# Patient Record
Sex: FEMALE | Race: BLACK OR AFRICAN AMERICAN | Employment: OTHER | URBAN - METROPOLITAN AREA
[De-identification: names, ages, dates, MRNs, and addresses within clinical notes are randomized per-mention and may not be internally consistent; named-entity substitution may affect disease eponyms.]

---

## 2017-01-13 DIAGNOSIS — R55 SYNCOPE AND COLLAPSE: ICD-10-CM

## 2017-01-13 DIAGNOSIS — I10 ESSENTIAL HYPERTENSION: ICD-10-CM

## 2017-01-14 RX ORDER — LOSARTAN POTASSIUM AND HYDROCHLOROTHIAZIDE 12.5; 5 MG/1; MG/1
1 TABLET ORAL DAILY
Qty: 90 TAB | Refills: 3 | Status: SHIPPED | OUTPATIENT
Start: 2017-01-14 | End: 2018-01-30 | Stop reason: SDUPTHER

## 2017-01-14 RX ORDER — MECLIZINE HCL 12.5 MG 12.5 MG/1
12.5 TABLET ORAL
Qty: 30 TAB | Refills: 3 | Status: SHIPPED | OUTPATIENT
Start: 2017-01-14 | End: 2018-05-24 | Stop reason: SDUPTHER

## 2017-01-23 ENCOUNTER — HOSPITAL ENCOUNTER (OUTPATIENT)
Dept: GENERAL RADIOLOGY | Age: 82
Discharge: HOME | End: 2017-01-23
Attending: INTERNAL MEDICINE
Payer: MEDICARE

## 2017-01-23 DIAGNOSIS — D50.8 IRON DEFICIENCY ANEMIA SECONDARY TO INADEQUATE DIETARY IRON INTAKE: ICD-10-CM

## 2017-01-23 PROCEDURE — 77075 RADEX OSSEOUS SURVEY COMPL: CPT

## 2017-02-14 ENCOUNTER — OFFICE VISIT (OUTPATIENT)
Dept: INTERNAL MEDICINE CLINIC | Age: 82
End: 2017-02-14

## 2017-02-14 VITALS
BODY MASS INDEX: 33.09 KG/M2 | SYSTOLIC BLOOD PRESSURE: 131 MMHG | TEMPERATURE: 98.8 F | DIASTOLIC BLOOD PRESSURE: 74 MMHG | WEIGHT: 179.8 LBS | HEART RATE: 67 BPM | OXYGEN SATURATION: 95 % | HEIGHT: 62 IN

## 2017-02-14 DIAGNOSIS — I10 ESSENTIAL HYPERTENSION: ICD-10-CM

## 2017-02-14 DIAGNOSIS — M17.0 PRIMARY OSTEOARTHRITIS OF BOTH KNEES: ICD-10-CM

## 2017-02-14 DIAGNOSIS — F41.9 ANXIETY TENSION STATE: ICD-10-CM

## 2017-02-14 DIAGNOSIS — M47.896 OTHER OSTEOARTHRITIS OF SPINE, LUMBAR REGION: ICD-10-CM

## 2017-02-14 DIAGNOSIS — D64.9 ANEMIA, UNSPECIFIED TYPE: Primary | ICD-10-CM

## 2017-02-14 DIAGNOSIS — E66.09 NON MORBID OBESITY DUE TO EXCESS CALORIES: ICD-10-CM

## 2017-02-14 DIAGNOSIS — N81.4 UTERINE PROLAPSE: ICD-10-CM

## 2017-02-14 PROBLEM — M47.816 OSTEOARTHRITIS OF LUMBAR SPINE: Status: ACTIVE | Noted: 2017-02-14

## 2017-02-14 NOTE — MR AVS SNAPSHOT
Visit Information Date & Time Provider Department Dept. Phone Encounter #  
 2/14/2017  2:30 PM Camilla Schulz 80 Sports [facility] and [place] [address] [id number] Follow-up Instructions Return in about 3 months (around 5/14/2017). Your Appointments 3/6/2017  9:30 AM  
Any with Mady Gallego MD  
[address] [place] [address] and Primary Care [facility]) Appt Note: 3 mo f/up [address] 1 [facility] [place]  
  
   
 [address] [id number]  
  
    
 11/29/2017 11:40 AM  
ESTABLISHED PATIENT with Samuel Carrillo MD  
[facility] ([facility]) Appt Note: 1 yr f/u  
 [address]  [address] [place] [address]  
[address] [address] Upcoming Health Maintenance Date Due  
 GLAUCOMA SCREENING Q2Y 4/19/1997 OSTEOPOROSIS SCREENING (DEXA) 4/19/1997 Pneumococcal 65+ High/Highest Risk (2 of 2 - PPSV23) 1/3/2017 ZOSTER VACCINE AGE 60> 3/8/2017* DTaP/Tdap/Td series (1 - Tdap) 3/8/2017* MEDICARE YEARLY EXAM 11/4/2017 *Topic was postponed. The date shown is not the original due date. Allergies as of 2/14/2017  Review Complete On: 2/14/2017 By: Mady Gallego MD  
 No Known Allergies Current Immunizations  Never Reviewed No immunizations on file. Not reviewed this visit You Were Diagnosed With   
  
 Codes Comments Anemia, unspecified type    -  Primary ICD-10-CM: D64.9 ICD-9-CM: 285.9 Anxiety tension state     ICD-10-CM: F41.9 ICD-9-CM: 300.00 Essential hypertension     ICD-10-CM: I10 
ICD-9-CM: 401.9 Primary osteoarthritis of both knees     ICD-10-CM: M17.0 ICD-9-CM: 715.16 Non morbid obesity due to excess calories     ICD-10-CM: E66.09 
ICD-9-CM: 278.00 Other osteoarthritis of spine, lumbar region     ICD-10-CM: M47.896 Uterine prolapse     ICD-10-CM: N81.4 ICD-9-CM: 618.1 Vitals BP Pulse Temp Height(growth percentile) Weight(growth percentile) SpO2  
 131/74 (BP 1 Location: Left arm, BP Patient Position: Sitting) 67 98.8 °F (37.1 °C) (Oral) 5' 2\" (1.575 m) 179 lb 12.8 oz (81.6 kg) 95% BMI OB Status Smoking Status 32.89 kg/m2 Hysterectomy Never Smoker BMI and BSA Data Body Mass Index Body Surface Area  
 32.89 kg/m 2 1.89 m 2 Preferred Pharmacy Pharmacy Name Phone [id number] [address] [phone] Your Updated Medication List  
  
   
This list is accurate as of: 2/14/17  3:57 PM.  Always use your most recent med list.  
  
  
  
  
 citalopram 20 mg tablet Commonly known as:  CELEXA  
1 tablet every morning  
  
 fluticasone 50 mcg/actuation nasal spray Commonly known as:  Caffie Euler 2 Sprays by Both Nostrils route daily. LORazepam 0.5 mg tablet Commonly known as:  ATIVAN Take 1 Tab by mouth two (2) times daily as needed for Anxiety. Max Daily Amount: 1 mg.  
  
 losartan-hydroCHLOROthiazide 50-12.5 mg per tablet Commonly known as:  HYZAAR Take 1 Tab by mouth daily. meclizine 25 mg Cap Take 12.5 mg by mouth every six (6) hours as needed (for vertigo). omeprazole 40 mg capsule Commonly known as:  PRILOSEC Take 1 Cap by mouth daily. traMADol 50 mg tablet Commonly known as:  ULTRAM  
Take 50 mg by mouth every six (6) hours as needed for Pain. We Performed the Following METABOLIC PANEL, BASIC [98972 CPT(R)] Follow-up Instructions Return in about 3 months (around 5/14/2017). Introducing [facility]! Silvia Clifford introduces [employer] patient portal. Now you can access parts of your medical record, email your doctor's office, and request medication refills online. 1. In your internet browser, go to [email or web address] 2. Click on the First Time User? Click Here link in the Sign In box. You will see the New Member Sign Up page. 3. Enter your Gifi Access Code exactly as it appears below. You will not need to use this code after youve completed the sign-up process. If you do not sign up before the expiration date, you must request a new code. · Gifi Access Code: [email or web address] Expires: 4/11/2017  2:46 PM 
 
4. Enter the last four digits of your Social Security Number (xxxx) and Date of Birth (mm/dd/yyyy) as indicated and click Submit. You will be taken to the next sign-up page. 5. Create a Gifi ID. This will be your Gifi login ID and cannot be changed, so think of one that is secure and easy to remember. 6. Create a Gifi password. You can change your password at any time. 7. Enter your Password Reset Question and Answer. This can be used at a later time if you forget your password. 8. Enter your e-mail address. You will receive e-mail notification when new information is available in [address] 9. Click Sign Up. You can now view and download portions of your medical record. 10. Click the Download Summary menu link to download a portable copy of your medical information. If you have questions, please visit the Frequently Asked Questions section of the Gifi website. Remember, Gifi is NOT to be used for urgent needs. For medical emergencies, dial 911. Now available from your iPhone and Android! Please provide this summary of care documentation to your next provider. Your primary care clinician is listed as Amarilis Eisenberg. If you have any questions after today's visit, please call [phone].

## 2017-02-14 NOTE — PROGRESS NOTES
[address] [facility] and Primary Care  [place] [address]  [address] [place] [id number]  Phone:  [phone]  Fax: [phone]       Chief Complaint   Patient presents with    Hypertension     follow up    . SUBJECTIVE:    Mayur Gama is a 80 y.o. female comes in for return visit stating that she has done fairly well. She sees multiple physicians regarding different medical problems. She is concerned about her psychiatric medicine because it is not doing what it formerly did before in terms of allowing some degree of solace. She was seeing Dr. Doug Vogt who actually placed her on her current medication. She is wondering if she needs to see him again. I also sent her to the hematologist for her anemia because of positive end spike on her serum protein electrophoresis. He told her that he would see her back in six months. She has severe osteoarthritis involving both knees. Fortunately it does not limit her ability to function. She remains on her antihypertensive medication as prescribed. She has had no cardiovascular events. Current Outpatient Prescriptions   Medication Sig Dispense Refill    losartan-hydroCHLOROthiazide (HYZAAR) 50-12.5 mg per tablet Take 1 Tab by mouth daily. 90 Tab 3    citalopram (CELEXA) 20 mg tablet 1 tablet every morning 30 Tab 11    LORazepam (ATIVAN) 0.5 mg tablet Take 1 Tab by mouth two (2) times daily as needed for Anxiety. Max Daily Amount: 1 mg. 50 Tab 5    omeprazole (PRILOSEC) 40 mg capsule Take 1 Cap by mouth daily. 30 Cap 11    traMADol (ULTRAM) 50 mg tablet Take 50 mg by mouth every six (6) hours as needed for Pain.  fluticasone (FLONASE) 50 mcg/actuation nasal spray 2 Sprays by Both Nostrils route daily. 1 Bottle 1    meclizine 25 mg Cap Take 12.5 mg by mouth every six (6) hours as needed (for vertigo).        Past Medical History   Diagnosis Date    Bradycardia     Essential hypertension     GERD (gastroesophageal reflux disease)     Psychiatric disorder      Anxiety     Past Surgical History   Procedure Laterality Date    Hx partial hysterectomy      Hx bunionectomy      Hx gyn       Status post PAULO    Hx orthopaedic       Status post bunionectomy     No Known Allergies      REVIEW OF SYSTEMS:  General: negative for - chills or fever  ENT: negative for - headaches, nasal congestion or tinnitus  Respiratory: negative for - cough, hemoptysis, shortness of breath or wheezing  Cardiovascular : negative for - chest pain, edema, palpitations or shortness of breath  Gastrointestinal: negative for - abdominal pain, blood in stools, heartburn or nausea/vomiting  Genito-Urinary: no dysuria, trouble voiding, or hematuria  Musculoskeletal: negative for - gait disturbance, joint pain, joint stiffness or joint swelling  Neurological: no TIA or stroke symptoms  Hematologic: no bruises, no bleeding, no swollen glands  Integument: no lumps, mole changes, nail changes or rash  Endocrine: no malaise/lethargy or unexpected weight changes      Social History     Social History    Marital status:      Spouse name: N/A    Number of children: N/A    Years of education: N/A     Social History Main Topics    Smoking status: Never Smoker    Smokeless tobacco: Never Used    Alcohol use No    Drug use: No    Sexual activity: Not Currently     Other Topics Concern    None     Social History Narrative     Family History   Problem Relation Age of Onset    Hypertension Mother        OBJECTIVE:    Visit Vitals    /74 (BP 1 Location: Left arm, BP Patient Position: Sitting)    Pulse 67    Temp 98.8 °F (37.1 °C) (Oral)    Ht 5' 2\" (1.575 m)    Wt 179 lb 12.8 oz (81.6 kg)    SpO2 95%    BMI 32.89 kg/m2     CONSTITUTIONAL: well , well nourished, appears age appropriate  EYES: perrla, eom intact  ENMT:moist mucous membranes, pharynx clear  NECK: supple.  Thyroid normal  RESPIRATORY: Chest: clear to ascultation and percussion CARDIOVASCULAR: Heart: regular rate and rhythm  GASTROINTESTINAL: Abdomen: soft, bowel sounds active  HEMATOLOGIC: no pathological lymph nodes palpated  MUSCULOSKELETAL: Extremities: no edema, pulse 1+, severe degenerative changes noted both knees  INTEGUMENT: No unusual rashes or suspicious skin lesions noted. Nails appear normal.  NEUROLOGIC: non-focal exam   MENTAL STATUS: alert and oriented, appropriate affect      ASSESSMENT:  1. Anemia, unspecified type    2. Anxiety tension state    3. Essential hypertension    4. Primary osteoarthritis of both knees    5. Non morbid obesity due to excess calories    6. Other osteoarthritis of spine, lumbar region    7. Uterine prolapse        PLAN:    1. Her anemia is under the auspices of the hematologist.  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 2. She has a tremendous amount of anxiety. She wishes to see a psychiatrist again, specifically Dr. Sarai Dobbs. I will attempt to facilitate this. 3. Her blood pressure is excellent today. No adjustments are made. 4. She has a rather severe osteoarthritis of both knees. They are reasonably stable at this point. 5. She is concerned about her weight thinking that she has lost a significant amount of weight. In reality she has lost about four pounds. I do not think this is pathological weight loss at this point. 6. She complains about pain in her right low back area radiating to her buttock. This is related to osteoarthritis of her back. Supportive measures only. 7. She complains about a cough but I do not find any pathology. 8. She also apparently has a uterine prolapse. She will have to follow-up with gynecology if she is concerned about this. Follow-up Disposition:  Return in about 3 months (around 5/14/2017).       Isa Horton MD

## 2017-02-14 NOTE — PROGRESS NOTES
Chief Complaint   Patient presents with    Hypertension     follow up    1. Have you been to the ER, urgent care clinic since your last visit? Hospitalized since your last visit? No    2. Have you seen or consulted any other health care providers outside of the [facility] since your last visit? Include any pap smears or colon screening.  No

## 2017-02-15 LAB
BUN SERPL-MCNC: 14 MG/DL (ref 8–27)
BUN/CREAT SERPL: 16 (ref 11–26)
CALCIUM SERPL-MCNC: 9.5 MG/DL (ref 8.7–10.3)
CHLORIDE SERPL-SCNC: 100 MMOL/L (ref 96–106)
CO2 SERPL-SCNC: 25 MMOL/L (ref 18–29)
CREAT SERPL-MCNC: 0.87 MG/DL (ref 0.57–1)
GLUCOSE SERPL-MCNC: 102 MG/DL (ref 65–99)
POTASSIUM SERPL-SCNC: 3.7 MMOL/L (ref 3.5–5.2)
SODIUM SERPL-SCNC: 139 MMOL/L (ref 134–144)

## 2017-04-17 ENCOUNTER — HOSPITAL ENCOUNTER (OUTPATIENT)
Dept: MAMMOGRAPHY | Age: 82
Discharge: HOME | End: 2017-04-17
Attending: INTERNAL MEDICINE
Payer: MEDICARE

## 2017-04-17 DIAGNOSIS — Z12.31 VISIT FOR SCREENING MAMMOGRAM: ICD-10-CM

## 2017-04-17 PROCEDURE — 77067 SCR MAMMO BI INCL CAD: CPT

## 2017-05-05 ENCOUNTER — OFFICE VISIT (OUTPATIENT)
Dept: INTERNAL MEDICINE CLINIC | Age: 82
End: 2017-05-05

## 2017-05-05 VITALS
TEMPERATURE: 97.5 F | DIASTOLIC BLOOD PRESSURE: 73 MMHG | WEIGHT: 180.8 LBS | HEIGHT: 62 IN | BODY MASS INDEX: 33.27 KG/M2 | SYSTOLIC BLOOD PRESSURE: 120 MMHG | HEART RATE: 56 BPM | RESPIRATION RATE: 18 BRPM | OXYGEN SATURATION: 97 %

## 2017-05-05 DIAGNOSIS — F41.9 ANXIETY TENSION STATE: ICD-10-CM

## 2017-05-05 DIAGNOSIS — K21.00 REFLUX ESOPHAGITIS: Primary | ICD-10-CM

## 2017-05-05 DIAGNOSIS — I10 ESSENTIAL HYPERTENSION: ICD-10-CM

## 2017-05-05 DIAGNOSIS — G44.219 EPISODIC TENSION-TYPE HEADACHE, NOT INTRACTABLE: ICD-10-CM

## 2017-05-05 DIAGNOSIS — D64.9 ANEMIA, UNSPECIFIED TYPE: ICD-10-CM

## 2017-05-05 RX ORDER — PANTOPRAZOLE SODIUM 40 MG/1
40 TABLET, DELAYED RELEASE ORAL DAILY
Qty: 30 TAB | Refills: 11 | Status: SHIPPED | OUTPATIENT
Start: 2017-05-05 | End: 2018-03-14 | Stop reason: ALTCHOICE

## 2017-05-05 NOTE — PROGRESS NOTES
Chief Complaint   Patient presents with    Abdominal Pain   1. Have you been to the ER, urgent care clinic since your last visit? Hospitalized since your last visit? No    2. Have you seen or consulted any other health care providers outside of the [address] [place] [address] since your last visit? Include any pap smears or colon screening.  No

## 2017-05-05 NOTE — MR AVS SNAPSHOT
Visit Information Date & Time Provider Department Dept. Phone Encounter #  
 5/5/2017 11:30 AM Marianne Johns MD [facility] Medicine and Primary Care [phone] [id number] Your Appointments 5/18/2017  9:15 AM  
Any with Marianne Johns MD  
[address] [facility] and Primary Care [address] [facility]) Appt Note: 3 month f/u  
 [address] 1 [facility]  
  
   
 [address] [id number]  
  
    
 11/29/2017 11:40 AM  
ESTABLISHED PATIENT with Emeterio Butt MD  
[facility] ([facility]) Appt Note: 1 yr f/u  
 [address]  [address] [place] [address]  
[address] [address] Upcoming Health Maintenance Date Due DTaP/Tdap/Td series (1 - Tdap) 4/19/1953 ZOSTER VACCINE AGE 60> 4/19/1992 GLAUCOMA SCREENING Q2Y 4/19/1997 OSTEOPOROSIS SCREENING (DEXA) 4/19/1997 Pneumococcal 65+ High/Highest Risk (2 of 2 - PPSV23) 1/3/2017 INFLUENZA AGE 9 TO ADULT 8/1/2017 MEDICARE YEARLY EXAM 11/4/2017 Allergies as of 5/5/2017  Review Complete On: 2/14/2017 By: Marianne Johns MD  
 No Known Allergies Current Immunizations  Never Reviewed No immunizations on file. Not reviewed this visit You Were Diagnosed With   
  
 Codes Comments Reflux esophagitis    -  Primary ICD-10-CM: K21.0 ICD-9-CM: 530.11 Essential hypertension     ICD-10-CM: I10 
ICD-9-CM: 401.9 Anxiety tension state     ICD-10-CM: F41.9 ICD-9-CM: 300.00 Episodic tension-type headache, not intractable     ICD-10-CM: G32.321 ICD-9-CM: 339.11 Anemia, unspecified type     ICD-10-CM: D64.9 ICD-9-CM: 821. 9 Vitals BP Pulse Temp Resp Height(growth percentile) Weight(growth percentile) 120/73 (BP 1 Location: Left arm, BP Patient Position: Sitting) (!) 56 97.5 °F (36.4 °C) (Oral) 18 5' 2\" (1.575 m) 180 lb 12.8 oz (82 kg) SpO2 BMI OB Status Smoking Status 97% 33.07 kg/m2 Hysterectomy Never Smoker Vitals History BMI and BSA Data Body Mass Index Body Surface Area 33.07 kg/m 2 1.89 m 2 [id number] [place] [address] [address] [phone] Your Updated Medication List  
  
   
This list is accurate as of: 5/5/17  1:46 PM.  Always use your most recent med list.  
  
  
  
  
 citalopram 20 mg tablet Commonly known as:  CELEXA  
1 tablet every morning  
  
 fluticasone 50 mcg/actuation nasal spray Commonly known as:  [place]ick [place] 2 Sprays by Both Nostrils route daily. LORazepam 0.5 mg tablet Commonly known as:  ATIVAN Take 1 Tab by mouth two (2) times daily as needed for Anxiety. Max Daily Amount: 1 mg.  
  
 losartan-hydroCHLOROthiazide 50-12.5 mg per tablet Commonly known as:  HYZAAR Take 1 Tab by mouth daily. meclizine 25 mg Cap Take 12.5 mg by mouth every six (6) hours as needed (for vertigo). pantoprazole 40 mg tablet Commonly known as:  PROTONIX Take 1 Tab by mouth daily. traMADol 50 mg tablet Commonly known as:  ULTRAM  
Take 50 mg by mouth every six (6) hours as needed for Pain. Prescriptions Sent to Pharmacy Refills  
 pantoprazole (PROTONIX) 40 mg tablet 11 Sig: Take 1 Tab by mouth daily. Class: Normal  
 [id number] [place] [address] [address] Ph #: [phone] Route: Oral  
  
We Performed the Following CBC WITH AUTOMATED DIFF [12986 CPT(R)] METABOLIC PANEL, BASIC [26845 CPT(R)] Introducing [facility]! Hazel Jacobson introduces [employer] patient portal. Now you can access parts of your medical record, email your doctor's office, and request medication refills online. 1. In your internet browser, go to [email or web address] 2. Click on the First Time User? Click Here link in the Sign In box. You will see the New Member Sign Up page. 3. Enter your [email or web address] Access Code exactly as it appears below. You will not need to use this code after youve completed the sign-up process. If you do not sign up before the expiration date, you must request a new code. · [email or web address] Access Code: [email or web address] Expires: 7/16/2017  9:20 AM 
 
4. Enter the last four digits of your Social Security Number (xxxx) and Date of Birth (mm/dd/yyyy) as indicated and click Submit. You will be taken to the next sign-up page. 5. Create a [email or web address] ID. This will be your [email or web address] login ID and cannot be changed, so think of one that is secure and easy to remember. 6. Create a [email or web address] password. You can change your password at any time. 7. Enter your Password Reset Question and Answer. This can be used at a later time if you forget your password. 8. Enter your e-mail address. You will receive e-mail notification when new information is available in [address] 9. Click Sign Up. You can now view and download portions of your medical record. 10. Click the Download Summary menu link to download a portable copy of your medical information. If you have questions, please visit the Frequently Asked Questions section of the [email or web address] website. Remember, [email or web address] is NOT to be used for urgent needs. For medical emergencies, dial 911. Now available from your iPhone and Android! Please provide this summary of care documentation to your next provider. Your primary care clinician is listed as Amarilis Eisenberg. If you have any questions after today's visit, please call [phone].

## 2017-05-06 LAB
BASOPHILS # BLD AUTO: 0 X10E3/UL (ref 0–0.2)
BASOPHILS NFR BLD AUTO: 0 %
BUN SERPL-MCNC: 16 MG/DL (ref 8–27)
BUN/CREAT SERPL: 19 (ref 12–28)
CALCIUM SERPL-MCNC: 9.4 MG/DL (ref 8.7–10.3)
CHLORIDE SERPL-SCNC: 102 MMOL/L (ref 96–106)
CO2 SERPL-SCNC: 24 MMOL/L (ref 18–29)
CREAT SERPL-MCNC: 0.84 MG/DL (ref 0.57–1)
EOSINOPHIL # BLD AUTO: 0.1 X10E3/UL (ref 0–0.4)
EOSINOPHIL NFR BLD AUTO: 1 %
ERYTHROCYTE [DISTWIDTH] IN BLOOD BY AUTOMATED COUNT: 20.6 % (ref 12.3–15.4)
GLUCOSE SERPL-MCNC: 89 MG/DL (ref 65–99)
HCT VFR BLD AUTO: 32.8 % (ref 34–46.6)
HGB BLD-MCNC: 10.8 G/DL (ref 11.1–15.9)
IMM GRANULOCYTES # BLD: 0 X10E3/UL (ref 0–0.1)
IMM GRANULOCYTES NFR BLD: 0 %
LYMPHOCYTES # BLD AUTO: 2.7 X10E3/UL (ref 0.7–3.1)
LYMPHOCYTES NFR BLD AUTO: 39 %
MCH RBC QN AUTO: 31.3 PG (ref 26.6–33)
MCHC RBC AUTO-ENTMCNC: 32.9 G/DL (ref 31.5–35.7)
MCV RBC AUTO: 95 FL (ref 79–97)
MONOCYTES # BLD AUTO: 0.4 X10E3/UL (ref 0.1–0.9)
MONOCYTES NFR BLD AUTO: 6 %
NEUTROPHILS # BLD AUTO: 3.7 X10E3/UL (ref 1.4–7)
NEUTROPHILS NFR BLD AUTO: 54 %
PLATELET # BLD AUTO: 194 X10E3/UL (ref 150–379)
POTASSIUM SERPL-SCNC: 4.3 MMOL/L (ref 3.5–5.2)
RBC # BLD AUTO: 3.45 X10E6/UL (ref 3.77–5.28)
SODIUM SERPL-SCNC: 140 MMOL/L (ref 134–144)
WBC # BLD AUTO: 6.9 X10E3/UL (ref 3.4–10.8)

## 2017-05-06 NOTE — PROGRESS NOTES
[address] [facility] and Primary Care  [place] [address]  [address] [place] [address]  Phone:  [phone]  Fax: [phone]       Chief Complaint   Patient presents with    Abdominal Pain   . SUBJECTIVE:    Laurie Perez is a 80 y.o. female comes in for return visit. She continues to complain about the antics of her . She additionally complains of dyspeptic symptoms accompanied by reflux symptoms. She has a bitter taste in her mouth periodically. She continues to be quite anxious. She has episodic headaches which she has had previously which have not changed in quality or character. She has seen neurologists on multiple occasions for this. Finally I sent her a neurologist because of an M spike. Current Outpatient Prescriptions   Medication Sig Dispense Refill    pantoprazole (PROTONIX) 40 mg tablet Take 1 Tab by mouth daily. 30 Tab 11    losartan-hydroCHLOROthiazide (HYZAAR) 50-12.5 mg per tablet Take 1 Tab by mouth daily. 90 Tab 3    citalopram (CELEXA) 20 mg tablet 1 tablet every morning 30 Tab 11    LORazepam (ATIVAN) 0.5 mg tablet Take 1 Tab by mouth two (2) times daily as needed for Anxiety. Max Daily Amount: 1 mg. 50 Tab 5    traMADol (ULTRAM) 50 mg tablet Take 50 mg by mouth every six (6) hours as needed for Pain.  fluticasone (FLONASE) 50 mcg/actuation nasal spray 2 Sprays by Both Nostrils route daily. 1 Bottle 1    meclizine 25 mg Cap Take 12.5 mg by mouth every six (6) hours as needed (for vertigo).        Past Medical History:   Diagnosis Date    Bradycardia     Essential hypertension     GERD (gastroesophageal reflux disease)     Psychiatric disorder     Anxiety     Past Surgical History:   Procedure Laterality Date    HX BUNIONECTOMY      HX GYN      Status post PAULO    HX ORTHOPAEDIC      Status post bunionectomy    HX PARTIAL HYSTERECTOMY       No Known Allergies      REVIEW OF SYSTEMS:  General: negative for - chills or fever  ENT: negative for - headaches, nasal congestion or tinnitus  Respiratory: negative for - cough, hemoptysis, shortness of breath or wheezing  Cardiovascular : negative for - chest pain, edema, palpitations or shortness of breath  Gastrointestinal: negative for - abdominal pain, blood in stools, heartburn or nausea/vomiting  Genito-Urinary: no dysuria, trouble voiding, or hematuria  Musculoskeletal: negative for - gait disturbance, joint pain, joint stiffness or joint swelling  Neurological: no TIA or stroke symptoms  Hematologic: no bruises, no bleeding, no swollen glands  Integument: no lumps, mole changes, nail changes or rash  Endocrine: no malaise/lethargy or unexpected weight changes      Social History     Social History    Marital status:      Spouse name: N/A    Number of children: N/A    Years of education: N/A     Social History Main Topics    Smoking status: Never Smoker    Smokeless tobacco: Never Used    Alcohol use No    Drug use: No    Sexual activity: Not Currently     Other Topics Concern    None     Social History Narrative     Family History   Problem Relation Age of Onset    Hypertension Mother        OBJECTIVE:    Visit Vitals    /73 (BP 1 Location: Left arm, BP Patient Position: Sitting)    Pulse (!) 56    Temp 97.5 °F (36.4 °C) (Oral)    Resp 18    Ht 5' 2\" (1.575 m)    Wt 180 lb 12.8 oz (82 kg)    SpO2 97%    BMI 33.07 kg/m2     CONSTITUTIONAL: well , well nourished, appears age appropriate  EYES: perrla, eom intact  ENMT:moist mucous membranes, pharynx clear  NECK: supple. Thyroid normal  RESPIRATORY: Chest: clear to ascultation and percussion   CARDIOVASCULAR: Heart: regular rate and rhythm  GASTROINTESTINAL: Abdomen: soft, bowel sounds active  HEMATOLOGIC: no pathological lymph nodes palpated  MUSCULOSKELETAL: Extremities: no edema, pulse 1+   INTEGUMENT: No unusual rashes or suspicious skin lesions noted.  Nails appear normal.  NEUROLOGIC: non-focal exam   MENTAL STATUS: alert and oriented, appropriate affect      ASSESSMENT:  1. Reflux esophagitis    2. Essential hypertension    3. Anxiety tension state    4. Episodic tension-type headache, not intractable    5. Anemia, unspecified type        PLAN:    1. She appears to have reflux esophagitis. She will be started on Protonix because the Omeprazole did not work. Hopefully this will help. She was told to take this on a daily basis. 2. Blood pressure is excellent today. No adjustments are made. 3. She is chronically anxious. She will take her minor tranquilizer as needed. She however remains quite functional.    4. She has chronic muscle tension headaches. Nothing more need be done. 5. As far as her anemia is concerned, repeat CBC will be done. 6. She remains quite physically active which is great. .  Orders Placed This Encounter    CBC WITH AUTOMATED DIFF    METABOLIC PANEL, BASIC    pantoprazole (PROTONIX) 40 mg tablet         Follow-up Disposition:  Return in about 3 months (around 8/5/2017).       Chris Cline MD

## 2017-05-30 ENCOUNTER — OFFICE VISIT (OUTPATIENT)
Dept: INTERNAL MEDICINE CLINIC | Age: 82
End: 2017-05-30

## 2017-05-30 VITALS
HEIGHT: 62 IN | RESPIRATION RATE: 18 BRPM | DIASTOLIC BLOOD PRESSURE: 68 MMHG | TEMPERATURE: 98 F | BODY MASS INDEX: 33.31 KG/M2 | SYSTOLIC BLOOD PRESSURE: 135 MMHG | HEART RATE: 85 BPM | WEIGHT: 181 LBS

## 2017-05-30 DIAGNOSIS — T14.8XXA HEMATOMA: Primary | ICD-10-CM

## 2017-05-30 DIAGNOSIS — D64.9 ANEMIA, UNSPECIFIED TYPE: ICD-10-CM

## 2017-05-30 NOTE — MR AVS SNAPSHOT
Visit Information Date & Time Provider Department Dept. Phone Encounter #  
 5/30/2017 11:45 AM Camilla Willoughby 80 Sports Medicine and [place] 34 [id number] Your Appointments 8/31/2017 11:45 AM  
Any with Bennett Christensen MD  
[address] [place] [address] and Primary Care [address] [facility]) Appt Note: 3 month f/u  
 [address] [facility]  
  
   
 [address] [id number]  
  
    
 11/29/2017 11:40 AM  
ESTABLISHED PATIENT with Celestia Meigs, MD  
[facility] ([facility]) Appt Note: 1 yr f/u  
 [address] [facility] [address] [place] [address]  
[address] [facility] [facility] [address] [address] Upcoming Health Maintenance Date Due DTaP/Tdap/Td series (1 - Tdap) 4/19/1953 ZOSTER VACCINE AGE 60> 4/19/1992 GLAUCOMA SCREENING Q2Y 4/19/1997 OSTEOPOROSIS SCREENING (DEXA) 4/19/1997 Pneumococcal 65+ High/Highest Risk (2 of 2 - PPSV23) 1/3/2017 INFLUENZA AGE 9 TO ADULT 8/1/2017 MEDICARE YEARLY EXAM 11/4/2017 Allergies as of 5/30/2017  Review Complete On: 5/30/2017 By: Alber Arguello LPN No Known Allergies Current Immunizations  Never Reviewed No immunizations on file. Not reviewed this visit Vitals BP Pulse Temp Resp Height(growth percentile) Weight(growth percentile) 135/68 85 98 °F (36.7 °C) 18 5' 2\" (1.575 m) 181 lb (82.1 kg) BMI OB Status Smoking Status 33.11 kg/m2 Hysterectomy Never Smoker BMI and BSA Data Body Mass Index Body Surface Area  
 33.11 kg/m 2 1.9 m 2 Preferred Pharmacy Pharmacy Name Phone [phone] [facility], [place] [address] Britton Torres [phone] Your Updated Medication List  
  
   
This list is accurate as of: 5/30/17  1:57 PM.  Always use your most recent med list.  
  
  
  
  
 citalopram 20 mg tablet Commonly known as:  CELEXA  
1 tablet every morning  
  
 fluticasone 50 mcg/actuation nasal spray Commonly known as:  Grossman Elizabeth 2 Sprays by Both Nostrils route daily. LORazepam 0.5 mg tablet Commonly known as:  ATIVAN Take 1 Tab by mouth two (2) times daily as needed for Anxiety. Max Daily Amount: 1 mg.  
  
 losartan-hydroCHLOROthiazide 50-12.5 mg per tablet Commonly known as:  HYZAAR Take 1 Tab by mouth daily. meclizine 25 mg Cap Take 12.5 mg by mouth every six (6) hours as needed (for vertigo). pantoprazole 40 mg tablet Commonly known as:  PROTONIX Take 1 Tab by mouth daily. traMADol 50 mg tablet Commonly known as:  ULTRAM  
Take 50 mg by mouth every six (6) hours as needed for Pain. Introducing [facility] & HEALTH SERVICES! [facility] introduces GLO patient portal. Now you can access parts of your medical record, email your doctor's office, and request medication refills online. 1. In your internet browser, go to [email or web address] 2. Click on the First Time User? Click Here link in the Sign In box. You will see the New Member Sign Up page. 3. Enter your GLO Access Code exactly as it appears below. You will not need to use this code after youve completed the sign-up process. If you do not sign up before the expiration date, you must request a new code. · GLO Access Code: [email or web address] Expires: 7/16/2017  9:20 AM 
 
4. Enter the last four digits of your Social Security Number (xxxx) and Date of Birth (mm/dd/yyyy) as indicated and click Submit. You will be taken to the next sign-up page. 5. Create a NWIXt ID. This will be your GLO login ID and cannot be changed, so think of one that is secure and easy to remember. 6. Create a GLO password. You can change your password at any time. 7. Enter your Password Reset Question and Answer. This can be used at a later time if you forget your password. 8. Enter your e-mail address. You will receive e-mail notification when new information is available in [address] 9. Click Sign Up. You can now view and download portions of your medical record. 10. Click the Download Summary menu link to download a portable copy of your medical information. If you have questions, please visit the Frequently Asked Questions section of the [employer] website. Remember, [employer] is NOT to be used for urgent needs. For medical emergencies, dial 911. Now available from your iPhone and Android! Please provide this summary of care documentation to your next provider. Your primary care clinician is listed as Amarilis Eisenberg. If you have any questions after today's visit, please call [phone].

## 2017-05-30 NOTE — PROGRESS NOTES
Chief Complaint   Patient presents with    Mass     on the top of right foot, since this morning, no injury

## 2017-05-31 NOTE — PROGRESS NOTES
Chief Complaint   Patient presents with    Mass     on the top of right foot, since this morning, no injury   . SUBECTIVE:    Florencio Frankel is a 80 y.o. female comes in for return visit concerned about a nodule on the ventral aspect of her right foot. She states that it was never painful and does not remember bumping the area. She also has a mild anemia with a positive M spike. She is seeing the hematologist regarding this. Current Outpatient Prescriptions   Medication Sig Dispense Refill    pantoprazole (PROTONIX) 40 mg tablet Take 1 Tab by mouth daily. 30 Tab 11    losartan-hydroCHLOROthiazide (HYZAAR) 50-12.5 mg per tablet Take 1 Tab by mouth daily. 90 Tab 3    citalopram (CELEXA) 20 mg tablet 1 tablet every morning 30 Tab 11    LORazepam (ATIVAN) 0.5 mg tablet Take 1 Tab by mouth two (2) times daily as needed for Anxiety. Max Daily Amount: 1 mg. 50 Tab 5    traMADol (ULTRAM) 50 mg tablet Take 50 mg by mouth every six (6) hours as needed for Pain.  fluticasone (FLONASE) 50 mcg/actuation nasal spray 2 Sprays by Both Nostrils route daily. 1 Bottle 1    meclizine 25 mg Cap Take 12.5 mg by mouth every six (6) hours as needed (for vertigo).        Past Medical History:   Diagnosis Date    Bradycardia     Essential hypertension     GERD (gastroesophageal reflux disease)     Psychiatric disorder     Anxiety     Past Surgical History:   Procedure Laterality Date    HX BUNIONECTOMY      HX GYN      Status post PAULO    HX ORTHOPAEDIC      Status post bunionectomy    HX PARTIAL HYSTERECTOMY       No Known Allergies    REVIEW OF SYSTEMS:  Review of Systems - Negative except   ENT ROS: negative for - headaches, hearing change, nasal congestion, oral lesions, tinnitus, visual changes or   Respiratory ROS: no cough, shortness of breath, or wheezing  Cardiovascular ROS: no chest pain or dyspnea on exertion  Gastrointestinal ROS: no abdominal pain, change in bowel habits, or black or blood  Genito-Urinary ROS: no dysuria, trouble voiding, or hematuria  Musculoskeletal ROS: negative  Neurological ROS: no TIA or stroke symptoms      Social History     Social History    Marital status:      Spouse name: N/A    Number of children: N/A    Years of education: N/A     Social History Main Topics    Smoking status: Never Smoker    Smokeless tobacco: Never Used    Alcohol use No    Drug use: No    Sexual activity: Not Currently     Other Topics Concern    None     Social History Narrative   r  Family History   Problem Relation Age of Onset    Hypertension Mother        OBJECTIVE:  Visit Vitals    /68    Pulse 85    Temp 98 °F (36.7 °C)    Resp 18    Ht 5' 2\" (1.575 m)    Wt 181 lb (82.1 kg)    BMI 33.11 kg/m2     ENT: perrla,  eom intact  NECK: supple. Thyroid normal, no JVD  CHEST: clear to ascultation and percussion   HEART: regular rate and rhythm  ABD: soft, bowel sounds active,   EXTREMITIES: no edema, pulse 1+  INTEGUMENT: clear      ASSESSMENT:  1. Hematoma    2. Anemia, unspecified type        PLAN:    1. The lesion on her foot is suggestive of a hematoma. This is not a new one but it has been present for about a week. She has no pain in that area. I explained to her that this should resolve in the next two to three weeks. I do not think this is a ganglion cyst in view of the discoloration that is present. 2. She asks me about following up with the hematologist for her anemia which I strongly suggest she do this. She also can discontinue the iron which was inappropriately started by her previous physician. Follow-up Disposition:  Return in about 3 months (around 8/30/2017).       Rodger Kam MD

## 2017-07-14 RX ORDER — HYDROXYZINE 25 MG/1
25 TABLET, FILM COATED ORAL
Qty: 60 TAB | Refills: 0 | Status: SHIPPED | OUTPATIENT
Start: 2017-07-14 | End: 2017-07-24

## 2017-08-31 ENCOUNTER — OFFICE VISIT (OUTPATIENT)
Dept: INTERNAL MEDICINE CLINIC | Age: 82
End: 2017-08-31

## 2017-08-31 VITALS
SYSTOLIC BLOOD PRESSURE: 137 MMHG | TEMPERATURE: 97.9 F | BODY MASS INDEX: 33.42 KG/M2 | HEART RATE: 56 BPM | RESPIRATION RATE: 16 BRPM | WEIGHT: 181.6 LBS | DIASTOLIC BLOOD PRESSURE: 76 MMHG | OXYGEN SATURATION: 94 % | HEIGHT: 62 IN

## 2017-08-31 DIAGNOSIS — G44.219 EPISODIC TENSION-TYPE HEADACHE, NOT INTRACTABLE: Primary | ICD-10-CM

## 2017-08-31 DIAGNOSIS — I10 ESSENTIAL HYPERTENSION: ICD-10-CM

## 2017-08-31 DIAGNOSIS — D64.9 ANEMIA, UNSPECIFIED TYPE: ICD-10-CM

## 2017-08-31 DIAGNOSIS — F41.9 ANXIETY TENSION STATE: ICD-10-CM

## 2017-08-31 RX ORDER — CITALOPRAM 20 MG/1
20 TABLET, FILM COATED ORAL DAILY
COMMUNITY
End: 2017-10-20 | Stop reason: CLARIF

## 2017-08-31 RX ORDER — ESOMEPRAZOLE MAGNESIUM 40 MG/1
20 CAPSULE, DELAYED RELEASE ORAL DAILY
COMMUNITY
End: 2018-03-14 | Stop reason: ALTCHOICE

## 2017-08-31 NOTE — MR AVS SNAPSHOT
Visit Information Date & Time Provider Department Dept. Phone Encounter #  
 8/31/2017 11:45 AM [facility] Phillips [facility] [id number] Your Appointments 11/29/2017 11:40 AM  
ESTABLISHED PATIENT with Argelia Saleh MD  
[facility] ([facility]) Appt Note: 1 yr f/u  
 [address] [facility] [address] [facility] [address]  
[address] [facility] [address]  
  
    
 12/7/2017 10:45 AM  
Any with Elvis Deshpande MD  
[address] [place] [address] and Primary Care [facility]) Appt Note: 3 month f/u  
 [address] [facility] 1 [facility]  
  
   
 [address] [facility] [address] Upcoming Health Maintenance Date Due DTaP/Tdap/Td series (1 - Tdap) 4/19/1953 ZOSTER VACCINE AGE 60> 2/19/1992 GLAUCOMA SCREENING Q2Y 4/19/1997 OSTEOPOROSIS SCREENING (DEXA) 4/19/1997 Pneumococcal 65+ High/Highest Risk (2 of 2 - PPSV23) 1/3/2017 INFLUENZA AGE 9 TO ADULT 8/1/2017 MEDICARE YEARLY EXAM 11/4/2017 Allergies as of 8/31/2017  Review Complete On: 8/31/2017 By: Leanna Ferrera No Known Allergies Current Immunizations  Never Reviewed No immunizations on file. Not reviewed this visit You Were Diagnosed With   
  
 Codes Comments Episodic tension-type headache, not intractable    -  Primary ICD-10-CM: A67.769 ICD-9-CM: 339.11 Anxiety tension state     ICD-10-CM: F41.9 ICD-9-CM: 300.00 Anemia, unspecified type     ICD-10-CM: D64.9 ICD-9-CM: 285.9 Essential hypertension     ICD-10-CM: I10 
ICD-9-CM: 401.9 Vitals BP Pulse Temp Resp Height(growth percentile) Weight(growth percentile)  
 137/76 (BP 1 Location: Left arm, BP Patient Position: Sitting) (!) 56 97.9 °F (36.6 °C) (Oral) 16 5' 2\" (1.575 m) 181 lb 9.6 oz (82.4 kg) SpO2 BMI OB Status Smoking Status 94% 33.22 kg/m2 Hysterectomy Never Smoker Vitals History BMI and BSA Data Body Mass Index Body Surface Area  
 33.22 kg/m 2 1.9 m 2 Preferred Pharmacy Pharmacy Name Phone [phone] [place] [address] Dwayne Antoine [phone] Your Updated Medication List  
  
   
This list is accurate as of: 8/31/17  1:54 PM.  Always use your most recent med list.  
  
  
  
  
 CENTRUM SILVER PO Take 1 Tab by mouth daily. citalopram 20 mg tablet Commonly known as:  Alexandria Sink Take 20 mg by mouth daily. esomeprazole 40 mg capsule Commonly known as:  Rick Moulds Take 20 mg by mouth daily. Take 1 capsule by mouth once daily 1 to 2 hours before dinner  
  
 fluticasone 50 mcg/actuation nasal spray Commonly known as:  [employer] 2 Sprays by Both Nostrils route daily. LORazepam 0.5 mg tablet Commonly known as:  ATIVAN  
TAKE ONE TABLET BY MOUTH TWICE A DAY AS NEEDED FOR ANXIETY. MAX DAILY DOSE 1MG  
  
 losartan-hydroCHLOROthiazide 50-12.5 mg per tablet Commonly known as:  HYZAAR Take 1 Tab by mouth daily. meclizine 25 mg Cap Take 12.5 mg by mouth every six (6) hours as needed (for vertigo). pantoprazole 40 mg tablet Commonly known as:  PROTONIX Take 1 Tab by mouth daily. traMADol 50 mg tablet Commonly known as:  ULTRAM  
Take 50 mg by mouth every six (6) hours as needed for Pain. Introducing \[facility]\"" & HEALTH SERVICES! Marixa Lundberg introduces TopLog patient portal. Now you can access parts of your medical record, email your doctor's office, and request medication refills online. 1. In your internet browser, go to [email or web address] 2. Click on the First Time User? Click Here link in the Sign In box. You will see the New Member Sign Up page. 3. Enter your TopLog Access Code exactly as it appears below. You will not need to use this code after youve completed the sign-up process.  If you do not sign up before the expiration date, you must request a new code. · Edvivo Access Code: [email or web address] Expires: 11/29/2017  1:54 PM 
 
4. Enter the last four digits of your Social Security Number (xxxx) and Date of Birth (mm/dd/yyyy) as indicated and click Submit. You will be taken to the next sign-up page. 5. Create a Edvivo ID. This will be your Edvivo login ID and cannot be changed, so think of one that is secure and easy to remember. 6. Create a Edvivo password. You can change your password at any time. 7. Enter your Password Reset Question and Answer. This can be used at a later time if you forget your password. 8. Enter your e-mail address. You will receive e-mail notification when new information is available in [address] 9. Click Sign Up. You can now view and download portions of your medical record. 10. Click the Download Summary menu link to download a portable copy of your medical information. If you have questions, please visit the Frequently Asked Questions section of the Ed[employer]ivo website. Remember, Edvivo is NOT to be used for urgent needs. For medical emergencies, dial 911. Now available from your iPhone and Android! Please provide this summary of care documentation to your next provider. Your primary care clinician is listed as Amarilis Eisenberg. If you have any questions after today's visit, please call [phone].

## 2017-08-31 NOTE — PROGRESS NOTES
Chief Complaint   Patient presents with    Anemia     3 month follow up      1. Have you been to the ER, urgent care clinic since your last visit? Hospitalized since your last visit? No    2. Have you seen or consulted any other health care providers outside of the [address] [place] [address] since your last visit? Include any pap smears or colon screening.  No

## 2017-08-31 NOTE — PROGRESS NOTES
Chief Complaint   Patient presents with    Anemia     3 month follow up    . SUBECTIVE:    Sandy Wang is a 80 y.o. female Comes in for return visit stating that she has done fairly well. Her biggest complaint is of headaches, frontal and vertex in locations. She has had chronic headaches. There is nothing new and different about these. She has occasional nausea. She had a GI workup by Dr. Zabrina Aguiar and is essentially negative. She is quite stressed because of her . She needs help in the home. He is quite disabled and it is difficult for her to care for him. Current Outpatient Prescriptions   Medication Sig Dispense Refill    citalopram (CELEXA) 20 mg tablet Take 20 mg by mouth daily.  esomeprazole (NEXIUM) 40 mg capsule Take 20 mg by mouth daily. Take 1 capsule by mouth once daily 1 to 2 hours before dinner      FOLIC ACID/MULTIVIT-MIN/LUTEIN (CENTRUM SILVER PO) Take 1 Tab by mouth daily.  LORazepam (ATIVAN) 0.5 mg tablet TAKE ONE TABLET BY MOUTH TWICE A DAY AS NEEDED FOR ANXIETY. MAX DAILY DOSE 1MG 50 Tab 5    pantoprazole (PROTONIX) 40 mg tablet Take 1 Tab by mouth daily. 30 Tab 11    losartan-hydroCHLOROthiazide (HYZAAR) 50-12.5 mg per tablet Take 1 Tab by mouth daily. 90 Tab 3    fluticasone (FLONASE) 50 mcg/actuation nasal spray 2 Sprays by Both Nostrils route daily. 1 Bottle 1    meclizine 25 mg Cap Take 12.5 mg by mouth every six (6) hours as needed (for vertigo).  traMADol (ULTRAM) 50 mg tablet Take 50 mg by mouth every six (6) hours as needed for Pain.        Past Medical History:   Diagnosis Date    Bradycardia     Essential hypertension     GERD (gastroesophageal reflux disease)     Psychiatric disorder     Anxiety     Past Surgical History:   Procedure Laterality Date    HX BUNIONECTOMY      HX GYN      Status post PAULO    HX ORTHOPAEDIC      Status post bunionectomy    HX PARTIAL HYSTERECTOMY       No Known Allergies    REVIEW OF SYSTEMS:  Review of Systems - Negative except   ENT ROS: negative for - headaches, hearing change, nasal congestion, oral lesions, tinnitus, visual changes or   Respiratory ROS: no cough, shortness of breath, or wheezing  Cardiovascular ROS: no chest pain or dyspnea on exertion  Gastrointestinal ROS: no abdominal pain, change in bowel habits, or black or blood  Genito-Urinary ROS: no dysuria, trouble voiding, or hematuria  Musculoskeletal ROS: negative  Neurological ROS: no TIA or stroke symptoms      Social History     Social History    Marital status:      Spouse name: N/A    Number of children: N/A    Years of education: N/A     Social History Main Topics    Smoking status: Never Smoker    Smokeless tobacco: Never Used    Alcohol use No    Drug use: No    Sexual activity: Not Currently     Other Topics Concern    None     Social History Narrative   r  Family History   Problem Relation Age of Onset    Hypertension Mother        OBJECTIVE:  Visit Vitals    /76 (BP 1 Location: Left arm, BP Patient Position: Sitting)    Pulse (!) 56    Temp 97.9 °F (36.6 °C) (Oral)    Resp 16    Ht 5' 2\" (1.575 m)    Wt 181 lb 9.6 oz (82.4 kg)    SpO2 94%    BMI 33.22 kg/m2     ENT: perrla,  eom intact  NECK: supple. Thyroid normal, no JVD  CHEST: clear to ascultation and percussion   HEART: regular rate and rhythm  ABD: soft, bowel sounds active,   EXTREMITIES: no edema, pulse 1+  INTEGUMENT: clear      ASSESSMENT:  1. Episodic tension-type headache, not intractable    2. Anxiety tension state    3. Anemia, unspecified type    4. Essential hypertension        PLAN:    1. The patient has muscle tension headaches. The quality and character are unchanged from her baseline headaches. Continue symptomatic treatment. She has a tremendous amount of anxiety and will take her anxiolytic as needed.    2. She has an anemia that is accurately followed by hematology for the possibility of plasma cell dyscrasia. 3. Blood pressure is excellent. No adjustments are made. .  Orders Placed This Encounter    citalopram (CELEXA) 20 mg tablet    esomeprazole (NEXIUM) 40 mg capsule    FOLIC ACID/MULTIVIT-MIN/LUTEIN (CENTRUM SILVER PO)       Follow-up Disposition:  Return in about 4 months (around 12/31/2017).       Niya Yeboah MD

## 2017-10-20 RX ORDER — CITALOPRAM 20 MG/1
TABLET, FILM COATED ORAL
Qty: 30 TAB | Refills: 11 | Status: SHIPPED | OUTPATIENT
Start: 2017-10-20 | End: 2018-08-18 | Stop reason: SDUPTHER

## 2017-11-29 ENCOUNTER — OFFICE VISIT (OUTPATIENT)
Dept: CARDIOLOGY CLINIC | Age: 82
End: 2017-11-29

## 2017-11-29 VITALS
WEIGHT: 182.2 LBS | BODY MASS INDEX: 33.53 KG/M2 | HEIGHT: 62 IN | HEART RATE: 72 BPM | DIASTOLIC BLOOD PRESSURE: 80 MMHG | SYSTOLIC BLOOD PRESSURE: 120 MMHG

## 2017-11-29 DIAGNOSIS — I10 ESSENTIAL HYPERTENSION: ICD-10-CM

## 2017-11-29 DIAGNOSIS — I49.5 SSS (SICK SINUS SYNDROME) (HCC): Primary | ICD-10-CM

## 2017-11-29 DIAGNOSIS — M17.0 PRIMARY OSTEOARTHRITIS OF BOTH KNEES: ICD-10-CM

## 2017-11-29 DIAGNOSIS — E66.09 NON MORBID OBESITY DUE TO EXCESS CALORIES: ICD-10-CM

## 2017-11-29 RX ORDER — ASPIRIN 81 MG/1
TABLET ORAL DAILY
COMMUNITY
End: 2017-11-29 | Stop reason: ALTCHOICE

## 2017-11-29 NOTE — MR AVS SNAPSHOT
Visit Information Date & Time Provider Department Dept. Phone Encounter #  
 11/29/2017 11:40 AM Nishant Wesley MD [facility] Zayda Macdonald [phone] [id number] Your Appointments 11/29/2017 11:40 AM  
ESTABLISHED PATIENT with Nishant Wesley MD  
[facility] ([facility]) Appt Note: 1 yr f/u  
 [address] [facility] [address] [place] [address]  
[address] [place] [address]  
  
    
 12/7/2017 10:45 AM  
Any with Marissa Campo MD  
[address] [place] [address] and [facility] Appt Note: 3 month f/u  
 [address] 1 [facility]  
  
   
 [address] [id number] Upcoming Health Maintenance Date Due DTaP/Tdap/Td series (1 - Tdap) 4/19/1953 ZOSTER VACCINE AGE 60> 2/19/1992 GLAUCOMA SCREENING Q2Y 4/19/1997 OSTEOPOROSIS SCREENING (DEXA) 4/19/1997 Pneumococcal 65+ Low/Medium Risk (1 of 2 - PCV13) 4/19/1997 Influenza Age 5 to Adult 8/1/2017 MEDICARE YEARLY EXAM 11/4/2017 Allergies as of 11/29/2017  Review Complete On: 11/29/2017 By: Omayra Johnson LPN No Known Allergies Current Immunizations  Never Reviewed No immunizations on file. Not reviewed this visit Vitals BP Pulse Height(growth percentile) Weight(growth percentile) BMI OB Status 120/80 72 5' 2\" (1.575 m) 182 lb 3.2 oz (82.6 kg) 33.32 kg/m2 Hysterectomy Smoking Status Never Smoker Vitals History BMI and BSA Data Body Mass Index Body Surface Area  
 33.32 kg/m 2 1.9 m 2 Your Updated Medication List  
  
   
This list is accurate as of: 11/29/17  9:53 AM.  Always use your most recent med list.  
  
  
  
  
 aspirin delayed-release 81 mg tablet Take  by mouth daily. CENTRUM SILVER PO Take 1 Tab by mouth daily. citalopram 20 mg tablet Commonly known as:  Sabi Eye take 1 tablet by mouth once daily  
  
 esomeprazole 40 mg capsule Commonly known as:  Haley Sarna Take 20 mg by mouth daily. Take 1 capsule by mouth once daily 1 to 2 hours before dinner  
  
 fluticasone 50 mcg/actuation nasal spray Commonly known as:  Euna Devin 2 Sprays by Both Nostrils route daily. LORazepam 0.5 mg tablet Commonly known as:  ATIVAN  
TAKE ONE TABLET BY MOUTH TWICE A DAY AS NEEDED FOR ANXIETY. MAX DAILY DOSE 1MG  
  
 losartan-hydroCHLOROthiazide 50-12.5 mg per tablet Commonly known as:  HYZAAR Take 1 Tab by mouth daily. meclizine 25 mg Cap Take 12.5 mg by mouth every six (6) hours as needed (for vertigo). pantoprazole 40 mg tablet Commonly known as:  PROTONIX Take 1 Tab by mouth daily. traMADol 50 mg tablet Commonly known as:  ULTRAM  
Take 50 mg by mouth every six (6) hours as needed for Pain. Introducing [facility] & HEALTH SERVICES! Domingo Lancaster introduces Oodle patient portal. Now you can access parts of your medical record, email your doctor's office, and request medication refills online. 1. In your internet browser, go to [email or web address] 2. Click on the First Time User? Click Here link in the Sign In box. You will see the New Member Sign Up page. 3. Enter your Oodle Access Code exactly as it appears below. You will not need to use this code after youve completed the sign-up process. If you do not sign up before the expiration date, you must request a new code. · Oodle Access Code: [email or web address] Expires: 11/29/2017 12:54 PM 
 
4. Enter the last four digits of your Social Security Number (xxxx) and Date of Birth (mm/dd/yyyy) as indicated and click Submit. You will be taken to the next sign-up page. 5. Create a EntropySoftt ID. This will be your EntropySoftt login ID and cannot be changed, so think of one that is secure and easy to remember. 6. Create a EntropySoftt password. You can change your password at any time. 7. Enter your Password Reset Question and Answer. This can be used at a later time if you forget your password. 8. Enter your e-mail address. You will receive e-mail notification when new information is available in [address] 9. Click Sign Up. You can now view and download portions of your medical record. 10. Click the Download Summary menu link to download a portable copy of your medical information. If you have questions, please visit the Frequently Asked Questions section of the NetLex website. Remember, NetLex is NOT to be used for urgent needs. For medical emergencies, dial 911. Now available from your iPhone and Android! Please provide this summary of care documentation to your next provider. Your primary care clinician is listed as Amarilis Eisenberg. If you have any questions after today's visit, please call [phone].

## 2017-11-29 NOTE — PROGRESS NOTES
HISTORY OF PRESENT ILLNESS  Analilia Flaherty is a 80 y.o. female. She is seen in followup for sick sinus syndrome and hypertension. She continues to have a great deal of stress caring for her  who is over [de-identified] years of age. She is unsteady and uses a cane. Her  has lost a lot of weight. She does complain of some problems with reflux and anxiety. She also has anemia and has an appointment to see Dr. Saundra Leach in February in this regard. Her anemia workup has been unrevealing thus far. HPI  Patient Active Problem List   Diagnosis Code    Syncope and collapse R55    Obesity E66.9    Bradycardia R00.1    HTN (hypertension) I10    Sinus congestion R09.81    SSS (sick sinus syndrome) (HCC) I49.5    Vasomotor rhinitis J30.0    Non morbid obesity due to excess calories E66.09    Anemia D64.9    Primary osteoarthritis of both knees M17.0    Benign recurrent vertigo H81.10    Anxiety tension state F41.9    Dyspepsia R10.13    Uterine prolapse N81.4    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     Current Outpatient Prescriptions   Medication Sig Dispense Refill    citalopram (CELEXA) 20 mg tablet take 1 tablet by mouth once daily 30 Tab 11    esomeprazole (NEXIUM) 40 mg capsule Take 20 mg by mouth daily. Take 1 capsule by mouth once daily 1 to 2 hours before dinner      LORazepam (ATIVAN) 0.5 mg tablet TAKE ONE TABLET BY MOUTH TWICE A DAY AS NEEDED FOR ANXIETY. MAX DAILY DOSE 1MG 50 Tab 5    losartan-hydroCHLOROthiazide (HYZAAR) 50-12.5 mg per tablet Take 1 Tab by mouth daily. 90 Tab 3    meclizine 25 mg Cap Take 12.5 mg by mouth every six (6) hours as needed (for vertigo).  FOLIC ACID/MULTIVIT-MIN/LUTEIN (CENTRUM SILVER PO) Take 1 Tab by mouth daily.  pantoprazole (PROTONIX) 40 mg tablet Take 1 Tab by mouth daily. 30 Tab 11    traMADol (ULTRAM) 50 mg tablet Take 50 mg by mouth every six (6) hours as needed for Pain.       fluticasone (FLONASE) 50 mcg/actuation nasal spray 2 Sprays by Both Nostrils route daily. 1 Bottle 1     Past Medical History:   Diagnosis Date    Bradycardia     Essential hypertension     GERD (gastroesophageal reflux disease)     Psychiatric disorder     Anxiety     Past Surgical History:   Procedure Laterality Date    HX BUNIONECTOMY      HX GYN      Status post PAULO    HX ORTHOPAEDIC      Status post bunionectomy    HX PARTIAL HYSTERECTOMY         Review of Systems   Skin: Positive for itching. Psychiatric/Behavioral: The patient is nervous/anxious. All other systems reviewed and are negative. Visit Vitals    /80    Pulse 72    Ht 5' 2\" (1.575 m)    Wt 182 lb 3.2 oz (82.6 kg)    BMI 33.32 kg/m2       Physical Exam   Constitutional: She is oriented to person, place, and time. She appears well-nourished. HENT:   Head: Atraumatic. Eyes: Conjunctivae are normal.   Neck: Neck supple. Cardiovascular: Normal rate, regular rhythm and normal heart sounds. Exam reveals no gallop and no friction rub. No murmur heard. Pulmonary/Chest: Breath sounds normal. She has no wheezes. Abdominal: Bowel sounds are normal.   Musculoskeletal: She exhibits no edema. Neurological: She is oriented to person, place, and time. Skin: Skin is dry. Nursing note and vitals reviewed. ASSESSMENT and PLAN  Her blood pressure is well controlled today and her heart rate is 72 beats per minute. She previously had a low heart rate and syncope on carvedilol. I have encouraged her to keep her appointments with her other physicians in the future. I will see her back in six months.

## 2017-12-26 ENCOUNTER — OFFICE VISIT (OUTPATIENT)
Dept: INTERNAL MEDICINE CLINIC | Age: 82
End: 2017-12-26

## 2017-12-26 VITALS
DIASTOLIC BLOOD PRESSURE: 71 MMHG | HEIGHT: 62 IN | WEIGHT: 180.2 LBS | OXYGEN SATURATION: 96 % | RESPIRATION RATE: 16 BRPM | HEART RATE: 62 BPM | SYSTOLIC BLOOD PRESSURE: 109 MMHG | BODY MASS INDEX: 33.16 KG/M2 | TEMPERATURE: 97.5 F

## 2017-12-26 DIAGNOSIS — I10 ESSENTIAL HYPERTENSION: ICD-10-CM

## 2017-12-26 DIAGNOSIS — Z00.00 WELLNESS EXAMINATION: ICD-10-CM

## 2017-12-26 DIAGNOSIS — Z13.39 SCREENING FOR ALCOHOLISM: ICD-10-CM

## 2017-12-26 DIAGNOSIS — K21.00 REFLUX ESOPHAGITIS: Primary | ICD-10-CM

## 2017-12-26 DIAGNOSIS — G44.229 CHRONIC TENSION-TYPE HEADACHE, NOT INTRACTABLE: ICD-10-CM

## 2017-12-26 DIAGNOSIS — Z13.31 SCREENING FOR DEPRESSION: ICD-10-CM

## 2017-12-26 DIAGNOSIS — E66.09 NON MORBID OBESITY DUE TO EXCESS CALORIES: ICD-10-CM

## 2017-12-26 RX ORDER — DEXTROMETHORPHAN HYDROBROMIDE, GUAIFENESIN 5; 100 MG/5ML; MG/5ML
650 LIQUID ORAL EVERY 8 HOURS
COMMUNITY
End: 2018-03-14 | Stop reason: ALTCHOICE

## 2017-12-26 RX ORDER — SUCRALFATE 1 G/1
1 TABLET ORAL
Qty: 42 TAB | Refills: 0 | Status: SHIPPED | OUTPATIENT
Start: 2017-12-26 | End: 2018-03-14 | Stop reason: ALTCHOICE

## 2017-12-26 NOTE — PATIENT INSTRUCTIONS

## 2017-12-26 NOTE — MR AVS SNAPSHOT
Visit Information Date & Time Provider Department Dept. Phone Encounter #  
 12/26/2017 12:00 PM Ricky Mcdaniel MD [facility] [phone] [id number] Your Appointments 5/30/2018 11:40 AM  
ESTABLISHED PATIENT with Parul Stacy MD  
[facility] ([facility] SCHEDULING) Appt Note: 6 MO F/U  
 [address] [facility] [address] [place] [address] [address] [place], [place] [address] [facility] 7 [phone] Upcoming Health Maintenance Date Due  
 GLAUCOMA SCREENING Q2Y 4/19/1997 OSTEOPOROSIS SCREENING (DEXA) 4/19/1997 MEDICARE YEARLY EXAM 11/4/2017 Pneumococcal 65+ Low/Medium Risk (2 of 2 - PPSV23) 10/10/2018 DTaP/Tdap/Td series (2 - Td) 12/26/2027 Allergies as of 12/26/2017  Review Complete On: 12/26/2017 By: Emily Valenzuela LPN No Known Allergies Current Immunizations  Never Reviewed No immunizations on file. Not reviewed this visit You Were Diagnosed With   
  
 Codes Comments Reflux esophagitis    -  Primary ICD-10-CM: K21.0 ICD-9-CM: 530.11 Chronic tension-type headache, not intractable     ICD-10-CM: U91.195 ICD-9-CM: 339.12 Essential hypertension     ICD-10-CM: I10 
ICD-9-CM: 401.9 Non morbid obesity due to excess calories     ICD-10-CM: E66.09 
ICD-9-CM: 278.00 Vitals BP Pulse Temp Resp Height(growth percentile) Weight(growth percentile) 109/71 62 97.5 °F (36.4 °C) (Oral) 16 5' 2\" (1.575 m) 180 lb 3.2 oz (81.7 kg) SpO2 BMI OB Status Smoking Status 96% 32.96 kg/m2 Hysterectomy Never Smoker Vitals History BMI and BSA Data Body Mass Index Body Surface Area  
 32.96 kg/m 2 1.89 m 2 Preferred Pharmacy Pharmacy Name Phone [id number] [address] [phone] Your Updated Medication List  
  
   
 This list is accurate as of: 12/26/17  1:22 PM.  Always use your most recent med list.  
  
  
  
  
 CENTRUM SILVER PO Take 1 Tab by mouth daily. citalopram 20 mg tablet Commonly known as:  CELEXA  
take 1 tablet by mouth once daily  
  
 esomeprazole 40 mg capsule Commonly known as:  Arleene Markell Take 20 mg by mouth daily. Take 1 capsule by mouth once daily 1 to 2 hours before dinner  
  
 fluticasone 50 mcg/actuation nasal spray Commonly known as:  Montine Manderson 2 Sprays by Both Nostrils route daily. LORazepam 0.5 mg tablet Commonly known as:  ATIVAN  
TAKE ONE TABLET BY MOUTH TWICE A DAY AS NEEDED FOR ANXIETY. MAX DAILY DOSE 1MG  
  
 losartan-hydroCHLOROthiazide 50-12.5 mg per tablet Commonly known as:  HYZAAR Take 1 Tab by mouth daily. meclizine 25 mg Cap Take 12.5 mg by mouth every six (6) hours as needed (for vertigo). pantoprazole 40 mg tablet Commonly known as:  PROTONIX Take 1 Tab by mouth daily. sucralfate 1 gram tablet Commonly known as:  Wilhelminia Amor Take 1 Tab by mouth Before breakfast, lunch, and dinner. traMADol 50 mg tablet Commonly known as:  ULTRAM  
Take 50 mg by mouth every six (6) hours as needed for Pain. TYLENOL ARTHRITIS PAIN 650 mg Michelle Glass Generic drug:  acetaminophen Take 650 mg by mouth every eight (8) hours. Prescriptions Sent to Pharmacy Refills  
 sucralfate (CARAFATE) 1 gram tablet 0 Sig: Take 1 Tab by mouth Before breakfast, lunch, and dinner. Class: Normal  
 [facility]acy: [id number] [address] [place] [address] #: [phone] Route: Oral  
  
Introducing [facility]! [facility] introduces [employer] patient portal. Now you can access parts of your medical record, email your doctor's office, and request medication refills online. 1. In your internet browser, go to [email or web address] 2. Click on the First Time User? Click Here link in the Sign In box. You will see the New Member Sign Up page. 3. Enter your [employer] Access Code exactly as it appears below. You will not need to use this code after youve completed the sign-up process. If you do not sign up before the expiration date, you must request a new code. · [employer] Access Code: [email or web address] Expires: 3/26/2018  1:22 PM 
 
4. Enter the last four digits of your Social Security Number (xxxx) and Date of Birth (mm/dd/yyyy) as indicated and click Submit. You will be taken to the next sign-up page. 5. Create a [employer] ID. This will be your MicroV[employer] login ID and cannot be changed, so think of one that is secure and easy to remember. 6. Create a [employer] password. You can change your password at any time. 7. Enter your Password Reset Question and Answer. This can be used at a later time if you forget your password. 8. Enter your e-mail address. You will receive e-mail notification when new information is available in [address] 9. Click Sign Up. You can now view and download portions of your medical record. 10. Click the Download Summary menu link to download a portable copy of your medical information. If you have questions, please visit the Frequently Asked Questions section of the [employer] website. Remember, [employer] is NOT to be used for urgent needs. For medical emergencies, dial 911. Now available from your iPhone and Android! Please provide this summary of care documentation to your next provider. Your primary care clinician is listed as Amarilis Eisenberg. If you have any questions after today's visit, please call [phone].

## 2017-12-26 NOTE — PROGRESS NOTES
1. Have you been to the ER, urgent care clinic since your last visit? Hospitalized since your last visit? No    2. Have you seen or consulted any other health care providers outside of the [address] [place] [address] since your last visit? Include any pap smears or colon screening.  No     Complaints of really bad headaches

## 2018-01-30 ENCOUNTER — OFFICE VISIT (OUTPATIENT)
Dept: INTERNAL MEDICINE CLINIC | Age: 83
End: 2018-01-30

## 2018-01-30 VITALS
RESPIRATION RATE: 16 BRPM | HEART RATE: 56 BPM | WEIGHT: 181.5 LBS | DIASTOLIC BLOOD PRESSURE: 79 MMHG | TEMPERATURE: 97.6 F | SYSTOLIC BLOOD PRESSURE: 125 MMHG | HEIGHT: 62 IN | OXYGEN SATURATION: 97 % | BODY MASS INDEX: 33.4 KG/M2

## 2018-01-30 DIAGNOSIS — M19.041 PRIMARY OSTEOARTHRITIS OF BOTH HANDS: ICD-10-CM

## 2018-01-30 DIAGNOSIS — L85.3 XEROSIS OF SKIN: Primary | ICD-10-CM

## 2018-01-30 DIAGNOSIS — I10 ESSENTIAL HYPERTENSION: ICD-10-CM

## 2018-01-30 DIAGNOSIS — I49.5 SSS (SICK SINUS SYNDROME) (HCC): ICD-10-CM

## 2018-01-30 DIAGNOSIS — D64.9 ANEMIA, UNSPECIFIED TYPE: ICD-10-CM

## 2018-01-30 DIAGNOSIS — M19.042 PRIMARY OSTEOARTHRITIS OF BOTH HANDS: ICD-10-CM

## 2018-01-30 DIAGNOSIS — E66.09 NON MORBID OBESITY DUE TO EXCESS CALORIES: ICD-10-CM

## 2018-01-30 RX ORDER — LOSARTAN POTASSIUM AND HYDROCHLOROTHIAZIDE 12.5; 5 MG/1; MG/1
1 TABLET ORAL DAILY
Qty: 90 TAB | Refills: 3 | Status: SHIPPED | OUTPATIENT
Start: 2018-01-30 | End: 2018-08-18 | Stop reason: SDUPTHER

## 2018-01-30 NOTE — MR AVS SNAPSHOT
[address] [place] [address]. [address] [id number] 
[phone] Patient: Ahmet Brasher MRN: [id number] TOÑO:[birth year] Visit Information Date & Time Provider Department Dept. Phone Encounter #  
 1/30/2018 12:00 PM Chico Arcos MD [facility] [phone] [id number] Your Appointments 5/30/2018 11:40 AM  
ESTABLISHED PATIENT with Janki Patiño MD  
[facility] ([facility] SCHEDUL[facility]) Appt Note: 6 MO F/U  
 [address] [place] [address]  
[address] [phone] Upcoming Health Maintenance Date Due Pneumococcal 65+ Low/Medium Risk (2 of 2 - PPSV23) 10/10/2018 MEDICARE YEARLY EXAM 12/27/2018 GLAUCOMA SCREENING Q2Y 1/30/2020 DTaP/Tdap/Td series (2 - Td) 12/26/2027 Allergies as of 1/30/2018  Review Complete On: 1/30/2018 By: Nikita Becerril No Known Allergies Current Immunizations  Never Reviewed No immunizations on file. Not reviewed this visit You Were Diagnosed With   
  
 Codes Comments Essential hypertension     ICD-10-CM: I10 
ICD-9-CM: 401.9 Vitals BP Pulse Temp Resp Height(growth percentile) Weight(growth percentile) 125/79 (BP 1 Location: Left arm, BP Patient Position: Sitting) (!) 56 97.6 °F (36.4 °C) (Oral) 16 5' 2\" (1.575 m) 181 lb 8 oz (82.3 kg) SpO2 BMI OB Status Smoking Status 97% 33.2 kg/m2 Hysterectomy Never Smoker Vitals History BMI and BSA Data Body Mass Index Body Surface Area  
 33.2 kg/m 2 1.9 m 2 Preferred Pharmacy Pharmacy Name Phone [id number]- [place], [place] - [address] [phone] Your Updated Medication List  
  
   
This list is accurate as of: 1/30/18  1:59 PM.  Always use your most recent med list.  
  
  
  
  
 CENTRUM SILVER PO  
 Take 1 Tab by mouth daily. citalopram 20 mg tablet Commonly known as:  CELEXA  
take 1 tablet by mouth once daily  
  
 esomeprazole 40 mg capsule Commonly known as:  Dimitri Rash Take 20 mg by mouth daily. Take 1 capsule by mouth once daily 1 to 2 hours before dinner  
  
 fluticasone 50 mcg/actuation nasal spray Commonly known as:  Ejrri Jamar 2 Sprays by Both Nostrils route daily. LORazepam 0.5 mg tablet Commonly known as:  ATIVAN  
TAKE ONE TABLET BY MOUTH TWICE A DAY AS NEEDED FOR ANXIETY. MAX DAILY DOSE 1MG  
  
 losartan-hydroCHLOROthiazide 50-12.5 mg per tablet Commonly known as:  HYZAAR Take 1 Tab by mouth daily. meclizine 25 mg Cap Take 12.5 mg by mouth every six (6) hours as needed (for vertigo). pantoprazole 40 mg tablet Commonly known as:  PROTONIX Take 1 Tab by mouth daily. sucralfate 1 gram tablet Commonly known as:  Es Shutters Take 1 Tab by mouth Before breakfast, lunch, and dinner. traMADol 50 mg tablet Commonly known as:  ULTRAM  
Take 50 mg by mouth every six (6) hours as needed for Pain. TYLENOL ARTHRITIS PAIN 650 mg [facility] [employer] Generic drug:  acetaminophen Take 650 mg by mouth every eight (8) hours. Introducing [facility]! [facility] introduces [employer] patient portal. Now you can access parts of your medical record, email your doctor's office, and request medication refills online. 1. In your internet browser, go to [email or web address] 2. Click on the First Time User? Click Here link in the Sign In box. You will see the New Member Sign Up page. 3. Enter your [employer] Access Code exactly as it appears below. You will not need to use this code after youve completed the sign-up process. If you do not sign up before the expiration date, you must request a new code. · [employer] Access Code: [email or web address] Expires: 3/26/2018  1:22 PM 
 
 4. Enter the last four digits of your Social Security Number (xxxx) and Date of Birth (mm/dd/yyyy) as indicated and click Submit. You will be taken to the next sign-up page. 5. Create a [employer] ID. This will be your [employer] login ID and cannot be changed, so think of one that is secure and easy to remember. 6. Create a [employer] password. You can change your password at any time. 7. Enter your Password Reset Question and Answer. This can be used at a later time if you forget your password. 8. Enter your e-mail address. You will receive e-mail notification when new information is available in [address] 9. Click Sign Up. You can now view and download portions of your medical record. 10. Click the Download Summary menu link to download a portable copy of your medical information. If you have questions, please visit the Frequently Asked Questions section of the [employer] website. Remember, [employer] is NOT to be used for urgent needs. For medical emergencies, dial 911. Now available from your iPhone and Android! Please provide this summary of care documentation to your next provider. Your primary care clinician is listed as Amarilis Eisenberg. If you have any questions after today's visit, please call [phone].

## 2018-01-30 NOTE — PROGRESS NOTES
[facility]  [place] [address]  [address]  [place] [address]  Phone:  [phone]  Fax: [phone]       Chief Complaint   Patient presents with    Head Pain    Ankle swelling    Other     Patient complains of \"bump\" on lower lip that \"comes and goes\". .      SUBJECTIVE:    Brandan Moctezuma is a 80 y.o. female   Comes in quite anxious as is always the case. She is concerned about her skin that is quite dry and ashy. She has significant pain in her hands aggravated with movement although when she puts her hand in warm water, it feels much better. She has been taking her antihypertensive medication as prescribed. There has been no meaningful weight loss, but fortunately, she has not gained any. 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 MedDATA/gwo            Current Outpatient Prescriptions   Medication Sig Dispense Refill    losartan-hydroCHLOROthiazide (HYZAAR) 50-12.5 mg per tablet Take 1 Tab by mouth daily. 90 Tab 3    acetaminophen (TYLENOL ARTHRITIS PAIN) 650 mg TbER Take 650 mg by mouth every eight (8) hours.  sucralfate (CARAFATE) 1 gram tablet Take 1 Tab by mouth Before breakfast, lunch, and dinner. 42 Tab 0    citalopram (CELEXA) 20 mg tablet take 1 tablet by mouth once daily 30 Tab 11    esomeprazole (NEXIUM) 40 mg capsule Take 20 mg by mouth daily. Take 1 capsule by mouth once daily 1 to 2 hours before dinner      FOLIC ACID/MULTIVIT-MIN/LUTEIN (CENTRUM SILVER PO) Take 1 Tab by mouth daily.  LORazepam (ATIVAN) 0.5 mg tablet TAKE ONE TABLET BY MOUTH TWICE A DAY AS NEEDED FOR ANXIETY. MAX DAILY DOSE 1MG 50 Tab 5    pantoprazole (PROTONIX) 40 mg tablet Take 1 Tab by mouth daily. 30 Tab 11    traMADol (ULTRAM) 50 mg tablet Take 50 mg by mouth every six (6) hours as needed for Pain.  fluticasone (FLONASE) 50 mcg/actuation nasal spray 2 Sprays by Both Nostrils route daily.  1 Bottle 1    meclizine 25 mg Cap Take 12.5 mg by mouth every six (6) hours as needed (for vertigo).        Past Medical History:   Diagnosis Date    Bradycardia     Essential hypertension     GERD (gastroesophageal reflux disease)     Psychiatric disorder     Anxiety     Past Surgical History:   Procedure Laterality Date    HX BUNIONECTOMY      HX GYN      Status post PAULO    HX ORTHOPAEDIC      Status post bunionectomy    HX PARTIAL HYSTERECTOMY       No Known Allergies      REVIEW OF SYSTEMS:  General: negative for - chills or fever  ENT: negative for - headaches, nasal congestion or tinnitus  Respiratory: negative for - cough, hemoptysis, shortness of breath or wheezing  Cardiovascular : negative for - chest pain, edema, palpitations or shortness of breath  Gastrointestinal: negative for - abdominal pain, blood in stools, heartburn or nausea/vomiting  Genito-Urinary: no dysuria, trouble voiding, or hematuria  Musculoskeletal: negative for - gait disturbance, joint pain, joint stiffness or joint swelling  Neurological: no TIA or stroke symptoms  Hematologic: no bruises, no bleeding, no swollen glands  Integument: no lumps, mole changes, nail changes or rash  Endocrine: no malaise/lethargy or unexpected weight changes      Social History     Social History    Marital status:      Spouse name: N/A    Number of children: N/A    Years of education: N/A     Social History Main Topics    Smoking status: Never Smoker    Smokeless tobacco: Never Used    Alcohol use No    Drug use: No    Sexual activity: Not Currently     Other Topics Concern    None     Social History Narrative     Family History   Problem Relation Age of Onset    Hypertension Mother        OBJECTIVE:    Visit Vitals    /79 (BP 1 Location: Left arm, BP Patient Position: Sitting)    Pulse (!) 56    Temp 97.6 °F (36.4 °C) (Oral)    Resp 16    Ht 5' 2\" (1.575 m)    Wt 181 lb 8 oz (82.3 kg)    SpO2 97%    BMI 33.2 kg/m2     CONSTITUTIONAL: well , well nourished, appears age appropriate  EYES: perrla, eom intact  ENMT:moist mucous membranes, pharynx clear  NECK: supple. Thyroid normal  RESPIRATORY: Chest: clear to ascultation and percussion   CARDIOVASCULAR: Heart: regular rate and rhythm  GASTROINTESTINAL: Abdomen: soft, bowel sounds active  HEMATOLOGIC: no pathological lymph nodes palpated  MUSCULOSKELETAL: Extremities: no edema, pulse 1+   INTEGUMENT: No unusual rashes or suspicious skin lesions noted. Nails appear normal.  NEUROLOGIC: non-focal exam   MENTAL STATUS: alert and oriented, appropriate affect      ASSESSMENT:  1. Xerosis of skin    2. Essential hypertension    3. Primary osteoarthritis of both hands    4. Non morbid obesity due to excess calories    5. Anemia, unspecified type    6. [facility]      Diagnoses and all orders for this visit:    1. Xerosis of skin    2. Essential hypertension  -     losartan-hydroCHLOROthiazide (HYZAAR) 50-12.5 mg per tablet; Take 1 Tab by mouth daily. 3. Primary osteoarthritis of both hands    4. Non morbid obesity due to excess calories    5. Anemia, unspecified type    6. SSS (sick sinus syndrome) ([facility].)  Assessment & Plan: This condition is managed by Specialist.  Key CAD CHF Meds             losartan-hydroCHLOROthiazide (HYZAAR) 50-12.5 mg per tablet Take 1 Tab by mouth daily. Key Antihyperlipidemia Meds     The patient is on no antihyperlipidemia meds. Lab Results   Component Value Date/Time    Sodium 140 05/05/2017 01:38 PM    Potassium 4.3 05/05/2017 01:38 PM    Cholesterol, total 180 11/03/2016 04:41 PM    HDL Cholesterol 67 11/03/2016 04:41 PM    LDL, calculated 104 11/03/2016 04:41 PM    Triglyceride 43 11/03/2016 04:41 PM             PLAN:    1. The patient has xerosis of her skin and needs to apply Vaseline at least twice or three times a day. 2. Blood pressure excellent, no adjustments made. 3. She has osteoarthritis involving hands.  Theoretically, Prednisone 5 mg would be great for her. At this point, she remains quite comfortable and there is no reason to intervene. 4. I encouraged her to lose weight by eating meals, minimizing snacks. She needs also to reduce her consumption of processed carbohydrates. 5. Her anemia has remained reasonably stable. She will continue follow up with hematology. Alma/leoncio               Follow-up Disposition:  Return in about 4 months (around 5/30/2018).       Sonja Booker MD

## 2018-01-31 NOTE — ASSESSMENT & PLAN NOTE
This condition is managed by Specialist.  Key CAD CHF Meds             losartan-hydroCHLOROthiazide (HYZAAR) 50-12.5 mg per tablet Take 1 Tab by mouth daily. Key Antihyperlipidemia Meds     The patient is on no antihyperlipidemia meds.         Lab Results   Component Value Date/Time    Sodium 140 05/05/2017 01:38 PM    Potassium 4.3 05/05/2017 01:38 PM    Cholesterol, total 180 11/03/2016 04:41 PM    HDL Cholesterol 67 11/03/2016 04:41 PM    LDL, calculated 104 11/03/2016 04:41 PM    Triglyceride 43 11/03/2016 04:41 PM

## 2018-02-09 DIAGNOSIS — F41.9 ANXIETY: Primary | ICD-10-CM

## 2018-02-10 RX ORDER — LORAZEPAM 0.5 MG/1
TABLET ORAL
Qty: 50 TAB | Refills: 5 | Status: SHIPPED | OUTPATIENT
Start: 2018-02-10 | End: 2018-08-18 | Stop reason: SDUPTHER

## 2018-02-12 ENCOUNTER — HOSPITAL ENCOUNTER (OUTPATIENT)
Dept: GENERAL RADIOLOGY | Age: 83
Discharge: HOME | End: 2018-02-12
Attending: INTERNAL MEDICINE
Payer: MEDICARE

## 2018-02-12 DIAGNOSIS — D50.8 IRON DEFICIENCY ANEMIA SECONDARY TO INADEQUATE DIETARY IRON INTAKE: ICD-10-CM

## 2018-02-12 DIAGNOSIS — R68.89 MECHANICAL AND MOTOR PROBLEMS WITH INTERNAL ORGANS: ICD-10-CM

## 2018-02-12 PROCEDURE — 77075 RADEX OSSEOUS SURVEY COMPL: CPT

## 2018-03-14 ENCOUNTER — OFFICE VISIT (OUTPATIENT)
Dept: OBGYN CLINIC | Age: 83
End: 2018-03-14

## 2018-03-14 VITALS
HEIGHT: 62 IN | WEIGHT: 179 LBS | RESPIRATION RATE: 16 BRPM | BODY MASS INDEX: 32.94 KG/M2 | SYSTOLIC BLOOD PRESSURE: 128 MMHG | DIASTOLIC BLOOD PRESSURE: 78 MMHG | TEMPERATURE: 97.2 F | HEART RATE: 70 BPM

## 2018-03-14 DIAGNOSIS — N81.10 CYSTOCELE WITHOUT UTERINE PROLAPSE: Primary | ICD-10-CM

## 2018-03-14 RX ORDER — HYDROCORTISONE 1 %
CREAM (GRAM) TOPICAL 2 TIMES DAILY
Qty: 30 G | Refills: 1 | Status: SHIPPED | OUTPATIENT
Start: 2018-03-14 | End: 2018-12-13

## 2018-03-14 NOTE — PROGRESS NOTES
SUBJECTIVE: Milvia Espinal is a 80 y.o. female  s/p hysterectomy who presents with complaints of \"something dropping out. \" over the last 2-3 weeks. She describes a hysterectomy for \"unknown reasons\" in . She describes a \"bump' that is pink coming from her vagina. She also describes irritation in the vaginal folds that sometimes causes a \"boil\" in the skin folds sometimes. \"  She desires evaluation today. No LMP recorded. Patient has had a hysterectomy. .    ROS: A comprehensive review of systems was negative except for that written in the HPI. OBJECTIVE:     Visit Vitals    /78 (BP 1 Location: Right arm, BP Patient Position: Sitting)    Pulse 70    Temp 97.2 °F (36.2 °C) (Oral)    Resp 16    Ht 5' 2\" (1.575 m)    Wt 179 lb (81.2 kg)    BMI 32.74 kg/m2         General:  alert, cooperative, no distress, appears stated age   Skin:  Normal.   Abdomen: soft, non-tender. Bowel sounds normal. No masses,  no organomegaly   Back:  Costovertebral angle tenderness absent   Genitourinary: External genitalia: atrophic, some resolving redness noted on the labia majora  Urinary system: urethral meatus gaping  Vaginal: atrophic, cystocele noted with valsalva. Cervix: absent  Adnexa: normal bimanual exam  Uterus: absent   Extremities:  extremities normal, atraumatic, no cyanosis or edema   Neurologic:  negative   Psychiatric:  non focal       ASSESSMENT:  Cystocele with prolapse    Plan:  Fitting with #5 pessary ring with platform. Order placed with distributor. RTO 2 weeks for placement. RX. For 1% hydrocortisone cream- apply lightly to groin creases bid. Pt. Voices understanding of treatment plan. Follow-up Disposition:  Return in about 2 weeks (around 3/28/2018) for pessary fitting.

## 2018-03-14 NOTE — PATIENT INSTRUCTIONS
Pessary: Care Instructions  Your Care Instructions    A pessary is a device that fits into your vagina and supports the pelvic organs. It may be used if a pelvic organ sags or moves out of its normal position (prolapse). For some women, wearing a pessary means that they may not have to have surgery to fix a prolapse. A pessary also may help a woman who has trouble controlling her urine (incontinence). Or a pessary may be used during a pregnancy to hold the uterus in place. There are many sizes and types of pessaries. Which type you use depends on the problem you have. Your doctor will make sure the pessary is just right for you. You may need to try different kinds of pessaries to find the best fit. The pessary should hold the pelvic organ in place without causing pain or pressure. You may be able to have sex with your pessary in place. It depends on the type of pessary and your comfort level. Follow-up care is a key part of your treatment and safety. Be sure to make and go to all appointments, and call your doctor if you are having problems. It's also a good idea to know your test results and keep a list of the medicines you take. How can you care for yourself at home? · If you get a vaginal discharge while you have a pessary, talk to your doctor about ways to reduce the discharge and smell. A pessary, in some cases, rubs the vagina and may cause irritation and discharge. If your vagina feels sore, talk to your doctor about a cream or gel to protect the vagina. · Follow your doctor's advice on how long you can wear your pessary before it needs to be cleaned. You may be able to remove and clean it yourself. Or your doctor may want to do this during an office visit. · If you clean your pessary, wash it with mild soap and water. Follow your doctor's advice on inserting the pessary. · Do not douche or use a vaginal wash unless your doctor tells you to do so. · Do not smoke.  Smoking can cause a cough, which makes a prolapse worse. If you need help quitting, talk to your doctor about stop-smoking programs and medicines. These can increase your chances of quitting for good. To help support your pelvic organs  · Avoid activities that put pressure on your pelvic muscles, such as heavy lifting. · Do pelvic floor (Kegel) exercises, which tighten and strengthen pelvic muscles. To do Kegel exercises:  ¨ Squeeze the same muscles you would use to stop your urine. Your belly and thighs should not move. ¨ Hold the squeeze for 3 seconds, and then relax for 3 seconds. ¨ Start with 3 seconds. Then add 1 second each week until you are able to squeeze for 10 seconds. ¨ Repeat the exercise 10 to 15 times a session. Do three or more sessions each day. · To ease pressure on your vagina, lie down and put a pillow under your knees. You also can lie on your side and bring your knees up to your chest.  When should you call for help? Call your doctor now or seek immediate medical care if:  ? · You have vaginal discharge that has increased in amount or smells bad.   ? · You have new or worse belly or pelvic pain. ? Watch closely for changes in your health, and be sure to contact your doctor if:  ? · You have problems with the pessary, such as vaginal pain, vaginal bleeding, or problems with urination or bowel movements. Where can you learn more? Go to http://ryan-leonardo.info/. Enter G520 in the search box to learn more about \"Pessary: Care Instructions. \"  Current as of: October 13, 2016  Content Version: 11.4  © 3499-2716 CrowdScannerr. Care instructions adapted under license by GOQii (which disclaims liability or warranty for this information). If you have questions about a medical condition or this instruction, always ask your healthcare professional. [address] any warranty or liability for your use of this information.

## 2018-03-14 NOTE — PROGRESS NOTES
Chief Complaint   Patient presents with    Well Woman     Pt states she noticed a \"pink\" something coming out, noticed it about 3 weeks ago. Pt also states she noticed some \"black area down there. \"

## 2018-03-14 NOTE — MR AVS SNAPSHOT
[address] [facility] [address] 
[phone] Patient: Marjorie Zaragoza MRN:  [id number] Visit Information Date & Time Provider Department Dept. Phone Encounter #  
 3/14/2018  3:00 PM Yair Wise NP [facility] 2220 [facility] AT [address] [facility] [id number] Follow-up Instructions Return in about 2 weeks (around 3/28/2018) for pessary fitting. Your Appointments 5/1/2018 11:45 AM  
Any with Bette Burciaga MD  
[address] [place] [address] and Primary Care 365[address] [facility]) Appt Note: 4 Month Follow Up  
 [phone]  
  
   
 [address] [phone]  
  
    
 5/30/2018 11:40 AM  
ESTABLISHED PATIENT with Santiago Dumont MD  
[facility] ([facility]) Appt Note: 6 MO F/U  
 [address] [facility] [address] [facility] 7 [address] Allergies as of 3/14/2018  Review Complete On: 3/14/2018 By: Yair Wise NP No Known Allergies Current Immunizations  Never Reviewed No immunizations on file. Not reviewed this visit Vitals BP Pulse Temp Resp Height(growth percentile) Weight(growth percentile) 128/78 (BP 1 Location: Right arm, BP Patient Position: Sitting) 70 97.2 °F (36.2 °C) (Oral) 16 5' 2\" (1.575 m) 179 lb (81.2 kg) BMI OB Status Smoking Status 32.74 kg/m2 Hysterectomy Never Smoker Vitals History BMI and BSA Data Body Mass Index Body Surface Area 32.74 kg/m 2 1.88 m 2 Preferred Pharmacy Pharmacy Name Phone [id number]- [place], [place] - [address] [phone] Your Updated Medication List  
  
   
This list is accurate as of 3/14/18  3:10 PM.  Always use your most recent med list.  
  
  
  
  
 citalopram 20 mg tablet Commonly known as:  CELEXA  
take 1 tablet by mouth once daily GAVISCON PO Take  by mouth. hydrocortisone 1 % topical cream  
Commonly known as:  CORTAID Apply  to affected area two (2) times a day. use thin layer LORazepam 0.5 mg tablet Commonly known as:  ATIVAN  
TAKE ONE TABLET BY MOUTH TWICE A DAY AS NEEDED FOR ANXIETY. MAX DAILY DOSE 1MG  
  
 losartan-hydroCHLOROthiazide 50-12.5 mg per tablet Commonly known as:  HYZAAR Take 1 Tab by mouth daily. meclizine 25 mg Cap Take 12.5 mg by mouth every six (6) hours as needed (for vertigo). Prescriptions Printed Refills  
 hydrocortisone (CORTAID) 1 % topical cream 1 Sig: Apply  to affected area two (2) times a day. use thin layer Class: Print Route: Topical  
  
Follow-up Instructions Return in about 2 weeks (around 3/28/2018) for pessary fitting. Patient Instructions Pessary: Care Instructions Your Care Instructions A pessary is a device that fits into your vagina and supports the pelvic organs. It may be used if a pelvic organ sags or moves out of its normal position (prolapse). For some women, wearing a pessary means that they may not have to have surgery to fix a prolapse. A pessary also may help a woman who has trouble controlling her urine (incontinence). Or a pessary may be used during a pregnancy to hold the uterus in place. There are many sizes and types of pessaries. Which type you use depends on the problem you have. Your doctor will make sure the pessary is just right for you. You may need to try different kinds of pessaries to find the best fit. The pessary should hold the pelvic organ in place without causing pain or pressure. You may be able to have sex with your pessary in place. It depends on the type of pessary and your comfort level. Follow-up care is a key part of your treatment and safety.  Be sure to make and go to all appointments, and call your doctor if you are having problems. It's also a good idea to know your test results and keep a list of the medicines you take. How can you care for yourself at home? · If you get a vaginal discharge while you have a pessary, talk to your doctor about ways to reduce the discharge and smell. A pessary, in some cases, rubs the vagina and may cause irritation and discharge. If your vagina feels sore, talk to your doctor about a cream or gel to protect the vagina. · Follow your doctor's advice on how long you can wear your pessary before it needs to be cleaned. You may be able to remove and clean it yourself. Or your doctor may want to do this during an office visit. · If you clean your pessary, wash it with mild soap and water. Follow your doctor's advice on inserting the pessary. · Do not douche or use a vaginal wash unless your doctor tells you to do so. · Do not smoke. Smoking can cause a cough, which makes a prolapse worse. If you need help quitting, talk to your doctor about stop-smoking programs and medicines. These can increase your chances of quitting for good. To help support your pelvic organs · Avoid activities that put pressure on your pelvic muscles, such as heavy lifting. · Do pelvic floor (Kegel) exercises, which tighten and strengthen pelvic muscles. To do Kegel exercises: 
¨ Squeeze the same muscles you would use to stop your urine. Your belly and thighs should not move. ¨ Hold the squeeze for 3 seconds, and then relax for 3 seconds. ¨ Start with 3 seconds. Then add 1 second each week until you are able to squeeze for 10 seconds. ¨ Repeat the exercise 10 to 15 times a session. Do three or more sessions each day. · To ease pressure on your vagina, lie down and put a pillow under your knees. You also can lie on your side and bring your knees up to your chest. 
When should you call for help? Call your doctor now or seek immediate medical care if: ? · You have vaginal discharge that has increased in amount or smells bad.  
? · You have new or worse belly or pelvic pain. ? Watch closely for changes in your health, and be sure to contact your doctor if: 
? · You have problems with the pessary, such as vaginal pain, vaginal bleeding, or problems with urination or bowel movements. Where can you learn more? Go to http://ryan-leonardo.info/. Enter X414 in the search box to learn more about \"Pessary: Care Instructions. \" Current as of: October 13, 2016 Content Version: 11.4 © 2769-2443 SmartLink Radio Networks. Care instructions adapted under license by Access Point (which disclaims liability or warranty for this information). If you have questions about a medical condition or this instruction, always ask your healthcare professional. [address] any warranty or liability for your use of this information. Introducing [facility]! [facility] introduces Fugoo patient portal. Now you can access parts of your medical record, email your doctor's office, and request medication refills online. 1. In your internet browser, go to [email or web address] 2. Click on the First Time User? Click Here link in the Sign In box. You will see the New Member Sign Up page. 3. Enter your Fugoo Access Code exactly as it appears below. You will not need to use this code after youve completed the sign-up process. If you do not sign up before the expiration date, you must request a new code. · Fugoo Access Code: [email or web address] Expires: 3/26/2018  2:22 PM 
 
4. Enter the last four digits of your Social Security Number (xxxx) and Date of Birth (mm/dd/yyyy) as indicated and click Submit. You will be taken to the next sign-up page. 5. Create a Fugoo ID. This will be your Fugoo login ID and cannot be changed, so think of one that is secure and easy to remember. 6. Create a Grandis password. You can change your password at any time. 7. Enter your Password Reset Question and Answer. This can be used at a later time if you forget your password. 8. Enter your e-mail address. You will receive e-mail notification when new information is available in [address] 9. Click Sign Up. You can now view and download portions of your medical record. 10. Click the Download Summary menu link to download a portable copy of your medical information. If you have questions, please visit the Frequently Asked Questions section of the Grandis website. Remember, Grandis is NOT to be used for urgent needs. For medical emergencies, dial 911. Now available from your iPhone and Android! Please provide this summary of care documentation to your next provider. Your primary care clinician is listed as Amarilis Eisenberg. If you have any questions after today's visit, please call [phone].

## 2018-03-20 ENCOUNTER — TELEPHONE (OUTPATIENT)
Dept: CARDIOLOGY CLINIC | Age: 83
End: 2018-03-20

## 2018-03-20 NOTE — TELEPHONE ENCOUNTER
Pt calling because she is really fatigued and she wants to know if it is because of her heart. Please give her a call back @ [phone]. Thanks!   Deandra Polk

## 2018-03-20 NOTE — TELEPHONE ENCOUNTER
Called patient. Left voicemail stating I was returning call. I stated on message it may be a good idea to move up her May appointment with Dr. Karlene Lewis to an earlier date. If she is having new symptoms, it can be better addressed in the office by Dr. Karlene Lewis.

## 2018-03-28 ENCOUNTER — OFFICE VISIT (OUTPATIENT)
Dept: OBGYN CLINIC | Age: 83
End: 2018-03-28

## 2018-03-28 VITALS
BODY MASS INDEX: 32.97 KG/M2 | HEIGHT: 62 IN | DIASTOLIC BLOOD PRESSURE: 71 MMHG | TEMPERATURE: 96.2 F | WEIGHT: 179.2 LBS | HEART RATE: 62 BPM | OXYGEN SATURATION: 97 % | SYSTOLIC BLOOD PRESSURE: 137 MMHG | RESPIRATION RATE: 20 BRPM

## 2018-03-28 DIAGNOSIS — N81.10 FEMALE CYSTOCELE: Primary | ICD-10-CM

## 2018-03-28 NOTE — MR AVS SNAPSHOT
Jasmyn Smith 
 
 
 [facility] [address] 
[phone] Patient: Ya Gant MRN:  [id number] Visit Information Date & Time Provider Department Dept. Phone Encounter #  
 3/28/2018 11:00 AM Lincoln Miller 2[address] [facility] [id number] Follow-up Instructions Return in about 3 months (around 6/28/2018) for pessary washing. Your Appointments 3/28/2018 11:00 AM  
ESTABLISHED PATIENT with JIMMY Miller [facility] ([address]) Appt Note: pessary fitting. Pessary in office 3/22/18 [facility] [address] [address]  
[phone]  
  
   
 [facility] [address] 5/1/2018 11:45 AM  
Any with Tyesha Gaviria MD  
[address] [place] [address] and Primary Care [address]) Appt Note: 4 Month Follow Up  
 [address] [facility]  
  
   
 [address] [phone]  
  
    
 5/30/2018 11:40 AM  
ESTABLISHED PATIENT with Davy Loredo MD  
[facility] ([facility]) Appt Note: 6 MO F/U  
 [address] [place] [address] [address] [facility] [address] Allergies as of 3/28/2018  Review Complete On: 3/28/2018 By: Lynsey Beltran NP No Known Allergies Current Immunizations  Never Reviewed No immunizations on file. Not reviewed this visit Vitals BP Pulse Temp Resp Height(growth percentile) Weight(growth percentile)  
 137/71 (BP 1 Location: Right arm, BP Patient Position: Sitting) 62 96.2 °F (35.7 °C) (Oral) 20 5' 2\" (1.575 m) 179 lb 3.2 oz (81.3 kg) SpO2 BMI OB Status Smoking Status 97% 32.78 kg/m2 Hysterectomy Never Smoker Vitals History BMI and BSA Data Body Mass Index Body Surface Area 32.78 kg/m 2 1.89 m 2 Preferred Pharmacy Pharmacy Name Phone [phone] [address] Valerie Valle [phone] Your Updated Medication List  
  
   
This list is accurate as of 3/28/18 10:39 AM.  Always use your most recent med list.  
  
  
  
  
 citalopram 20 mg tablet Commonly known as:  CELEXA  
take 1 tablet by mouth once daily GAVISCON PO Take  by mouth. hydrocortisone 1 % topical cream  
Commonly known as:  CORTAID Apply  to affected area two (2) times a day. use thin layer LORazepam 0.5 mg tablet Commonly known as:  ATIVAN  
TAKE ONE TABLET BY MOUTH TWICE A DAY AS NEEDED FOR ANXIETY. MAX DAILY DOSE 1MG  
  
 losartan-hydroCHLOROthiazide 50-12.5 mg per tablet Commonly known as:  HYZAAR Take 1 Tab by mouth daily. meclizine 25 mg Cap Take 12.5 mg by mouth every six (6) hours as needed (for vertigo). Follow-up Instructions Return in about 3 months (around 6/28/2018) for pessary washing. Patient Instructions Pessary: Care Instructions Your Care Instructions A pessary is a device that fits into your vagina and supports the pelvic organs. It may be used if a pelvic organ sags or moves out of its normal position (prolapse). For some women, wearing a pessary means that they may not have to have surgery to fix a prolapse. A pessary also may help a woman who has trouble controlling her urine (incontinence). Or a pessary may be used during a pregnancy to hold the uterus in place. There are many sizes and types of pessaries. Which type you use depends on the problem you have. Your doctor will make sure the pessary is just right for you. You may need to try different kinds of pessaries to find the best fit. The pessary should hold the pelvic organ in place without causing pain or pressure. You may be able to have sex with your pessary in place. It depends on the type of pessary and your comfort level. Follow-up care is a key part of your treatment and safety. Be sure to make and go to all appointments, and call your doctor if you are having problems. It's also a good idea to know your test results and keep a list of the medicines you take. How can you care for yourself at home? · If you get a vaginal discharge while you have a pessary, talk to your doctor about ways to reduce the discharge and smell. A pessary, in some cases, rubs the vagina and may cause irritation and discharge. If your vagina feels sore, talk to your doctor about a cream or gel to protect the vagina. · Follow your doctor's advice on how long you can wear your pessary before it needs to be cleaned. You may be able to remove and clean it yourself. Or your doctor may want to do this during an office visit. · If you clean your pessary, wash it with mild soap and water. Follow your doctor's advice on inserting the pessary. · Do not douche or use a vaginal wash unless your doctor tells you to do so. · Do not smoke. Smoking can cause a cough, which makes a prolapse worse. If you need help quitting, talk to your doctor about stop-smoking programs and medicines. These can increase your chances of quitting for good. To help support your pelvic organs · Avoid activities that put pressure on your pelvic muscles, such as heavy lifting. · Do pelvic floor (Kegel) exercises, which tighten and strengthen pelvic muscles. To do Kegel exercises: 
¨ Squeeze the same muscles you would use to stop your urine. Your belly and thighs should not move. ¨ Hold the squeeze for 3 seconds, and then relax for 3 seconds. ¨ Start with 3 seconds. Then add 1 second each week until you are able to squeeze for 10 seconds. ¨ Repeat the exercise 10 to 15 times a session. Do three or more sessions each day.  
· To ease pressure on your vagina, lie down and put a pillow under your knees. You also can lie on your side and bring your knees up to your chest. 
When should you call for help? Call your doctor now or seek immediate medical care if: 
? · You have vaginal discharge that has increased in amount or smells bad.  
? · You have new or worse belly or pelvic pain. ? Watch closely for changes in your health, and be sure to contact your doctor if: 
? · You have problems with the pessary, such as vaginal pain, vaginal bleeding, or problems with urination or bowel movements. Where can you learn more? Go to http://ryan-leonardo.info/. Enter R023 in the search box to learn more about \"Pessary: Care Instructions. \" Current as of: October 13, 2016 Content Version: 11.4 © 6292-6551 FriendsClear. Care instructions adapted under license by LeadCloud (which disclaims liability or warranty for this information). If you have questions about a medical condition or this instruction, always ask your healthcare professional. [place] [address] any warranty or liability for your use of this information. Introducing [facility]! Sahde Thomas introduces Newsela patient portal. Now you can access parts of your medical record, email your doctor's office, and request medication refills online. 1. In your internet browser, go to [email or web address] 2. Click on the First Time User? Click Here link in the Sign In box. You will see the New Member Sign Up page. 3. Enter your Newsela Access Code exactly as it appears below. You will not need to use this code after youve completed the sign-up process. If you do not sign up before the expiration date, you must request a new code. · Newsela Access Code: [email or web address] Expires: 6/26/2018 10:39 AM 
 
4. Enter the last four digits of your Social Security Number (xxxx) and Date of Birth (mm/dd/yyyy) as indicated and click Submit.  You will be taken to the next sign-up page. 5. Create a Millennium Entertainment ID. This will be your Millennium Entertainment login ID and cannot be changed, so think of one that is secure and easy to remember. 6. Create a Millennium Entertainment password. You can change your password at any time. 7. Enter your Password Reset Question and Answer. This can be used at a later time if you forget your password. 8. Enter your e-mail address. You will receive e-mail notification when new information is available in [address] 9. Click Sign Up. You can now view and download portions of your medical record. 10. Click the Download Summary menu link to download a portable copy of your medical information. If you have questions, please visit the Frequently Asked Questions section of the Millennium Entertainment website. Remember, [employer] Entertainment is NOT to be used for urgent needs. For medical emergencies, dial 911. Now available from your iPhone and Android! Please provide this summary of care documentation to your next provider. Your primary care clinician is listed as Amarilis Eisenberg. If you have any questions after today's visit, please call [phone].

## 2018-03-28 NOTE — PATIENT INSTRUCTIONS
Pessary: Care Instructions  Your Care Instructions    A pessary is a device that fits into your vagina and supports the pelvic organs. It may be used if a pelvic organ sags or moves out of its normal position (prolapse). For some women, wearing a pessary means that they may not have to have surgery to fix a prolapse. A pessary also may help a woman who has trouble controlling her urine (incontinence). Or a pessary may be used during a pregnancy to hold the uterus in place. There are many sizes and types of pessaries. Which type you use depends on the problem you have. Your doctor will make sure the pessary is just right for you. You may need to try different kinds of pessaries to find the best fit. The pessary should hold the pelvic organ in place without causing pain or pressure. You may be able to have sex with your pessary in place. It depends on the type of pessary and your comfort level. Follow-up care is a key part of your treatment and safety. Be sure to make and go to all appointments, and call your doctor if you are having problems. It's also a good idea to know your test results and keep a list of the medicines you take. How can you care for yourself at home? · If you get a vaginal discharge while you have a pessary, talk to your doctor about ways to reduce the discharge and smell. A pessary, in some cases, rubs the vagina and may cause irritation and discharge. If your vagina feels sore, talk to your doctor about a cream or gel to protect the vagina. · Follow your doctor's advice on how long you can wear your pessary before it needs to be cleaned. You may be able to remove and clean it yourself. Or your doctor may want to do this during an office visit. · If you clean your pessary, wash it with mild soap and water. Follow your doctor's advice on inserting the pessary. · Do not douche or use a vaginal wash unless your doctor tells you to do so. · Do not smoke.  Smoking can cause a cough, which makes a prolapse worse. If you need help quitting, talk to your doctor about stop-smoking programs and medicines. These can increase your chances of quitting for good. To help support your pelvic organs  · Avoid activities that put pressure on your pelvic muscles, such as heavy lifting. · Do pelvic floor (Kegel) exercises, which tighten and strengthen pelvic muscles. To do Kegel exercises:  ¨ Squeeze the same muscles you would use to stop your urine. Your belly and thighs should not move. ¨ Hold the squeeze for 3 seconds, and then relax for 3 seconds. ¨ Start with 3 seconds. Then add 1 second each week until you are able to squeeze for 10 seconds. ¨ Repeat the exercise 10 to 15 times a session. Do three or more sessions each day. · To ease pressure on your vagina, lie down and put a pillow under your knees. You also can lie on your side and bring your knees up to your chest.  When should you call for help? Call your doctor now or seek immediate medical care if:  ? · You have vaginal discharge that has increased in amount or smells bad.   ? · You have new or worse belly or pelvic pain. ? Watch closely for changes in your health, and be sure to contact your doctor if:  ? · You have problems with the pessary, such as vaginal pain, vaginal bleeding, or problems with urination or bowel movements. Where can you learn more? Go to http://ryan-leonardo.info/. Enter I080 in the search box to learn more about \"Pessary: Care Instructions. \"  Current as of: October 13, 2016  Content Version: 11.4  © 8314-6092 [email or web address]. Care instructions adapted under license by [employer] (which disclaims liability or warranty for this information). If you have questions about a medical condition or this instruction, always ask your healthcare professional. [address] any warranty or liability for your use of this information.

## 2018-03-28 NOTE — PROGRESS NOTES
SUBJECTIVE: Eva Maldonado is a 80 y.o. female G9 who presents in follow up for pessary from prolapse. Pt. Was fitted at her last visit with a #5 ring with platform and comes today to receive the device. No LMP recorded. Patient has had a hysterectomy. .    ROS: A comprehensive review of systems was negative except for that written in the HPI. OBJECTIVE:     Visit Vitals    /71 (BP 1 Location: Right arm, BP Patient Position: Sitting)    Pulse 62    Temp 96.2 °F (35.7 °C) (Oral)    Resp 20    Ht 5' 2\" (1.575 m)    Wt 179 lb 3.2 oz (81.3 kg)    SpO2 97%    BMI 32.78 kg/m2         General:  alert, cooperative, no distress, appears stated age   Skin:  Normal.   Abdomen: soft, non-tender. Bowel sounds normal. No masses,  no organomegaly   Back:  Costovertebral angle tenderness absent   Genitourinary: External genitalia: Atrohic appearance  #5 RIng with platform pessary is placed. Pt. Attempted valsalva and ambulated in room and device was re-check with good placement. Pt. Tolerated well. Extremities:  extremities normal, atraumatic, no cyanosis or edema   Neurologic:  negative   Psychiatric:  non focal       ASSESSMENT:  Cystocele with prolapse    Plan:  Insertion complete. RTO in 3 months for washing and cleaning. TrimoSan cream given with kit and instructions reviewed. Pt. Voices understanding of treatment plan. Follow-up Disposition:  Return in about 3 months (around 6/28/2018) for pessary washing.

## 2018-04-12 ENCOUNTER — OFFICE VISIT (OUTPATIENT)
Dept: INTERNAL MEDICINE CLINIC | Age: 83
End: 2018-04-12

## 2018-04-12 VITALS
HEIGHT: 62 IN | WEIGHT: 177.3 LBS | HEART RATE: 60 BPM | BODY MASS INDEX: 32.63 KG/M2 | RESPIRATION RATE: 14 BRPM | DIASTOLIC BLOOD PRESSURE: 70 MMHG | SYSTOLIC BLOOD PRESSURE: 112 MMHG | TEMPERATURE: 97.9 F | OXYGEN SATURATION: 94 %

## 2018-04-12 DIAGNOSIS — J45.30 MILD PERSISTENT REACTIVE AIRWAY DISEASE WITHOUT COMPLICATION: ICD-10-CM

## 2018-04-12 DIAGNOSIS — I10 ESSENTIAL HYPERTENSION: ICD-10-CM

## 2018-04-12 DIAGNOSIS — N81.4 UTERINE PROLAPSE: ICD-10-CM

## 2018-04-12 DIAGNOSIS — F41.9 ANXIETY TENSION STATE: ICD-10-CM

## 2018-04-12 DIAGNOSIS — J06.9 UPPER RESPIRATORY TRACT INFECTION, UNSPECIFIED TYPE: Primary | ICD-10-CM

## 2018-04-12 DIAGNOSIS — K59.00 CONSTIPATION, UNSPECIFIED CONSTIPATION TYPE: ICD-10-CM

## 2018-04-12 RX ORDER — PREDNISONE 20 MG/1
20 TABLET ORAL
Qty: 21 TAB | Refills: 0 | Status: SHIPPED | OUTPATIENT
Start: 2018-04-12 | End: 2019-02-14 | Stop reason: ALTCHOICE

## 2018-04-12 NOTE — MR AVS SNAPSHOT
[address] [facility] 
 
 
 [address] [address] 
[phone] Patient: Nina Arceo MRN: [id number] QEA:5[birth year]98[birth year] Visit Information Date & Time Provider Department Dept. Phone Encounter #  
 4/12/2018 10:30 AM Carloz Jeff MD [facility] Medicine and [address] [id number] Your Appointments 5/1/2018 11:45 AM  
Any with Carloz Jeff MD  
[address] [place] [address] and Primary Care [address]) Appt Note: 4 Month Follow Up  
 [address] [facility]  
  
   
 [address] [address]  
  
    
 5/30/2018 11:40 AM  
ESTABLISHED PATIENT with Thelma Walsh MD  
[facility] ([facility]) Appt Note: 6 MO F/U  
 [address] [facility] [address] [place]  
[address] [facility] [address]  
  
    
 6/28/2018 11:00 AM  
ESTABLISHED PATIENT with Zoya Arnett NP  
[address]Second Floor [place] AT [facility] ([address]) Appt Note: Pessary washing [place] [address] [facility] [address] [phone]  
  
   
 [place] [address] [place] Upcoming Health Maintenance Date Due Pneumococcal 65+ Low/Medium Risk (2 of 2 - PPSV23) 10/10/2018 MEDICARE YEARLY EXAM 12/27/2018 GLAUCOMA SCREENING Q2Y 1/30/2020 DTaP/Tdap/Td series (2 - Td) 12/26/2027 Allergies as of 4/12/2018  Review Complete On: 4/12/2018 By: Tim Byrd No Known Allergies Current Immunizations  Never Reviewed No immunizations on file. Not reviewed this visit You Were Diagnosed With   
  
 Codes Comments Upper respiratory tract infection, unspecified type    -  Primary ICD-10-CM: J06.9 ICD-9-CM: 465.9 Mild persistent reactive airway disease without complication     VJH-45-LOREE: J45.30 ICD-9-CM: 493.90   
 Constipation, unspecified constipation type     ICD-10-CM: K59.00 ICD-9-CM: 564.00 Essential hypertension     ICD-10-CM: I10 
ICD-9-CM: 401.9 Vaginal prolapse without uterine prolapse     ICD-10-CM: N81.10 ICD-9-CM: 618.00 Anxiety tension state     ICD-10-CM: F41.9 ICD-9-CM: 300.00 Vitals BP Pulse Temp Resp Height(growth percentile) Weight(growth percentile) 112/70 60 97.9 °F (36.6 °C) (Oral) 14 5' 2\" (1.575 m) 177 lb 4.8 oz (80.4 kg) SpO2 BMI OB Status Smoking Status 94% 32.43 kg/m2 Hysterectomy Never Smoker Vitals History BMI and BSA Data Body Mass Index Body Surface Area  
 32.43 kg/m 2 1.88 m 2 Preferred Pharmacy Pharmacy Name Phone [id number] [phone] Your Updated Medication List  
  
   
This list is accurate as of 4/12/18 11:40 AM.  Always use your most recent med list.  
  
  
  
  
 citalopram 20 mg tablet Commonly known as:  CELEXA  
take 1 tablet by mouth once daily  
  
 docusate sodium 50 mg capsule Commonly known as:  Genice Flax Take 1 tablet twice a day GAVISCON PO Take  by mouth. hydrocortisone 1 % topical cream  
Commonly known as:  CORTAID Apply  to affected area two (2) times a day. use thin layer LORazepam 0.5 mg tablet Commonly known as:  ATIVAN  
TAKE ONE TABLET BY MOUTH TWICE A DAY AS NEEDED FOR ANXIETY. MAX DAILY DOSE 1MG  
  
 losartan-hydroCHLOROthiazide 50-12.5 mg per tablet Commonly known as:  HYZAAR Take 1 Tab by mouth daily. meclizine 25 mg Cap Take 12.5 mg by mouth every six (6) hours as needed (for vertigo). predniSONE 20 mg tablet Commonly known as:  Amanda Luigier Take 1 Tab by mouth three (3) times daily (after meals). Prescriptions Sent to Pharmacy Refills  
 predniSONE (DELTASONE) 20 mg tablet 0 Sig: Take 1 Tab by mouth three (3) times daily (after meals).   
 Class: Normal  
 [facility] [address] [place], [place] [address] [place] [address] #: [phone] Route: Oral  
  
To-Do List   
 04/19/2018 12:15 PM  
  Appointment with [facility] JOHNSON 1 at [address] [place] [address] ([phone]) Shower or bathe using soap and water. Do not use deodorant, powder, perfumes, or lotion the day of your exam.  If your prior mammograms were not performed at [facility] 6 please bring films with you or forward prior images 2 days before your procedure. Check in at registration 15min before your appointment time unless you were instructed to do otherwise. A script is not necessary, but if you have one, please bring it on the day of the mammogram or have it faxed to the department. [facility] [phone] [facility]  [phone] [address]  [phone] [facility] [phone] [address] [phone] Introducing [facility] & HEALTH SERVICES! [facility] introduces Passado patient portal. Now you can access parts of your medical record, email your doctor's office, and request medication refills online. 1. In your internet browser, go to [email or web address] 2. Click on the First Time User? Click Here link in the Sign In box. You will see the New Member Sign Up page. 3. Enter your Passado Access Code exactly as it appears below. You will not need to use this code after youve completed the sign-up process. If you do not sign up before the expiration date, you must request a new code. · Passado Access Code: [email or web address] Expires: 6/26/2018 10:39 AM 
 
4. Enter the last four digits of your Social Security Number (xxxx) and Date of Birth (mm/dd/yyyy) as indicated and click Submit. You will be taken to the next sign-up page. 5. Create a Passado ID. This will be your MyChart login ID and cannot be changed, so think of one that is secure and easy to remember. 6. Create a Passado password. You can change your password at any time. 7. Enter your Password Reset Question and Answer. This can be used at a later time if you forget your password. 8. Enter your e-mail address. You will receive e-mail notification when new information is available in [address] 9. Click Sign Up. You can now view and download portions of your medical record. 10. Click the Download Summary menu link to download a portable copy of your medical information. If you have questions, please visit the Frequently Asked Questions section of the Cleverlize website. Remember, Cleverlize is NOT to be used for urgent needs. For medical emergencies, dial 911. Now available from your iPhone and Android! Please provide this summary of care documentation to your next provider. Your primary care clinician is listed as Amarilis Eisenberg. If you have any questions after today's visit, please call [phone].

## 2018-04-12 NOTE — PROGRESS NOTES
[address] [facility] and Primary Care  [place] [address]  [address] [place] [address]  Phone:  [phone]  Fax: [phone]       Chief Complaint   Patient presents with    Cold Symptoms     Patient complains of a cold (cough and sore throat) x 3 days. .      SUBJECTIVE:    Thuan Montanez is a 80 y.o. female comes in complaining of upper respiratory symptoms for the last 3-4 days. She has had a persistent cough with audible wheezing. Her sputum is clear although she had a slight blood tinging on one or two occasions. She denies any shortness of breath. She has also had problems with constipation. She is taking a stool softener. Most recently she saw a gynecologist for a vaginal/uterine prolapse. She was given a pessary but it fell out so she is going to just leave well enough alone at this point. She has a past history of anemia and primary hypertension. Current Outpatient Prescriptions   Medication Sig Dispense Refill    predniSONE (DELTASONE) 20 mg tablet Take 1 Tab by mouth three (3) times daily (after meals). 21 Tab 0    docusate sodium (COLACE) 50 mg capsule Take 1 tablet twice a day 60 Cap 11    MAG/ALUMINUM/SOD BICARB/ALGINC (GAVISCON PO) Take  by mouth.  hydrocortisone (CORTAID) 1 % topical cream Apply  to affected area two (2) times a day. use thin layer 30 g 1    LORazepam (ATIVAN) 0.5 mg tablet TAKE ONE TABLET BY MOUTH TWICE A DAY AS NEEDED FOR ANXIETY. MAX DAILY DOSE 1MG 50 Tab 5    citalopram (CELEXA) 20 mg tablet take 1 tablet by mouth once daily 30 Tab 11    meclizine 25 mg Cap Take 12.5 mg by mouth every six (6) hours as needed (for vertigo).  losartan-hydroCHLOROthiazide (HYZAAR) 50-12.5 mg per tablet Take 1 Tab by mouth daily.  80 Tab 3     Past Medical History:   Diagnosis Date    Bradycardia     Essential hypertension     GERD (gastroesophageal reflux disease)     Psychiatric disorder     Anxiety     Past Surgical History:   Procedure Laterality Date    HX BUNIONECTOMY      HX HYSTERECTOMY  1971    partial    HX ORTHOPAEDIC      Status post bunionectomy    HX PARTIAL HYSTERECTOMY       No Known Allergies      REVIEW OF SYSTEMS:  General: negative for - chills or fever  ENT: negative for - headaches, nasal congestion or tinnitus  Respiratory: negative for - cough, hemoptysis, shortness of breath or wheezing  Cardiovascular : negative for - chest pain, edema, palpitations or shortness of breath  Gastrointestinal: negative for - abdominal pain, blood in stools, heartburn or nausea/vomiting  Genito-Urinary: no dysuria, trouble voiding, or hematuria  Musculoskeletal: negative for - gait disturbance, joint pain, joint stiffness or joint swelling  Neurological: no TIA or stroke symptoms  Hematologic: no bruises, no bleeding, no swollen glands  Integument: no lumps, mole changes, nail changes or rash  Endocrine: no malaise/lethargy or unexpected weight changes      Social History     Social History    Marital status:      Spouse name: N/A    Number of children: N/A    Years of education: N/A     Social History Main Topics    Smoking status: Never Smoker    Smokeless tobacco: Never Used    Alcohol use No    Drug use: No    Sexual activity: Not Currently     Other Topics Concern    None     Social History Narrative     Family History   Problem Relation Age of Onset    Hypertension Mother        OBJECTIVE:    Visit Vitals    /70    Pulse 60    Temp 97.9 °F (36.6 °C) (Oral)    Resp 14    Ht 5' 2\" (1.575 m)    Wt 177 lb 4.8 oz (80.4 kg)    SpO2 94%    BMI 32.43 kg/m2     CONSTITUTIONAL: well , well nourished, appears age appropriate  EYES: perrla, eom intact  ENMT:moist mucous membranes, pharynx clear  NECK: supple.  Thyroid normal  RESPIRATORY: Chest: clear to ascultation and percussion   CARDIOVASCULAR: Heart: regular rate and rhythm  GASTROINTESTINAL: Abdomen: soft, bowel sounds active  HEMATOLOGIC: no pathological lymph nodes palpated  MUSCULOSKELETAL: Extremities: no edema, pulse 1+   INTEGUMENT: No unusual rashes or suspicious skin lesions noted. Nails appear normal.  NEUROLOGIC: non-focal exam   MENTAL STATUS: alert and oriented, appropriate affect      ASSESSMENT:  1. Upper respiratory tract infection, unspecified type    2. Mild persistent reactive airway disease without complication    3. Constipation, unspecified constipation type    4. Essential hypertension    5. Uterine prolapse    6. Anxiety tension state        PLAN:    1. She has an upper respiratory infection complicated by reactive airway disease. She blood tinging of the mucus is probably of not significance other than bronchial irritation. If it gets worse or she starts having true hemoptysis she will obviously return as soon as possible. In the intervening time I will give her prednisone 20 mg tid pc for the next seven days. 2. For constipation she will continue Miralax. 3. Blood pressure is excellent today, no adjustments are made. 4. Her anxiety level is quite stable also. She uses her anxiolytic as needed. 5. As far as her vaginal prolapse is concerned she is not going to use the pessary any longer. I explained to her that this is a benign condition that is more of a nescience than anything else. .  Orders Placed This Encounter    predniSONE (DELTASONE) 20 mg tablet         Follow-up Disposition:  Return in about 3 months (around 7/12/2018).       Izabela Hatfield MD

## 2018-04-12 NOTE — PROGRESS NOTES
Chief Complaint   Patient presents with    Cold Symptoms     Patient complains of a cold (cough and sore throat) x 3 days. 1. Have you been to the ER, urgent care clinic since your last visit? Hospitalized since your last visit? No    2. Have you seen or consulted any other health care providers outside of the [address] [place] [address] since your last visit? Include any pap smears or colon screening.  No

## 2018-04-15 PROBLEM — N81.4 VAGINAL AND CERVICAL PROLAPSE: Status: RESOLVED | Noted: 2018-04-15 | Resolved: 2018-04-15

## 2018-04-15 PROBLEM — N81.4 VAGINAL AND CERVICAL PROLAPSE: Status: ACTIVE | Noted: 2018-04-15

## 2018-04-23 ENCOUNTER — HOSPITAL ENCOUNTER (OUTPATIENT)
Dept: MAMMOGRAPHY | Age: 83
Discharge: HOME | End: 2018-04-23
Attending: INTERNAL MEDICINE
Payer: MEDICARE

## 2018-04-23 DIAGNOSIS — Z12.31 VISIT FOR SCREENING MAMMOGRAM: ICD-10-CM

## 2018-04-23 PROCEDURE — 77067 SCR MAMMO BI INCL CAD: CPT

## 2018-05-15 ENCOUNTER — OFFICE VISIT (OUTPATIENT)
Dept: CARDIOLOGY CLINIC | Age: 83
End: 2018-05-15

## 2018-05-15 VITALS
BODY MASS INDEX: 32.57 KG/M2 | HEIGHT: 62 IN | SYSTOLIC BLOOD PRESSURE: 110 MMHG | DIASTOLIC BLOOD PRESSURE: 70 MMHG | HEART RATE: 81 BPM | WEIGHT: 177 LBS

## 2018-05-15 DIAGNOSIS — I49.5 SSS (SICK SINUS SYNDROME) (HCC): ICD-10-CM

## 2018-05-15 DIAGNOSIS — R45.89 DEPRESSED AFFECT: ICD-10-CM

## 2018-05-15 DIAGNOSIS — I10 ESSENTIAL HYPERTENSION: Primary | ICD-10-CM

## 2018-05-15 DIAGNOSIS — E66.09 NON MORBID OBESITY DUE TO EXCESS CALORIES: ICD-10-CM

## 2018-05-15 DIAGNOSIS — R55 SYNCOPE AND COLLAPSE: ICD-10-CM

## 2018-05-15 DIAGNOSIS — F41.9 ANXIETY TENSION STATE: ICD-10-CM

## 2018-05-15 NOTE — MR AVS SNAPSHOT
[address] [facility] [address] [place] 
[phone] Patient: Michelle Nelson MRN:  [id number] Visit Information Date & Time Provider Department Dept. Phone Encounter #  
 5/15/2018 11:20 AM Clarissa Marrero MD [facility] [phone] [id number] Your Appointments 5/15/2018 11:20 AM  
ESTABLISHED PATIENT with Clarissa Marrero MD  
[facility] ([facility] SCHEDULING) Appt Note: 6 MO F/U; Pt r/s from 05/30/18 6 MO F/U [address] [place] [address]  
[address] [address]  
  
    
 6/28/2018 11:00 AM  
ESTABLISHED PATIENT with Keith Luke NP  
[address],Second Floor [place] AT [facility] ([facility]) Appt Note: Pessary washing [place] [address] [address]  
[phone]  
  
   
 [place] [address] [place] [address] Upcoming Health Maintenance Date Due Influenza Age 5 to Adult 8/1/2018 Pneumococcal 65+ Low/Medium Risk (2 of 2 - PPSV23) 10/10/2018 MEDICARE YEARLY EXAM 12/27/2018 GLAUCOMA SCREENING Q2Y 1/30/2020 DTaP/Tdap/Td series (2 - Td) 12/26/2027 Allergies as of 5/15/2018  Review Complete On: 5/15/2018 By: Larisa Rand RN No Known Allergies Current Immunizations  Never Reviewed No immunizations on file. Not reviewed this visit Vitals BP Pulse Height(growth percentile) Weight(growth percentile) BMI OB Status 110/70 (BP 1 Location: Right arm, BP Patient Position: Sitting) 81 5' 2\" (1.575 m) 177 lb (80.3 kg) 32.37 kg/m2 Hysterectomy Smoking Status Never Smoker Vitals History BMI and BSA Data Body Mass Index Body Surface Area  
 32.37 kg/m 2 1.87 m 2 Preferred Pharmacy Pharmacy Name Phone [id number] [facility], [place] [address] Anthony Jose [phone] Your Updated Medication List  
  
   
This list is accurate as of 5/15/18 11:02 AM.  Always use your most recent med list.  
  
  
  
  
 citalopram 20 mg tablet Commonly known as:  CELEXA  
take 1 tablet by mouth once daily  
  
 docusate sodium 50 mg capsule Commonly known as:  Grey Guppy Take 1 tablet twice a day GAVISCON PO Take  by mouth. hydrocortisone 1 % topical cream  
Commonly known as:  CORTAID Apply  to affected area two (2) times a day. use thin layer LORazepam 0.5 mg tablet Commonly known as:  ATIVAN  
TAKE ONE TABLET BY MOUTH TWICE A DAY AS NEEDED FOR ANXIETY. MAX DAILY DOSE 1MG  
  
 losartan-hydroCHLOROthiazide 50-12.5 mg per tablet Commonly known as:  HYZAAR Take 1 Tab by mouth daily. meclizine 25 mg Cap Take 12.5 mg by mouth every six (6) hours as needed (for vertigo). predniSONE 20 mg tablet Commonly known as:  Edrie Power Take 1 Tab by mouth three (3) times daily (after meals). Introducing [facility] & HEALTH SERVICES! Arnold Rosarioick introduces Blink for iPhone and Android patient portal. Now you can access parts of your medical record, email your doctor's office, and request medication refills online. 1. In your internet browser, go to [email or web address] 2. Click on the First Time User? Click Here link in the Sign In box. You will see the New Member Sign Up page. 3. Enter your Blink for iPhone and Android Access Code exactly as it appears below. You will not need to use this code after youve completed the sign-up process. If you do not sign up before the expiration date, you must request a new code. · Blink for iPhone and Android Access Code: [email or web address] Expires: 6/26/2018 10:39 AM 
 
4. Enter the last four digits of your Social Security Number (xxxx) and Date of Birth (mm/dd/yyyy) as indicated and click Submit. You will be taken to the next sign-up page. 5. Create a Colabo ID. This will be your Colabo login ID and cannot be changed, so think of one that is secure and easy to remember. 6. Create a Colabo password. You can change your password at any time. 7. Enter your Password Reset Question and Answer. This can be used at a later time if you forget your password. 8. Enter your e-mail address. You will receive e-mail notification when new information is available in [address] 9. Click Sign Up. You can now view and download portions of your medical record. 10. Click the Download Summary menu link to download a portable copy of your medical information. If you have questions, please visit the Frequently Asked Questions section of the Colabo website. Remember, Colabo is NOT to be used for urgent needs. For medical emergencies, dial 911. Now available from your iPhone and Android! Please provide this summary of care documentation to your next provider. Your primary care clinician is listed as Amarilis Eisenberg. If you have any questions after today's visit, please call [phone].

## 2018-05-15 NOTE — PROGRESS NOTES
HISTORY OF PRESENT ILLNESS  Diogo James is a 80 y.o. female. She is seen in follow up for hypertension. She still cares for her 80-year old  and she admits to being stressed and depressed. She has a history of sick sinus syndrome on a beta-blocker but currently this is not an issue. She actually had syncope in the past. She is losing some weight but states she has a good appetite. 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  LORazepam (ATIVAN) 0.5 mg tablet TAKE ONE TABLET BY MOUTH TWICE A DAY AS NEEDED FOR ANXIETY. MAX DAILY DOSE 1MG 50 Tab 5    losartan-hydroCHLOROthiazide (HYZAAR) 50-12.5 mg per tablet Take 1 Tab by mouth daily. 90 Tab 3    citalopram (CELEXA) 20 mg tablet take 1 tablet by mouth once daily 30 Tab 11    predniSONE (DELTASONE) 20 mg tablet Take 1 Tab by mouth three (3) times daily (after meals). 21 Tab 0    hydrocortisone (CORTAID) 1 % topical cream Apply  to affected area two (2) times a day. use thin layer 30 g 1    meclizine 25 mg Cap Take 12.5 mg by mouth every six (6) hours as needed (for vertigo).        Past Medical History:   Diagnosis Date    Bradycardia     Essential hypertension     GERD (gastroesophageal reflux disease)     Psychiatric disorder     Anxiety     Past Surgical History:   Procedure Laterality Date    HX BUNIONECTOMY      HX HYSTERECTOMY  1971    partial    HX ORTHOPAEDIC      Status post bunionectomy    HX PARTIAL HYSTERECTOMY         Review of Systems   Constitutional: Positive for malaise/fatigue and weight loss. Neurological: Positive for weakness. Psychiatric/Behavioral: Positive for depression. All other systems reviewed and are negative. Visit Vitals    /70 (BP 1 Location: Right arm, BP Patient Position: Sitting)    Pulse 81    Ht 5' 2\" (1.575 m)    Wt 177 lb (80.3 kg)    BMI 32.37 kg/m2       Physical Exam   Constitutional: She appears well-nourished. HENT:   Head: Atraumatic. Eyes: Conjunctivae are normal.   Neck: Neck supple. Cardiovascular: Normal rate, regular rhythm and normal heart sounds. Exam reveals no gallop and no friction rub. No murmur heard. Pulmonary/Chest: Breath sounds normal. She has no wheezes. Abdominal: Bowel sounds are normal.   Musculoskeletal: She exhibits no edema. Neurological: No cranial nerve deficit. Coordination normal.   Skin: Skin is dry. Psychiatric: Her behavior is normal.   Nursing note and vitals reviewed. ASSESSMENT and PLAN  Her blood pressure is well controlled. Her son  recently in his 63's possibly from Alzheimer's disease. I suspect she does suffer from depression given her lifestyle and the fact that she has no help. She will be seeing her primary care physician in the near future and I asked her to bring up these issues with him. I will see her back in 6 months.

## 2018-05-28 ENCOUNTER — HOSPITAL ENCOUNTER (EMERGENCY)
Age: 83
Discharge: HOME | End: 2018-05-28
Attending: EMERGENCY MEDICINE | Admitting: EMERGENCY MEDICINE
Payer: MEDICARE

## 2018-05-28 VITALS
RESPIRATION RATE: 20 BRPM | DIASTOLIC BLOOD PRESSURE: 67 MMHG | HEART RATE: 65 BPM | HEIGHT: 62 IN | WEIGHT: 177 LBS | SYSTOLIC BLOOD PRESSURE: 140 MMHG | OXYGEN SATURATION: 100 % | BODY MASS INDEX: 32.57 KG/M2 | TEMPERATURE: 98.2 F

## 2018-05-28 DIAGNOSIS — R51.9 CHRONIC NONINTRACTABLE HEADACHE, UNSPECIFIED HEADACHE TYPE: ICD-10-CM

## 2018-05-28 DIAGNOSIS — G89.29 CHRONIC NONINTRACTABLE HEADACHE, UNSPECIFIED HEADACHE TYPE: ICD-10-CM

## 2018-05-28 DIAGNOSIS — M70.22 OLECRANON BURSITIS OF LEFT ELBOW: Primary | ICD-10-CM

## 2018-05-28 PROCEDURE — 99282 EMERGENCY DEPT VISIT SF MDM: CPT

## 2018-05-28 RX ORDER — IBUPROFEN 800 MG/1
800 TABLET ORAL
Qty: 30 TAB | Refills: 0 | Status: SHIPPED | OUTPATIENT
Start: 2018-05-28 | End: 2018-12-13

## 2018-05-28 NOTE — ED NOTES
Assumed care of patient from triage. Patient alert and oriented x4. Patient reports multiple complains that she states she has seen Dr. Mima Uribe for. Patient reports left elbow pain since this morning. Denies injury. Patient also reports urinary frequency since starting blood pressure medication a year ago. Patient concerns for painful area to upper and lower lip for an unknown amount of time. Patient also reports bilateral knee pain. Patient with ace wraps that have slipped down to knee. Patient also reports intermittent headaches but denies any head pain currently. Patient denies any other complaints at this time. Emergency Department Nursing Plan of Care       The Nursing Plan of Care is developed from the Nursing assessment and Emergency Department Attending provider initial evaluation. The plan of care may be reviewed in the ED Provider note.     The Plan of Care was developed with the following considerations:   Patient / Family readiness to learn indicated by:verbalized understanding  Persons(s) to be included in education: patient  Barriers to Learning/Limitations:No    Signed     Eric Clemente RN    5/28/2018   11:28 AM

## 2018-05-28 NOTE — ED NOTES
Discharge instructions and 1 prescriptions reviewed with patient. Patient verbalizes understanding and denies any questions. Patient alert and oriented and ambulatory out of ED in no apparent distress. Patient declined wheelchair.

## 2018-05-28 NOTE — ED PROVIDER NOTES
EMERGENCY DEPARTMENT HISTORY AND PHYSICAL EXAM      Date: 5/28/2018  Patient Name: Romulo Huynh    History of Presenting Illness     Chief Complaint   Patient presents with    Arm Pain     left arm pain starting this morning upon waking up. Denies injury. Denies chest pain or SOB.  Urinary Frequency       History Provided By: Patient    HPI: Romulo Huynh, 80 y.o. female with PMHx significant for HTN, GERD, and anxiety, presents ambulatory to the ED with cc of left elbow pain since this morning. She states her pain is exacerbated by movement of her arm. Pt denies recent trauma, injury, or excessive exertion to which her symptoms might be attributed. Pt also reports HA x ~3-4 months, for which she denies taking any medications. She states she has not been evaluated for HA since onset. Pt specifically denies fever, chills, and N/V/D. There are no other complaints, changes, or physical findings at this time. PCP: Arabella Grijalva MD    Current Outpatient Prescriptions   Medication Sig Dispense Refill    ibuprofen (MOTRIN) 800 mg tablet Take 1 Tab by mouth every eight (8) hours as needed for Pain. 30 Tab 0    meclizine (ANTIVERT) 12.5 mg tablet 1 tablet 3 times daily as needed for dizziness 30 Tab 3    predniSONE (DELTASONE) 20 mg tablet Take 1 Tab by mouth three (3) times daily (after meals). 21 Tab 0    docusate sodium (COLACE) 50 mg capsule Take 1 tablet twice a day 60 Cap 11    MAG/ALUMINUM/SOD BICARB/ALGINC (GAVISCON PO) Take  by mouth.  hydrocortisone (CORTAID) 1 % topical cream Apply  to affected area two (2) times a day. use thin layer 30 g 1    LORazepam (ATIVAN) 0.5 mg tablet TAKE ONE TABLET BY MOUTH TWICE A DAY AS NEEDED FOR ANXIETY. MAX DAILY DOSE 1MG 50 Tab 5    losartan-hydroCHLOROthiazide (HYZAAR) 50-12.5 mg per tablet Take 1 Tab by mouth daily.  90 Tab 3    citalopram (CELEXA) 20 mg tablet take 1 tablet by mouth once daily 30 Tab 11       Past History     Past Medical History:  Past Medical History:   Diagnosis Date    Bradycardia     Essential hypertension     GERD (gastroesophageal reflux disease)     Psychiatric disorder     Anxiety       Past Surgical History:  Past Surgical History:   Procedure Laterality Date    HX BUNIONECTOMY      HX HYSTERECTOMY  1971    partial    HX ORTHOPAEDIC      Status post bunionectomy    HX PARTIAL HYSTERECTOMY         Family History:  Family History   Problem Relation Age of Onset    Hypertension Mother        Social History:  Social History   Substance Use Topics    Smoking status: Never Smoker    Smokeless tobacco: Never Used    Alcohol use No       Allergies:  No Known Allergies      Review of Systems   Review of Systems   Constitutional: Negative for chills and fever. HENT: Negative for congestion, rhinorrhea, sneezing and sore throat. Eyes: Negative for redness and visual disturbance. Respiratory: Negative for shortness of breath. Cardiovascular: Negative for leg swelling. Gastrointestinal: Negative for abdominal pain, nausea and vomiting. Genitourinary: Negative for difficulty urinating and frequency. Musculoskeletal: Positive for arthralgias (left elbow). Negative for back pain, myalgias and neck stiffness. Skin: Negative for rash. Neurological: Positive for headaches. Negative for dizziness, syncope and weakness. Hematological: Negative for adenopathy. All other systems reviewed and are negative. Physical Exam   Physical Exam   Constitutional: She is oriented to person, place, and time. She appears well-developed and well-nourished. HENT:   Head: Normocephalic and atraumatic. Mouth/Throat: Oropharynx is clear and moist.   Eyes: Conjunctivae and EOM are normal.   Neck: Normal range of motion and full passive range of motion without pain. Neck supple. Cardiovascular: Normal rate, regular rhythm, S1 normal, S2 normal, normal heart sounds, intact distal pulses and normal pulses.     No murmur heard.  Pulmonary/Chest: Effort normal and breath sounds normal. No respiratory distress. She has no wheezes. Abdominal: Soft. Normal appearance and bowel sounds are normal. She exhibits no distension. There is no tenderness. There is no rebound. Musculoskeletal: Normal range of motion. Tenderness over left olecranon   Neurological: She is alert and oriented to person, place, and time. She has normal strength. Skin: Skin is warm, dry and intact. No rash noted. Psychiatric: She has a normal mood and affect. Her speech is normal and behavior is normal. Judgment and thought content normal.   Nursing note and vitals reviewed. Medical Decision Making   I am the first provider for this patient. I reviewed the vital signs, available nursing notes, past medical history, past surgical history, family history and social history. Vital Signs-Reviewed the patient's vital signs. Patient Vitals for the past 12 hrs:   Temp Pulse Resp BP SpO2   05/28/18 1105 98.2 °F (36.8 °C) 65 20 140/67 100 %       Pulse Oximetry Analysis - 100% on RA    Records Reviewed: Nursing Notes and Old Medical Records    Provider Notes (Medical Decision Making):     DDx: olecranon bursitis, tension HA, migraine HA    ED Course:   Initial assessment performed. The patients presenting problems have been discussed, and they are in agreement with the care plan formulated and outlined with them. I have encouraged them to ask questions as they arise throughout their visit. Disposition:  DISCHARGE NOTE:  12:28 PM  The patient is ready for discharge. The patients signs, symptoms, diagnosis, and instructions for discharge have been discussed and the pt has conveyed their understanding. The patient is to follow up as recommended or return to the ER should their symptoms worsen. Plan has been discussed and patient has conveyed their agreement. PLAN:  1.    Current Discharge Medication List      START taking these medications    Details ibuprofen (MOTRIN) 800 mg tablet Take 1 Tab by mouth every eight (8) hours as needed for Pain. Qty: 30 Tab, Refills: 0           2. Follow-up Information     Follow up With Details Comments [id number] MD Sofia Schedule an appointment as soon as possible for a visit  [address] [phone]      One of the dentists from the list provided Schedule an appointment as soon as possible for a visit      [facility] EMERGENCY DEPT  As needed, If symptoms worsen [facility]  [phone]        Return to ED if worse     Diagnosis     Clinical Impression:   1. Olecranon bursitis of left elbow    2. Chronic nonintractable headache, unspecified headache type        Attestations: This note is prepared by Sebastian Holloway, acting as Scribe for Angelito Cordoba MD.    Angelito Cordoba MD: The scribe's documentation has been prepared under my direction and personally reviewed by me in its entirety. I confirm that the note above accurately reflects all work, treatment, procedures, and medical decision making performed by me.

## 2018-05-28 NOTE — DISCHARGE INSTRUCTIONS
Bursitis of the Elbow: Care Instructions  Your Care Instructions  Bursitis is pain and swelling of the bursae. These are sacs of fluid that help your joints move smoothly. Olecranon bursitis is a type of bursitis that affects the back of the elbow. This is sometimes called Stanford elbow because the bump that develops looks like the cartoon character Stanford's elbow. Injury, overuse, or prolonged pressure on your elbow can cause this form of bursitis. Sometimes it happens when people have arthritis. It also can occur for unknown reasons. Treatment may include draining fluid from the bursa with a needle. If your doctor thought there was infection, he or she may have prescribed antibiotics. You also may get shots of medicine into the bursa to help the swelling go down. Your elbow should get better in a few days or weeks. Follow-up care is a key part of your treatment and safety. Be sure to make and go to all appointments, and call your doctor if you are having problems. It's also a good idea to know your test results and keep a list of the medicines you take. How can you care for yourself at home? · Take pain medicines exactly as directed. ¨ If the doctor gave you a prescription medicine for pain, take it as prescribed. ¨ If you are not taking a prescription pain medicine, ask your doctor if you can take an over-the-counter medicine. ¨ Do not take two or more pain medicines at the same time unless the doctor told you to. Many pain medicines have acetaminophen, which is Tylenol. Too much acetaminophen (Tylenol) can be harmful. · If your doctor prescribed antibiotics, take them as directed. Do not stop taking them just because you feel better. You need to take the full course of antibiotics. · If your doctor gave you a sling, an elastic bandage, or a compression sleeve, wear it exactly as instructed. · Put ice or a cold pack on your elbow for 10 to 20 minutes at a time.  Try to do this every 1 to 2 hours for the next 3 days (when you are awake) or until the swelling goes down. Put a thin cloth between the ice and your skin. · After 3 days, you can try heat, or alternate heat and ice. · Rest your elbow. Try to stop or reduce any activity that causes pain. · Wear elbow pads during physical activity to prevent injury. · Do not lean your elbows on tables or armrests. When should you call for help? Call your doctor now or seek immediate medical care if:  ? · You have new or worse symptoms of infection, such as:  ¨ Increased pain, swelling, warmth, or redness. ¨ Red streaks leading from the area. ¨ Pus draining from the area. ¨ A fever. ? Watch closely for changes in your health, and be sure to contact your doctor if:  ? · You do not get better as expected. Where can you learn more? Go to http://ryanConferenceEdgeleonardo.info/. Enter  in the search box to learn more about \"Bursitis of the Elbow: Care Instructions. \"  Current as of: March 21, 2017  Content Version: 11.4  © 6075-5245 [employer]. Care instructions adapted under license by [employer] (which disclaims liability or warranty for this information). If you have questions about a medical condition or this instruction, always ask your healthcare professional. [place] [address] any warranty or liability for your use of this information. Bursitis: Care Instructions  Your Care Instructions    A bursa is a small sac of fluid that helps the tissues around a joint slide over one another easily. Injury or overuse of a joint can cause pain, redness, and inflammation in the bursa (bursitis). Bursitis usually gets better if you avoid the activity that caused it. You can help prevent bursitis from coming back by doing stretching and strengthening exercises. You may also need to change the way you do some activities. Follow-up care is a key part of your treatment and safety.  Be sure to make and go to all appointments, and call your doctor if you are having problems. It's also a good idea to know your test results and keep a list of the medicines you take. How can you care for yourself at home? · Put ice or a cold pack on the area for 10 to 20 minutes at a time. Try to do this every 1 to 2 hours for the next 3 days (when you are awake) or until the swelling goes down. Put a thin cloth between the ice and your skin. · After the 3 days of using ice, you may use heat on the area. You can use a hot water bottle; a warm, moist towel; or a heating pad set on low. You can also try alternating heat and ice. · Rest the area where you have pain. Stop any activities that cause pain. Switch to activities that do not stress the area. · Take pain medicines exactly as directed. ¨ If the doctor gave you a prescription medicine for pain, take it as prescribed. ¨ If you are not taking a prescription pain medicine, ask your doctor if you can take an over-the-counter medicine. ¨ Do not take two or more pain medicines at the same time unless the doctor told you to. Many pain medicines have acetaminophen, which is Tylenol. Too much acetaminophen (Tylenol) can be harmful. · To prevent stiffness, gently move the joint as much as you can without pain every day. As the pain gets better, keep doing range-of-motion exercises. Ask your doctor for exercises that will make the muscles around the joint stronger. Do these as directed. · You can slowly return to the activity that caused the pain, but do it with less effort until you can do it without pain or swelling. Be sure to warm up before and stretch after you do the activity. When should you call for help? Call your doctor now or seek immediate medical care if:  ? · You have new or worse symptoms of infection, such as:  ¨ Increased pain, swelling, warmth, or redness. ¨ Red streaks leading from the area. ¨ Pus draining from the area. ¨ A fever. ? Watch closely for changes in your health, and be sure to contact your doctor if:  ? · You do not get better as expected. Where can you learn more? Go to http://ryan-leonardo.info/. Enter D952 in the search box to learn more about \"Bursitis: Care Instructions. \"  Current as of: March 21, 2017  Content Version: 11.4  © 3163-2847 [employer]. Care instructions adapted under license by [employer] (which disclaims liability or warranty for this information). If you have questions about a medical condition or this instruction, always ask your healthcare professional. [place] [address] any warranty or liability for your use of this information.

## 2018-06-16 NOTE — PROGRESS NOTES
1. Sling to left arm - wear for 48 hours, then only at night for 6 weeks.  2. Keflex 500 mg TID for 5 days at discharge   3. No lifting left elbow above shoulder height  4. No lifting over 5 pounds  5. No driving for 1 week and for 4 weeks if patient uses left arm to make turns  8. Patient may shower in 24 hours, do not let beam of shower hit site directly and no scrubbing in area  6. Follow up in device clinic in 1 week and with 4 weeks with Dr. Gates       [address] [place] [address] and Primary Care  [facility]  [address] [place] [address]  Phone:  [phone]  Fax: [phone]       Chief Complaint   Patient presents with   [facility] Wellness Visit   . SUBJECTIVE:    Dede Klinefelter is a 80 y.o. female comes in for return visit complaining of reflux symptoms. She has seen an ENT physician who gave her esomeprazole for reflux esophagitis  In spite of that, she continues with the burning sensation and bitter taste in her mouth. She continues to have chronic recurring headaches which are unchanged in quality or character. She has a past history of primary hypertension and chronic anxiety. Additionally, there has been no meaningful weight loss. Current Outpatient Prescriptions   Medication Sig Dispense Refill    acetaminophen (TYLENOL ARTHRITIS PAIN) 650 mg TbER Take 650 mg by mouth every eight (8) hours.  sucralfate (CARAFATE) 1 gram tablet Take 1 Tab by mouth Before breakfast, lunch, and dinner. 42 Tab 0    citalopram (CELEXA) 20 mg tablet take 1 tablet by mouth once daily 30 Tab 11    esomeprazole (NEXIUM) 40 mg capsule Take 20 mg by mouth daily. Take 1 capsule by mouth once daily 1 to 2 hours before dinner      FOLIC ACID/MULTIVIT-MIN/LUTEIN (CENTRUM SILVER PO) Take 1 Tab by mouth daily.  LORazepam (ATIVAN) 0.5 mg tablet TAKE ONE TABLET BY MOUTH TWICE A DAY AS NEEDED FOR ANXIETY. MAX DAILY DOSE 1MG 50 Tab 5    pantoprazole (PROTONIX) 40 mg tablet Take 1 Tab by mouth daily. 30 Tab 11    losartan-hydroCHLOROthiazide (HYZAAR) 50-12.5 mg per tablet Take 1 Tab by mouth daily. 90 Tab 3    traMADol (ULTRAM) 50 mg tablet Take 50 mg by mouth every six (6) hours as needed for Pain.  fluticasone (FLONASE) 50 mcg/actuation nasal spray 2 Sprays by Both Nostrils route daily. 1 Bottle 1    meclizine 25 mg Cap Take 12.5 mg by mouth every six (6) hours as needed (for vertigo).        Past Medical History:   Diagnosis Date  Bradycardia     Essential hypertension     GERD (gastroesophageal reflux disease)     Psychiatric disorder     Anxiety     Past Surgical History:   Procedure Laterality Date    HX BUNIONECTOMY      HX GYN      Status post PAULO    HX ORTHOPAEDIC      Status post bunionectomy    HX PARTIAL HYSTERECTOMY       No Known Allergies      REVIEW OF SYSTEMS:  General: negative for - chills or fever  ENT: negative for - headaches, nasal congestion or tinnitus  Respiratory: negative for - cough, hemoptysis, shortness of breath or wheezing  Cardiovascular : negative for - chest pain, edema, palpitations or shortness of breath  Gastrointestinal: negative for - abdominal pain, blood in stools, heartburn or nausea/vomiting  Genito-Urinary: no dysuria, trouble voiding, or hematuria  Musculoskeletal: negative for - gait disturbance, joint pain, joint stiffness or joint swelling  Neurological: no TIA or stroke symptoms  Hematologic: no bruises, no bleeding, no swollen glands  Integument: no lumps, mole changes, nail changes or rash  Endocrine: no malaise/lethargy or unexpected weight changes      Social History     Social History    Marital status:      Spouse name: N/A    Number of children: N/A    Years of education: N/A     Social History Main Topics    Smoking status: Never Smoker    Smokeless tobacco: Never Used    Alcohol use No    Drug use: No    Sexual activity: Not Currently     Other Topics Concern    None     Social History Narrative     Family History   Problem Relation Age of Onset    Hypertension Mother        OBJECTIVE:    Visit Vitals    /71    Pulse 62    Temp 97.5 °F (36.4 °C) (Oral)    Resp 16    Ht 5' 2\" (1.575 m)    Wt 180 lb 3.2 oz (81.7 kg)    SpO2 96%    BMI 32.96 kg/m2     CONSTITUTIONAL: well , well nourished, appears age appropriate  EYES: perrla, eom intact  ENMT:moist mucous membranes, pharynx clear  NECK: supple.  Thyroid normal  RESPIRATORY: Chest: clear to ascultation and percussion   CARDIOVASCULAR: Heart: regular rate and rhythm  GASTROINTESTINAL: Abdomen: soft, bowel sounds active  HEMATOLOGIC: no pathological lymph nodes palpated  MUSCULOSKELETAL: Extremities: no edema, pulse 1+   INTEGUMENT: No unusual rashes or suspicious skin lesions noted. Nails appear normal.  NEUROLOGIC: non-focal exam   MENTAL STATUS: alert and oriented, appropriate affect      ASSESSMENT:  1. Reflux esophagitis    2. Chronic tension-type headache, not intractable    3. Essential hypertension    4. Non morbid obesity due to excess calories    5. Screening for alcoholism    6. Screening for depression    7. Wellness examination        PLAN:    1. The patient has reflux esophagitis. I will add Carafate to her regimen and she will continue the omeprazole. She will take two weeks of this and hopefully this will alleviate the reflux. If so, I will put her on it for at least next 3-4 months. 2. She has chronic muscle tension headaches. Symptomatic treatment only. She will follow-up with neurology if this gets worse. 3. Blood pressure is excellent today, no adjustments are made. Specifically, her blood pressure today was 130/75.    4. I encouraged the patient to lose weight by reducing carbohydrate intake which means minimize the consumption of processed carbohydrates and eating meals. She is also to minimizing snacking. 5. I encouraged the patient to remain as physically active as possible. .  Orders Placed This Encounter    Depression Screen Annual    acetaminophen (TYLENOL ARTHRITIS PAIN) 650 mg TbER    sucralfate (CARAFATE) 1 gram tablet         Follow-up Disposition:  Return in about 4 weeks (around 1/23/2018).       Lyudmila Carcamo MD      This is a Subsequent Medicare Annual Wellness Exam (AWV) (Performed 12 months after IPPE or effective date of Medicare Part B enrollment)    I have reviewed the patient's medical history in detail and updated the computerized patient record. History     Past Medical History:   Diagnosis Date    Bradycardia     Essential hypertension     GERD (gastroesophageal reflux disease)     Psychiatric disorder     Anxiety      Past Surgical History:   Procedure Laterality Date    HX BUNIONECTOMY      HX GYN      Status post PAULO    HX ORTHOPAEDIC      Status post bunionectomy    HX PARTIAL HYSTERECTOMY       Current Outpatient Prescriptions   Medication Sig Dispense Refill    acetaminophen (TYLENOL ARTHRITIS PAIN) 650 mg TbER Take 650 mg by mouth every eight (8) hours.  sucralfate (CARAFATE) 1 gram tablet Take 1 Tab by mouth Before breakfast, lunch, and dinner. 42 Tab 0    citalopram (CELEXA) 20 mg tablet take 1 tablet by mouth once daily 30 Tab 11    esomeprazole (NEXIUM) 40 mg capsule Take 20 mg by mouth daily. Take 1 capsule by mouth once daily 1 to 2 hours before dinner      FOLIC ACID/MULTIVIT-MIN/LUTEIN (CENTRUM SILVER PO) Take 1 Tab by mouth daily.  LORazepam (ATIVAN) 0.5 mg tablet TAKE ONE TABLET BY MOUTH TWICE A DAY AS NEEDED FOR ANXIETY. MAX DAILY DOSE 1MG 50 Tab 5    pantoprazole (PROTONIX) 40 mg tablet Take 1 Tab by mouth daily. 30 Tab 11    losartan-hydroCHLOROthiazide (HYZAAR) 50-12.5 mg per tablet Take 1 Tab by mouth daily. 90 Tab 3    traMADol (ULTRAM) 50 mg tablet Take 50 mg by mouth every six (6) hours as needed for Pain.  fluticasone (FLONASE) 50 mcg/actuation nasal spray 2 Sprays by Both Nostrils route daily. 1 Bottle 1    meclizine 25 mg Cap Take 12.5 mg by mouth every six (6) hours as needed (for vertigo).        No Known Allergies  Family History   Problem Relation Age of Onset    Hypertension Mother      Social History   Substance Use Topics    Smoking status: Never Smoker    Smokeless tobacco: Never Used    Alcohol use No     Patient Active Problem List   Diagnosis Code    Syncope and collapse R55    Obesity E66.9    Bradycardia R00.1    HTN (hypertension) I10    Sinus congestion R09.81    SSS (sick sinus syndrome) ([facility]) I49.5    Vasomotor rhinitis J30.0    Non morbid obesity due to excess calories E66.09    Anemia D64.9    Primary osteoarthritis of both knees M17.0    Benign recurrent vertigo H81.10    Anxiety tension state F41.9    Dyspepsia R10.13    Uterine prolapse N81.4    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 Functional Ability and Level of Safety:   Hearing Loss  Hearing is good. Activities of Daily Living  The home contains: no safety equipment. Patient does total self care    Fall Risk  Fall Risk Assessment, last 12 mths 12/26/2017   Able to walk? Yes   Fall in past 12 months? No       Abuse Screen  Patient is not abused    Cognitive Screening   Evaluation of Cognitive Function:  Has your family/caregiver stated any concerns about your memory: no  Normal    Patient Care Team   Patient Care Team:  Tasha Zepeda MD as PCP - General (Internal Medicine)  Hitesh Jerry MD (Cardiology)    Assessment/Plan   Education and counseling provided:  Are appropriate based on today's review and evaluation    Diagnoses and all orders for this visit:    1. Reflux esophagitis    2. Chronic tension-type headache, not intractable    3. Essential hypertension    4. Non morbid obesity due to excess calories    5. Screening for alcoholism  -     Annual  Alcohol Screen 15 min ()    6. Screening for depression  -     Depression Screen Annual    7. Wellness examination    Other orders  -     sucralfate (CARAFATE) 1 gram tablet; Take 1 Tab by mouth Before breakfast, lunch, and dinner.         Health Maintenance Due   Topic Date Due    GLAUCOMA SCREENING Q2Y  04/19/1997    OSTEOPOROSIS SCREENING (DEXA)  04/19/1997    MEDICARE YEARLY EXAM  11/04/2017

## 2018-08-29 ENCOUNTER — ANESTHESIA EVENT (OUTPATIENT)
Dept: ENDOSCOPY | Age: 83
End: 2018-08-29
Payer: MEDICARE

## 2018-08-30 ENCOUNTER — ANESTHESIA (OUTPATIENT)
Dept: ENDOSCOPY | Age: 83
End: 2018-08-30
Payer: MEDICARE

## 2018-08-30 ENCOUNTER — HOSPITAL ENCOUNTER (OUTPATIENT)
Age: 83
Setting detail: OUTPATIENT SURGERY
Discharge: HOME | End: 2018-08-30
Attending: SPECIALIST | Admitting: SPECIALIST
Payer: MEDICARE

## 2018-08-30 VITALS
OXYGEN SATURATION: 99 % | TEMPERATURE: 97.8 F | RESPIRATION RATE: 20 BRPM | DIASTOLIC BLOOD PRESSURE: 60 MMHG | HEART RATE: 61 BPM | SYSTOLIC BLOOD PRESSURE: 140 MMHG

## 2018-08-30 PROCEDURE — 88305 TISSUE EXAM BY PATHOLOGIST: CPT | Performed by: SPECIALIST

## 2018-08-30 PROCEDURE — 74011250636 HC RX REV CODE- 250/636

## 2018-08-30 PROCEDURE — 77030009426 HC FCPS BIOP ENDOSC BSC -B: Performed by: SPECIALIST

## 2018-08-30 PROCEDURE — 76060000031 HC ANESTHESIA FIRST 0.5 HR: Performed by: SPECIALIST

## 2018-08-30 PROCEDURE — 76040000019: Performed by: SPECIALIST

## 2018-08-30 RX ORDER — SODIUM CHLORIDE 9 MG/ML
INJECTION, SOLUTION INTRAVENOUS
Status: DISCONTINUED | OUTPATIENT
Start: 2018-08-30 | End: 2018-08-30 | Stop reason: HOSPADM

## 2018-08-30 RX ORDER — PROPOFOL 10 MG/ML
INJECTION, EMULSION INTRAVENOUS AS NEEDED
Status: DISCONTINUED | OUTPATIENT
Start: 2018-08-30 | End: 2018-08-30 | Stop reason: HOSPADM

## 2018-08-30 RX ORDER — LIDOCAINE HYDROCHLORIDE 20 MG/ML
INJECTION, SOLUTION EPIDURAL; INFILTRATION; INTRACAUDAL; PERINEURAL AS NEEDED
Status: DISCONTINUED | OUTPATIENT
Start: 2018-08-30 | End: 2018-08-30 | Stop reason: HOSPADM

## 2018-08-30 RX ADMIN — LIDOCAINE HYDROCHLORIDE 40 MG: 20 INJECTION, SOLUTION EPIDURAL; INFILTRATION; INTRACAUDAL; PERINEURAL at 17:12

## 2018-08-30 RX ADMIN — PROPOFOL 50 MG: 10 INJECTION, EMULSION INTRAVENOUS at 17:08

## 2018-08-30 RX ADMIN — PROPOFOL 50 MG: 10 INJECTION, EMULSION INTRAVENOUS at 17:12

## 2018-08-30 RX ADMIN — SODIUM CHLORIDE: 9 INJECTION, SOLUTION INTRAVENOUS at 17:00

## 2018-08-30 NOTE — ANESTHESIA PREPROCEDURE EVALUATION
Anesthetic History No history of anesthetic complications Review of Systems / Medical History Patient summary reviewed, nursing notes reviewed and pertinent labs reviewed Pulmonary Within defined limits Neuro/Psych Psychiatric history Cardiovascular Hypertension: well controlled Dysrhythmias Comments: Hx bradycardia GI/Hepatic/Renal 
  
GERD: well controlled Endo/Other Obesity and anemia Other Findings Physical Exam 
 
Airway Mallampati: II 
TM Distance: > 6 cm Neck ROM: normal range of motion Mouth opening: Normal 
 
 Cardiovascular Regular rate and rhythm,  S1 and S2 normal,  no murmur, click, rub, or gallop Dental 
 
Dentition: Full lower dentures and Full upper dentures Pulmonary Breath sounds clear to auscultation Abdominal 
GI exam deferred Other Findings Anesthetic Plan ASA: 3 Anesthesia type: MAC Induction: Intravenous Anesthetic plan and risks discussed with: Patient

## 2018-08-30 NOTE — PROGRESS NOTES

## 2018-08-30 NOTE — H&P
Pre-endoscopy H and P     The patient was seen and examined in the endoscopy suite. The airway was assessed and docuemented. The problem list and medications were reviewed. 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 Social History Main Topics    Smoking status: Never Smoker    Smokeless tobacco: Never Used    Alcohol use No    Drug use: No    Sexual activity: Not Currently     Other Topics Concern    Not on file     Social History Narrative     Past Medical History:   Diagnosis Date    Bradycardia     Essential hypertension     GERD (gastroesophageal reflux disease)     Psychiatric disorder     Anxiety         Prior to Admission Medications   Prescriptions Last Dose Informant Patient Reported? Taking?    LORazepam (ATIVAN) 0.5 mg tablet 8/29/2018 at Unknown time  No Yes   Sig: TAKE 1 TABLET BY MOUTH TWICE A DAY AS NEEDED FOR ANXIETY   MAG/ALUMINUM/SOD BICARB/ALGINC (GAVISCON PO) Not Taking at Unknown time  Yes No   Sig: Take  by mouth.   citalopram (CELEXA) 20 mg tablet 8/29/2018 at Unknown time  No Yes   Sig: TAKE 1 TABLET BY MOUTH EVERY DAY   docusate sodium (COLACE) 50 mg capsule 8/23/2018 at Unknown time  No Yes   Sig: Take 1 tablet twice a day   hydrocortisone (CORTAID) 1 % topical cream   No No   Sig: Apply  to affected area two (2) times a day. use thin layer   ibuprofen (MOTRIN) 800 mg tablet 2018 at Unknown time  No Yes   Sig: Take 1 Tab by mouth every eight (8) hours as needed for Pain.   losartan-hydroCHLOROthiazide (HYZAAR) 50-12.5 mg per tablet 2018 at Unknown time  No Yes   Sig: TAKE 1 TABLET BY MOUTH EVERY DAY   meclizine (ANTIVERT) 12.5 mg tablet 2018 at Unknown time  No Yes   Si tablet 3 times daily as needed for dizziness   predniSONE (DELTASONE) 20 mg tablet Not Taking at Unknown time  No No   Sig: Take 1 Tab by mouth three (3) times daily (after meals). Facility-Administered Medications: None       Chief complaint, history of present illness, and review of systems and Past medical History are positive for: epigastric pain, GERD and anxiety    The heart, lungs and mental status were satisfactory for the administration of sedation and for the procedure. I discussed with the patient the objectives, risks, consequences and alternatives to the procedure.      Plan: Endoscopic procedure with sedation     Jose Martin Hou MD   2018  5:08 PM

## 2018-08-30 NOTE — IP AVS SNAPSHOT
[address] 
[phone] Patient: Edmundo Found MRN: [id number] PGB:[id number] About your hospitalization You were admitted on:  August 30, 2018 You last received care in the:  [facility] ENDOSCOPY You were discharged on:  August 30, 2018 Why you were hospitalized Your primary diagnosis was:  Not on File Follow-up Information None Discharge Orders None A check pam indicates which time of day the medication should be taken. My Medications CONTINUE taking these medications Instructions Each Dose to Equal  
 Morning Noon Evening Bedtime  
 citalopram 20 mg tablet Commonly known as:  Viona Creamer Your last dose was: Your next dose is: TAKE 1 TABLET BY MOUTH EVERY DAY  
     
   
   
   
  
 docusate sodium 50 mg capsule Commonly known as:  Ninoska Uribe Your last dose was: Your next dose is: Take 1 tablet twice a day GAVISCON PO Your last dose was: Your next dose is: Take  by mouth. hydrocortisone 1 % topical cream  
Commonly known as:  CORTAID Your last dose was: Your next dose is:    
   
   
 Apply  to affected area two (2) times a day. use thin layer  
     
   
   
   
  
 ibuprofen 800 mg tablet Commonly known as:  MOTRIN Your last dose was: Your next dose is: Take 1 Tab by mouth every eight (8) hours as needed for Pain. 800 mg LORazepam 0.5 mg tablet Commonly known as:  ATIVAN Your last dose was: Your next dose is: TAKE 1 TABLET BY MOUTH TWICE A DAY AS NEEDED FOR ANXIETY  
     
   
   
   
  
 losartan-hydroCHLOROthiazide 50-12.5 mg per tablet Commonly known as:  HYZAAR Your last dose was: Your next dose is: TAKE 1 TABLET BY MOUTH EVERY DAY  
     
   
   
   
  
 meclizine 12.5 mg tablet Commonly known as:  ANTIVERT Your last dose was: Your next dose is:    
   
   
 1 tablet 3 times daily as needed for dizziness  
     
   
   
   
  
 predniSONE 20 mg tablet Commonly known as:  Marcelene Im Your last dose was: Your next dose is: Take 1 Tab by mouth three (3) times daily (after meals). 20 mg Discharge Instructions Rosalva Ulloa [id number] 4/19/1932 EGD DISCHARGE INSTRUCTIONS Discomfort: 
Sore throat- throat lozenges or warm salt water gargle 
redness at IV site- apply warm compress to area; if redness or soreness persist- contact your physician Gaseous discomfort- walking, belching will help relieve any discomfort You may not operate a vehicle for 12 hours You may not engage in an occupation involving machinery or appliances for rest of today. You may not drink alcoholic beverages for at least 12 hours Avoid making any critical decisions for at least 24 hour DIET You may resume your regular diet  however -  remember your colon is empty and a heavy meal will produce gas. Avoid these foods:  vegetables, fried / greasy foods, carbonated drinks MEDICATIONS Regarding Aspirin or Nonsteroidal medications specifically, please see below. ACTIVITY You may resume your normal daily activities. Spend the remainder of the day resting -  avoid any strenuous activity. CALL M.D. ANY SIGN OF Increasing pain, nausea, vomiting Abdominal distension (swelling) New increased bleeding (oral or rectal) Fever (chills) Pain in chest area Bloody discharge from nose or mouth Shortness of breath You may not  take any Advil, Aspirin, Ibuprofen, Motrin, Aleve, or Goodys for 10 days, ONLY  Tylenol as needed for pain. Follow-up Instructions: 
 Call Dr. Justina Marinelli Results of procedure / biopsy in 10 days Telephone #  [phone] DISCHARGE SUMMARY from Nurse The following personal items collected during your admission are returned to you:  
Dental Appliance: Dental Appliances: Uppers, Lowers Vision: Visual Aid: None Hearing Aid:   
Jewelry:   
Clothing:   
Other Valuables:   
Valuables sent to safe:   
 
 
 
 
  
 
 
ACO Transitions of Care Introducing [employer] [address] offers a voluntary care coordination program to provide high quality service and care to [facility] fee-for-service beneficiaries. Matt Corbin was designed to help you enhance your health and well-being through the following services: ? Transitions of Care  support for individuals who are transitioning from one care setting to another (example: Hospital to home). ? Chronic and Complex Care Coordination  support for individuals and caregivers of those with serious or chronic illnesses or with more than one chronic (ongoing) condition and those who take a number of different medications. If you meet specific medical criteria, a [address] [place] [address] Rd may call you directly to coordinate your care with your primary care physician and your other care providers. For questions about the [facility] programs, please, contact your physicians office. For general questions or additional information about Accountable Care Organizations: 
Please visit www.medicare.gov/acos. html or call 1-800-MEDICARE (4-371.432.3570) TTY users should call 9-169.978.6470. Introducing [facility]! [facility] introduces eROI patient portal. Now you can access parts of your medical record, email your doctor's office, and request medication refills online. 1. In your internet browser, go to [email or web address] Ext[employer]art 2. Click on the First Time User? Click Here link in the Sign In box. You will see the New Member Sign Up page. 3. Enter your Neuro Hero Access Code exactly as it appears below. You will not need to use this code after youve completed the sign-up process. If you do not sign up before the expiration date, you must request a new code. · Neuro Hero Access Code: [email or web address] Expires: 9/27/2018  2:54 PM 
 
4. Enter the last four digits of your Social Security Number (xxxx) and Date of Birth (mm/dd/yyyy) as indicated and click Submit. You will be taken to the next sign-up page. 5. Create a HMT Technologyt ID. This will be your Neuro Hero login ID and cannot be changed, so think of one that is secure and easy to remember. 6. Create a Neuro Hero password. You can change your password at any time. 7. Enter your Password Reset Question and Answer. This can be used at a later time if you forget your password. 8. Enter your e-mail address. You will receive e-mail notification when new information is available in [address]. 9. Click Sign Up. You can now view and download portions of your medical record. 10. Click the Download Summary menu link to download a portable copy of your medical information. If you have questions, please visit the Frequently Asked Questions section of the Neuro Hero website. Remember, Neuro Hero is NOT to be used for urgent needs. For medical emergencies, dial 911. Now available from your iPhone and Android! Introducing Elie Styles As a Jeff Gong patient, I wanted to make you aware of our electronic visit tool called Elie Shankarpepealysa. [facility] 24/7 allows you to connect within minutes with a medical provider 24 hours a day, seven days a week via a mobile device or tablet or logging into a secure website from your computer. You can access Elie Styles from anywhere in the [place].  
 
A virtual visit might be right for you when you have a simple condition and feel like you just dont want to get out of bed, or cant get away from work for an appointment, when your regular [employer] provider is not available (evenings, weekends or holidays), or when youre out of town and need minor care. Electronic visits cost only $49 and if the [employer] 24/7 provider determines a prescription is needed to treat your condition, one can be electronically transmitted to a nearby pharmacy*. Please take a moment to enroll today if you have not already done so. The enrollment process is free and takes just a few minutes. To enroll, please download the [employer] 24/7 grey to your tablet or phone, or visit [email or web address][email or web address] org to enroll on your computer. And, as an [address] [place] [address] patient with a [employer] account, the results of your visits will be scanned into your electronic medical record and your primary care provider will be able to view the scanned results. We urge you to continue to see your regular [employer] provider for your ongoing medical care. And while your primary care provider may not be the one available when you seek a [employer] virtual visit, the peace of mind you get from getting a real diagnosis real time can be priceless. For more information on SpecifiedBy, view our Frequently Asked Questions (FAQs) at [email or web address]."[email or web address][email or web address] org. Sincerely, 
 
Denny Zendejas MD 
Chief Medical Officer [employer] *:  certain medications cannot be prescribed via SpecifiedBy Providers Seen During Your Hospitalization Provider Specialty Primary office phone Brittany Hartman MD Gastroenterology [phone] Your Primary Care Physician (PCP) Primary Care Physician Office Phone Office Fax [facility] [phone] You are allergic to the following No active allergies Recent Documentation OB Status Smoking Status Hysterectomy Never Smoker Emergency Contacts Name Discharge Info Relation Home Work Mobile U Mely [address] CAREGIVER [3] Child [2] [phone] Patient Belongings The following personal items are in your possession at time of discharge: 
  Dental Appliances: Uppers, Lowers  Visual Aid: None Please provide this summary of care documentation to your next provider. Signatures-by signing, you are acknowledging that this After Visit Summary has been reviewed with you and you have received a copy. Patient Signature:  ____________________________________________________________ Date:  ____________________________________________________________  
  
[facility] Provider Signature:  ____________________________________________________________ Date:  ____________________________________________________________

## 2018-08-30 NOTE — PROCEDURES
[address] [facility] [address], [address] [facility]                 NAME:  Edmundo Vaca   :   1932   MRN:   [id number]     Date/Time:  2018 5:20 PM    Esophagogastroduodenoscopy (EGD) Procedure Note    :  Anival Mills MD    Referring Provider:  Shaji Jo MD    Anethesia/Sedation:  MAC anesthesia Propofol    Preoperative diagnosis:  EPIGASTRIC PAIN, GERD  Postoperative diagnosis: 1. Gastritis  2. Hiatal Hernia    Procedure Details     After infom consent was obtained for the procedure, with all risks and benefits of procedure explained the patient was taken to the endoscopy suite and placed in the left lateral decubitus position. Following sequential administration of sedation as per above, the [id number] gastroscope was inserted into the mouth and advanced under direct vision to second portion of the duodenum. A careful inspection was made as the gastroscope was withdrawn, including a retroflexed view of the proximal stomach; findings and interventions are described below. Findings:  Esophagus:Small hiatal hernia with Z line at 36 cm. Stomach:Patchy antral erythema, biopsies done  Duodenum/jejunum:normal      Therapies:  none    Specimens: antral biopsies           EBL: None    Complications:   None; patient tolerated the procedure well. Impression:    See Postoperative diagnosis above    Recommendations:  -Acid suppression with a proton pump inhibitor. , -Await pathology.     Discharge disposition:  Home in the company of  when able to ambulate    Anival Mills MD

## 2018-08-30 NOTE — DISCHARGE INSTRUCTIONS
Shima Agarwal  [id number]  4/19/1932    EGD DISCHARGE INSTRUCTIONS  Discomfort:  Sore throat- throat lozenges or warm salt water gargle  redness at IV site- apply warm compress to area; if redness or soreness persist- contact your physician  Gaseous discomfort- walking, belching will help relieve any discomfort  You may not operate a vehicle for 12 hours  You may not engage in an occupation involving machinery or appliances for rest of today. You may not drink alcoholic beverages for at least 12 hours  Avoid making any critical decisions for at least 24 hour  DIET  You may resume your regular diet - however -  remember your colon is empty and a heavy meal will produce gas. Avoid these foods:  vegetables, fried / greasy foods, carbonated drinks  MEDICATIONS   Regarding Aspirin or Nonsteroidal medications specifically, please see below. ACTIVITY  You may resume your normal daily activities. Spend the remainder of the day resting -  avoid any strenuous activity. CALL M.D. ANY SIGN OF   Increasing pain, nausea, vomiting  Abdominal distension (swelling)  New increased bleeding (oral or rectal)  Fever (chills)  Pain in chest area  Bloody discharge from nose or mouth  Shortness of breath    You may not  take any Advil, Aspirin, Ibuprofen, Motrin, Aleve, or Goodys for 10 days, ONLY  Tylenol as needed for pain.     Follow-up Instructions:   Call Dr. Nirmal Mike  Results of procedure / biopsy in 10 days  Telephone #  [phone]        DISCHARGE SUMMARY from Nurse    The following personal items collected during your admission are returned to you:   Dental Appliance: Dental Appliances: Uppers, Lowers  Vision: Visual Aid: None  Hearing Aid:    Jewelry:    Clothing:    Other Valuables:    Valuables sent to safe:

## 2018-08-30 NOTE — ANESTHESIA POSTPROCEDURE EVALUATION
Post-Anesthesia Evaluation and Assessment Patient: Heron Reddy MRN: [id number]  SSN: [id number] YOB: 1932  Age: 80 y.o. Sex: female Cardiovascular Function/Vital Signs Visit Vitals  /66  Pulse 69  Temp 36.5 °C (97.7 °F)  Resp 21  SpO2 99% Patient is status post MAC anesthesia for Procedure(s): ESOPHAGOGASTRODUODENOSCOPY (EGD) ESOPHAGOGASTRODUODENAL (EGD) BIOPSY. Nausea/Vomiting: None Postoperative hydration reviewed and adequate. Pain: 
Pain Scale 1: Numeric (0 - 10) (08/30/18 1601) Managed Neurological Status: At baseline Mental Status and Level of Consciousness: Arousable Pulmonary Status:  
O2 Device: CO2 nasal cannula (08/30/18 2185) Adequate oxygenation and airway patent Complications related to anesthesia: None Post-anesthesia assessment completed. No concerns Signed By: Modesto Hirsch MD   
 August 30, 2018

## 2018-09-06 ENCOUNTER — CLINICAL SUPPORT (OUTPATIENT)
Dept: INTERNAL MEDICINE CLINIC | Age: 83
End: 2018-09-06

## 2018-09-06 ENCOUNTER — OFFICE VISIT (OUTPATIENT)
Dept: INTERNAL MEDICINE CLINIC | Age: 83
End: 2018-09-06

## 2018-09-06 VITALS
TEMPERATURE: 97.9 F | RESPIRATION RATE: 14 BRPM | HEART RATE: 49 BPM | WEIGHT: 172.5 LBS | OXYGEN SATURATION: 97 % | HEIGHT: 62 IN | DIASTOLIC BLOOD PRESSURE: 68 MMHG | BODY MASS INDEX: 31.74 KG/M2 | SYSTOLIC BLOOD PRESSURE: 114 MMHG

## 2018-09-06 DIAGNOSIS — G44.219 EPISODIC TENSION-TYPE HEADACHE, NOT INTRACTABLE: Primary | ICD-10-CM

## 2018-09-06 DIAGNOSIS — E66.09 CLASS 1 OBESITY DUE TO EXCESS CALORIES WITHOUT SERIOUS COMORBIDITY WITH BODY MASS INDEX (BMI) OF 31.0 TO 31.9 IN ADULT: ICD-10-CM

## 2018-09-06 DIAGNOSIS — N39.0 URINARY TRACT INFECTION WITHOUT HEMATURIA, SITE UNSPECIFIED: ICD-10-CM

## 2018-09-06 DIAGNOSIS — L50.9 URTICARIA: ICD-10-CM

## 2018-09-06 DIAGNOSIS — R53.83 FATIGUE, UNSPECIFIED TYPE: ICD-10-CM

## 2018-09-06 DIAGNOSIS — D47.2 SMOLDERING MYELOMA: ICD-10-CM

## 2018-09-06 NOTE — PROGRESS NOTES
Chief Complaint Patient presents with  Foreign Body in [address] [place] [address] 1. Have you been to the ER, urgent care clinic since your last visit? Hospitalized since your last visit? No 
 
2. Have you seen or consulted any other health care providers outside of the [address] [place] [address] since your last visit? Include any pap smears or colon screening.  No

## 2018-09-06 NOTE — PROGRESS NOTES
[address] [place] [address] and Primary Care 
[place] [address] 
[address] [place] [address] [address] Phone:  [phone]  Fax: [phone] Chief Complaint Patient presents with  Foreign Body in [address] [place] [address] Ryan Morley SUBJECTIVE: 
  Stephanie Pinzon is a 80 y.o. female Comes in for a return visit quite anxious. She complains of fatigue. She takes care of her  at home and this is quite a bit for her. She has been having increasing headaches frontal in location without other associated symptoms. She does have a history of recurrent headaches. She has had some vague pain in the left ear occurring most recently. She complains of generalized itching, oftentimes worse at night. The patient follows up with Dr. Louisa Sorenson for her smoldering myeloma as well as a cardiologist for her cardiac problems which are currently inactive. Current Outpatient Prescriptions Medication Sig Dispense Refill  LORazepam (ATIVAN) 0.5 mg tablet TAKE 1 TABLET BY MOUTH TWICE A DAY AS NEEDED FOR ANXIETY 50 Tab 1  
 losartan-hydroCHLOROthiazide (HYZAAR) 50-12.5 mg per tablet TAKE 1 TABLET BY MOUTH EVERY DAY 90 Tab 3  
 citalopram (CELEXA) 20 mg tablet TAKE 1 TABLET BY MOUTH EVERY DAY 90 Tab 3  ibuprofen (MOTRIN) 800 mg tablet Take 1 Tab by mouth every eight (8) hours as needed for Pain. 30 Tab 0  
 meclizine (ANTIVERT) 12.5 mg tablet 1 tablet 3 times daily as needed for dizziness 30 Tab 3  predniSONE (DELTASONE) 20 mg tablet Take 1 Tab by mouth three (3) times daily (after meals). 21 Tab 0  
 docusate sodium (COLACE) 50 mg capsule Take 1 tablet twice a day 60 Cap 11  
 MAG/ALUMINUM/SOD BICARB/ALGINC (GAVISCON PO) Take  by mouth.  hydrocortisone (CORTAID) 1 % topical cream Apply  to affected area two (2) times a day. use thin layer 30 g 1 Past Medical History:  
Diagnosis Date  Bradycardia  Essential hypertension  GERD (gastroesophageal reflux disease)  Psychiatric disorder Anxiety Past Surgical History:  
Procedure Laterality Date  HX BUNIONECTOMY  HX HYSTERECTOMY  1971  
 partial  
 HX ORTHOPAEDIC Status post bunionectomy  HX PARTIAL HYSTERECTOMY No Known Allergies REVIEW OF SYSTEMS: 
General: negative for - chills or fever ENT: negative for - headaches, nasal congestion or tinnitus Respiratory: negative for - cough, hemoptysis, shortness of breath or wheezing Cardiovascular : negative for - chest pain, edema, palpitations or shortness of breath Gastrointestinal: negative for - abdominal pain, blood in stools, heartburn or nausea/vomiting Genito-Urinary: no dysuria, trouble voiding, or hematuria Musculoskeletal: negative for - gait disturbance, joint pain, joint stiffness or joint swelling Neurological: no TIA or stroke symptoms Hematologic: no bruises, no bleeding, no swollen glands Integument: no lumps, mole changes, nail changes or rash Endocrine: no malaise/lethargy or unexpected weight changes Social History Social History  Marital status:  Spouse name: N/A  
 Number of children: N/A  
 Years of education: N/A Social History Main Topics  Smoking status: Never Smoker  Smokeless tobacco: Never Used  Alcohol use No  
 Drug use: No  
 Sexual activity: Not Currently Other Topics Concern  None Social History Narrative Family History Problem Relation Age of Onset  Hypertension Mother OBJECTIVE: 
 
Visit Vitals  /68  Pulse (!) 49  Temp 97.9 °F (36.6 °C) (Oral)  Resp 14  
 Ht 5' 2\" (1.575 m)  Wt 172 lb 8 oz (78.2 kg)  SpO2 97%  BMI 31.55 kg/m2 CONSTITUTIONAL: well , well nourished, appears age appropriate EYES: perrla, eom intact ENMT:moist mucous membranes, pharynx clear NECK: supple. Thyroid normal 
RESPIRATORY: Chest: clear to ascultation and percussion CARDIOVASCULAR: Heart: regular rate and rhythm GASTROINTESTINAL: Abdomen: soft, bowel sounds active HEMATOLOGIC: no pathological lymph nodes palpated MUSCULOSKELETAL: Extremities: no edema, pulse 1+ INTEGUMENT: No unusual rashes or suspicious skin lesions noted. Nails appear normal. 
NEUROLOGIC: non-focal exam  
MENTAL STATUS: alert and oriented, appropriate affect ASSESSMENT: 
1. Episodic tension-type headache, not intractable 2. Fatigue, unspecified type 3. Urticaria 4. Class 1 obesity due to excess calories without serious comorbidity with body mass index (BMI) of 31.0 to 31.9 in adult 5. Smoldering myeloma ([facility] [address] 6. Urinary tract infection without hematuria, site unspecified PLAN: 
 
1. The patient's fatigue is related to deconditioning and the excessive care required for taking care of her . There is nothing I can do for that. 2. Her headaches are muscle contraction, symptomatic treatment for now. 3. I do not find any pathology in her left ear today. 4. The itching is related to urticaria, symptomatic treatment with Claritin 10 mg daily. Other antihistamines would cause sedation. 5. She will follow up with her hematologist for her smoldering myeloma. 6. She will continue antihypertensive medication as prescribed. Follow-up Disposition: 
Return in about 3 months (around 12/6/2018).  
 
 
Pat Mascorro MD

## 2018-09-08 LAB
APPEARANCE UR: CLEAR
BILIRUB UR QL STRIP: NEGATIVE
COLOR UR: YELLOW
GLUCOSE UR QL: NEGATIVE
HGB UR QL STRIP: NEGATIVE
KETONES UR QL STRIP: NEGATIVE
LEUKOCYTE ESTERASE UR QL STRIP: NEGATIVE
MICRO URNS: NORMAL
NITRITE UR QL STRIP: NEGATIVE
PH UR STRIP: 6.5 [PH] (ref 5–7.5)
PROT UR QL STRIP: NEGATIVE
SP GR UR: 1.02 (ref 1–1.03)
UROBILINOGEN UR STRIP-MCNC: 0.2 MG/DL (ref 0.2–1)

## 2018-10-25 RX ORDER — MECLIZINE HCL 12.5 MG 12.5 MG/1
TABLET ORAL
Qty: 30 TAB | Refills: 3 | Status: SHIPPED | OUTPATIENT
Start: 2018-10-25 | End: 2019-11-04 | Stop reason: SDUPTHER

## 2018-11-14 ENCOUNTER — OFFICE VISIT (OUTPATIENT)
Dept: CARDIOLOGY CLINIC | Age: 83
End: 2018-11-14

## 2018-11-14 VITALS
RESPIRATION RATE: 16 BRPM | BODY MASS INDEX: 31.65 KG/M2 | WEIGHT: 172 LBS | HEART RATE: 64 BPM | DIASTOLIC BLOOD PRESSURE: 70 MMHG | SYSTOLIC BLOOD PRESSURE: 110 MMHG | OXYGEN SATURATION: 97 % | HEIGHT: 62 IN

## 2018-11-14 DIAGNOSIS — E66.09 NON MORBID OBESITY DUE TO EXCESS CALORIES: ICD-10-CM

## 2018-11-14 DIAGNOSIS — I10 ESSENTIAL HYPERTENSION: Primary | ICD-10-CM

## 2018-11-14 DIAGNOSIS — I49.5 SSS (SICK SINUS SYNDROME) (HCC): ICD-10-CM

## 2018-11-14 DIAGNOSIS — R55 SYNCOPE AND COLLAPSE: ICD-10-CM

## 2018-11-14 DIAGNOSIS — M17.0 PRIMARY OSTEOARTHRITIS OF BOTH KNEES: ICD-10-CM

## 2018-11-14 NOTE — PROGRESS NOTES
HISTORY OF PRESENT ILLNESS  Radha Ingram is a 80 y.o. female. She is still caring for her 80-year old  without help. She is having occasional spells of dizziness which may sound like vertigo. Her blood pressure is somewhat low, however, and has been there on medication. She was dizzy today. Her weight is down five pounds, but she states she has a good appetite. She uses a cane. HPI    Review of Systems   Constitutional: Positive for weight loss. Neurological: Positive for dizziness. All other systems reviewed and are negative. Physical Exam   Constitutional: She is oriented to person, place, and time. She appears well-nourished. HENT:   Head: Atraumatic. Eyes: Conjunctivae are normal.   Neck: Neck supple. Cardiovascular: Normal rate, regular rhythm and normal heart sounds. Exam reveals no gallop and no friction rub. No murmur heard. Pulmonary/Chest: Breath sounds normal. She has no wheezes. Abdominal: Bowel sounds are normal.   Musculoskeletal: She exhibits no edema. Neurological: She is oriented to person, place, and time. Skin: Skin is dry. Psychiatric: Her behavior is normal.   Nursing note and vitals reviewed. ASSESSMENT and PLAN  She is still managing despite her arthritis and her difficulties with her . I am concerned about her dizziness and her relatively low blood pressure as well as her weight loss. For now, I will have her stop Losartan with HCTZ and see her back in one month. She may wish to establish care with another primary care physician.

## 2018-11-30 DIAGNOSIS — F41.9 ANXIETY: ICD-10-CM

## 2018-11-30 RX ORDER — LORAZEPAM 0.5 MG/1
TABLET ORAL
Qty: 50 TAB | Refills: 1 | Status: SHIPPED | OUTPATIENT
Start: 2018-11-30 | End: 2019-02-22 | Stop reason: SDUPTHER

## 2018-12-03 ENCOUNTER — TELEPHONE (OUTPATIENT)
Dept: INTERNAL MEDICINE CLINIC | Age: 83
End: 2018-12-03

## 2018-12-11 ENCOUNTER — TELEPHONE (OUTPATIENT)
Dept: CARDIOLOGY CLINIC | Age: 83
End: 2018-12-11

## 2018-12-11 NOTE — TELEPHONE ENCOUNTER
Patient stated she was taken off her BP medication but she is unable to make it to her appt today's so she needs to know what to do   Phone: [phone]
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
## 2018-12-13 ENCOUNTER — OFFICE VISIT (OUTPATIENT)
Dept: CARDIOLOGY CLINIC | Age: 83
End: 2018-12-13

## 2018-12-13 VITALS
SYSTOLIC BLOOD PRESSURE: 144 MMHG | OXYGEN SATURATION: 98 % | DIASTOLIC BLOOD PRESSURE: 78 MMHG | RESPIRATION RATE: 16 BRPM | HEART RATE: 60 BPM | WEIGHT: 175.4 LBS | HEIGHT: 62 IN | BODY MASS INDEX: 32.28 KG/M2

## 2018-12-13 DIAGNOSIS — F41.9 ANXIETY TENSION STATE: ICD-10-CM

## 2018-12-13 DIAGNOSIS — I10 ESSENTIAL HYPERTENSION: Primary | ICD-10-CM

## 2018-12-13 DIAGNOSIS — M17.0 PRIMARY OSTEOARTHRITIS OF BOTH KNEES: ICD-10-CM

## 2018-12-13 DIAGNOSIS — R00.1 BRADYCARDIA: ICD-10-CM

## 2018-12-13 DIAGNOSIS — I49.5 SSS (SICK SINUS SYNDROME) (HCC): ICD-10-CM

## 2018-12-13 DIAGNOSIS — E66.09 NON MORBID OBESITY DUE TO EXCESS CALORIES: ICD-10-CM

## 2018-12-13 RX ORDER — DEXLANSOPRAZOLE 60 MG/1
CAPSULE, DELAYED RELEASE ORAL
Refills: 4 | Status: ON HOLD | COMMUNITY
Start: 2018-11-27 | End: 2021-12-07

## 2018-12-13 NOTE — PROGRESS NOTES
Chief Complaint   Patient presents with    Hypertension     was taken off BP medication. Denies chest pain/shortness of breath. complaints of dizziness (vertigo) and swelling.

## 2018-12-13 NOTE — PROGRESS NOTES
HISTORY OF PRESENT ILLNESS  Jose Levy is a 80 y.o. female. She has hypertension and sick sinus syndrome. She continues to care for her , who has dementia. She gave birth to nine children, but only has two still living. When she was here before, her blood pressure was only 375 systolic and she complained of dizziness. I stopped her Losartan and Hydrochlorothiazide, but her blood pressure has been as high as 170 at home and is 144 in the office today. Her weight is up three pounds. She complains of some swelling in her leg.           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    Non morbid obesity due to excess calories E66.09    Anemia D64.9    Primary osteoarthritis of both knees M17.0    Benign recurrent vertigo H81.10    Anxiety tension state F41.9    Dyspepsia R10.13    Uterine prolapse N81.4    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    Primary osteoarthritis of both hands M19.041, M19.042    Smoldering myeloma ([facility]) C90.00     Current Outpatient Medications   Medication Sig Dispense Refill    DEXILANT 60 mg CpDB capsule (delayed release) TAKE ONE CAPSULE BY MOUTH EVERY DAY 30 TO 60 MINS PRIOR TO MEAL  4    LORazepam (ATIVAN) 0.5 mg tablet TAKE 1 TABLET BY MOUTH TWICE A DAY AS NEEDED FOR ANXIETY 50 Tab 1    meclizine (ANTIVERT) 12.5 mg tablet 1 tablet 3 times daily as needed for dizziness 30 Tab 3    citalopram (CELEXA) 20 mg tablet TAKE 1 TABLET BY MOUTH EVERY DAY 90 Tab 3    docusate sodium (COLACE) 50 mg capsule Take 1 tablet twice a day 60 Cap 11    MAG/ALUMINUM/SOD BICARB/ALGINC (GAVISCON PO) Take  by mouth daily as needed.  losartan-hydroCHLOROthiazide (HYZAAR) 50-12.5 mg per tablet TAKE 1 TABLET BY MOUTH EVERY DAY 90 Tab 3    predniSONE (DELTASONE) 20 mg tablet Take 1 Tab by mouth three (3) times daily (after meals). 21 Tab 0     Past Medical History:   Diagnosis Date    Bradycardia     Essential hypertension     GERD (gastroesophageal reflux disease)     Psychiatric disorder     Anxiety     Past Surgical History:   Procedure Laterality Date    HX BUNIONECTOMY      HX HYSTERECTOMY  1971    partial    HX ORTHOPAEDIC      Status post bunionectomy    HX PARTIAL HYSTERECTOMY         HPI    Review of Systems   Cardiovascular: Positive for leg swelling. All other systems reviewed and are negative. Physical Exam   Constitutional: She is oriented to person, place, and time. She appears well-nourished. HENT:   Head: Atraumatic. Eyes: Conjunctivae are normal.   Neck: Neck supple. Cardiovascular: Normal rate, regular rhythm and normal heart sounds. Exam reveals no gallop and no friction rub. No murmur heard. Pulmonary/Chest: Breath sounds normal. She has no wheezes. Abdominal: Bowel sounds are normal.   Musculoskeletal: She exhibits no edema. Neurological: She is oriented to person, place, and time. Skin: Skin is dry. Psychiatric: Her behavior is normal.   Nursing note and vitals reviewed. ASSESSMENT and PLAN  Her blood pressure is higher and it sounds like she has episodes of dizziness or vertigo for which she takes Meclizine on a p.r.n. basis. I will therefore have her go back on her Losartan with the Hydrochlorothiazide and we will see her in six months' time.

## 2018-12-19 ENCOUNTER — TELEPHONE (OUTPATIENT)
Dept: CARDIOLOGY CLINIC | Age: 83
End: 2018-12-19

## 2018-12-19 NOTE — TELEPHONE ENCOUNTER
Two patient indentifiers verified. Returned patient call about her high blood pressure. Pt stated that she checks her BP everyday and it has been high at 170's/70's since she was seen in the office 12/13/18. Pt was started back on losartan-hydochlorothiazide at that appointment. Pt stated that she has been taking it. Pt denies any swelling and stated that her swelling has gone away since started back on that medications. Informed patient to make sure she is not eating salty foods because that could increase BP and she stated she doesn't eat salty foods. Pt did say she has a slight headache off and on. Pt was informed Dr. Leroy Victor would get this message and we will call back once we hear from him. Pt verbalized understanding and denies any further questions at this time.

## 2018-12-19 NOTE — TELEPHONE ENCOUNTER
Pt calling because she said she was told by dr. Danika Bolden to call if her bp was high. It's 173/74. The pt stated she wasn't feeling any symptoms. She can be reached at .     [employer]

## 2018-12-20 ENCOUNTER — HOSPITAL ENCOUNTER (EMERGENCY)
Age: 83
Discharge: HOME | End: 2018-12-20
Attending: EMERGENCY MEDICINE
Payer: MEDICARE

## 2018-12-20 ENCOUNTER — APPOINTMENT (OUTPATIENT)
Dept: CT IMAGING | Age: 83
End: 2018-12-20
Attending: EMERGENCY MEDICINE
Payer: MEDICARE

## 2018-12-20 VITALS
RESPIRATION RATE: 16 BRPM | WEIGHT: 167 LBS | HEIGHT: 62 IN | OXYGEN SATURATION: 95 % | BODY MASS INDEX: 30.73 KG/M2 | HEART RATE: 94 BPM | DIASTOLIC BLOOD PRESSURE: 96 MMHG | SYSTOLIC BLOOD PRESSURE: 139 MMHG | TEMPERATURE: 98.3 F

## 2018-12-20 DIAGNOSIS — G45.9 TIA (TRANSIENT ISCHEMIC ATTACK): Primary | ICD-10-CM

## 2018-12-20 LAB
ANION GAP SERPL CALC-SCNC: 9 MMOL/L (ref 5–15)
BASOPHILS # BLD: 0.1 K/UL (ref 0–0.1)
BASOPHILS NFR BLD: 1 % (ref 0–1)
BUN SERPL-MCNC: 26 MG/DL (ref 6–20)
BUN/CREAT SERPL: 21 (ref 12–20)
CALCIUM SERPL-MCNC: 9.7 MG/DL (ref 8.5–10.1)
CHLORIDE SERPL-SCNC: 102 MMOL/L (ref 97–108)
CO2 SERPL-SCNC: 26 MMOL/L (ref 21–32)
COMMENT, HOLDF: NORMAL
CREAT SERPL-MCNC: 1.21 MG/DL (ref 0.55–1.02)
DIFFERENTIAL METHOD BLD: ABNORMAL
EOSINOPHIL # BLD: 0.2 K/UL (ref 0–0.4)
EOSINOPHIL NFR BLD: 2 % (ref 0–7)
ERYTHROCYTE [DISTWIDTH] IN BLOOD BY AUTOMATED COUNT: 20.8 % (ref 11.5–14.5)
GLUCOSE BLD STRIP.AUTO-MCNC: 123 MG/DL (ref 65–100)
GLUCOSE SERPL-MCNC: 107 MG/DL (ref 65–100)
HCT VFR BLD AUTO: 32.6 % (ref 35–47)
HGB BLD-MCNC: 11.1 G/DL (ref 11.5–16)
IMM GRANULOCYTES # BLD: 0.1 K/UL (ref 0–0.04)
IMM GRANULOCYTES NFR BLD AUTO: 1 % (ref 0–0.5)
INR PPP: 1 (ref 0.9–1.1)
LYMPHOCYTES # BLD: 2.6 K/UL (ref 0.8–3.5)
LYMPHOCYTES NFR BLD: 32 % (ref 12–49)
MCH RBC QN AUTO: 31.9 PG (ref 26–34)
MCHC RBC AUTO-ENTMCNC: 34 G/DL (ref 30–36.5)
MCV RBC AUTO: 93.7 FL (ref 80–99)
MONOCYTES # BLD: 0.6 K/UL (ref 0–1)
MONOCYTES NFR BLD: 7 % (ref 5–13)
NEUTS SEG # BLD: 4.6 K/UL (ref 1.8–8)
NEUTS SEG NFR BLD: 57 % (ref 32–75)
NRBC # BLD: 0.09 K/UL (ref 0–0.01)
NRBC BLD-RTO: 1.1 PER 100 WBC
PLATELET # BLD AUTO: 187 K/UL (ref 150–400)
PMV BLD AUTO: 10.9 FL (ref 8.9–12.9)
POTASSIUM SERPL-SCNC: 3.7 MMOL/L (ref 3.5–5.1)
PROTHROMBIN TIME: 9.9 SEC (ref 9–11.1)
RBC # BLD AUTO: 3.48 M/UL (ref 3.8–5.2)
RBC MORPH BLD: ABNORMAL
RBC MORPH BLD: ABNORMAL
SAMPLES BEING HELD,HOLD: NORMAL
SERVICE CMNT-IMP: ABNORMAL
SODIUM SERPL-SCNC: 137 MMOL/L (ref 136–145)
WBC # BLD AUTO: 8.2 K/UL (ref 3.6–11)
WBC MORPH BLD: ABNORMAL

## 2018-12-20 PROCEDURE — 80048 BASIC METABOLIC PNL TOTAL CA: CPT

## 2018-12-20 PROCEDURE — 82962 GLUCOSE BLOOD TEST: CPT

## 2018-12-20 PROCEDURE — 85610 PROTHROMBIN TIME: CPT

## 2018-12-20 PROCEDURE — 36415 COLL VENOUS BLD VENIPUNCTURE: CPT

## 2018-12-20 PROCEDURE — 99285 EMERGENCY DEPT VISIT HI MDM: CPT

## 2018-12-20 PROCEDURE — 74011250637 HC RX REV CODE- 250/637: Performed by: EMERGENCY MEDICINE

## 2018-12-20 PROCEDURE — 93005 ELECTROCARDIOGRAM TRACING: CPT

## 2018-12-20 PROCEDURE — 85025 COMPLETE CBC W/AUTO DIFF WBC: CPT

## 2018-12-20 PROCEDURE — 70450 CT HEAD/BRAIN W/O DYE: CPT

## 2018-12-20 RX ORDER — GUAIFENESIN 100 MG/5ML
325 LIQUID (ML) ORAL
Status: COMPLETED | OUTPATIENT
Start: 2018-12-20 | End: 2018-12-20

## 2018-12-20 RX ADMIN — ASPIRIN 81 MG 324 MG: 81 TABLET ORAL at 17:48

## 2018-12-20 NOTE — DISCHARGE INSTRUCTIONS
Transient Ischemic Attack: Care Instructions  Your Care Instructions    A transient ischemic attack (TIA) is when blood flow to a part of your brain is blocked for a short time. A TIA is like a stroke but usually lasts only a few minutes. A TIA does not cause lasting brain damage. Any vision problems, slurred speech, or other symptoms usually go away in 10 to 20 minutes. But they may last for up to 24 hours. TIAs are often warning signs of a stroke. Some people who have a TIA may have a stroke in the future. A stroke can cause symptoms like those of a TIA. But a stroke causes lasting damage to your brain. You can take steps to help prevent a stroke. One thing you can do is get early treatment. If you have other new symptoms, or if your symptoms do not get better, go back to the emergency room or call your doctor right away. Getting treatment right away may prevent long-term brain damage caused by a stroke. The doctor has checked you carefully, but problems can develop later. If you notice any problems or new symptoms, get medical treatment right away. Follow-up care is a key part of your treatment and safety. Be sure to make and go to all appointments, and call your doctor if you are having problems. It's also a good idea to know your test results and keep a list of the medicines you take. How can you care for yourself at home? Medicines    · Be safe with medicines. Take your medicines exactly as prescribed. Call your doctor if you think you are having a problem with your medicine.     · If you take a blood thinner, such as aspirin, be sure you get instructions about how to take your medicine safely.  Blood thinners can cause serious bleeding problems.     · Call your doctor if you are not able to take your medicines for any reason.     · Do not take any over-the-counter medicines or herbal products without talking to your doctor first.     · If you take birth control pills or hormone therapy, talk to your doctor. Ask if these treatments are right for you.    Lifestyle changes    · Do not smoke. If you need help quitting, talk to your doctor about stop-smoking programs and medicines.     · Be active. If your doctor recommends it, get more exercise. Walking is a good choice. Bit by bit, increase the amount you walk every day. Try for at least 30 minutes on most days of the week. You also may want to swim, bike, or do other activities.     · Eat heart-healthy foods. These include fruits, vegetables, high-fiber foods, fish, and foods that are low in sodium, saturated fat, and trans fat.     · Stay at a healthy weight. Lose weight if you need to.     · Limit alcohol to 2 drinks a day for men and 1 drink a day for women.    Staying healthy    · Manage other health problems such as diabetes, high blood pressure, and high cholesterol.     · Get the flu vaccine every year. When should you call for help? Call 911 anytime you think you may need emergency care. For example, call if:    · You have new or worse symptoms of a stroke. These may include:  ? Sudden numbness, tingling, weakness, or loss of movement in your face, arm, or leg, especially on only one side of your body. ? Sudden vision changes. ? Sudden trouble speaking. ? Sudden confusion or trouble understanding simple statements. ? Sudden problems with walking or balance. ? A sudden, severe headache that is different from past headaches. Call 911 even if these symptoms go away in a few minutes.     · You feel like you are having another TIA.    Watch closely for changes in your health, and be sure to contact your doctor if you have any problems. Where can you learn more? Go to http://ryan-leonardo.info/. Enter ([phone] 9534 2128 in the search box to learn more about \"Transient Ischemic Attack: Care Instructions. \"  Current as of: November 21, 2017  Content Version: 11.8  © 2012-5112 [employer].  Care instructions adapted under license by [employer] (which disclaims liability or warranty for this information). If you have questions about a medical condition or this instruction, always ask your healthcare professional. [address] any warranty or liability for your use of this information.
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
## 2018-12-20 NOTE — TELEPHONE ENCOUNTER
MD Pamella Draper, RN   Caller: Unspecified (Yesterday,  2:02 PM)             Take 2 of these pills each morning and keep in touch. Two patient indentifiers verified. Spoke with patient and informed of Dr. Jennifer Carbajal message. Pt to also take medication then about an hour after check BP and let us know in a week how her blood pressure has been running. Pt verbalized understanding and denies in further questions at this time.

## 2018-12-20 NOTE — ED PROVIDER NOTES
EMERGENCY DEPARTMENT HISTORY AND PHYSICAL EXAM      Date: 12/20/2018  Patient Name: Jose Levy    History of Presenting Illness     Chief Complaint   Patient presents with    Dysarthria       History Provided By: Patient    HPI: Jose Levy, 80 y.o. female with PMHx significant for HTN and anxiety, presents via EMS to the ED with cc of transient episode of right sided facial droop and numbness with slurred speech occurring 1 hour pta. Pt states the episode was unwitnessed and brief in nature, lasting only several minutes. Upon ED arrival pt able to ambulate through the door with no sx's. However, during examination pt c/o recurrence right sided facial numbness and droop. She denies prior hx of stroke. Pt specifically denies any n/v/d. There are no other complaints, changes, or physical findings at this time. PCP: Braxton Arreguin MD    Current Outpatient Medications   Medication Sig Dispense Refill    DEXILANT 60 mg CpDB capsule (delayed release) TAKE ONE CAPSULE BY MOUTH EVERY DAY 30 TO 60 MINS PRIOR TO MEAL  4    LORazepam (ATIVAN) 0.5 mg tablet TAKE 1 TABLET BY MOUTH TWICE A DAY AS NEEDED FOR ANXIETY 50 Tab 1    meclizine (ANTIVERT) 12.5 mg tablet 1 tablet 3 times daily as needed for dizziness 30 Tab 3    losartan-hydroCHLOROthiazide (HYZAAR) 50-12.5 mg per tablet TAKE 1 TABLET BY MOUTH EVERY DAY 90 Tab 3    citalopram (CELEXA) 20 mg tablet TAKE 1 TABLET BY MOUTH EVERY DAY 90 Tab 3    predniSONE (DELTASONE) 20 mg tablet Take 1 Tab by mouth three (3) times daily (after meals). 21 Tab 0    docusate sodium (COLACE) 50 mg capsule Take 1 tablet twice a day 60 Cap 11    MAG/ALUMINUM/SOD BICARB/ALGINC (GAVISCON PO) Take  by mouth daily as needed.          Past History     Past Medical History:  Past Medical History:   Diagnosis Date    Bradycardia     Essential hypertension     GERD (gastroesophageal reflux disease)     Psychiatric disorder     Anxiety       Past Surgical History:  Past Surgical History:   Procedure Laterality Date    HX BUNIONECTOMY      HX HYSTERECTOMY  1971    partial    HX ORTHOPAEDIC      Status post bunionectomy    HX PARTIAL HYSTERECTOMY         Family History:  Family History   Problem Relation Age of Onset    Hypertension Mother        Social History:  Social History     Tobacco Use    Smoking status: Never Smoker    Smokeless tobacco: Never Used   Substance Use Topics    Alcohol use: No    Drug use: No       Allergies:  No Known Allergies      Review of Systems   Review of Systems   Constitutional: Negative. Negative for activity change, chills and diaphoresis. HENT: Negative. Negative for ear pain, trouble swallowing and voice change. Eyes: Negative. Respiratory: Negative. Negative for chest tightness and shortness of breath. Cardiovascular: Negative. Negative for chest pain, palpitations and leg swelling. Gastrointestinal: Negative. Negative for constipation, nausea and vomiting. Endocrine: Negative. Genitourinary: Negative. Negative for flank pain, frequency and hematuria. Musculoskeletal: Negative. Skin: Negative. Allergic/Immunologic: Negative. Neurological: Positive for facial asymmetry, speech difficulty and numbness. Hematological: Negative. Psychiatric/Behavioral: Negative. All other systems reviewed and are negative. Physical Exam   Physical Exam   Constitutional: She is oriented to person, place, and time. She appears well-developed and well-nourished. HENT:   Head: Normocephalic. Mouth/Throat: Oropharynx is clear and moist.   Eyes: Conjunctivae and EOM are normal. Pupils are equal, round, and reactive to light. Neck: Normal range of motion. Neck supple. Cardiovascular: Normal rate, regular rhythm, normal heart sounds and intact distal pulses. Pulmonary/Chest: Effort normal and breath sounds normal.   Abdominal: Soft. Bowel sounds are normal. She exhibits no distension. There is no rebound. Musculoskeletal: Normal range of motion. She exhibits no edema or deformity. Neurological: She is alert and oriented to person, place, and time. Mild right sided facial droop involving the mouth only   Skin: Skin is warm and dry. Psychiatric: She has a normal mood and affect. Her behavior is normal. Judgment and thought content normal.       Diagnostic Study Results     Labs -     Recent Results (from the past 12 hour(s))   SAMPLES BEING HELD    Collection Time: 12/20/18  5:19 PM   Result Value Ref Range    SAMPLES BEING HELD 1LAV,1PST,1BLU,1RED,1SST     COMMENT        Add-on orders for these samples will be processed based on acceptable specimen integrity and analyte stability, which may vary by analyte. CBC WITH AUTOMATED DIFF    Collection Time: 12/20/18  5:19 PM   Result Value Ref Range    WBC 8.2 3.6 - 11.0 K/uL    RBC 3.48 (L) 3.80 - 5.20 M/uL    HGB 11.1 (L) 11.5 - 16.0 g/dL    HCT 32.6 (L) 35.0 - 47.0 %    MCV 93.7 80.0 - 99.0 FL    MCH 31.9 26.0 - 34.0 PG    MCHC 34.0 30.0 - 36.5 g/dL    RDW 20.8 (H) 11.5 - 14.5 %    PLATELET 506 044 - 522 K/uL    MPV 10.9 8.9 - 12.9 FL    NRBC 1.1 (H) 0  WBC    ABSOLUTE NRBC 0.09 (H) 0.00 - 0.01 K/uL    NEUTROPHILS 57 32 - 75 %    LYMPHOCYTES 32 12 - 49 %    MONOCYTES 7 5 - 13 %    EOSINOPHILS 2 0 - 7 %    BASOPHILS 1 0 - 1 %    IMMATURE GRANULOCYTES 1 (H) 0.0 - 0.5 %    ABS. NEUTROPHILS 4.6 1.8 - 8.0 K/UL    ABS. LYMPHOCYTES 2.6 0.8 - 3.5 K/UL    ABS. MONOCYTES 0.6 0.0 - 1.0 K/UL    ABS. EOSINOPHILS 0.2 0.0 - 0.4 K/UL    ABS. BASOPHILS 0.1 0.0 - 0.1 K/UL    ABS. IMM.  GRANS. 0.1 (H) 0.00 - 0.04 K/UL    DF MANUAL      RBC COMMENTS ANISOCYTOSIS  1+        RBC COMMENTS TARGET CELLS  2+        WBC COMMENTS TOXIC GRANULATION     METABOLIC PANEL, BASIC    Collection Time: 12/20/18  5:19 PM   Result Value Ref Range    Sodium 137 136 - 145 mmol/L    Potassium 3.7 3.5 - 5.1 mmol/L    Chloride 102 97 - 108 mmol/L    CO2 26 21 - 32 mmol/L    Anion gap 9 5 - 15 mmol/L Glucose 107 (H) 65 - 100 mg/dL    BUN 26 (H) 6 - 20 MG/DL    Creatinine 1.21 (H) 0.55 - 1.02 MG/DL    BUN/Creatinine ratio 21 (H) 12 - 20      GFR est AA 51 (L) >60 ml/min/1.73m2    GFR est non-AA 42 (L) >60 ml/min/1.73m2    Calcium 9.7 8.5 - 10.1 MG/DL   PROTHROMBIN TIME + INR    Collection Time: 12/20/18  5:19 PM   Result Value Ref Range    INR 1.0 0.9 - 1.1      Prothrombin time 9.9 9.0 - 11.1 sec   GLUCOSE, POC    Collection Time: 12/20/18  5:31 PM   Result Value Ref Range    Glucose (POC) 123 (H) 65 - 100 mg/dL    Performed by [facility])        Radiologic Studies -   CT Results  (Last 48 hours)               12/20/18 1728  CT CODE NEURO HEAD WO CONTRAST Final result    Impression:  IMPRESSION:       No acute process. Narrative:  INDICATION:   stroke NOW - CODE STROKE       EXAM:  HEAD CT WITHOUT CONTRAST       COMPARISON:  April 25, 2006       TECHNIQUE:  Routine noncontrast axial head CT was performed. Sagittal and   coronal reconstructions were generated. CT dose reduction was achieved through use of a standardized protocol tailored   for this examination and automatic exposure control for dose modulation. FINDINGS:       Ventricles:  Midline, no hydrocephalus. Intracranial Hemorrhage:  None. Brain Parenchyma/Brainstem:  Normal for age. Basal Cisterns:  Normal.    Paranasal Sinuses:  Visualized sinuses are clear. Additional Comments:  N/A. Medical Decision Making   I am the first provider for this patient. I reviewed the vital signs, available nursing notes, past medical history, past surgical history, family history and social history. Vital Signs-Reviewed the patient's vital signs.   Patient Vitals for the past 12 hrs:   Temp Pulse Resp BP SpO2   12/20/18 1701 98.3 °F (36.8 °C) 94 16 (!) 139/96 95 %       Pulse Oximetry Analysis - 95% on RA    Cardiac Monitor:   Rate: 95 bpm  Rhythm: Normal Sinus Rhythm      EKG interpretation: (Preliminary)  1731  Rhythm: normal sinus rhythm; and regular . Rate (approx.): 76; Axis: normal; NE interval: normal; QRS interval: normal ; ST/T wave: normal.  Written by VJ Bender, as dictated by Felix Burgess MD.    Records Reviewed: Nursing Notes, Old Medical Records and Ambulance Run Sheet    Provider Notes (Medical Decision Making):   DDx: CVA, TIA    ED Course:   Initial assessment performed. The patients presenting problems have been discussed, and they are in agreement with the care plan formulated and outlined with them. I have encouraged them to ask questions as they arise throughout their visit. Eugenia Goldstein  4/19/1932    Arrival time to ED: [facility]: 8407  Physician at Bedside: [address] [place] [address]   CT Order Time: 4814  ACT Page: Karen Rowland  ACT Call Back: [phone]    CONSULT NOTE:   5:23 PM  Felix Burgess MD spoke with Tammy Pablo MD   Specialty: neurology  Discussed pt's hx, disposition, and available diagnostic and imaging results. Reviewed care plans. Consultant agrees with plans as outlined. Dr. Nahum Platt will evaluate at bedside. Written by VJ Bender, as dictated by Felix Burgess MD.    PROGRESS NOTE:  5:40 PM  Dr. Nahum Platt has evaluated pt and states he felt her exam was normal. He states pt can be admitted for TIA workup, or is also safe to be discharged home. Written by VJ Bender, as dictated by Felix Buregss MD.     PROGRESS NOTE:  5:45 PM  Pt states that she is the primary caregiver of her 79 y/o  who has dementia and explains there is no one else that could care for him. Pt would prefer to be discharged home. Written by VJ Bender, as dictated by Felix Burgess MD.    Critical Care Time: 0    Disposition:  DISCHARGE NOTE:  6:07 PM  The patient is ready for discharge. The patients signs, symptoms, diagnosis, and instructions for discharge have been discussed and the pt has conveyed their understanding.  The patient is to follow up as recommended with PCP or return to the ER should their symptoms worsen. Plan has been discussed and patient has conveyed their agreement. PLAN:  1. Discharge home   2. Follow-up Information     Follow up With Specialties Details Why Contact Info    Festus Goodpasture, MD Internal Medicine Call  [address] [address] [address] [phone]      [facility] EMERGENCY DEPT Emergency Medicine  As needed, If symptoms worsen [facility]  [phone]        Return to ED if worse     Diagnosis     Clinical Impression:   1. TIA (transient ischemic attack)        Attestations: This note is prepared by China Acosta, acting as Scribe for Andreina Washington MD.    Andreina Washington MD: The scribe's documentation has been prepared under my direction and personally reviewed by me in its entirety. I confirm that the note above accurately reflects all work, treatment, procedures, and medical decision making performed by me.

## 2018-12-20 NOTE — ED NOTES
Emergency Department Nursing Plan of Care       The Nursing Plan of Care is developed from the Nursing assessment and Emergency Department Attending provider initial evaluation. The plan of care may be reviewed in the ED Provider note.     The Plan of Care was developed with the following considerations:   Patient / Family readiness to learn indicated by:verbalized understanding  Persons(s) to be included in education: patient  Barriers to Learning/Limitations:No    [address] [facility]    12/20/2018   5:26 PM

## 2018-12-20 NOTE — ED NOTES
After pt was roomed, when going over pt's medical history and exam, pt started to have slurred speech with possible right facial droop. Dr Brittanie Meneses md, and evi, primary rn, were notified and are at bedside.

## 2018-12-20 NOTE — ED NOTES
Emergency Department Nursing Plan of Care       The Nursing Plan of Care is developed from the Nursing assessment and Emergency Department Attending provider initial evaluation. The plan of care may be reviewed in the ED Provider note.     The Plan of Care was developed with the following considerations:   Patient / Family readiness to learn indicated by:verbalized understanding  Persons(s) to be included in education: patient  Barriers to Learning/Limitations:No    [address] [facility]    12/20/2018   5:01 PM

## 2018-12-20 NOTE — ED NOTES
Pt concerned about elderly  being home alone. Reports that he falls frequently and also has dementia. Pt instructed to call for ambulance if symptoms return or worsen. Pt also instructed to call and follow up with pcp tomorrow am.  Pt's facesheet left for .

## 2018-12-21 ENCOUNTER — OFFICE VISIT (OUTPATIENT)
Dept: INTERNAL MEDICINE CLINIC | Age: 83
End: 2018-12-21

## 2018-12-21 VITALS
TEMPERATURE: 98.6 F | SYSTOLIC BLOOD PRESSURE: 113 MMHG | BODY MASS INDEX: 30.97 KG/M2 | WEIGHT: 168.3 LBS | HEART RATE: 83 BPM | DIASTOLIC BLOOD PRESSURE: 72 MMHG | OXYGEN SATURATION: 94 % | RESPIRATION RATE: 16 BRPM | HEIGHT: 62 IN

## 2018-12-21 DIAGNOSIS — E66.09 CLASS 1 OBESITY DUE TO EXCESS CALORIES WITHOUT SERIOUS COMORBIDITY WITH BODY MASS INDEX (BMI) OF 31.0 TO 31.9 IN ADULT: ICD-10-CM

## 2018-12-21 DIAGNOSIS — E78.5 DYSLIPIDEMIA: ICD-10-CM

## 2018-12-21 DIAGNOSIS — G45.9 TIA (TRANSIENT ISCHEMIC ATTACK): Primary | ICD-10-CM

## 2018-12-21 DIAGNOSIS — I10 ESSENTIAL HYPERTENSION: ICD-10-CM

## 2018-12-21 NOTE — PROGRESS NOTES
ANIBAL got a message from RN state patient needs Case Harriet lew., Patient in the ER for TIA. Patient did not want to be admitted states she has a 80year old  at home has frequent falls needs to go home. CM call patient [phone]. Patient state she went to see Norberto Flower today he gave her medication for her not to have stroke (cannot name medication) state medication for her not to have a stroke. Patient states she did not pick-up the medication today she will try to pick it up tomorrow. Discuss and educate patient on stroke signs and symptoms to look for. discuss face drooping, hard time speaking and numbness on hands, uneven smile to please call 911. She said she understands. CM call [facility] set-up a visit to see patient tomorrow. ANIBAL called Dr. Hobson [facility] office [phone] to speak with nurse navigator for [facility] order for safety evaluation and  office was closed CM to call on Monday.     [address] [place] [address]  [phone]

## 2018-12-21 NOTE — PROGRESS NOTES
Chief Complaint   Patient presents with   [facility] ED Follow-up     Patient seent at [facility] ER on 12/20/18 for Dysarthria. 1. Have you been to the ER, urgent care clinic since your last visit? Hospitalized since your last visit? Yes When: 12/20/18 Where: [facility] Reason for visit: dysarthria    2. Have you seen or consulted any other health care providers outside of the [address] [place] [address] since your last visit? Include any pap smears or colon screening.  No

## 2018-12-23 LAB
ATRIAL RATE: 76 BPM
CALCULATED P AXIS, ECG09: 42 DEGREES
CALCULATED R AXIS, ECG10: -42 DEGREES
CALCULATED T AXIS, ECG11: 11 DEGREES
DIAGNOSIS, 93000: NORMAL
P-R INTERVAL, ECG05: 194 MS
Q-T INTERVAL, ECG07: 398 MS
QRS DURATION, ECG06: 86 MS
QTC CALCULATION (BEZET), ECG08: 447 MS
VENTRICULAR RATE, ECG03: 76 BPM

## 2018-12-23 RX ORDER — CLOPIDOGREL BISULFATE 75 MG/1
75 TABLET ORAL DAILY
Qty: 30 TAB | Refills: 11 | Status: SHIPPED | OUTPATIENT
Start: 2018-12-23 | End: 2019-06-10

## 2018-12-23 NOTE — PROGRESS NOTES
[address] [place] [address] and Primary Care  [facility]  [address] [facility] [address]  Phone:  [phone]  Fax: [phone]       Chief Complaint   Patient presents with   Ashley Gonzales ED Follow-up     Patient seent at [facility] on 12/20/18 for Dysarthria. .      SUBJECTIVE:    Analisa Rico is a 80 y.o. female who comes in for a return visit stating that she has not done well. She went to the emergency room two days ago because of slurred speech and apparent weakness on the right side of her face. This was short lived and by the time she got to the emergency room she was back to her baseline with no deficits noted. Radiographically, everything was normal with no suggestive evidence of a CVA. She was kept overnight and discharged. She has no recurrence of her symptoms and has been reasonably stable since being home. Risk factors for atherosclerosis include age, as well as history of hypertension. Lipids need to be checked today. Finally, there has been no meaningful weight loss. We talk about the importance of weight reduction. Current Outpatient Medications   Medication Sig Dispense Refill    clopidogrel (PLAVIX) 75 mg tab Take 1 Tab by mouth daily. 30 Tab 11    DEXILANT 60 mg CpDB capsule (delayed release) TAKE ONE CAPSULE BY MOUTH EVERY DAY 30 TO 60 MINS PRIOR TO MEAL  4    LORazepam (ATIVAN) 0.5 mg tablet TAKE 1 TABLET BY MOUTH TWICE A DAY AS NEEDED FOR ANXIETY 50 Tab 1    meclizine (ANTIVERT) 12.5 mg tablet 1 tablet 3 times daily as needed for dizziness 30 Tab 3    losartan-hydroCHLOROthiazide (HYZAAR) 50-12.5 mg per tablet TAKE 1 TABLET BY MOUTH EVERY DAY 90 Tab 3    citalopram (CELEXA) 20 mg tablet TAKE 1 TABLET BY MOUTH EVERY DAY 90 Tab 3    predniSONE (DELTASONE) 20 mg tablet Take 1 Tab by mouth three (3) times daily (after meals).  21 Tab 0    docusate sodium (COLACE) 50 mg capsule Take 1 tablet twice a day 60 Cap 11    MAG/ALUMINUM/SOD BICARB/ALGINC (GAVISCON PO) Take  by mouth daily as needed.        Past Medical History:   Diagnosis Date    Bradycardia     Essential hypertension     GERD (gastroesophageal reflux disease)     Psychiatric disorder     Anxiety     Past Surgical History:   Procedure Laterality Date    HX BUNIONECTOMY      HX HYSTERECTOMY  1971    partial    HX ORTHOPAEDIC      Status post bunionectomy    HX PARTIAL HYSTERECTOMY       No Known Allergies      REVIEW OF SYSTEMS:  General: negative for - chills or fever  ENT: negative for - headaches, nasal congestion or tinnitus  Respiratory: negative for - cough, hemoptysis, shortness of breath or wheezing  Cardiovascular : negative for - chest pain, edema, palpitations or shortness of breath  Gastrointestinal: negative for - abdominal pain, blood in stools, heartburn or nausea/vomiting  Genito-Urinary: no dysuria, trouble voiding, or hematuria  Musculoskeletal: negative for - gait disturbance, joint pain, joint stiffness or joint swelling  Neurological: no TIA or stroke symptoms  Hematologic: no bruises, no bleeding, no swollen glands  Integument: no lumps, mole changes, nail changes or rash  Endocrine: no malaise/lethargy or unexpected weight changes      Social History     Socioeconomic History    Marital status:      Spouse name: Not on file    Number of children: Not on file    Years of education: Not on file    Highest education level: Not on file   Tobacco Use    Smoking status: Never Smoker    Smokeless tobacco: Never Used   Substance and Sexual Activity    Alcohol use: No    Drug use: No    Sexual activity: Not Currently     Family History   Problem Relation Age of Onset    Hypertension Mother        OBJECTIVE:    Visit Vitals  /72   Pulse 83   Temp 98.6 °F (37 °C) (Oral)   Resp 16   Ht 5' 2\" (1.575 m)   Wt 168 lb 4.8 oz (76.3 kg)   SpO2 94%   BMI 30.78 kg/m²     CONSTITUTIONAL: well , well nourished, appears age appropriate  EYES: perrla, eom intact  ENMT:moist mucous membranes, pharynx clear  NECK: supple. Thyroid normal  RESPIRATORY: Chest: clear to ascultation and percussion   CARDIOVASCULAR: Heart: regular rate and rhythm  GASTROINTESTINAL: Abdomen: soft, bowel sounds active  HEMATOLOGIC: no pathological lymph nodes palpated  MUSCULOSKELETAL: Extremities: no edema, pulse 1+   INTEGUMENT: No unusual rashes or suspicious skin lesions noted. Nails appear normal.  NEUROLOGIC: non-focal exam   MENTAL STATUS: alert and oriented, appropriate affect      ASSESSMENT:  1. TIA (transient ischemic attack)    2. Essential hypertension    3. Dyslipidemia    4. Class 1 obesity due to excess calories without serious comorbidity with body mass index (BMI) of 31.0 to 31.9 in adult        PLAN:    1. The patient probably had a TIA. I am somewhat leery about putting her on aspirin at her age and will therefore place her on Plavix. There is no relative absolute contraindication to its use. 2. This would therefore place the patient in a secondary cardiovascular risk prevention status. She will need a statin regardless of what her cholesterol values are.    3. Her blood pressure is well controlled. No adjustments are made. 4. I again encourage the patient to lose weight. She is told to minimize her consumption of processed carbohydrates. Follow-up Disposition:  Return in about 4 weeks (around 1/18/2019).       Brooke Guadalupe MD

## 2019-01-07 ENCOUNTER — TELEPHONE (OUTPATIENT)
Dept: INTERNAL MEDICINE CLINIC | Age: 84
End: 2019-01-07

## 2019-01-08 ENCOUNTER — LAB ONLY (OUTPATIENT)
Dept: INTERNAL MEDICINE CLINIC | Age: 84
End: 2019-01-08

## 2019-01-08 DIAGNOSIS — I10 ESSENTIAL HYPERTENSION: Primary | ICD-10-CM

## 2019-01-10 LAB
CHOLEST SERPL-MCNC: 180 MG/DL (ref 100–199)
HDLC SERPL-MCNC: 68 MG/DL
LDLC SERPL CALC-MCNC: 105 MG/DL (ref 0–99)
TRIGL SERPL-MCNC: 35 MG/DL (ref 0–149)
VLDLC SERPL CALC-MCNC: 7 MG/DL (ref 5–40)

## 2019-02-14 ENCOUNTER — OFFICE VISIT (OUTPATIENT)
Dept: INTERNAL MEDICINE CLINIC | Age: 84
End: 2019-02-14

## 2019-02-14 VITALS
DIASTOLIC BLOOD PRESSURE: 74 MMHG | RESPIRATION RATE: 18 BRPM | WEIGHT: 171 LBS | HEIGHT: 63 IN | SYSTOLIC BLOOD PRESSURE: 132 MMHG | HEART RATE: 73 BPM | OXYGEN SATURATION: 95 % | TEMPERATURE: 98.3 F | BODY MASS INDEX: 30.3 KG/M2

## 2019-02-14 DIAGNOSIS — I49.5 SSS (SICK SINUS SYNDROME) (HCC): ICD-10-CM

## 2019-02-14 DIAGNOSIS — F41.9 ANXIETY TENSION STATE: ICD-10-CM

## 2019-02-14 DIAGNOSIS — G45.9 TIA (TRANSIENT ISCHEMIC ATTACK): ICD-10-CM

## 2019-02-14 DIAGNOSIS — I10 ESSENTIAL HYPERTENSION: Primary | ICD-10-CM

## 2019-02-14 DIAGNOSIS — K59.00 CONSTIPATION, UNSPECIFIED CONSTIPATION TYPE: ICD-10-CM

## 2019-02-14 DIAGNOSIS — N28.9 RENAL INSUFFICIENCY: ICD-10-CM

## 2019-02-14 DIAGNOSIS — M47.896 OTHER OSTEOARTHRITIS OF SPINE, LUMBAR REGION: ICD-10-CM

## 2019-02-14 RX ORDER — ASPIRIN 81 MG/1
81 TABLET ORAL DAILY
Qty: 30 TAB | Refills: 12 | Status: SHIPPED | OUTPATIENT
Start: 2019-02-14 | End: 2020-01-09

## 2019-02-14 RX ORDER — POLYETHYLENE GLYCOL 3350 17 G/17G
17 POWDER, FOR SOLUTION ORAL DAILY
Qty: 850 G | Refills: 2 | Status: SHIPPED | OUTPATIENT
Start: 2019-02-14 | End: 2019-06-10 | Stop reason: ALTCHOICE

## 2019-02-14 NOTE — PROGRESS NOTES
Felix Yen is a 80 y.o. female 1. Have you been to the ER, urgent care clinic since your last visit? Hospitalized since your last visit? No 
 
2. Have you seen or consulted any other health care providers outside of the [address] [place] [address] since your last visit? Include any pap smears or colon screening. No 
 
Chief Complaint Patient presents with  [facility]  
  new patient. History of possible stroke right side weakness.

## 2019-02-14 NOTE — PROGRESS NOTES
HISTORY OF PRESENT ILLNESS Nba Back is a 80 y.o. female here to establish care. She was admitted to hospitals with possible TIA last year. CT head was done, negative for any acute infarct. Lab work all done. Mostly stable. Her PCP Dr. Rhett Ovalle has placed patient on Plavix every day. Patient is not willing to take it. Has hypertension, on Hyzaar. No chest pain palpitation or shortness of breath. Has no coronary artery disease but has sick sinus syndrome. No pacemaker was suggested. She is seeing Dr. Chaz Solis twice a year. Has DJD knee and lumbar area. Seeing orthopedics. Doing well. Taking Tylenol as needed. Report constipation often. Taking Colace as needed. Needs some help. Has vertigo, seeing ENT. Taking Antivert as needed. All labs reviewed. HPI Review of Systems Constitutional: Negative. HENT: Negative. Eyes: Negative. Respiratory: Negative. Cardiovascular: Negative. Gastrointestinal: Negative. Genitourinary: Negative. Musculoskeletal: Negative. Skin: Negative. Neurological: Negative. Endo/Heme/Allergies: Negative. Psychiatric/Behavioral: Negative. Physical Exam  
Constitutional: She is oriented to person, place, and time. She appears well-developed and well-nourished. No distress. HENT:  
Head: Normocephalic and atraumatic. Right Ear: External ear normal.  
Left Ear: External ear normal.  
Nose: Nose normal.  
Mouth/Throat: Oropharynx is clear and moist. No oropharyngeal exudate. Eyes: Conjunctivae and EOM are normal. Pupils are equal, round, and reactive to light. Neck: Normal range of motion. Neck supple. No JVD present. No thyromegaly present. Cardiovascular: Normal rate, regular rhythm, normal heart sounds and intact distal pulses. Pulmonary/Chest: Effort normal and breath sounds normal. No respiratory distress. She has no wheezes. She has no rales. Abdominal: Soft. Bowel sounds are normal. She exhibits no distension. There is no tenderness. Musculoskeletal: She exhibits no edema or tenderness. Neurological: She is alert and oriented to person, place, and time. She has normal reflexes. She displays normal reflexes. No cranial nerve deficit. She exhibits normal muscle tone. Coordination normal.  
Psychiatric: She has a normal mood and affect. Her behavior is normal.  
 
 
ASSESSMENT and PLAN Diagnoses and all orders for this visit: 1. Essential hypertension On Hyzaar. Stable blood pressure. Be on DASH diet. 2. SSS (sick sinus syndrome) ([address] She is not taking medications. Seeing Dr. Zaid Argueta. No pacemaker placed yet. 3. Anxiety tension state Taking lorazepam as needed. Not require any medications right now. 4. Other osteoarthritis of spine, lumbar region Taking Tylenol as needed. 5. TIA (transient ischemic attack) She was admitted last year possible TIA. CT head came back negative. She was placed on Plavix for a whole year by Dr. Renay Bernal. Patient is not willing to take Plavix. Will discontinue Plavix. Will order, 
-     aspirin delayed-release 81 mg tablet; Take 1 Tab by mouth daily. D/C plavix. pt is not willing to take it 6. Renal insufficiency Need to drink more fluid. Will repeat, 
-     METABOLIC PANEL, COMPREHENSIVE 7. Constipation, unspecified constipation type Need to be on high-fiber diet. Will order, 
-     polyethylene glycol (MIRALAX) 17 gram/dose powder; Take 17 g by mouth daily. Taking Colace as needed. Discussed expected course/resolution/complications of diagnosis in detail with patient. Medication risks/benefits/costs/interactions/alternatives discussed with patient. Pt was given an after visit summary which includes diagnoses, current medications & vitals. Pt expressed understanding with the diagnosis and plan.

## 2019-02-15 LAB
ALBUMIN SERPL-MCNC: 4.3 G/DL (ref 3.5–4.7)
ALBUMIN/GLOB SERPL: 1.4 {RATIO} (ref 1.2–2.2)
ALP SERPL-CCNC: 63 IU/L (ref 39–117)
ALT SERPL-CCNC: 11 IU/L (ref 0–32)
AST SERPL-CCNC: 19 IU/L (ref 0–40)
BILIRUB SERPL-MCNC: 0.5 MG/DL (ref 0–1.2)
BUN SERPL-MCNC: 17 MG/DL (ref 8–27)
BUN/CREAT SERPL: 17 (ref 12–28)
CALCIUM SERPL-MCNC: 9.8 MG/DL (ref 8.7–10.3)
CHLORIDE SERPL-SCNC: 102 MMOL/L (ref 96–106)
CO2 SERPL-SCNC: 22 MMOL/L (ref 20–29)
CREAT SERPL-MCNC: 1 MG/DL (ref 0.57–1)
GLOBULIN SER CALC-MCNC: 3 G/DL (ref 1.5–4.5)
GLUCOSE SERPL-MCNC: 96 MG/DL (ref 65–99)
INTERPRETATION: NORMAL
POTASSIUM SERPL-SCNC: 3.9 MMOL/L (ref 3.5–5.2)
PROT SERPL-MCNC: 7.3 G/DL (ref 6–8.5)
SODIUM SERPL-SCNC: 137 MMOL/L (ref 134–144)

## 2019-02-23 ENCOUNTER — HOSPITAL ENCOUNTER (EMERGENCY)
Age: 84
Discharge: HOME | End: 2019-02-23
Attending: EMERGENCY MEDICINE
Payer: MEDICARE

## 2019-02-23 VITALS
WEIGHT: 171 LBS | SYSTOLIC BLOOD PRESSURE: 146 MMHG | TEMPERATURE: 98.3 F | RESPIRATION RATE: 12 BRPM | HEART RATE: 76 BPM | OXYGEN SATURATION: 98 % | BODY MASS INDEX: 31.47 KG/M2 | DIASTOLIC BLOOD PRESSURE: 82 MMHG | HEIGHT: 62 IN

## 2019-02-23 DIAGNOSIS — Z76.0 MEDICATION REFILL: Primary | ICD-10-CM

## 2019-02-23 DIAGNOSIS — Z86.59 HX OF ANXIETY DISORDER: ICD-10-CM

## 2019-02-23 PROCEDURE — 99282 EMERGENCY DEPT VISIT SF MDM: CPT

## 2019-02-23 RX ORDER — LORAZEPAM 0.5 MG/1
0.5 TABLET ORAL
Qty: 6 TAB | Refills: 0 | Status: SHIPPED | OUTPATIENT
Start: 2019-02-23 | End: 2019-04-02 | Stop reason: SDUPTHER

## 2019-02-23 NOTE — ED PROVIDER NOTES
EMERGENCY DEPARTMENT HISTORY AND PHYSICAL EXAM 
 
Date: 2/23/2019 Patient Name: Thuan Montanez History of Presenting Illness Chief Complaint Patient presents with  Anxiety History Provided By: Patient Chief Complaint: anxiety Duration: onset today Timing:  Constant Quality: feeling shaky Severity: Moderate Modifying Factors:needs refill for ativan Associated Symptoms: denies any other associated signs or symptoms HPI: Thuan Montnaez is a 80 y.o. female with a PMH of anemia GERD anxiety who presents with feeling anxious. Has run out of ativan which she states she has taken for years for anxiety. Patient specifically denies fever fatigue chest pain shortness of breath abdominal pain nausea vomiitng diarrhea dysuria numbness headache PCP: Alan José MD 
 
Current Outpatient Medications Medication Sig Dispense Refill  LORazepam (ATIVAN) 0.5 mg tablet TAKE 1 TABLET BY MOUTH TWICE A DAY AS NEEDED FOR ANXIETY 50 Tab 6  
 LORazepam (ATIVAN) 0.5 mg tablet Take 1 Tab by mouth every eight (8) hours as needed for Anxiety. Max Daily Amount: 1.5 mg. 6 Tab 0  
 aspirin delayed-release 81 mg tablet Take 1 Tab by mouth daily. D/C plavix. pt is not willing to take it 30 Tab 12  DEXILANT 60 mg CpDB capsule (delayed release) TAKE ONE CAPSULE BY MOUTH EVERY DAY 30 TO 60 MINS PRIOR TO MEAL  4  
 losartan-hydroCHLOROthiazide (HYZAAR) 50-12.5 mg per tablet TAKE 1 TABLET BY MOUTH EVERY DAY 90 Tab 3  
 citalopram (CELEXA) 20 mg tablet TAKE 1 TABLET BY MOUTH EVERY DAY 90 Tab 3  polyethylene glycol (MIRALAX) 17 gram/dose powder Take 17 g by mouth daily. 850 g 2  clopidogrel (PLAVIX) 75 mg tab Take 1 Tab by mouth daily. 30 Tab 11  
 meclizine (ANTIVERT) 12.5 mg tablet 1 tablet 3 times daily as needed for dizziness 30 Tab 3  
 docusate sodium (COLACE) 50 mg capsule Take 1 tablet twice a day 60 Cap 11  
 MAG/ALUMINUM/SOD BICARB/ALGINC (GAVISCON PO) Take  by mouth daily as needed. Past History Past Medical History: 
Past Medical History:  
Diagnosis Date  Anemia   
 Palmira Aniak MM,seeing Shahzad Mclean  Arrhythmia SSS  Bradycardia  Essential hypertension  GERD (gastroesophageal reflux disease)  Psychiatric disorder Anxiety  Psychotic disorder ([address]   
 anxiety  Vertigo Past Surgical History: 
Past Surgical History:  
Procedure Laterality Date  HX BUNIONECTOMY  HX HYSTERECTOMY  1971  
 partial  
 HX ORTHOPAEDIC Status post bunionectomy  HX PARTIAL HYSTERECTOMY Family History: 
Family History Problem Relation Age of Onset  Hypertension Mother Social History: 
Social History Tobacco Use  Smoking status: Never Smoker  Smokeless tobacco: Never Used Substance Use Topics  Alcohol use: No  
 Drug use: No  
 
 
Allergies: 
No Known Allergies Review of Systems Review of Systems Constitutional: Negative for fatigue and fever. Respiratory: Negative for shortness of breath and wheezing. Cardiovascular: Negative for chest pain and palpitations. Gastrointestinal: Negative for abdominal pain. Musculoskeletal: Negative for arthralgias, myalgias, neck pain and neck stiffness. Skin: Negative for pallor and rash. Neurological: Negative for dizziness, tremors, weakness and headaches. Hematological: Negative for adenopathy. All other systems reviewed and are negative. Physical Exam  
 
Vitals:  
 02/23/19 1109 02/23/19 1219 BP: (!) 159/92 146/82 Pulse: 76 Resp: 12 Temp: 98.3 °F (36.8 °C) SpO2: 98% Weight: 77.6 kg (171 lb) Height: 5' 2\" (1.575 m) Physical Exam  
Constitutional: She is oriented to person, place, and time. She appears well-developed and well-nourished. No distress. HENT:  
Head: Normocephalic and atraumatic.   
Right Ear: External ear normal.  
Left Ear: External ear normal.  
Nose: Nose normal.  
Mouth/Throat: Oropharynx is clear and moist.  
 Eyes: Conjunctivae are normal.  
Neck: Normal range of motion. Neck supple. Cardiovascular: Normal rate, regular rhythm and normal heart sounds. Pulmonary/Chest: Effort normal and breath sounds normal. No respiratory distress. She has no wheezes. Abdominal: Soft. Bowel sounds are normal. There is no tenderness. Musculoskeletal: Normal range of motion. Lymphadenopathy:  
  She has no cervical adenopathy. Neurological: She is alert and oriented to person, place, and time. No cranial nerve deficit. Coordination normal.  
Skin: Skin is warm and dry. No rash noted. Psychiatric: She has a normal mood and affect. Her behavior is normal. Judgment and thought content normal.  
Nursing note and vitals reviewed. Diagnostic Study Results Labs - No results found for this or any previous visit (from the past 12 hour(s)). Radiologic Studies - No orders to display CT Results  (Last 48 hours) None CXR Results  (Last 48 hours) None Medical Decision Making I am the first provider for this patient. I reviewed the vital signs, available nursing notes, past medical history, past surgical history, family history and social history. Vital Signs-Reviewed the patient's vital signs. Records Reviewed: Nursing Notes Disposition: 
Home Will RX 6 tabs concern for withdrawal use of ativan BID for several use on  review. will refer to  [facility]. DISCHARGE NOTE:  
 
  Care plan outlined and precautions discussed. Patient has no new complaints, changes, or physical findings. Results of tests were reviewed with the patient. All medications were reviewed with the patient; will d/c home with ativan 6 tabs. All of pt's questions and concerns were addressed. Patient was instructed and agrees to follow up with Dr Juana Montes, as well as to return to the ED upon further deterioration. Patient is ready to go home. Follow-up Information Follow up With Specialties Details Why Contact Info Awilda Varma MD Psychiatry In 2 days  [address] [place] 
[phone] Discharge Medication List as of 2/23/2019 12:15 PM  
  
START taking these medications Details  
!! LORazepam (ATIVAN) 0.5 mg tablet Take 1 Tab by mouth every eight (8) hours as needed for Anxiety. Max Daily Amount: 1.5 mg., Print, Disp-6 Tab, R-0  
  
 !! - Potential duplicate medications found. Please discuss with provider. CONTINUE these medications which have NOT CHANGED Details  
!! LORazepam (ATIVAN) 0.5 mg tablet TAKE 1 TABLET BY MOUTH TWICE A DAY AS NEEDED FOR ANXIETY, PrintNot to exceed 4 additional fills before 06/04/2019Disp-50 Tab, R-6  
  
aspirin delayed-release 81 mg tablet Take 1 Tab by mouth daily. D/C plavix. pt is not willing to take it, Normal, Disp-30 Tab, R-12 DEXILANT 60 mg CpDB capsule (delayed release) TAKE ONE CAPSULE BY MOUTH EVERY DAY 30 TO 60 MINS PRIOR TO MEAL, Historical Med, R-4, YESY  
  
losartan-hydroCHLOROthiazide (HYZAAR) 50-12.5 mg per tablet TAKE 1 TABLET BY MOUTH EVERY DAY, Normal, Disp-90 Tab, R-3  
  
citalopram (CELEXA) 20 mg tablet TAKE 1 TABLET BY MOUTH EVERY DAY, Normal, Disp-90 Tab, R-3  
  
polyethylene glycol (MIRALAX) 17 gram/dose powder Take 17 g by mouth daily. , Normal, Disp-850 g, R-2  
  
clopidogrel (PLAVIX) 75 mg tab Take 1 Tab by mouth daily. , Normal, Disp-30 Tab, R-11  
  
meclizine (ANTIVERT) 12.5 mg tablet 1 tablet 3 times daily as needed for dizziness, Normal, Disp-30 Tab, R-3  
  
docusate sodium (COLACE) 50 mg capsule Take 1 tablet twice a day, Normal, Disp-60 Cap, R-11  
  
MAG/ALUMINUM/SOD BICARB/ALGINC (GAVISCON PO) Take  by mouth daily as needed., Historical Med  
  
 !! - Potential duplicate medications found. Please discuss with provider. Provider Notes (Medical Decision Making): DDX anxiety disorder adjustment disorder medication refill Procedures: 
Procedures Diagnosis Clinical Impression: 1. Medication refill 2. Hx of anxiety disorder

## 2019-02-23 NOTE — ED NOTES
Patient presents to ED with c/o medication refill. Patient states that she has been out her anxiety medication and began to fell anxious this morning. Denies any current symptoms. Emergency Department Nursing Plan of Care The Nursing Plan of Care is developed from the Nursing assessment and Emergency Department Attending provider initial evaluation. The plan of care may be reviewed in the ED Provider note. The Plan of Care was developed with the following considerations:  
Patient / Family readiness to learn indicated by:verbalized understanding and successful return demonstration Persons(s) to be included in education: patient Barriers to Learning/Limitations:No 
 
Signed Surjit Hernandez RN   
2/23/2019   11:33 PM

## 2019-02-25 ENCOUNTER — TELEPHONE (OUTPATIENT)
Dept: INTERNAL MEDICINE CLINIC | Age: 84
End: 2019-02-25

## 2019-02-25 DIAGNOSIS — Z76.0 MEDICATION REFILL: ICD-10-CM

## 2019-02-25 DIAGNOSIS — F41.9 ANXIETY: ICD-10-CM

## 2019-02-25 DIAGNOSIS — Z86.59 HX OF ANXIETY DISORDER: ICD-10-CM

## 2019-02-25 RX ORDER — LORAZEPAM 0.5 MG/1
TABLET ORAL
Qty: 50 TAB | Refills: 5 | Status: CANCELLED | OUTPATIENT
Start: 2019-02-25

## 2019-03-05 ENCOUNTER — OFFICE VISIT (OUTPATIENT)
Dept: INTERNAL MEDICINE CLINIC | Age: 84
End: 2019-03-05

## 2019-03-05 VITALS
HEART RATE: 72 BPM | BODY MASS INDEX: 31.54 KG/M2 | WEIGHT: 171.4 LBS | DIASTOLIC BLOOD PRESSURE: 62 MMHG | OXYGEN SATURATION: 93 % | RESPIRATION RATE: 16 BRPM | HEIGHT: 62 IN | SYSTOLIC BLOOD PRESSURE: 107 MMHG | TEMPERATURE: 98.4 F

## 2019-03-05 DIAGNOSIS — I10 ESSENTIAL HYPERTENSION: Primary | ICD-10-CM

## 2019-03-05 DIAGNOSIS — Z13.31 SCREENING FOR DEPRESSION: ICD-10-CM

## 2019-03-05 DIAGNOSIS — D47.2 SMOLDERING MYELOMA: ICD-10-CM

## 2019-03-05 DIAGNOSIS — Z00.00 WELLNESS EXAMINATION: ICD-10-CM

## 2019-03-05 DIAGNOSIS — Z13.39 SCREENING FOR ALCOHOLISM: ICD-10-CM

## 2019-03-05 DIAGNOSIS — M17.0 PRIMARY OSTEOARTHRITIS OF BOTH KNEES: ICD-10-CM

## 2019-03-05 DIAGNOSIS — E66.09 CLASS 1 OBESITY DUE TO EXCESS CALORIES WITHOUT SERIOUS COMORBIDITY WITH BODY MASS INDEX (BMI) OF 31.0 TO 31.9 IN ADULT: ICD-10-CM

## 2019-03-05 DIAGNOSIS — F41.9 ANXIETY TENSION STATE: ICD-10-CM

## 2019-03-05 DIAGNOSIS — G56.03 BILATERAL CARPAL TUNNEL SYNDROME: ICD-10-CM

## 2019-03-05 RX ORDER — LOSARTAN POTASSIUM 100 MG/1
100 TABLET ORAL DAILY
Qty: 30 TAB | Refills: 11 | Status: SHIPPED | OUTPATIENT
Start: 2019-03-05 | End: 2019-06-10

## 2019-03-05 NOTE — PROGRESS NOTES
Chief Complaint   Patient presents with   Anum Annual Wellness Visit     1. Have you been to the ER, urgent care clinic since your last visit? Hospitalized since your last visit? No    2. Have you seen or consulted any other health care providers outside of the [address] [place] [address] since your last visit? Include any pap smears or colon screening.  No

## 2019-03-05 NOTE — PROGRESS NOTES
[address] [place] [address] and Primary Care  [place] [address]  [address] [facility] [address]  Phone:  [phone]  Fax: [phone]       Chief Complaint   Patient presents with   [facility] Wellness Visit   . SUBJECTIVE:    Michelle Nelson is a 80 y.o. female Comes in for return visit stating that generally she is doing well. She follows up with her multiple physicians. She most recently saw a physician for her carpal tunnel, who suggested surgical intervention. She sees Dr. Vic Melissa for her smoldering myeloma and now sees him every six months. Her disease is quite stable with no progression. She is concerned about increased frequency of urination and wondered if her antihypertensive medication could indeed be changed. She is quite frustrated with her home, but she is doing as well as can be expected under the circumstances. She has a past history of osteoarthritis, anxiety and constipation. Current Outpatient Medications   Medication Sig Dispense Refill    losartan (COZAAR) 100 mg tablet Take 1 Tab by mouth daily. 30 Tab 11    LORazepam (ATIVAN) 0.5 mg tablet TAKE 1 TABLET BY MOUTH TWICE A DAY AS NEEDED FOR ANXIETY 50 Tab 6    LORazepam (ATIVAN) 0.5 mg tablet Take 1 Tab by mouth every eight (8) hours as needed for Anxiety. Max Daily Amount: 1.5 mg. 6 Tab 0    aspirin delayed-release 81 mg tablet Take 1 Tab by mouth daily. D/C plavix. pt is not willing to take it 30 Tab 12    polyethylene glycol (MIRALAX) 17 gram/dose powder Take 17 g by mouth daily. 850 g 2    clopidogrel (PLAVIX) 75 mg tab Take 1 Tab by mouth daily.  30 Tab 11    DEXILANT 60 mg CpDB capsule (delayed release) TAKE ONE CAPSULE BY MOUTH EVERY DAY 30 TO 60 MINS PRIOR TO MEAL  4    meclizine (ANTIVERT) 12.5 mg tablet 1 tablet 3 times daily as needed for dizziness 30 Tab 3    citalopram (CELEXA) 20 mg tablet TAKE 1 TABLET BY MOUTH EVERY DAY 90 Tab 3    docusate sodium (COLACE) 50 mg capsule Take 1 tablet twice a day 60 Cap 11    MAG/ALUMINUM/SOD BICARB/ALGINC (GAVISCON PO) Take  by mouth daily as needed.        Past Medical History:   Diagnosis Date    Anemia     Stephen Castro MM,seeing     Arrhythmia     SSS    Bradycardia     Essential hypertension     GERD (gastroesophageal reflux disease)     Psychiatric disorder     Anxiety    Psychotic disorder (HCC)     anxiety    Vertigo      Past Surgical History:   Procedure Laterality Date    HX BUNIONECTOMY      HX HYSTERECTOMY  1971    partial    HX ORTHOPAEDIC      Status post bunionectomy    HX PARTIAL HYSTERECTOMY       No Known Allergies      REVIEW OF SYSTEMS:  General: negative for - chills or fever  ENT: negative for - headaches, nasal congestion or tinnitus  Respiratory: negative for - cough, hemoptysis, shortness of breath or wheezing  Cardiovascular : negative for - chest pain, edema, palpitations or shortness of breath  Gastrointestinal: negative for - abdominal pain, blood in stools, heartburn or nausea/vomiting  Genito-Urinary: no dysuria, trouble voiding, or hematuria  Musculoskeletal: negative for - gait disturbance, joint pain, joint stiffness or joint swelling  Neurological: no TIA or stroke symptoms  Hematologic: no bruises, no bleeding, no swollen glands  Integument: no lumps, mole changes, nail changes or rash  Endocrine: no malaise/lethargy or unexpected weight changes      Social History     Socioeconomic History    Marital status:      Spouse name: Not on file    Number of children: Not on file    Years of education: Not on file    Highest education level: Not on file   Tobacco Use    Smoking status: Never Smoker    Smokeless tobacco: Never Used   Substance and Sexual Activity    Alcohol use: No    Drug use: No    Sexual activity: Not Currently     Comment: ,2 children,     Family History   Problem Relation Age of Onset    Hypertension Mother        OBJECTIVE:    Visit Vitals  /62   Pulse 72   Temp 98.4 °F (36.9 °C) (Oral)   Resp 16   Ht 5' 2\" (1.575 m)   Wt 171 lb 6.4 oz (77.7 kg)   SpO2 93%   BMI 31.35 kg/m²     CONSTITUTIONAL: well , well nourished, appears age appropriate  EYES: perrla, eom intact  ENMT:moist mucous membranes, pharynx clear  NECK: supple. Thyroid normal  RESPIRATORY: Chest: clear to ascultation and percussion   CARDIOVASCULAR: Heart: regular rate and rhythm  GASTROINTESTINAL: Abdomen: soft, bowel sounds active  HEMATOLOGIC: no pathological lymph nodes palpated  MUSCULOSKELETAL: Extremities: no edema, pulse 1+   INTEGUMENT: No unusual rashes or suspicious skin lesions noted. Nails appear normal.  NEUROLOGIC: non-focal exam   MENTAL STATUS: alert and oriented, appropriate affect      ASSESSMENT:  1. Essential hypertension    2. Class 1 obesity due to excess calories without serious comorbidity with body mass index (BMI) of 31.0 to 31.9 in adult    3. Primary osteoarthritis of both knees    4. Anxiety tension state    5. Smoldering myeloma ([address]    6. Bilateral carpal tunnel syndrome    7. Screening for alcoholism    8. Screening for depression    9. Wellness examination        PLAN:    1. Her blood pressure is excellent today, however I will discontinue the Losartan/HCTZ for plain Losartan 100 mg.    2. I encouraged weight reduction. This can be accomplished by eating meals, eliminating snacks and avoiding the consumption of processed carbohydrates. 3. Her osteoarthritis is doing reasonably well. Her anxiety is also quite stable. 4. She will follow up with her hematologist regarding her smoldering myeloma. 5. I will leave it to the patient to decide if she wants to have decompression for her carpal tunnel syndrome. .  Orders Placed This Encounter    Depression Screen Annual    losartan (COZAAR) 100 mg tablet         Follow-up Disposition:  Return in about 4 months (around 7/5/2019).       Elvia Barroso MD  This is the Subsequent Medicare Annual Wellness Exam, performed 12 months or more after the Initial AWV or the last Subsequent AWV    I have reviewed the patient's medical history in detail and updated the computerized patient record. History     Past Medical History:   Diagnosis Date    Anemia     Bradley Mora MM,seeing     Arrhythmia     SSS    Bradycardia     Essential hypertension     GERD (gastroesophageal reflux disease)     Psychiatric disorder     Anxiety    Psychotic disorder (HCC)     anxiety    Vertigo       Past Surgical History:   Procedure Laterality Date    HX BUNIONECTOMY      HX HYSTERECTOMY  1971    partial    HX ORTHOPAEDIC      Status post bunionectomy    HX PARTIAL HYSTERECTOMY       Current Outpatient Medications   Medication Sig Dispense Refill    losartan (COZAAR) 100 mg tablet Take 1 Tab by mouth daily. 30 Tab 11    LORazepam (ATIVAN) 0.5 mg tablet TAKE 1 TABLET BY MOUTH TWICE A DAY AS NEEDED FOR ANXIETY 50 Tab 6    LORazepam (ATIVAN) 0.5 mg tablet Take 1 Tab by mouth every eight (8) hours as needed for Anxiety. Max Daily Amount: 1.5 mg. 6 Tab 0    aspirin delayed-release 81 mg tablet Take 1 Tab by mouth daily. D/C plavix. pt is not willing to take it 30 Tab 12    polyethylene glycol (MIRALAX) 17 gram/dose powder Take 17 g by mouth daily. 850 g 2    clopidogrel (PLAVIX) 75 mg tab Take 1 Tab by mouth daily. 30 Tab 11    DEXILANT 60 mg CpDB capsule (delayed release) TAKE ONE CAPSULE BY MOUTH EVERY DAY 30 TO 60 MINS PRIOR TO MEAL  4    meclizine (ANTIVERT) 12.5 mg tablet 1 tablet 3 times daily as needed for dizziness 30 Tab 3    citalopram (CELEXA) 20 mg tablet TAKE 1 TABLET BY MOUTH EVERY DAY 90 Tab 3    docusate sodium (COLACE) 50 mg capsule Take 1 tablet twice a day 60 Cap 11    MAG/ALUMINUM/SOD BICARB/ALGINC (GAVISCON PO) Take  by mouth daily as needed.        No Known Allergies  Family History   Problem Relation Age of Onset    Hypertension Mother      Social History     Tobacco Use    Smoking status: Never Smoker    Smokeless tobacco: Never Used   Substance Use Topics    Alcohol use: No     Patient Active Problem List   Diagnosis Code    Syncope and collapse R55    Obesity E66.9    Bradycardia R00.1    HTN (hypertension) I10    Sinus congestion R09.81    SSS (sick sinus syndrome) ([facility]) I49.5    Vasomotor rhinitis J30.0    Non morbid obesity due to excess calories E66.09    Anemia D64.9    Primary osteoarthritis of both knees M17.0    Benign recurrent vertigo H81.10    Anxiety tension state F41.9    Dyspepsia R10.13    Uterine prolapse N81.4    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    Primary osteoarthritis of both hands M19.041, M19.042    Smoldering myeloma ([facility]) C90.00    Bilateral carpal tunnel syndrome G56.03       Depression Risk Factor Screening:     3 most recent PHQ Screens 3/5/2019   PHQ Not Done -   Little interest or pleasure in doing things Not at all   Feeling down, depressed, irritable, or hopeless Not at all   Total Score PHQ 2 0   Trouble falling or staying asleep, or sleeping too much -   Feeling tired or having little energy -   Poor appetite, weight loss, or overeating -   Feeling bad about yourself - or that you are a failure or have let yourself or your family down -   Trouble concentrating on things such as school, work, reading, or watching TV -   Moving or speaking so slowly that other people could have noticed; or the opposite being so fidgety that others notice -   Thoughts of being better off dead, or hurting yourself in some way -   PHQ 9 Score -   How difficult have these problems made it for you to do your work, take care of your home and get along with others -     Alcohol Risk Factor Screening: You do not drink alcohol or very rarely. Functional Ability and Level of Safety:   Hearing Loss  Hearing is good. Activities of Daily Living  The home contains: no safety equipment. Patient does total self care    Fall Risk  Fall Risk Assessment, last 12 mths 3/5/2019   Able to walk?  Yes   Fall in past 12 months? No       Abuse Screen  Patient is not abused    Cognitive Screening   Evaluation of Cognitive Function:  Has your family/caregiver stated any concerns about your memory: no  Normal    Patient Care Team   Patient Care Team:  Jaky Dunbar MD as PCP - General (Internal Medicine)  Kobe Posye MD (Cardiology)    Assessment/Plan   Education and counseling provided:  Are appropriate based on today's review and evaluation    Diagnoses and all orders for this visit:    1. Essential hypertension  -     losartan (COZAAR) 100 mg tablet; Take 1 Tab by mouth daily. 2. Class 1 obesity due to excess calories without serious comorbidity with body mass index (BMI) of 31.0 to 31.9 in adult    3. Primary osteoarthritis of both knees    4. Anxiety tension state    5. Smoldering myeloma ([facility]    6. Bilateral carpal tunnel syndrome    7. Screening for alcoholism  -     NC ANNUAL ALCOHOL SCREEN 15 MIN    8. Screening for depression  -     DEPRESSION SCREEN ANNUAL    9.  Wellness examination        Health Maintenance Due   Topic Date Due    Shingrix Vaccine Age 49> (1 of 2) 04/19/1982    Influenza Age 5 to Adult  08/01/2018    MEDICARE YEARLY EXAM  12/27/2018

## 2019-03-06 NOTE — PATIENT INSTRUCTIONS
Medicare Wellness Visit, Female     The best way to live healthy is to have a lifestyle where you eat a well-balanced diet, exercise regularly, limit alcohol use, and quit all forms of tobacco/nicotine, if applicable. Regular preventive services are another way to keep healthy. Preventive services (vaccines, screening tests, monitoring & exams) can help personalize your care plan, which helps you manage your own care. Screening tests can find health problems at the earliest stages, when they are easiest to treat. [facility] follows the current, evidence-based guidelines published by the [facility] ([facility]) when recommending preventive services for our patients. Because we follow these guidelines, sometimes recommendations change over time as research supports it. (For example, mammograms used to be recommended annually. Even though Medicare will still pay for an annual mammogram, the newer guidelines recommend a mammogram every two years for women of average risk.)  Of course, you and your doctor may decide to screen more often for some diseases, based on your risk and your health status. Preventive services for you include:  - Medicare offers their members a free annual wellness visit, which is time for you and your primary care provider to discuss and plan for your preventive service needs. Take advantage of this benefit every year!  -All adults over the age of 72 should receive the recommended pneumonia vaccines. Current USPSTF guidelines recommend a series of two vaccines for the best pneumonia protection.   -All adults should have a flu vaccine yearly and a tetanus vaccine every 10 years. All adults age 61 and older should receive a shingles vaccine once in their lifetime.    -A bone mass density test is recommended when a woman turns 65 to screen for osteoporosis. This test is only recommended one time, as a screening.  Some providers will use this same test as a disease monitoring tool if you already have osteoporosis. -All adults age 38-68 who are overweight should have a diabetes screening test once every three years.   -Other screening tests and preventive services for persons with diabetes include: an eye exam to screen for diabetic retinopathy, a kidney function test, a foot exam, and stricter control over your cholesterol.   -Cardiovascular screening for adults with routine risk involves an electrocardiogram (ECG) at intervals determined by your doctor.   -Colorectal cancer screenings should be done for adults age 54-65 with no increased risk factors for colorectal cancer. There are a number of acceptable methods of screening for this type of cancer. Each test has its own benefits and drawbacks. Discuss with your doctor what is most appropriate for you during your annual wellness visit. The different tests include: colonoscopy (considered the best screening method), a fecal occult blood test, a fecal DNA test, and sigmoidoscopy. -Breast cancer screenings are recommended every other year for women of normal risk, age 54-69.  -Cervical cancer screenings for women over age 72 are only recommended with certain risk factors.   -All adults born between [facility] should be screened once for Hepatitis C.      Here is a list of your current Health Maintenance items (your personalized list of preventive services) with a due date:  Health Maintenance Due   Topic Date Due    Shingles Vaccine (1 of 2) 04/19/1982    Flu Vaccine  08/01/2018    Annual Well Visit  12/27/2018

## 2019-04-02 ENCOUNTER — OFFICE VISIT (OUTPATIENT)
Dept: INTERNAL MEDICINE CLINIC | Age: 84
End: 2019-04-02

## 2019-04-02 VITALS
RESPIRATION RATE: 14 BRPM | DIASTOLIC BLOOD PRESSURE: 64 MMHG | SYSTOLIC BLOOD PRESSURE: 121 MMHG | WEIGHT: 172.7 LBS | BODY MASS INDEX: 31.78 KG/M2 | OXYGEN SATURATION: 95 % | HEIGHT: 62 IN | HEART RATE: 65 BPM | TEMPERATURE: 97.9 F

## 2019-04-02 DIAGNOSIS — M17.0 PRIMARY OSTEOARTHRITIS OF BOTH KNEES: ICD-10-CM

## 2019-04-02 DIAGNOSIS — E66.09 CLASS 1 OBESITY DUE TO EXCESS CALORIES WITHOUT SERIOUS COMORBIDITY WITH BODY MASS INDEX (BMI) OF 31.0 TO 31.9 IN ADULT: ICD-10-CM

## 2019-04-02 DIAGNOSIS — I10 ESSENTIAL HYPERTENSION: ICD-10-CM

## 2019-04-02 DIAGNOSIS — H81.10 BENIGN PAROXYSMAL POSITIONAL VERTIGO, UNSPECIFIED LATERALITY: ICD-10-CM

## 2019-04-02 DIAGNOSIS — R53.83 FATIGUE, UNSPECIFIED TYPE: Primary | ICD-10-CM

## 2019-04-02 NOTE — PROGRESS NOTES
[address] [facility] and Primary Care 
[place] [address] 
[address] [facility] [address] Phone:  [phone]  Fax: [phone] Chief Complaint Patient presents with  Dizziness  
  x 1 day Hector Herr SUBJECTIVE: 
  Bhavani Villeda is a 80 y.o. female Comes in for return visit stating that she is having increasing fatigue. Additionally today she had dizziness upon awakening, which she describes as a spinning sensation. She took Meclizine with improvement, although not complete abatement. She has significant osteoarthritis of both knees, but this is reasonably stable, not interfering with her functional status. She is quite anxious and takes Lorazepam as needed. Finally, there has been no meaningful weight loss since I last saw her. Current Outpatient Medications Medication Sig Dispense Refill  losartan (COZAAR) 100 mg tablet Take 1 Tab by mouth daily. 30 Tab 11  
 LORazepam (ATIVAN) 0.5 mg tablet TAKE 1 TABLET BY MOUTH TWICE A DAY AS NEEDED FOR ANXIETY 50 Tab 6  
 aspirin delayed-release 81 mg tablet Take 1 Tab by mouth daily. D/C plavix. pt is not willing to take it 30 Tab 12  
 polyethylene glycol (MIRALAX) 17 gram/dose powder Take 17 g by mouth daily. 850 g 2  clopidogrel (PLAVIX) 75 mg tab Take 1 Tab by mouth daily. 30 Tab 11  
 DEXILANT 60 mg CpDB capsule (delayed release) TAKE ONE CAPSULE BY MOUTH EVERY DAY 30 TO 60 MINS PRIOR TO MEAL  4  
 meclizine (ANTIVERT) 12.5 mg tablet 1 tablet 3 times daily as needed for dizziness 30 Tab 3  
 citalopram (CELEXA) 20 mg tablet TAKE 1 TABLET BY MOUTH EVERY DAY 90 Tab 3  
 docusate sodium (COLACE) 50 mg capsule Take 1 tablet twice a day 60 Cap 11  
 MAG/ALUMINUM/SOD BICARB/ALGINC (GAVISCON PO) Take  by mouth daily as needed. Past Medical History:  
Diagnosis Date  Anemia   
 Sonja Mar MM,seeing Khushbu Osborne  Arrhythmia SSS  Bradycardia  Essential hypertension  GERD (gastroesophageal reflux disease)  Psychiatric disorder Anxiety  [facility]   
 anxiety  Vertigo Past Surgical History:  
Procedure Laterality Date  HX BUNIONECTOMY  HX HYSTERECTOMY  1971  
 partial  
 HX ORTHOPAEDIC Status post bunionectomy  HX PARTIAL HYSTERECTOMY No Known Allergies REVIEW OF SYSTEMS: 
General: negative for - chills or fever ENT: negative for - headaches, nasal congestion or tinnitus Respiratory: negative for - cough, hemoptysis, shortness of breath or wheezing Cardiovascular : negative for - chest pain, edema, palpitations or shortness of breath Gastrointestinal: negative for - abdominal pain, blood in stools, heartburn or nausea/vomiting Genito-Urinary: no dysuria, trouble voiding, or hematuria Musculoskeletal: negative for - gait disturbance, joint pain, joint stiffness or joint swelling Neurological: no TIA or stroke symptoms Hematologic: no bruises, no bleeding, no swollen glands Integument: no lumps, mole changes, nail changes or rash Endocrine: no malaise/lethargy or unexpected weight changes Social History Socioeconomic History  Marital status:  Spouse name: Not on file  Number of children: Not on file  Years of education: Not on file  Highest education level: Not on file Tobacco Use  Smoking status: Never Smoker  Smokeless tobacco: Never Used Substance and Sexual Activity  Alcohol use: No  
 Drug use: No  
 Sexual activity: Not Currently Comment: ,2 children,  
 
Family History Problem Relation Age of Onset  Hypertension Mother OBJECTIVE: 
 
Visit Vitals /64 Pulse 65 Temp 97.9 °F (36.6 °C) (Oral) Resp 14 Ht 5' 2\" (1.575 m) Wt 172 lb 11.2 oz (78.3 kg) SpO2 95% BMI 31.59 kg/m² CONSTITUTIONAL: well , well nourished, appears age appropriate EYES: perrla, eom intact ENMT:moist mucous membranes, pharynx clear NECK: supple.  Thyroid normal 
 RESPIRATORY: Chest: clear to ascultation and percussion CARDIOVASCULAR: Heart: regular rate and rhythm GASTROINTESTINAL: Abdomen: soft, bowel sounds active HEMATOLOGIC: no pathological lymph nodes palpated MUSCULOSKELETAL: Extremities: no edema, pulse 1+,mild degenerative changes noted both knees INTEGUMENT: No unusual rashes or suspicious skin lesions noted. Nails appear normal. 
NEUROLOGIC: non-focal exam  
MENTAL STATUS: alert and oriented, appropriate affect ASSESSMENT: 
1. Fatigue, unspecified type 2. Primary osteoarthritis of both knees 3. Benign paroxysmal positional vertigo, unspecified laterality 4. Class 1 obesity due to excess calories without serious comorbidity with body mass index (BMI) of 31.0 to 31.9 in adult 5. Essential hypertension PLAN: 
 
1. As far as her fatigue is concerned, this is related to deconditioning. Increasing her physical activity will indeed help this. 2. She has moderate osteoarthritis of both knees. Fortunately this is reasonably stable and she does not really need anything. 3. She has paroxysmal positional vertigo. She will continue her Meclizine 12.5 mg t.i.d. prn for this. 4. I encouraged her to lose weight. This can be accomplished by eating meals, eliminating snacks and avoiding the consumption of processed carbohydrates. 5. Blood pressure is excellent today, no adjustments are made. Follow-up and Dispositions · Return in about 3 months (around 7/2/2019).  
  
 
 
 
Porfirio Grayson MD

## 2019-04-02 NOTE — PROGRESS NOTES
Chief Complaint Patient presents with  Dizziness  
  x 1 day 1. Have you been to the ER, urgent care clinic since your last visit? Hospitalized since your last visit? No 
 
2. Have you seen or consulted any other health care providers outside of the [address] [place] [address] since your last visit? Include any pap smears or colon screening.  No

## 2019-04-24 ENCOUNTER — HOSPITAL ENCOUNTER (OUTPATIENT)
Dept: MAMMOGRAPHY | Age: 84
Discharge: HOME | End: 2019-04-24
Attending: INTERNAL MEDICINE
Payer: MEDICARE

## 2019-04-24 DIAGNOSIS — Z12.39 BREAST SCREENING, UNSPECIFIED: ICD-10-CM

## 2019-04-24 PROCEDURE — 77067 SCR MAMMO BI INCL CAD: CPT

## 2019-06-10 ENCOUNTER — OFFICE VISIT (OUTPATIENT)
Dept: CARDIOLOGY CLINIC | Age: 84
End: 2019-06-10

## 2019-06-10 VITALS
BODY MASS INDEX: 31.76 KG/M2 | RESPIRATION RATE: 17 BRPM | SYSTOLIC BLOOD PRESSURE: 116 MMHG | WEIGHT: 172.6 LBS | HEIGHT: 62 IN | OXYGEN SATURATION: 97 % | DIASTOLIC BLOOD PRESSURE: 74 MMHG | HEART RATE: 79 BPM

## 2019-06-10 DIAGNOSIS — M17.0 PRIMARY OSTEOARTHRITIS OF BOTH KNEES: ICD-10-CM

## 2019-06-10 DIAGNOSIS — R45.89 DEPRESSED AFFECT: ICD-10-CM

## 2019-06-10 DIAGNOSIS — I10 ESSENTIAL HYPERTENSION: Primary | ICD-10-CM

## 2019-06-10 DIAGNOSIS — I49.5 SSS (SICK SINUS SYNDROME) (HCC): ICD-10-CM

## 2019-06-10 DIAGNOSIS — E66.09 NON MORBID OBESITY DUE TO EXCESS CALORIES: ICD-10-CM

## 2019-06-10 DIAGNOSIS — F41.9 ANXIETY TENSION STATE: ICD-10-CM

## 2019-06-10 RX ORDER — LOSARTAN POTASSIUM AND HYDROCHLOROTHIAZIDE 12.5; 5 MG/1; MG/1
1 TABLET ORAL DAILY
Qty: 90 TAB | Refills: 3 | Status: SHIPPED | OUTPATIENT
Start: 2019-06-10 | End: 2020-05-21 | Stop reason: SDUPTHER

## 2019-06-10 RX ORDER — LOSARTAN POTASSIUM AND HYDROCHLOROTHIAZIDE 12.5; 5 MG/1; MG/1
1 TABLET ORAL DAILY
COMMUNITY
End: 2019-06-10 | Stop reason: SDUPTHER

## 2019-06-10 RX ORDER — LOSARTAN POTASSIUM AND HYDROCHLOROTHIAZIDE 12.5; 5 MG/1; MG/1
1 TABLET ORAL DAILY
Qty: 90 TAB | Refills: 3 | Status: SHIPPED | OUTPATIENT
Start: 2019-06-10 | End: 2019-06-10 | Stop reason: SDUPTHER

## 2019-06-10 NOTE — PROGRESS NOTES
HISTORY OF PRESENT ILLNESS  David Abdalla is a 80 y.o. female. She has treated hypertension. She continues to care for her  who may have some dementia, certainly his behavior is very difficult and she has no help in caring for him. I have suggested to her today that she see if his primary care physician would refer him to a neurologist to see if he does have Alzheimer's disease. She is taking Losartan with HCTZ and does complain still of her ankle swelling some by the end of the day. Her renal function is normal as is her LV function. 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 90 Tab 3    LORazepam (ATIVAN) 0.5 mg tablet TAKE 1 TABLET BY MOUTH TWICE A DAY AS NEEDED FOR ANXIETY 50 Tab 6    aspirin delayed-release 81 mg tablet Take 1 Tab by mouth daily. D/C plavix. pt is not willing to take it 30 Tab 12    DEXILANT 60 mg CpDB capsule (delayed release) TAKE ONE CAPSULE BY MOUTH EVERY DAY 30 TO 60 MINS PRIOR TO MEAL  4    meclizine (ANTIVERT) 12.5 mg tablet 1 tablet 3 times daily as needed for dizziness 30 Tab 3    citalopram (CELEXA) 20 mg tablet TAKE 1 TABLET BY MOUTH EVERY DAY 90 Tab 3    docusate sodium (COLACE) 50 mg capsule Take 1 tablet twice a day 60 Cap 11    MAG/ALUMINUM/SOD BICARB/ALGINC (GAVISCON PO) Take  by mouth daily as needed. Past Medical History:   Diagnosis Date    Anemia     Ubaldo Lasso MM,seeing     Arrhythmia     SSS    Bradycardia     Essential hypertension     GERD (gastroesophageal reflux disease)     Psychiatric disorder     Anxiety    Psychotic disorder (HCC)     anxiety    Vertigo      Past Surgical History:   Procedure Laterality Date    HX BUNIONECTOMY      HX HYSTERECTOMY  1971    partial    HX ORTHOPAEDIC      Status post bunionectomy    HX PARTIAL HYSTERECTOMY         Review of Systems   Cardiovascular: Positive for leg swelling. Musculoskeletal: Positive for joint pain. All other systems reviewed and are negative. Visit Vitals  /74 (BP 1 Location: Left arm, BP Patient Position: Sitting)   Pulse 79   Resp 17   Ht 5' 2\" (1.575 m)   Wt 172 lb 9.6 oz (78.3 kg)   SpO2 97%   BMI 31.57 kg/m²       Physical Exam   Constitutional: She is oriented to person, place, and time. She appears well-nourished. HENT:   Head: Atraumatic. Eyes: Conjunctivae are normal.   Neck: Neck supple. Cardiovascular: Normal rate, regular rhythm and normal heart sounds. Exam reveals no gallop and no friction rub. No murmur heard. Pulmonary/Chest: Breath sounds normal. She has no wheezes. Abdominal: Bowel sounds are normal. There is no tenderness. Musculoskeletal: She exhibits no edema. Neurological: She is oriented to person, place, and time. Skin: Skin is dry. Psychiatric: Her behavior is normal.   Nursing note and vitals reviewed. ASSESSMENT and PLAN  Her blood pressure is well controlled today and I will refill the prescription for Losartan 50 mg with HCTZ 12.5 mg. Again, I reiterated to her the possibility that her  could have some degree of dementia with behavioral disturbance and suggested a neurologist in this regard. I will see her back in six months time.

## 2019-08-02 ENCOUNTER — OFFICE VISIT (OUTPATIENT)
Dept: INTERNAL MEDICINE CLINIC | Age: 84
End: 2019-08-02

## 2019-08-02 VITALS
SYSTOLIC BLOOD PRESSURE: 112 MMHG | HEART RATE: 53 BPM | OXYGEN SATURATION: 96 % | HEIGHT: 62 IN | TEMPERATURE: 98.1 F | RESPIRATION RATE: 14 BRPM | DIASTOLIC BLOOD PRESSURE: 64 MMHG | BODY MASS INDEX: 31.65 KG/M2 | WEIGHT: 172 LBS

## 2019-08-02 DIAGNOSIS — G44.219 EPISODIC TENSION-TYPE HEADACHE, NOT INTRACTABLE: ICD-10-CM

## 2019-08-02 DIAGNOSIS — I10 ESSENTIAL HYPERTENSION: Primary | ICD-10-CM

## 2019-08-02 DIAGNOSIS — D64.9 ANEMIA, UNSPECIFIED TYPE: ICD-10-CM

## 2019-08-02 DIAGNOSIS — F41.9 ANXIETY TENSION STATE: ICD-10-CM

## 2019-08-02 DIAGNOSIS — M17.0 PRIMARY OSTEOARTHRITIS OF BOTH KNEES: ICD-10-CM

## 2019-08-02 NOTE — PROGRESS NOTES
Chief Complaint   Patient presents with    Hypertension     4 month follow up      1. Have you been to the ER, urgent care clinic since your last visit? Hospitalized since your last visit? No    2. Have you seen or consulted any other health care providers outside of the [address] [place] [address] since your last visit? Include any pap smears or colon screening.  No

## 2019-08-02 NOTE — PROGRESS NOTES
[facility]  [place] [address]  [address] [facility] [place] [address]  Phone:  [phone]  Fax: [phone]       Chief Complaint   Patient presents with    Hypertension     4 month follow up    . SUBJECTIVE:    Reji Kamara is a 80 y.o. female Comes in for return visit stating that she is following up with her neurologist for her apparent anemia and MGUS. She has been getting occasional headaches, frontal in location, typically related to tense situations. She has occasional abdominal cramping. She has intermittent numbness in her right leg when she walks distances, but no gladys pain. She is not as stressed as she used to be because she has a nurse coming by her house to assist with her 's overall care. Current Outpatient Medications   Medication Sig Dispense Refill    losartan-hydroCHLOROthiazide (HYZAAR) 50-12.5 mg per tablet Take 1 Tab by mouth daily. 90 Tab 3    LORazepam (ATIVAN) 0.5 mg tablet TAKE 1 TABLET BY MOUTH TWICE A DAY AS NEEDED FOR ANXIETY 50 Tab 6    aspirin delayed-release 81 mg tablet Take 1 Tab by mouth daily. D/C plavix. pt is not willing to take it 30 Tab 12    DEXILANT 60 mg CpDB capsule (delayed release) TAKE ONE CAPSULE BY MOUTH EVERY DAY 30 TO 60 MINS PRIOR TO MEAL  4    meclizine (ANTIVERT) 12.5 mg tablet 1 tablet 3 times daily as needed for dizziness 30 Tab 3    citalopram (CELEXA) 20 mg tablet TAKE 1 TABLET BY MOUTH EVERY DAY 90 Tab 3    docusate sodium (COLACE) 50 mg capsule Take 1 tablet twice a day 60 Cap 11    MAG/ALUMINUM/SOD BICARB/ALGINC (GAVISCON PO) Take  by mouth daily as needed.        Past Medical History:   Diagnosis Date    Anemia     Merle Yu MM,seeing     Arrhythmia     SSS    Bradycardia     Essential hypertension     GERD (gastroesophageal reflux disease)     Psychiatric disorder     Anxiety    Psychotic disorder (HCC)     anxiety    Vertigo      Past Surgical History:   Procedure Laterality Date    HX BUNIONECTOMY      HX HYSTERECTOMY  1971    partial    HX ORTHOPAEDIC      Status post bunionectomy    HX PARTIAL HYSTERECTOMY       No Known Allergies      REVIEW OF SYSTEMS:  General: negative for - chills or fever  ENT: negative for - headaches, nasal congestion or tinnitus  Respiratory: negative for - cough, hemoptysis, shortness of breath or wheezing  Cardiovascular : negative for - chest pain, edema, palpitations or shortness of breath  Gastrointestinal: negative for - abdominal pain, blood in stools, heartburn or nausea/vomiting  Genito-Urinary: no dysuria, trouble voiding, or hematuria  Musculoskeletal: negative for - gait disturbance, joint pain, joint stiffness or joint swelling  Neurological: no TIA or stroke symptoms  Hematologic: no bruises, no bleeding, no swollen glands  Integument: no lumps, mole changes, nail changes or rash  Endocrine: no malaise/lethargy or unexpected weight changes      Social History     Socioeconomic History    Marital status:      Spouse name: Not on file    Number of children: Not on file    Years of education: Not on file    Highest education level: Not on file   Tobacco Use    Smoking status: Never Smoker    Smokeless tobacco: Never Used   Substance and Sexual Activity    Alcohol use: No    Drug use: No    Sexual activity: Not Currently     Comment: ,2 children,     Family History   Problem Relation Age of Onset    Hypertension Mother        OBJECTIVE:    Visit Vitals  /64   Pulse (!) 53   Temp 98.1 °F (36.7 °C) (Oral)   Resp 14   Ht 5' 2\" (1.575 m)   Wt 172 lb (78 kg)   SpO2 96%   BMI 31.46 kg/m²     CONSTITUTIONAL: well , well nourished, appears age appropriate  EYES: perrla, eom intact  ENMT:moist mucous membranes, pharynx clear  NECK: supple.  Thyroid normal  RESPIRATORY: Chest: clear to ascultation and percussion   CARDIOVASCULAR: Heart: regular rate and rhythm  GASTROINTESTINAL: Abdomen: soft, bowel sounds active  HEMATOLOGIC: no pathological lymph nodes palpated  MUSCULOSKELETAL: Extremities: no edema, pulse 1+   INTEGUMENT: No unusual rashes or suspicious skin lesions noted. Nails appear normal.  NEUROLOGIC: non-focal exam   MENTAL STATUS: alert and oriented, appropriate affect      ASSESSMENT:  1. Essential hypertension    2. Anemia, unspecified type    3. Anxiety tension state    4. Episodic tension-type headache, not intractable    5. Primary osteoarthritis of both knees        PLAN:    1. The patient's headaches are muscle tension in origin. Nothing more need be done. 2. She has occasional abdominal cramping, which is most likely related to a form fruste of IBS. 3. I am not entirely sure of the origin of the patient's numbness of her right leg, but I will observe for now. If it gets worse she will probably have to see a neurologist.  4. BP is excellent, no adjustments are made. 5. Her emotional status is improving significantly. Follow-up and Dispositions    · Return in about 4 months (around 12/2/2019).            Veronica Casey MD

## 2019-08-11 RX ORDER — CITALOPRAM 20 MG/1
TABLET, FILM COATED ORAL
Qty: 90 TAB | Refills: 3 | Status: SHIPPED | OUTPATIENT
Start: 2019-08-11 | End: 2020-08-05

## 2019-11-04 ENCOUNTER — OFFICE VISIT (OUTPATIENT)
Dept: INTERNAL MEDICINE CLINIC | Age: 84
End: 2019-11-04

## 2019-11-04 VITALS
WEIGHT: 169.2 LBS | DIASTOLIC BLOOD PRESSURE: 76 MMHG | TEMPERATURE: 98.5 F | RESPIRATION RATE: 16 BRPM | HEIGHT: 62 IN | BODY MASS INDEX: 31.14 KG/M2 | OXYGEN SATURATION: 93 % | SYSTOLIC BLOOD PRESSURE: 115 MMHG | HEART RATE: 70 BPM

## 2019-11-04 DIAGNOSIS — I10 ESSENTIAL HYPERTENSION: Primary | ICD-10-CM

## 2019-11-04 DIAGNOSIS — H81.10 BENIGN RECURRENT VERTIGO: ICD-10-CM

## 2019-11-04 DIAGNOSIS — E66.09 CLASS 1 OBESITY DUE TO EXCESS CALORIES WITHOUT SERIOUS COMORBIDITY WITH BODY MASS INDEX (BMI) OF 31.0 TO 31.9 IN ADULT: ICD-10-CM

## 2019-11-04 DIAGNOSIS — E78.5 DYSLIPIDEMIA: ICD-10-CM

## 2019-11-04 DIAGNOSIS — D47.2 SMOLDERING MYELOMA: ICD-10-CM

## 2019-11-04 DIAGNOSIS — F41.9 ANXIETY: ICD-10-CM

## 2019-11-04 RX ORDER — MECLIZINE HCL 12.5 MG 12.5 MG/1
TABLET ORAL
Qty: 60 TAB | Refills: 3 | Status: ON HOLD | OUTPATIENT
Start: 2019-11-04 | End: 2021-12-07

## 2019-11-04 NOTE — PROGRESS NOTES
Chief Complaint   Patient presents with    Hypertension     3 month follow up      1. Have you been to the ER, urgent care clinic since your last visit? Hospitalized since your last visit? No    2. Have you seen or consulted any other health care providers outside of the [address] [place] [address] since your last visit? Include any pap smears or colon screening.  No

## 2019-11-04 NOTE — PROGRESS NOTES
[facility]  [place] [address]  [address] [place] [address]  Phone:  [phone]  Fax: [phone]       Chief Complaint   Patient presents with    Hypertension     3 month follow up    . SUBJECTIVE:    Alan Treviño is a 80 y.o. female Comes in for return visit stating that she has done well. She continues to have intermittent dizziness described as a spinning sensation, which is her vertigo. She needs refills on her Meclizine. She follows up with the hematologist for a smoldering myeloma. She has several other physicians that she sees. She has a past history of primary hypertension and anxiety. She uses Lorazepam as needed for her anxiety. Surprisingly she remains quite physically active. Current Outpatient Medications   Medication Sig Dispense Refill    meclizine (ANTIVERT) 12.5 mg tablet 1 tablet 3 times daily as needed for dizziness 60 Tab 3    LORazepam (ATIVAN) 0.5 mg tablet TAKE 1 TABLET BY MOUTH TWICE A DAY AS NEEDED ANXIETY 50 Tab 3    citalopram (CELEXA) 20 mg tablet TAKE 1 TABLET BY MOUTH EVERY DAY 90 Tab 3    losartan-hydroCHLOROthiazide (HYZAAR) 50-12.5 mg per tablet Take 1 Tab by mouth daily. 90 Tab 3    aspirin delayed-release 81 mg tablet Take 1 Tab by mouth daily. D/C plavix. pt is not willing to take it 30 Tab 12    DEXILANT 60 mg CpDB capsule (delayed release) TAKE ONE CAPSULE BY MOUTH EVERY DAY 30 TO 60 MINS PRIOR TO MEAL  4    docusate sodium (COLACE) 50 mg capsule Take 1 tablet twice a day 60 Cap 11    MAG/ALUMINUM/SOD BICARB/ALGINC (GAVISCON PO) Take  by mouth daily as needed.        Past Medical History:   Diagnosis Date    Anemia     Infredy Mahoney MM,seeing     Arrhythmia     SSS    Bradycardia     Essential hypertension     GERD (gastroesophageal reflux disease)     Psychiatric disorder     Anxiety    Psychotic disorder (HCC)     anxiety    Vertigo      Past Surgical History:   Procedure Laterality Date  HX BUNIONECTOMY      HX HYSTERECTOMY  1971    partial    HX ORTHOPAEDIC      Status post bunionectomy    HX PARTIAL HYSTERECTOMY       No Known Allergies      REVIEW OF SYSTEMS:  General: negative for - chills or fever  ENT: negative for - headaches, nasal congestion or tinnitus  Respiratory: negative for - cough, hemoptysis, shortness of breath or wheezing  Cardiovascular : negative for - chest pain, edema, palpitations or shortness of breath  Gastrointestinal: negative for - abdominal pain, blood in stools, heartburn or nausea/vomiting  Genito-Urinary: no dysuria, trouble voiding, or hematuria  Musculoskeletal: negative for - gait disturbance, joint pain, joint stiffness or joint swelling  Neurological: no TIA or stroke symptoms  Hematologic: no bruises, no bleeding, no swollen glands  Integument: no lumps, mole changes, nail changes or rash  Endocrine: no malaise/lethargy or unexpected weight changes      Social History     Socioeconomic History    Marital status:      Spouse name: Not on file    Number of children: Not on file    Years of education: Not on file    Highest education level: Not on file   Tobacco Use    Smoking status: Never Smoker    Smokeless tobacco: Never Used   Substance and Sexual Activity    Alcohol use: No    Drug use: No    Sexual activity: Not Currently     Comment: ,2 children,     Family History   Problem Relation Age of Onset    Hypertension Mother        OBJECTIVE:    Visit Vitals  /76   Pulse 70   Temp 98.5 °F (36.9 °C) (Oral)   Resp 16   Ht 5' 2\" (1.575 m)   Wt 169 lb 3.2 oz (76.7 kg)   SpO2 93%   BMI 30.95 kg/m²     CONSTITUTIONAL: well , well nourished, appears age appropriate  EYES: perrla, eom intact  ENMT:moist mucous membranes, pharynx clear  NECK: supple.  Thyroid normal  RESPIRATORY: Chest: clear to ascultation and percussion   CARDIOVASCULAR: Heart: regular rate and rhythm  GASTROINTESTINAL: Abdomen: soft, bowel sounds active  HEMATOLOGIC: no pathological lymph nodes palpated  MUSCULOSKELETAL: Extremities: no edema, pulse 1+   INTEGUMENT: No unusual rashes or suspicious skin lesions noted. Nails appear normal.  NEUROLOGIC: non-focal exam   MENTAL STATUS: alert and oriented, appropriate affect      ASSESSMENT:  1. Essential hypertension    2. Class 1 obesity due to excess calories without serious comorbidity with body mass index (BMI) of 31.0 to 31.9 in adult    3. Smoldering myeloma ([address]    4. Benign recurrent vertigo    5. Anxiety        PLAN:    1. Blood pressure is excellent today, no adjustments are made. 2. I encouraged her to minimize further weight gain. I explained to her that she could eat meals and eliminate her consumption of processed carbohydrates. 3. She will follow up with hematologist for smoldering myeloma. 4. For her vertigo, she will be given Meclizine. 5. I encouraged her to remain as physically active as possible and continue prn Lorazepam as needed. Follow-up and Dispositions    · Return in about 4 months (around 3/4/2020).            Stefanie Arredondo MD

## 2019-11-05 LAB
APO B SERPL-MCNC: 80 MG/DL
BUN SERPL-MCNC: 18 MG/DL (ref 8–27)
BUN/CREAT SERPL: 18 (ref 12–28)
CALCIUM SERPL-MCNC: 9.7 MG/DL (ref 8.7–10.3)
CHLORIDE SERPL-SCNC: 103 MMOL/L (ref 96–106)
CO2 SERPL-SCNC: 21 MMOL/L (ref 20–29)
CREAT SERPL-MCNC: 0.98 MG/DL (ref 0.57–1)
GLUCOSE SERPL-MCNC: 108 MG/DL (ref 65–99)
POTASSIUM SERPL-SCNC: 4.3 MMOL/L (ref 3.5–5.2)
SODIUM SERPL-SCNC: 140 MMOL/L (ref 134–144)

## 2019-12-10 ENCOUNTER — OFFICE VISIT (OUTPATIENT)
Dept: CARDIOLOGY CLINIC | Age: 84
End: 2019-12-10

## 2019-12-10 ENCOUNTER — HOSPITAL ENCOUNTER (OUTPATIENT)
Dept: LAB | Age: 84
Discharge: HOME | End: 2019-12-10
Payer: MEDICARE

## 2019-12-10 VITALS
OXYGEN SATURATION: 98 % | BODY MASS INDEX: 31.47 KG/M2 | WEIGHT: 171 LBS | HEART RATE: 66 BPM | HEIGHT: 62 IN | DIASTOLIC BLOOD PRESSURE: 50 MMHG | SYSTOLIC BLOOD PRESSURE: 100 MMHG | RESPIRATION RATE: 20 BRPM

## 2019-12-10 DIAGNOSIS — D64.9 ANEMIA, UNSPECIFIED TYPE: ICD-10-CM

## 2019-12-10 DIAGNOSIS — R42 DIZZINESS: Primary | ICD-10-CM

## 2019-12-10 DIAGNOSIS — F41.9 ANXIETY TENSION STATE: ICD-10-CM

## 2019-12-10 DIAGNOSIS — R55 SYNCOPE AND COLLAPSE: ICD-10-CM

## 2019-12-10 DIAGNOSIS — R00.1 BRADYCARDIA: ICD-10-CM

## 2019-12-10 DIAGNOSIS — I49.5 SSS (SICK SINUS SYNDROME) (HCC): ICD-10-CM

## 2019-12-10 DIAGNOSIS — I10 ESSENTIAL HYPERTENSION: ICD-10-CM

## 2019-12-10 PROCEDURE — 80053 COMPREHEN METABOLIC PANEL: CPT

## 2019-12-10 PROCEDURE — 85027 COMPLETE CBC AUTOMATED: CPT

## 2019-12-10 PROCEDURE — 36415 COLL VENOUS BLD VENIPUNCTURE: CPT

## 2019-12-10 PROCEDURE — 84443 ASSAY THYROID STIM HORMONE: CPT

## 2019-12-10 RX ORDER — FAMOTIDINE 40 MG/1
TABLET, FILM COATED ORAL
Refills: 11 | COMMUNITY
Start: 2019-11-22 | End: 2020-01-09

## 2019-12-10 RX ORDER — IBUPROFEN 600 MG/1
TABLET ORAL
Status: ON HOLD | COMMUNITY
End: 2021-12-07

## 2019-12-10 NOTE — PROGRESS NOTES
HISTORY OF PRESENT ILLNESS  Jaret Matta is a 80 y.o. female. She has previous hypertension and sick sinus syndrome. She was caring for her  for years. He had dementia but  last month. She complains of feeling dizzy. Her last echocardiogram was eight years ago and showed normal EF. She complains of chest pain and has a lot of reflux. Apparently, Gaviscon helps but Dexilant does not. She is not taking Motrin. She is on aspirin. She may have had an event within the last twelve months. She went to a [facility] hospital and this was felt to be neurologic although CT scan was negative. She does not check her blood pressure at home. She apparently was referred by her primary care physician to assess anemia but she does not know any more about this. The last hemoglobin that I can find in the chart was from a year ago and was 11.1. 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  famotidine (PEPCID) 40 mg tablet TAKE 1 TABLET BY MOUTH EVERYDAY AT BEDTIME  11    ibuprofen (MOTRIN) 600 mg tablet Take  by mouth every six (6) hours as needed for Pain.       meclizine (ANTIVERT) 12.5 mg tablet 1 tablet 3 times daily as needed for dizziness 60 Tab 3    LORazepam (ATIVAN) 0.5 mg tablet TAKE 1 TABLET BY MOUTH TWICE A DAY AS NEEDED ANXIETY 50 Tab 3    citalopram (CELEXA) 20 mg tablet TAKE 1 TABLET BY MOUTH EVERY DAY 90 Tab 3    losartan-hydroCHLOROthiazide (HYZAAR) 50-12.5 mg per tablet Take 1 Tab by mouth daily. 90 Tab 3    aspirin delayed-release 81 mg tablet Take 1 Tab by mouth daily. D/C plavix. pt is not willing to take it 30 Tab 12    docusate sodium (COLACE) 50 mg capsule Take 1 tablet twice a day 60 Cap 11    MAG/ALUMINUM/SOD BICARB/ALGINC (GAVISCON PO) Take  by mouth daily as needed.  DEXILANT 60 mg CpDB capsule (delayed release) TAKE ONE CAPSULE BY MOUTH EVERY DAY 30 TO 61 MINS PRIOR TO MEAL  4     Past Medical History:   Diagnosis Date    Anemia     Tobin Arnav MM,seeing     Arrhythmia     SSS    Bradycardia     Essential hypertension     GERD (gastroesophageal reflux disease)     Psychiatric disorder     Anxiety    Psychotic disorder (HCC)     anxiety    Vertigo      Past Surgical History:   Procedure Laterality Date    HX BUNIONECTOMY      HX HYSTERECTOMY  1971    partial    HX ORTHOPAEDIC      Status post bunionectomy    HX PARTIAL HYSTERECTOMY         Review of Systems   Neurological: Positive for dizziness. All other systems reviewed and are negative. Visit Vitals  /50 (BP 1 Location: Left arm, BP Patient Position: Sitting)   Pulse 66   Resp 20   Ht 5' 2\" (1.575 m)   Wt 171 lb (77.6 kg)   SpO2 98%   BMI 31.28 kg/m²       Physical Exam  Vitals signs and nursing note reviewed. Constitutional:       Appearance: Normal appearance. HENT:      Head: Normocephalic. Right Ear: External ear normal.      Nose: Nose normal.      Mouth/Throat:      Mouth: Mucous membranes are moist.   Neck:      Musculoskeletal: No neck rigidity. Cardiovascular:      Rate and Rhythm: Normal rate and regular rhythm. Heart sounds: No murmur. No friction rub. No gallop. Pulmonary:      Effort: No respiratory distress. Breath sounds: No wheezing or rales. Abdominal:      Palpations: Abdomen is soft. Musculoskeletal:         General: No swelling. Skin:     Coloration: Skin is not jaundiced. Neurological:      General: No focal deficit present. Mental Status: She is alert. Psychiatric:         Behavior: Behavior normal.         ASSESSMENT and PLAN  Blood pressure is low in the office at 100/50. This could be accounting for her dizziness. She also may have anemia. I did try to stop her blood pressure medication at one point in the past and she became hypertensive. However she was under a lot of stress from her  and this is now removed. I will again stop Hyzaar 50/12.5 and do blood work on her today. I will see her back in several weeks in the office and repeat her echocardiogram at that time.

## 2019-12-11 ENCOUNTER — TELEPHONE (OUTPATIENT)
Dept: CARDIOLOGY CLINIC | Age: 84
End: 2019-12-11

## 2019-12-11 LAB
ALBUMIN SERPL-MCNC: 4.2 G/DL (ref 3.5–4.7)
ALBUMIN/GLOB SERPL: 1.8 {RATIO} (ref 1.2–2.2)
ALP SERPL-CCNC: 65 IU/L (ref 39–117)
ALT SERPL-CCNC: 8 IU/L (ref 0–32)
AST SERPL-CCNC: 17 IU/L (ref 0–40)
BILIRUB SERPL-MCNC: 0.5 MG/DL (ref 0–1.2)
BUN SERPL-MCNC: 12 MG/DL (ref 8–27)
BUN/CREAT SERPL: 12 (ref 12–28)
CALCIUM SERPL-MCNC: 9.5 MG/DL (ref 8.7–10.3)
CHLORIDE SERPL-SCNC: 101 MMOL/L (ref 96–106)
CO2 SERPL-SCNC: 25 MMOL/L (ref 20–29)
CREAT SERPL-MCNC: 0.99 MG/DL (ref 0.57–1)
ERYTHROCYTE [DISTWIDTH] IN BLOOD BY AUTOMATED COUNT: 18.5 % (ref 12.3–15.4)
GLOBULIN SER CALC-MCNC: 2.4 G/DL (ref 1.5–4.5)
GLUCOSE SERPL-MCNC: 90 MG/DL (ref 65–99)
HCT VFR BLD AUTO: 30.9 % (ref 34–46.6)
HGB BLD-MCNC: 10.3 G/DL (ref 11.1–15.9)
INTERPRETATION: NORMAL
MCH RBC QN AUTO: 31.8 PG (ref 26.6–33)
MCHC RBC AUTO-ENTMCNC: 33.3 G/DL (ref 31.5–35.7)
MCV RBC AUTO: 95 FL (ref 79–97)
NRBC BLD AUTO-RTO: 1 % (ref 0–0)
PLATELET # BLD AUTO: 168 X10E3/UL (ref 150–450)
POTASSIUM SERPL-SCNC: 3.9 MMOL/L (ref 3.5–5.2)
PROT SERPL-MCNC: 6.6 G/DL (ref 6–8.5)
RBC # BLD AUTO: 3.24 X10E6/UL (ref 3.77–5.28)
SODIUM SERPL-SCNC: 139 MMOL/L (ref 134–144)
TSH SERPL DL<=0.005 MIU/L-ACNC: 3.25 UIU/ML (ref 0.45–4.5)
WBC # BLD AUTO: 5.6 X10E3/UL (ref 3.4–10.8)

## 2019-12-11 NOTE — TELEPHONE ENCOUNTER
Called patient. Verified patient's identity with two identifiers. Notified patient of results and Dr. Beatrice Tyler message. She sees Dr. Macrina Goncalves for anemia. She will call his office to make appointment and I will fax her lab results with Dr. Beatrice Tyler message and office note. Patient will keep appointments as scheduled with Dr. Chan Roberts. Patient verbalized understanding and denied further questions or concerns.

## 2019-12-11 NOTE — TELEPHONE ENCOUNTER
----- Message from Cory Mcwilliams MD sent at 12/11/2019  9:59 AM EST -----  She is more anemic and this could be accounting for her low blood pressure and other symptoms.   She needs to follow up with the MD she sees about her anemia.  ----- Message -----  From: Christine Adams Lab Results In  Sent: 12/11/2019   7:41 AM EST  To: Cory Mcwilliams MD

## 2019-12-30 ENCOUNTER — OFFICE VISIT (OUTPATIENT)
Dept: CARDIOLOGY CLINIC | Age: 84
End: 2019-12-30

## 2019-12-30 VITALS
HEIGHT: 62 IN | OXYGEN SATURATION: 97 % | HEART RATE: 66 BPM | RESPIRATION RATE: 16 BRPM | WEIGHT: 171.4 LBS | BODY MASS INDEX: 31.54 KG/M2 | SYSTOLIC BLOOD PRESSURE: 124 MMHG | DIASTOLIC BLOOD PRESSURE: 80 MMHG

## 2019-12-30 DIAGNOSIS — F41.9 ANXIETY TENSION STATE: ICD-10-CM

## 2019-12-30 DIAGNOSIS — I49.5 SSS (SICK SINUS SYNDROME) (HCC): ICD-10-CM

## 2019-12-30 DIAGNOSIS — E66.09 NON MORBID OBESITY DUE TO EXCESS CALORIES: ICD-10-CM

## 2019-12-30 DIAGNOSIS — I10 ESSENTIAL HYPERTENSION: Primary | ICD-10-CM

## 2019-12-30 DIAGNOSIS — D64.9 ANEMIA, UNSPECIFIED TYPE: ICD-10-CM

## 2019-12-30 DIAGNOSIS — R55 SYNCOPE AND COLLAPSE: ICD-10-CM

## 2019-12-30 DIAGNOSIS — R00.1 BRADYCARDIA: ICD-10-CM

## 2019-12-30 DIAGNOSIS — M17.0 PRIMARY OSTEOARTHRITIS OF BOTH KNEES: ICD-10-CM

## 2019-12-30 DIAGNOSIS — R45.89 DEPRESSED AFFECT: ICD-10-CM

## 2019-12-30 DIAGNOSIS — R42 DIZZINESS: ICD-10-CM

## 2019-12-30 NOTE — PROGRESS NOTES
HISTORY OF PRESENT ILLNESS  Gisele Gramajo is a 80 y.o. female. She has been seen in the past for hypertension and bradycardia possibly due to sick sinus syndrome. She has had a lot of dizziness and has been felt to have vertigo. However, I was concerned her blood pressure was dropping too low and the last time I saw her, I had her stop her losartan with HCTZ. She says her blood pressure at home is 147/104 but it is 124/80 today. Recent blood work shows persistent anemia. In the past Dr. Maya Chaney checked things such as her iron levels and vitamin B-12 about three years ago that were negative. However, she apparently has seen Dr. Sonia Wilson and has been ordered to undergo a bone marrow at [facility] in the near future. She was supposed to have an echocardiogram today and will still have one after the office visit. Her last echocardiogram was normal done more than seven years ago. HPI  Patient Active Problem List   Diagnosis Code    Syncope and collapse R55    Obesity E66.9    Bradycardia R00.1    HTN (hypertension) I10    Sinus congestion R09.81    SSS (sick sinus syndrome) ([facility]) I49.5    Vasomotor rhinitis J30.0    Non morbid obesity due to excess calories E66.09    Anemia D64.9    Primary osteoarthritis of both knees M17.0    Benign recurrent vertigo H81.10    Anxiety tension state F41.9    Dyspepsia R10.13    Uterine prolapse N81.4    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    Primary osteoarthritis of both hands M19.041, M19.042    Smoldering myeloma ([facility]) C90.00    Bilateral carpal tunnel syndrome G56.03     Current Outpatient Medications   Medication Sig Dispense Refill    folic acid/multivit-min/lutein (CENTRUM SILVER PO) Take 1 Tab by mouth daily.  famotidine (PEPCID) 40 mg tablet TAKE 1 TABLET BY MOUTH EVERYDAY AT BEDTIME  11    ibuprofen (MOTRIN) 600 mg tablet Take  by mouth every six (6) hours as needed for Pain.       meclizine (ANTIVERT) 12.5 mg tablet 1 tablet 3 times daily as needed for dizziness 60 Tab 3    LORazepam (ATIVAN) 0.5 mg tablet TAKE 1 TABLET BY MOUTH TWICE A DAY AS NEEDED ANXIETY 50 Tab 3    citalopram (CELEXA) 20 mg tablet TAKE 1 TABLET BY MOUTH EVERY DAY 90 Tab 3    DEXILANT 60 mg CpDB capsule (delayed release) TAKE ONE CAPSULE BY MOUTH EVERY DAY 30 TO 60 MINS PRIOR TO MEAL  4    docusate sodium (COLACE) 50 mg capsule Take 1 tablet twice a day 60 Cap 11    MAG/ALUMINUM/SOD BICARB/ALGINC (GAVISCON PO) Take  by mouth daily as needed.  losartan-hydroCHLOROthiazide (HYZAAR) 50-12.5 mg per tablet Take 1 Tab by mouth daily. 90 Tab 3    aspirin delayed-release 81 mg tablet Take 1 Tab by mouth daily. D/C plavix. pt is not willing to take it 30 Tab 12     Past Medical History:   Diagnosis Date    Anemia     Carol Lagunas MM,seeing     Arrhythmia     SSS    Bradycardia     Essential hypertension     GERD (gastroesophageal reflux disease)     Psychiatric disorder     Anxiety    Psychotic disorder (HCC)     anxiety    Vertigo      Past Surgical History:   Procedure Laterality Date    HX BUNIONECTOMY      HX HYSTERECTOMY  1971    partial    HX ORTHOPAEDIC      Status post bunionectomy    HX PARTIAL HYSTERECTOMY         Review of Systems   Musculoskeletal: Positive for joint pain. Neurological: Positive for dizziness. All other systems reviewed and are negative. Visit Vitals  /80 (BP 1 Location: Left arm, BP Patient Position: Sitting)   Pulse 66   Resp 16   Ht 5' 2\" (1.575 m)   Wt 171 lb 6.4 oz (77.7 kg)   SpO2 97%   BMI 31.35 kg/m²       Physical Exam  Vitals signs and nursing note reviewed. Constitutional:       Appearance: Normal appearance. HENT:      Head: Normocephalic. Right Ear: External ear normal.      Nose: Nose normal.      Mouth/Throat:      Mouth: Mucous membranes are moist.   Neck:      Musculoskeletal: Normal range of motion. Cardiovascular:      Rate and Rhythm: Normal rate and regular rhythm. Heart sounds:  No murmur. No friction rub. No gallop. Pulmonary:      Effort: No respiratory distress. Breath sounds: No wheezing. Abdominal:      Palpations: Abdomen is soft. Musculoskeletal:         General: No swelling. Skin:     Coloration: Skin is not jaundiced. Neurological:      General: No focal deficit present. Mental Status: She is alert. Psychiatric:         Behavior: Behavior normal.         ASSESSMENT and PLAN  She still has a fair amount of anxiety although her stress level is less since her  . He had dementia and she was caring for him alone. She may have multiple myeloma and this will be resolved with the upcoming bone marrow. For now, I will leave her off her losartan with HCTZ although it has not really helped her dizziness that much. Her anemia has certainly been present for a number of years.  We will call her regarding her echocardiogram results today and I will see her back in six months' time if the echocardiogram is normal.

## 2020-01-03 ENCOUNTER — TELEPHONE (OUTPATIENT)
Dept: CARDIOLOGY CLINIC | Age: 85
End: 2020-01-03

## 2020-01-03 NOTE — TELEPHONE ENCOUNTER
Patient called in stating her BP is elevated at 171/92. Her Losartan/Hct was stopped during last office visit. Per Dr. Shannon Salguero advised patient to restart medication now. Check her BP over the weekend and report back on Monday.  2 pt identifiers used

## 2020-01-09 ENCOUNTER — HOSPITAL ENCOUNTER (OUTPATIENT)
Dept: CT IMAGING | Age: 85
Discharge: HOME | End: 2020-01-09
Attending: INTERNAL MEDICINE
Payer: MEDICARE

## 2020-01-09 VITALS
BODY MASS INDEX: 31.47 KG/M2 | DIASTOLIC BLOOD PRESSURE: 97 MMHG | HEART RATE: 60 BPM | RESPIRATION RATE: 17 BRPM | WEIGHT: 171 LBS | SYSTOLIC BLOOD PRESSURE: 124 MMHG | TEMPERATURE: 97.9 F | OXYGEN SATURATION: 100 % | HEIGHT: 62 IN

## 2020-01-09 DIAGNOSIS — D64.9 ANEMIA: ICD-10-CM

## 2020-01-09 LAB
BASOPHILS # BLD: 0.1 K/UL (ref 0–0.1)
BASOPHILS NFR BLD: 1 % (ref 0–1)
DIFFERENTIAL METHOD BLD: ABNORMAL
EOSINOPHIL # BLD: 0.1 K/UL (ref 0–0.4)
EOSINOPHIL NFR BLD: 2 % (ref 0–7)
ERYTHROCYTE [DISTWIDTH] IN BLOOD BY AUTOMATED COUNT: 20.9 % (ref 11.5–14.5)
HCT VFR BLD AUTO: 32 % (ref 35–47)
HGB BLD-MCNC: 10.9 G/DL (ref 11.5–16)
IMM GRANULOCYTES # BLD AUTO: 0 K/UL (ref 0–0.04)
IMM GRANULOCYTES NFR BLD AUTO: 0 % (ref 0–0.5)
LYMPHOCYTES # BLD: 2 K/UL (ref 0.8–3.5)
LYMPHOCYTES NFR BLD: 31 % (ref 12–49)
MCH RBC QN AUTO: 32.3 PG (ref 26–34)
MCHC RBC AUTO-ENTMCNC: 34.1 G/DL (ref 30–36.5)
MCV RBC AUTO: 95 FL (ref 80–99)
MONOCYTES # BLD: 0.5 K/UL (ref 0–1)
MONOCYTES NFR BLD: 8 % (ref 5–13)
NEUTS SEG # BLD: 3.9 K/UL (ref 1.8–8)
NEUTS SEG NFR BLD: 58 % (ref 32–75)
NRBC # BLD: 0.03 K/UL (ref 0–0.01)
NRBC BLD-RTO: 0.5 PER 100 WBC
PLATELET # BLD AUTO: 174 K/UL (ref 150–400)
PLATELET COMMENTS,PCOM: ABNORMAL
PMV BLD AUTO: 11.4 FL (ref 8.9–12.9)
RBC # BLD AUTO: 3.37 M/UL (ref 3.8–5.2)
RBC MORPH BLD: ABNORMAL
WBC # BLD AUTO: 6.6 K/UL (ref 3.6–11)

## 2020-01-09 PROCEDURE — 88237 TISSUE CULTURE BONE MARROW: CPT

## 2020-01-09 PROCEDURE — 77030003666 HC NDL SPINAL BD -A

## 2020-01-09 PROCEDURE — 88342 IMHCHEM/IMCYTCHM 1ST ANTB: CPT

## 2020-01-09 PROCEDURE — 88313 SPECIAL STAINS GROUP 2: CPT

## 2020-01-09 PROCEDURE — 88184 FLOWCYTOMETRY/ TC 1 MARKER: CPT

## 2020-01-09 PROCEDURE — 38221 DX BONE MARROW BIOPSIES: CPT

## 2020-01-09 PROCEDURE — 88185 FLOWCYTOMETRY/TC ADD-ON: CPT

## 2020-01-09 PROCEDURE — 74011250636 HC RX REV CODE- 250/636: Performed by: RADIOLOGY

## 2020-01-09 PROCEDURE — 88374 M/PHMTRC ALYS ISHQUANT/SEMIQ: CPT

## 2020-01-09 PROCEDURE — 88341 IMHCHEM/IMCYTCHM EA ADD ANTB: CPT

## 2020-01-09 PROCEDURE — 36415 COLL VENOUS BLD VENIPUNCTURE: CPT

## 2020-01-09 PROCEDURE — 88360 TUMOR IMMUNOHISTOCHEM/MANUAL: CPT

## 2020-01-09 PROCEDURE — 77030028872 HC BN BIOP NDL ON CNTRL TY TELE -C

## 2020-01-09 PROCEDURE — 88305 TISSUE EXAM BY PATHOLOGIST: CPT

## 2020-01-09 PROCEDURE — 88264 CHROMOSOME ANALYSIS 20-25: CPT

## 2020-01-09 PROCEDURE — 85025 COMPLETE CBC W/AUTO DIFF WBC: CPT

## 2020-01-09 PROCEDURE — 88311 DECALCIFY TISSUE: CPT

## 2020-01-09 PROCEDURE — 77030014115

## 2020-01-09 RX ORDER — MIDAZOLAM HYDROCHLORIDE 1 MG/ML
5 INJECTION, SOLUTION INTRAMUSCULAR; INTRAVENOUS ONCE
Status: COMPLETED | OUTPATIENT
Start: 2020-01-09 | End: 2020-01-09

## 2020-01-09 RX ORDER — FENTANYL CITRATE 50 UG/ML
100 INJECTION, SOLUTION INTRAMUSCULAR; INTRAVENOUS
Status: DISCONTINUED | OUTPATIENT
Start: 2020-01-09 | End: 2020-01-09

## 2020-01-09 RX ORDER — SODIUM CHLORIDE 9 MG/ML
50 INJECTION, SOLUTION INTRAVENOUS CONTINUOUS
Status: DISCONTINUED | OUTPATIENT
Start: 2020-01-09 | End: 2020-01-13 | Stop reason: HOSPADM

## 2020-01-09 RX ADMIN — MIDAZOLAM 1 MG: 1 INJECTION INTRAMUSCULAR; INTRAVENOUS at 12:39

## 2020-01-09 RX ADMIN — FENTANYL CITRATE 25 MCG: 50 INJECTION, SOLUTION INTRAMUSCULAR; INTRAVENOUS at 12:32

## 2020-01-09 RX ADMIN — FENTANYL CITRATE 25 MCG: 50 INJECTION, SOLUTION INTRAMUSCULAR; INTRAVENOUS at 12:38

## 2020-01-09 RX ADMIN — MIDAZOLAM 0.5 MG: 1 INJECTION INTRAMUSCULAR; INTRAVENOUS at 12:31

## 2020-01-09 RX ADMIN — FENTANYL CITRATE 25 MCG: 50 INJECTION, SOLUTION INTRAMUSCULAR; INTRAVENOUS at 12:40

## 2020-01-09 RX ADMIN — SODIUM CHLORIDE 50 ML/HR: 900 INJECTION, SOLUTION INTRAVENOUS at 11:00

## 2020-01-09 NOTE — ROUTINE PROCESS
Pt. D/c'ed to home and transported to d/c lot via w/c. Post bone marrow bx discharge instructions reviewed with pt. And son and copy given. Both verbalized understanding of discharge instructions.

## 2020-01-09 NOTE — DISCHARGE INSTRUCTIONS
BONE MARROW BIOPSY/ASPIRATION DISCHARGE INSTRUCTIONS  A bone marrow aspiration is a procedure that takes out a small amount of bone marrow fluid through a needle. A bone marrow biopsy uses a needle to take out a small amount of bone with the marrow inside it. These samples are then checked under a microscope. The hip bone is the most commonly used area for these procedures. Home Care Instructions: You may resume your regular diet and medication regimen. Do not drink alcohol, drive, or make any important legal decisions in the next 24 hours. Do not lift anything heavier than a gallon of milk until the soreness goes away. You may use over the counter acetaminophen or ibuprofen for the soreness. You may apply an ice pack to the affected area for 20-30 minutes at time for the first 24 hours. After that, you may apply a heat pack. You will have a bandaid over the area where the doctor put the needle. Keep this area clean and dry for the next 24 hours. Change the bandaid daily, or if it becomes wet, until the area has healed. Call if you have any bleeding other than a small spot on your bandaid. Call if you have any signs or symptoms of infection: fever, redness, or drainage from the puncture site or fever. Should you experience any significant changes, please call [phone] between the hours of 7:30 am and 10 pm or [phone] after hours.  After hours, ask the  to page the [facility], and describe the problem to the technologist.

## 2020-01-09 NOTE — H&P
Interventional Radiology History and Physical (Inpatient)    Patient: Donna Goldstein 80 y.o. female     Referring Physician:  Dahlia Martinez MD    Chief Complaint: No chief complaint on file.       History of Present Illness: MGUS; conscious sedation (Versed and fentanyl) for bone marrow biopsy    History:  Past Medical History:   Diagnosis Date    Anemia     /possible MM,seeing     Arrhythmia     SSS    Bradycardia     Essential hypertension     GERD (gastroesophageal reflux disease)     Psychiatric disorder     Anxiety    Psychotic disorder (HCC)     anxiety    Vertigo      Family History   Problem Relation Age of Onset    Hypertension Mother      Social History     Socioeconomic History    Marital status:      Spouse name: Not on file    Number of children: Not on file    Years of education: Not on file    Highest education level: Not on file   Occupational History    Not on file   Social Needs    Financial resource strain: Not on file    Food insecurity:     Worry: Not on file     Inability: Not on file    Transportation needs:     Medical: Not on file     Non-medical: Not on file   Tobacco Use    Smoking status: Never Smoker    Smokeless tobacco: Never Used   Substance and Sexual Activity    Alcohol use: No    Drug use: No    Sexual activity: Not Currently     Comment: ,2 children,   Lifestyle    Physical activity:     Days per week: Not on file     Minutes per session: Not on file    Stress: Not on file   Relationships    Social connections:     Talks on phone: Not on file     Gets together: Not on file     Attends [employer] service: Not on file     Active member of club or organization: Not on file     Attends meetings of clubs or organizations: Not on file     Relationship status: Not on file    Intimate partner violence:     Fear of current or ex partner: Not on file     Emotionally abused: Not on file     Physically abused: Not on file     Forced sexual activity: Not on file   Other Topics Concern    Not on file   Social History Narrative    Not on file       Allergies: No Known Allergies    Current Medications:  Current Outpatient Medications   Medication Sig    folic acid/multivit-min/lutein (CENTRUM SILVER PO) Take 1 Tab by mouth daily.  meclizine (ANTIVERT) 12.5 mg tablet 1 tablet 3 times daily as needed for dizziness    LORazepam (ATIVAN) 0.5 mg tablet TAKE 1 TABLET BY MOUTH TWICE A DAY AS NEEDED ANXIETY    citalopram (CELEXA) 20 mg tablet TAKE 1 TABLET BY MOUTH EVERY DAY    losartan-hydroCHLOROthiazide (HYZAAR) 50-12.5 mg per tablet Take 1 Tab by mouth daily.  DEXILANT 60 mg CpDB capsule (delayed release) TAKE ONE CAPSULE BY MOUTH EVERY DAY 30 TO 60 MINS PRIOR TO MEAL    docusate sodium (COLACE) 50 mg capsule Take 1 tablet twice a day    MAG/ALUMINUM/SOD BICARB/ALGINC (GAVISCON PO) Take  by mouth daily as needed.  ibuprofen (MOTRIN) 600 mg tablet Take  by mouth every six (6) hours as needed for Pain. Current Facility-Administered Medications   Medication Dose Route Frequency    0.9% sodium chloride infusion  50 mL/hr IntraVENous CONTINUOUS    fentaNYL citrate (PF) injection 100 mcg  100 mcg IntraVENous Multiple    midazolam (VERSED) injection 5 mg  5 mg IntraVENous ONCE        Physical Exam:  Blood pressure 142/83, pulse 67, temperature 97.9 °F (36.6 °C), resp. rate 16, height 5' 2\" (1.575 m), weight 77.6 kg (171 lb), SpO2 99 %, not currently breastfeeding.   GENERAL: alert, cooperative, no distress, appears stated age, LUNG: clear to auscultation bilaterally, HEART: regular rate and rhythm    Findings/Diagnosis: MGUS; conscious sedation (Versed and fentanyl) for bone marrow biopsy    Alerts:    Hospital Problems  Date Reviewed: 12/30/2019    None          Laboratory:      Recent Labs     01/09/20  1020   HGB 10.9*   HCT 32.0*   WBC 6.6            Plan of Care/Planned Procedure:  Risks, benefits, and alternatives reviewed with patient and she agrees to proceed with the procedure. Full dictated report to follow.

## 2020-04-01 ENCOUNTER — TELEPHONE (OUTPATIENT)
Dept: CARDIOLOGY CLINIC | Age: 85
End: 2020-04-01

## 2020-04-01 NOTE — TELEPHONE ENCOUNTER
Patient is calling concerning her BP and would like to discuss this further with someone. Please advise.     Phone: [phone]

## 2020-04-01 NOTE — TELEPHONE ENCOUNTER
Called patient. Verified patient's identity with two identifiers. Patient stated her head hurts occasionally and hurt this morning. The doctor she sees for gastric reflux suggested she have CT of head, but she is afraid to get it done d/t to covid 19. Her BP today is 128/79. I told her this is a good BP. Patient will continue to monitor BP, stay inside/away from crowds, etc. She would like to take a walk, which I told her should be fine if she feels okay as long as she stays a good distance away from people. Patient agreed. Discussed signs and symptoms qualifying urgent care or call to office. Patient verbalized understanding and denied further questions or concerns.        Future Appointments   Date Time Provider Precious Arrieta   6/30/2020 11:40 AM Sushila Douglas  [address]

## 2020-05-21 DIAGNOSIS — I10 ESSENTIAL HYPERTENSION: ICD-10-CM

## 2020-05-21 RX ORDER — LOSARTAN POTASSIUM AND HYDROCHLOROTHIAZIDE 12.5; 5 MG/1; MG/1
1 TABLET ORAL DAILY
Qty: 90 TAB | Refills: 1 | Status: SHIPPED | OUTPATIENT
Start: 2020-05-21 | End: 2020-08-12 | Stop reason: SDUPTHER

## 2020-05-21 NOTE — TELEPHONE ENCOUNTER
Requested Prescriptions     Signed Prescriptions Disp Refills    losartan-hydroCHLOROthiazide (HYZAAR) 50-12.5 mg per tablet 90 Tab 1     Sig: Take 1 Tab by mouth daily.      Authorizing Provider: Tori Shin     Ordering User: Taylor eGorge    Per Dr. Shona Camacho verbal order

## 2020-05-22 ENCOUNTER — TELEPHONE (OUTPATIENT)
Dept: CARDIOLOGY CLINIC | Age: 85
End: 2020-05-22

## 2020-05-22 NOTE — TELEPHONE ENCOUNTER
Called patient. Verified patient's identity with two identifiers. Explained BP was too high yesterday, but okay today. She only checked once each day. She is going to try to start checking BP twice day. Advised of BPs too high and too low, as well as sxs to watch out for. Patient denied any sxs today, but did have a headache yesterday. Advised she continue to watch salt, continue monitoring BP, call if a couple readings are too high or too low, or any sxs. Patient verbalized understanding and denied further questions or concerns.

## 2020-05-22 NOTE — TELEPHONE ENCOUNTER
Pt is calling asking if a BP of 118/72 to low or to high and yesterday 174/81 Please call her at [phone]

## 2020-08-05 RX ORDER — CITALOPRAM 20 MG/1
TABLET, FILM COATED ORAL
Qty: 90 TAB | Refills: 3 | Status: SHIPPED | OUTPATIENT
Start: 2020-08-05 | End: 2021-08-01

## 2020-08-12 ENCOUNTER — OFFICE VISIT (OUTPATIENT)
Dept: CARDIOLOGY CLINIC | Age: 85
End: 2020-08-12
Payer: MEDICARE

## 2020-08-12 VITALS
RESPIRATION RATE: 20 BRPM | HEART RATE: 81 BPM | BODY MASS INDEX: 31.73 KG/M2 | HEIGHT: 62 IN | OXYGEN SATURATION: 96 % | WEIGHT: 172.4 LBS | DIASTOLIC BLOOD PRESSURE: 80 MMHG | SYSTOLIC BLOOD PRESSURE: 130 MMHG

## 2020-08-12 DIAGNOSIS — I10 ESSENTIAL HYPERTENSION: ICD-10-CM

## 2020-08-12 DIAGNOSIS — E66.09 NON MORBID OBESITY DUE TO EXCESS CALORIES: ICD-10-CM

## 2020-08-12 DIAGNOSIS — R42 DIZZINESS: ICD-10-CM

## 2020-08-12 DIAGNOSIS — I49.5 SSS (SICK SINUS SYNDROME) (HCC): Primary | ICD-10-CM

## 2020-08-12 DIAGNOSIS — M17.0 PRIMARY OSTEOARTHRITIS OF BOTH KNEES: ICD-10-CM

## 2020-08-12 DIAGNOSIS — F41.9 ANXIETY TENSION STATE: ICD-10-CM

## 2020-08-12 PROCEDURE — G8432 DEP SCR NOT DOC, RNG: HCPCS | Performed by: SPECIALIST

## 2020-08-12 PROCEDURE — 99214 OFFICE O/P EST MOD 30 MIN: CPT | Performed by: SPECIALIST

## 2020-08-12 PROCEDURE — G8417 CALC BMI ABV UP PARAM F/U: HCPCS | Performed by: SPECIALIST

## 2020-08-12 PROCEDURE — 1090F PRES/ABSN URINE INCON ASSESS: CPT | Performed by: SPECIALIST

## 2020-08-12 PROCEDURE — 1101F PT FALLS ASSESS-DOCD LE1/YR: CPT | Performed by: SPECIALIST

## 2020-08-12 PROCEDURE — G8536 NO DOC ELDER MAL SCRN: HCPCS | Performed by: SPECIALIST

## 2020-08-12 PROCEDURE — G8427 DOCREV CUR MEDS BY ELIG CLIN: HCPCS | Performed by: SPECIALIST

## 2020-08-12 RX ORDER — BENZONATATE 100 MG/1
CAPSULE ORAL
Status: ON HOLD | COMMUNITY
Start: 2020-05-20 | End: 2021-12-07

## 2020-08-12 RX ORDER — LOSARTAN POTASSIUM AND HYDROCHLOROTHIAZIDE 12.5; 5 MG/1; MG/1
1 TABLET ORAL DAILY
Qty: 90 TAB | Refills: 3 | Status: SHIPPED | OUTPATIENT
Start: 2020-08-12 | End: 2021-05-12 | Stop reason: SDUPTHER

## 2020-08-12 NOTE — PROGRESS NOTES
HISTORY OF PRESENT ILLNESS  Nicolette Saenz is a 80 y.o. female. She is seen in follow-up for hypertension. She has been under a great deal of stress. She also suffers from osteoarthritis in both knees. She is seen Dr. Gurpreet Girard for what may be a myeloproliferative disorder in early stages. She may be in the process of switching her primary care providers. HPI  Patient Active Problem List   Diagnosis Code    Syncope and collapse R55    Obesity E66.9    Bradycardia R00.1    HTN (hypertension) I10    Sinus congestion R09.81    SSS (sick sinus syndrome) ([facility]) I49.5    Vasomotor rhinitis J30.0    Non morbid obesity due to excess calories E66.09    Anemia D64.9    Primary osteoarthritis of both knees M17.0    Benign recurrent vertigo H81.10    Anxiety tension state F41.9    Dyspepsia R10.13    Uterine prolapse N81.4    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    Primary osteoarthritis of both hands M19.041, M19.042    Smoldering myeloma ([facility]) C90.00    Bilateral carpal tunnel syndrome G56.03     Current Outpatient Medications   Medication Sig Dispense Refill    benzonatate (TESSALON) 100 mg capsule TAKE 1 CAPSULE BY MOUTH THREE TIMES A DAY AS NEEDED COUGH      losartan-hydroCHLOROthiazide (HYZAAR) 50-12.5 mg per tablet Take 1 Tab by mouth daily. 90 Tab 3    citalopram (CELEXA) 20 mg tablet TAKE 1 TABLET BY MOUTH EVERY DAY 90 Tab 3    LORazepam (ATIVAN) 0.5 mg tablet TAKE 1 TABLET BY MOUTH TWICE A DAY AS NEEDED FOR ANXIETY 50 Tab 4    folic acid/multivit-min/lutein (CENTRUM SILVER PO) Take 1 Tab by mouth daily.  meclizine (ANTIVERT) 12.5 mg tablet 1 tablet 3 times daily as needed for dizziness 60 Tab 3    DEXILANT 60 mg CpDB capsule (delayed release) TAKE ONE CAPSULE BY MOUTH EVERY DAY 30 TO 60 MINS PRIOR TO MEAL  4    MAG/ALUMINUM/SOD BICARB/ALGINC (GAVISCON PO) Take  by mouth daily as needed.  ibuprofen (MOTRIN) 600 mg tablet Take  by mouth every six (6) hours as needed for Pain.       docusate sodium (COLACE) 50 mg capsule Take 1 tablet twice a day 60 Cap 11     Past Medical History:   Diagnosis Date    Anemia     /possible MM,seeing     Arrhythmia     SSS    Bradycardia     Essential hypertension     GERD (gastroesophageal reflux disease)     Psychiatric disorder     Anxiety    Psychotic disorder (HCC)     anxiety    Vertigo      Past Surgical History:   Procedure Laterality Date    HX BUNIONECTOMY      HX HYSTERECTOMY  1971    partial    HX ORTHOPAEDIC      Status post bunionectomy    HX PARTIAL HYSTERECTOMY         Review of Systems   Musculoskeletal: Positive for joint pain. Psychiatric/Behavioral: The patient is nervous/anxious. All other systems reviewed and are negative. Visit Vitals  /80 (BP 1 Location: Left arm, BP Patient Position: Sitting)   Pulse 81   Resp 20   Ht 5' 2\" (1.575 m)   Wt 172 lb 6.4 oz (78.2 kg)   SpO2 96%   BMI 31.53 kg/m²       Physical Exam   Constitutional: She is oriented to person, place, and time. She appears well-nourished. HENT:   Head: Atraumatic. Eyes: Conjunctivae are normal.   Neck: Neck supple. Cardiovascular: Normal rate, regular rhythm and normal heart sounds. Exam reveals no gallop and no friction rub. No murmur heard. Pulmonary/Chest: Breath sounds normal. She has no wheezes. Abdominal: Bowel sounds are normal.   Musculoskeletal:         General: No edema. Neurological: She is oriented to person, place, and time. Skin: Skin is dry. Psychiatric: Her behavior is normal.   Nursing note and vitals reviewed. ASSESSMENT and PLAN  Her blood pressure is controlled and she is being followed by hematology for her anemia. I will keep her on her current regimen and plan to see her back in 6 months time. I do think that anxiety is a major problem with her at present.

## 2020-11-30 ENCOUNTER — OFFICE VISIT (OUTPATIENT)
Dept: CARDIOLOGY CLINIC | Age: 85
End: 2020-11-30
Payer: MEDICARE

## 2020-11-30 VITALS
BODY MASS INDEX: 31.65 KG/M2 | DIASTOLIC BLOOD PRESSURE: 70 MMHG | SYSTOLIC BLOOD PRESSURE: 110 MMHG | WEIGHT: 172 LBS | RESPIRATION RATE: 16 BRPM | HEIGHT: 62 IN | OXYGEN SATURATION: 96 % | HEART RATE: 76 BPM

## 2020-11-30 DIAGNOSIS — R42 DIZZINESS: ICD-10-CM

## 2020-11-30 DIAGNOSIS — D47.2 SMOLDERING MYELOMA: ICD-10-CM

## 2020-11-30 DIAGNOSIS — R00.1 BRADYCARDIA: ICD-10-CM

## 2020-11-30 DIAGNOSIS — I10 ESSENTIAL HYPERTENSION: ICD-10-CM

## 2020-11-30 DIAGNOSIS — M17.0 PRIMARY OSTEOARTHRITIS OF BOTH KNEES: ICD-10-CM

## 2020-11-30 DIAGNOSIS — I49.5 SSS (SICK SINUS SYNDROME) (HCC): ICD-10-CM

## 2020-11-30 DIAGNOSIS — R06.02 SOB (SHORTNESS OF BREATH): Primary | ICD-10-CM

## 2020-11-30 DIAGNOSIS — R45.89 DEPRESSED AFFECT: ICD-10-CM

## 2020-11-30 DIAGNOSIS — D61.89 ANEMIA DUE TO OTHER BONE MARROW FAILURE (HCC): ICD-10-CM

## 2020-11-30 DIAGNOSIS — F41.9 ANXIETY TENSION STATE: ICD-10-CM

## 2020-11-30 PROCEDURE — G8417 CALC BMI ABV UP PARAM F/U: HCPCS | Performed by: SPECIALIST

## 2020-11-30 PROCEDURE — 1090F PRES/ABSN URINE INCON ASSESS: CPT | Performed by: SPECIALIST

## 2020-11-30 PROCEDURE — G0463 HOSPITAL OUTPT CLINIC VISIT: HCPCS | Performed by: SPECIALIST

## 2020-11-30 PROCEDURE — 1101F PT FALLS ASSESS-DOCD LE1/YR: CPT | Performed by: SPECIALIST

## 2020-11-30 PROCEDURE — G8432 DEP SCR NOT DOC, RNG: HCPCS | Performed by: SPECIALIST

## 2020-11-30 PROCEDURE — G8536 NO DOC ELDER MAL SCRN: HCPCS | Performed by: SPECIALIST

## 2020-11-30 PROCEDURE — 99214 OFFICE O/P EST MOD 30 MIN: CPT | Performed by: SPECIALIST

## 2020-11-30 PROCEDURE — G8427 DOCREV CUR MEDS BY ELIG CLIN: HCPCS | Performed by: SPECIALIST

## 2020-11-30 RX ORDER — BUSPIRONE HYDROCHLORIDE 5 MG/1
TABLET ORAL
COMMUNITY
Start: 2020-11-18 | End: 2021-01-28 | Stop reason: ALTCHOICE

## 2020-11-30 RX ORDER — CHOLECALCIFEROL (VITAMIN D3) 25 MCG
TABLET ORAL
Status: ON HOLD | COMMUNITY
Start: 2020-11-17 | End: 2021-12-07

## 2020-11-30 NOTE — PROGRESS NOTES
HISTORY OF PRESENT ILLNESS  Michel Walker is a 80 y.o. female. She has treated hypertension and a myeloproliferative disorder causing anemia with recent hematocrit of 32%. She had a negative stress test 8 years ago but now becomes quite tired when she walks. She also has a headache and has an appointment to see someone at the [address] [place] [address] Drive in this regard within about 2 months. She denies chest pain. She is quite anxious. An echocardiogram less than 1 year ago showed normal function. 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 90 Tab 3    citalopram (CELEXA) 20 mg tablet TAKE 1 TABLET BY MOUTH EVERY DAY 90 Tab 3    folic acid/multivit-min/lutein (CENTRUM SILVER PO) Take 1 Tab by mouth daily.       meclizine (ANTIVERT) 12.5 mg tablet 1 tablet 3 times daily as needed for dizziness 60 Tab 3    DEXILANT 60 mg CpDB capsule (delayed release) TAKE ONE CAPSULE BY MOUTH EVERY DAY 30 TO 60 MINS PRIOR TO MEAL  4    MAG/ALUMINUM/SOD BICARB/ALGINC (GAVISCON PO) Take  by mouth daily as needed.  LORazepam (ATIVAN) 0.5 mg tablet TAKE 1 TABLET BY MOUTH TWICE A DAY AS NEEDED FOR ANXIETY 50 Tab 4    ibuprofen (MOTRIN) 600 mg tablet Take  by mouth every six (6) hours as needed for Pain.  docusate sodium (COLACE) 50 mg capsule Take 1 tablet twice a day 60 Cap 11     Past Medical History:   Diagnosis Date    Anemia     /possible MM,seeing     Arrhythmia     SSS    Bradycardia     Essential hypertension     GERD (gastroesophageal reflux disease)     Psychiatric disorder     Anxiety    Psychotic disorder (HCC)     anxiety    Vertigo      Past Surgical History:   Procedure Laterality Date    HX BUNIONECTOMY      HX HYSTERECTOMY  1971    partial    HX ORTHOPAEDIC      Status post bunionectomy    HX PARTIAL HYSTERECTOMY         Review of Systems   Constitutional: Positive for malaise/fatigue. Respiratory: Positive for shortness of breath. Neurological: Positive for weakness and headaches. All other systems reviewed and are negative. Visit Vitals  /70 (BP 1 Location: Left arm, BP Patient Position: Sitting)   Pulse 76   Resp 16   Ht 5' 2\" (1.575 m)   Wt 172 lb (78 kg)   SpO2 96%   BMI 31.46 kg/m²       Physical Exam   Constitutional: She appears well-nourished. HENT:   Head: Atraumatic. Eyes: Conjunctivae are normal.   Neck: Neck supple. Cardiovascular: Normal rate, regular rhythm and normal heart sounds. Exam reveals no gallop and no friction rub. No murmur heard. Pulmonary/Chest: Breath sounds normal. No respiratory distress. She has no wheezes. She has no rales. Abdominal: Bowel sounds are normal.   Musculoskeletal:         General: No edema. Neurological: She is alert. No cranial nerve deficit. Skin: Skin is dry. Psychiatric: Her behavior is normal.   Nursing note and vitals reviewed.       ASSESSMENT and PLAN  Her symptoms are somewhat suggestive of coronary disease and this could be a possibility despite her negative stress test 8 years ago. I am unclear about the current level of her anemia. However I will have her return for another [facility] test and we will call her regarding the results and need for follow-up.

## 2020-12-21 ENCOUNTER — TELEPHONE (OUTPATIENT)
Dept: CARDIOLOGY CLINIC | Age: 85
End: 2020-12-21

## 2020-12-21 NOTE — TELEPHONE ENCOUNTER
Spoke with patient, identifiers x2. COVID prescreening performed. Screening negative.  Testing instructions reviewed with patient. Patient verbalized understanding.

## 2020-12-22 ENCOUNTER — ANCILLARY PROCEDURE (OUTPATIENT)
Dept: CARDIOLOGY CLINIC | Age: 85
End: 2020-12-22
Payer: MEDICARE

## 2020-12-22 VITALS
BODY MASS INDEX: 31.65 KG/M2 | DIASTOLIC BLOOD PRESSURE: 72 MMHG | WEIGHT: 172 LBS | HEIGHT: 62 IN | SYSTOLIC BLOOD PRESSURE: 132 MMHG

## 2020-12-22 DIAGNOSIS — D64.9 ANEMIA, UNSPECIFIED TYPE: ICD-10-CM

## 2020-12-22 DIAGNOSIS — I10 ESSENTIAL HYPERTENSION: ICD-10-CM

## 2020-12-22 DIAGNOSIS — R06.02 SOB (SHORTNESS OF BREATH): ICD-10-CM

## 2020-12-22 DIAGNOSIS — R55 SYNCOPE AND COLLAPSE: ICD-10-CM

## 2020-12-22 DIAGNOSIS — R00.1 BRADYCARDIA: ICD-10-CM

## 2020-12-22 DIAGNOSIS — F41.9 ANXIETY TENSION STATE: ICD-10-CM

## 2020-12-22 DIAGNOSIS — I49.5 SSS (SICK SINUS SYNDROME) (HCC): ICD-10-CM

## 2020-12-22 DIAGNOSIS — E66.09 NON MORBID OBESITY DUE TO EXCESS CALORIES: ICD-10-CM

## 2020-12-22 PROCEDURE — 93017 CV STRESS TEST TRACING ONLY: CPT | Performed by: INTERNAL MEDICINE

## 2020-12-22 PROCEDURE — 93018 CV STRESS TEST I&R ONLY: CPT | Performed by: INTERNAL MEDICINE

## 2020-12-22 PROCEDURE — 78452 HT MUSCLE IMAGE SPECT MULT: CPT

## 2020-12-22 PROCEDURE — A9500 TC99M SESTAMIBI: HCPCS | Performed by: INTERNAL MEDICINE

## 2020-12-22 PROCEDURE — 93016 CV STRESS TEST SUPVJ ONLY: CPT | Performed by: INTERNAL MEDICINE

## 2020-12-22 PROCEDURE — 78452 HT MUSCLE IMAGE SPECT MULT: CPT | Performed by: INTERNAL MEDICINE

## 2020-12-22 RX ORDER — TETRAKIS(2-METHOXYISOBUTYLISOCYANIDE)COPPER(I) TETRAFLUOROBORATE 1 MG/ML
10 INJECTION, POWDER, LYOPHILIZED, FOR SOLUTION INTRAVENOUS ONCE
Status: COMPLETED | OUTPATIENT
Start: 2020-12-22 | End: 2020-12-22

## 2020-12-22 RX ORDER — TETRAKIS(2-METHOXYISOBUTYLISOCYANIDE)COPPER(I) TETRAFLUOROBORATE 1 MG/ML
30 INJECTION, POWDER, LYOPHILIZED, FOR SOLUTION INTRAVENOUS ONCE
Status: COMPLETED | OUTPATIENT
Start: 2020-12-22 | End: 2020-12-22

## 2020-12-22 RX ADMIN — TECHNETIUM TC 99M SESTAMIBI 26 MILLICURIE: 1 INJECTION INTRAVENOUS at 13:30

## 2020-12-22 RX ADMIN — REGADENOSON 0.4 MG: 0.08 INJECTION, SOLUTION INTRAVENOUS at 13:50

## 2020-12-22 RX ADMIN — TETRAKIS(2-METHOXYISOBUTYLISOCYANIDE)COPPER(I) TETRAFLUOROBORATE 8.7 MILLICURIE: 1 INJECTION, POWDER, LYOPHILIZED, FOR SOLUTION INTRAVENOUS at 12:10

## 2020-12-23 LAB
STRESS BASELINE DIAS BP: 72 MMHG
STRESS BASELINE HR: 55 BPM
STRESS BASELINE SYS BP: 132 MMHG
STRESS O2 SAT PEAK: 99 %
STRESS O2 SAT REST: 97 %
STRESS PEAK DIAS BP: 70 MMHG
STRESS PEAK SYS BP: 160 MMHG
STRESS PERCENT HR ACHIEVED: 65 %
STRESS POST PEAK HR: 86 BPM
STRESS RATE PRESSURE PRODUCT: NORMAL BPM*MMHG
STRESS ST DEPRESSION: 0 MM
STRESS ST ELEVATION: 0 MM
STRESS TARGET HR: 132 BPM

## 2020-12-28 ENCOUNTER — TELEPHONE (OUTPATIENT)
Dept: CARDIOLOGY CLINIC | Age: 85
End: 2020-12-28

## 2020-12-28 NOTE — TELEPHONE ENCOUNTER
Called patient. Verified patient's identity with two identifiers. Notified patient of results and Dr. Titus Cornea message. Rescheduled 2/1/2021 f/u to 1/6/2021. Patient verbalized understanding and denied further questions or concerns.

## 2020-12-28 NOTE — TELEPHONE ENCOUNTER
----- Message from Mario Feliciano MD sent at 12/28/2020 11:31 AM EST -----  Stress test result borderline, not clearly normal or abnormal, need to see back at some point.  ----- Message -----  From: Anay Horner MD  Sent: 12/23/2020   2:51 PM EST  To: Mario Feliciano MD

## 2021-01-06 ENCOUNTER — OFFICE VISIT (OUTPATIENT)
Dept: CARDIOLOGY CLINIC | Age: 86
End: 2021-01-06
Payer: MEDICARE

## 2021-01-06 VITALS
SYSTOLIC BLOOD PRESSURE: 122 MMHG | HEIGHT: 62 IN | DIASTOLIC BLOOD PRESSURE: 80 MMHG | RESPIRATION RATE: 16 BRPM | BODY MASS INDEX: 32.02 KG/M2 | OXYGEN SATURATION: 94 % | HEART RATE: 65 BPM | WEIGHT: 174 LBS

## 2021-01-06 DIAGNOSIS — D61.89 ANEMIA DUE TO OTHER BONE MARROW FAILURE (HCC): ICD-10-CM

## 2021-01-06 DIAGNOSIS — F41.9 ANXIETY TENSION STATE: ICD-10-CM

## 2021-01-06 DIAGNOSIS — R00.1 BRADYCARDIA: ICD-10-CM

## 2021-01-06 DIAGNOSIS — I49.5 SSS (SICK SINUS SYNDROME) (HCC): ICD-10-CM

## 2021-01-06 DIAGNOSIS — R06.02 SOB (SHORTNESS OF BREATH): Primary | ICD-10-CM

## 2021-01-06 DIAGNOSIS — D47.2 SMOLDERING MYELOMA: ICD-10-CM

## 2021-01-06 DIAGNOSIS — D64.9 ANEMIA, UNSPECIFIED TYPE: ICD-10-CM

## 2021-01-06 DIAGNOSIS — R45.89 DEPRESSED AFFECT: ICD-10-CM

## 2021-01-06 DIAGNOSIS — I10 ESSENTIAL HYPERTENSION: ICD-10-CM

## 2021-01-06 DIAGNOSIS — E66.09 NON MORBID OBESITY DUE TO EXCESS CALORIES: ICD-10-CM

## 2021-01-06 PROCEDURE — G0463 HOSPITAL OUTPT CLINIC VISIT: HCPCS | Performed by: SPECIALIST

## 2021-01-06 PROCEDURE — 1090F PRES/ABSN URINE INCON ASSESS: CPT | Performed by: SPECIALIST

## 2021-01-06 PROCEDURE — G8417 CALC BMI ABV UP PARAM F/U: HCPCS | Performed by: SPECIALIST

## 2021-01-06 PROCEDURE — 99214 OFFICE O/P EST MOD 30 MIN: CPT | Performed by: SPECIALIST

## 2021-01-06 PROCEDURE — G8427 DOCREV CUR MEDS BY ELIG CLIN: HCPCS | Performed by: SPECIALIST

## 2021-01-06 PROCEDURE — G8432 DEP SCR NOT DOC, RNG: HCPCS | Performed by: SPECIALIST

## 2021-01-06 PROCEDURE — 1101F PT FALLS ASSESS-DOCD LE1/YR: CPT | Performed by: SPECIALIST

## 2021-01-06 PROCEDURE — G8536 NO DOC ELDER MAL SCRN: HCPCS | Performed by: SPECIALIST

## 2021-01-06 NOTE — PROGRESS NOTES
HISTORY OF PRESENT ILLNESS  Kayleigh Roman is a 80 y.o. female. She has mild low proliferative disorder with anemia and also anxiety and depression and treated hypertension. She has had some shortness of breath with exertion and also some symptoms of reflux. A stress test 8 years ago was normal.  Another stress test was repeated recently and showed an apical defect that was felt to possibly be due to soft tissue attenuation. Apparently walking today did not bother her at all. She lives alone and tries not to go out much. She sees Dr. 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 HPI  Patient Active Problem List   Diagnosis Code    Syncope and collapse R55    Obesity E66.9    Bradycardia R00.1    HTN (hypertension) I10    Sinus congestion R09.81    SSS [facility]sick sinus syndrome) ([facility]) I49.5    Vasomotor rhinitis J30.0    Non morbid obesity due to excess calories E66.09    Anemia D64.9    Primary osteoarthritis of both knees M17.0    Benign recurrent vertigo H81.10    Anxiety tension state F41.9    Dyspepsia R10.13    Uterine prolapse N81.4    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    Primary osteoarthritis of both hands M19.041, M19.042    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 90 Tab 3    citalopram (CELEXA) 20 mg tablet TAKE 1 TABLET BY MOUTH EVERY DAY 90 Tab 3    LORazepam (ATIVAN) 0.5 mg tablet TAKE 1 TABLET BY MOUTH TWICE A DAY AS NEEDED FOR ANXIETY 50 Tab 4    folic acid/multivit-min/lutein (CENTRUM SILVER PO) Take 1 Tab by mouth daily.       meclizine (ANTIVERT) 12.5 mg tablet 1 tablet 3 times daily as needed for dizziness 60 Tab 3    DEXILANT 60 mg CpDB capsule (delayed release) TAKE ONE CAPSULE BY MOUTH EVERY DAY 30 TO 60 MINS PRIOR TO MEAL  4    docusate sodium (COLACE) 50 mg capsule Take 1 tablet twice a day 60 Cap 11    MAG/ALUMINUM/SOD BICARB/ALGINC (GAVISCON PO) Take  by mouth daily as needed.  benzonatate (TESSALON) 100 mg capsule TAKE 1 CAPSULE BY MOUTH THREE TIMES A DAY AS NEEDED COUGH      ibuprofen (MOTRIN) 600 mg tablet Take  by mouth every six (6) hours as needed for Pain. Past Medical History:   Diagnosis Date    Anemia     Aria Muhammad MM,seeing     Arrhythmia     SSS    Bradycardia     Essential hypertension     GERD (gastroesophageal reflux disease)     Psychiatric disorder     Anxiety    Psychotic disorder (HCC)     anxiety    Vertigo      Past Surgical History:   Procedure Laterality Date    HX BUNIONECTOMY      HX HYSTERECTOMY  1971    partial    HX ORTHOPAEDIC      Status post bunionectomy    HX PARTIAL HYSTERECTOMY         Review of Systems   Respiratory: Positive for shortness of breath. Psychiatric/Behavioral: The patient is nervous/anxious. All other systems reviewed and are negative. Visit Vitals  /80 (BP 1 Location: Left arm, BP Patient Position: Sitting)   Pulse 65   Resp 16   Ht 5' 2\" (1.575 m)   Wt 174 lb (78.9 kg)   SpO2 94%   BMI 31.83 kg/m²       Physical Exam   Constitutional: She appears well-nourished. HENT:   Head: Atraumatic. Eyes: Conjunctivae are normal.   Neck: Neck supple. Cardiovascular: Normal rate, regular rhythm and normal heart sounds. Exam reveals no gallop and no friction rub. No murmur heard. Pulmonary/Chest: Breath sounds normal. No respiratory distress. She has no wheezes. Abdominal: Bowel sounds are normal.   Musculoskeletal:         General: No edema. Neurological: She is alert. Skin: Skin is dry. Psychiatric: Her behavior is normal.   Nursing note and vitals reviewed.       ASSESSMENT and PLAN  It is unclear whether she has developed coronary disease in the last 8 years or whether the stress test result is a false positive. Symptomatically she seems to be no worse and possibly even better. She has a great deal of anxiety and also has significant anemia although I have no lab results for the past year. At this point I would not recommend putting her through cardiac catheterization although she could have this in the future if necessary. I will plan to see her back in 4 months.

## 2021-01-06 NOTE — PROGRESS NOTES
Room: 14    Identified pt with two pt identifiers(name and ). Reviewed record in preparation for visit and have obtained necessary documentation. All patient medications has been reviewed. Chief Complaint   Patient presents with    Results     Stress test        Vitals:    21 1009   BP: 122/80   Pulse: 65   Resp: 16   SpO2: 94%   Weight: 174 lb (78.9 kg)   Height: 5' 2\" (1.575 m)   PainSc:   0 - No pain       4. Have you been to the ER, urgent care clinic since your last visit? Hospitalized since your last visit? No    5. Have you seen or consulted any other health care providers outside of the [address] [place] [address] since your last visit? Include any pap smears or colon screening. No        Patient is accompanied by self I have received verbal consent from Eugenia Cotsa to discuss any/all medical information while they are present in the room.

## 2021-01-14 ENCOUNTER — TRANSCRIBE ORDER (OUTPATIENT)
Dept: SCHEDULING | Age: 86
End: 2021-01-14

## 2021-01-14 DIAGNOSIS — Z12.31 VISIT FOR SCREENING MAMMOGRAM: Primary | ICD-10-CM

## 2021-01-25 ENCOUNTER — TELEPHONE (OUTPATIENT)
Dept: CARDIOLOGY CLINIC | Age: 86
End: 2021-01-25

## 2021-01-25 NOTE — TELEPHONE ENCOUNTER
Patient calling in regards to her bp. States last night it was 172/99 standing and 174/60 sitting. Patient states she has not checked today not taken her medication, but would like to know what to do.     Phone: [phone]

## 2021-01-25 NOTE — TELEPHONE ENCOUNTER
Called patient. Verified patient's identity with two identifiers. She just took BP medication about 15 minutes ago and has not checked BP since. BP readings reported below were from last night. Patient denied any sxs, except feels \"not right. \" She read off past BP readings that were WNL. She will recheck BP in about 30 minutes and call me back and we will go from there. Patient verbalized understanding and denied further questions or concerns.

## 2021-01-25 NOTE — TELEPHONE ENCOUNTER
Called patient. No answer. Will have her recheck bp, per Dr. Lane Pro advice. Patient called back. She stated her BP is now 136/116, HR 74. Dr. Samantha Alba thinks this reading is likely wrong, but she can come in to be seen. Called patient. She cannot make it in tomorrow. Scheduled appointment for Thursday and she will bring in her cuff. Patient verbalized understanding and denied further questions or concerns.      Future Appointments   Date Time Provider Precious Arrieta   1/28/2021 10:40 AM MD JAYDA Lambert AMB   2/2/2021 12:00 PM RUBEN XR 1 [facility] RANKIN [facility]   2/2/2021 12:30 PM RUBEN JOHNSON 1 [id number]   5/5/2021 11:20 AM MD JAYDA Lambert AMB

## 2021-01-25 NOTE — TELEPHONE ENCOUNTER
Patient called back. Her BP was 153//89. She said she just does not feel right. She also mentioned she is waiting to hear back from Dr. Familia Mayer regarding her sxs. I told her I would message Dr. Marlin Gerber to advise about BP/ med changes and call her back.

## 2021-01-28 ENCOUNTER — OFFICE VISIT (OUTPATIENT)
Dept: CARDIOLOGY CLINIC | Age: 86
End: 2021-01-28
Payer: MEDICARE

## 2021-01-28 VITALS
HEIGHT: 62 IN | HEART RATE: 86 BPM | RESPIRATION RATE: 18 BRPM | OXYGEN SATURATION: 98 % | WEIGHT: 170 LBS | SYSTOLIC BLOOD PRESSURE: 128 MMHG | DIASTOLIC BLOOD PRESSURE: 80 MMHG | BODY MASS INDEX: 31.28 KG/M2

## 2021-01-28 DIAGNOSIS — I49.5 SSS (SICK SINUS SYNDROME) (HCC): ICD-10-CM

## 2021-01-28 DIAGNOSIS — T88.7XXA DRUG SIDE EFFECTS: ICD-10-CM

## 2021-01-28 DIAGNOSIS — R42 DIZZINESS: Primary | ICD-10-CM

## 2021-01-28 DIAGNOSIS — F41.9 ANXIETY TENSION STATE: ICD-10-CM

## 2021-01-28 DIAGNOSIS — E66.09 NON MORBID OBESITY DUE TO EXCESS CALORIES: ICD-10-CM

## 2021-01-28 DIAGNOSIS — I10 ESSENTIAL HYPERTENSION: ICD-10-CM

## 2021-01-28 DIAGNOSIS — F41.9 ANXIETY: ICD-10-CM

## 2021-01-28 DIAGNOSIS — R06.02 SOB (SHORTNESS OF BREATH): ICD-10-CM

## 2021-01-28 DIAGNOSIS — R45.89 DEPRESSED AFFECT: ICD-10-CM

## 2021-01-28 DIAGNOSIS — D64.9 ANEMIA, UNSPECIFIED TYPE: ICD-10-CM

## 2021-01-28 DIAGNOSIS — D47.2 SMOLDERING MYELOMA: ICD-10-CM

## 2021-01-28 PROCEDURE — G8536 NO DOC ELDER MAL SCRN: HCPCS | Performed by: SPECIALIST

## 2021-01-28 PROCEDURE — G0463 HOSPITAL OUTPT CLINIC VISIT: HCPCS | Performed by: SPECIALIST

## 2021-01-28 PROCEDURE — 99214 OFFICE O/P EST MOD 30 MIN: CPT | Performed by: SPECIALIST

## 2021-01-28 PROCEDURE — G8417 CALC BMI ABV UP PARAM F/U: HCPCS | Performed by: SPECIALIST

## 2021-01-28 PROCEDURE — G8432 DEP SCR NOT DOC, RNG: HCPCS | Performed by: SPECIALIST

## 2021-01-28 PROCEDURE — 1090F PRES/ABSN URINE INCON ASSESS: CPT | Performed by: SPECIALIST

## 2021-01-28 PROCEDURE — G8427 DOCREV CUR MEDS BY ELIG CLIN: HCPCS | Performed by: SPECIALIST

## 2021-01-28 PROCEDURE — 1101F PT FALLS ASSESS-DOCD LE1/YR: CPT | Performed by: SPECIALIST

## 2021-01-28 RX ORDER — LORAZEPAM 0.5 MG/1
0.5 TABLET ORAL
Qty: 30 TAB | Refills: 5 | Status: SHIPPED | OUTPATIENT
Start: 2021-01-28 | End: 2021-05-12 | Stop reason: SDUPTHER

## 2021-01-28 NOTE — PROGRESS NOTES
HISTORY OF PRESENT ILLNESS  Howie Mcguire is a 80 y.o. female. She has treated hypertension and extreme anxiety. She is shaking in the office and is quite distressed. She had been on Ativan for a long period of time and was caring for her   within the past year or 2. Now she does not know what to do for herself. She was taken off Ativan and placed on BuSpar by her primary care physician and is very dizzy and cannot tolerate the medication. HPI  Patient Active Problem List   Diagnosis Code    Syncope and collapse R55    Obesity E66.9    Bradycardia R00.1    HTN (hypertension) I10    Sinus congestion R09.81    SSS (sick sinus syndrome) ([facility]) I49.5    Vasomotor rhinitis J30.0    Non morbid obesity due to excess calories E66.09    Anemia D64.9    Primary osteoarthritis of both knees M17.0    Benign recurrent vertigo H81.10    Anxiety tension state F41.9    Dyspepsia R10.13    Uterine prolapse N81.4    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    Primary osteoarthritis of both hands M19.041, M19.042    Smoldering myeloma ([facility]) C90.00    Bilateral carpal tunnel syndrome G56.03     Current Outpatient Medications   Medication Sig Dispense Refill    LORazepam (ATIVAN) 0.5 mg tablet Take 1 Tab by mouth every eight (8) hours as needed for Anxiety. Max Daily Amount: 1.5 mg. 30 Tab 5    Vitamin D3 25 mcg (1,000 unit) tablet TAKE 1 TABLET BY MOUTH EVERY DAY      benzonatate (TESSALON) 100 mg capsule TAKE 1 CAPSULE BY MOUTH THREE TIMES A DAY AS NEEDED COUGH      losartan-hydroCHLOROthiazide (HYZAAR) 50-12.5 mg per tablet Take 1 Tab by mouth daily. 90 Tab 3    citalopram (CELEXA) 20 mg tablet TAKE 1 TABLET BY MOUTH EVERY DAY 90 Tab 3    LORazepam (ATIVAN) 0.5 mg tablet TAKE 1 TABLET BY MOUTH TWICE A DAY AS NEEDED FOR ANXIETY 50 Tab 4    folic acid/multivit-min/lutein (CENTRUM SILVER PO) Take 1 Tab by mouth daily.       ibuprofen (MOTRIN) 600 mg tablet Take  by mouth every six (6) hours as needed for Pain.  meclizine (ANTIVERT) 12.5 mg tablet 1 tablet 3 times daily as needed for dizziness 60 Tab 3    DEXILANT 60 mg CpDB capsule (delayed release) TAKE ONE CAPSULE BY MOUTH EVERY DAY 30 TO 60 MINS PRIOR TO MEAL  4    docusate sodium (COLACE) 50 mg capsule Take 1 tablet twice a day 60 Cap 11    MAG/ALUMINUM/SOD BICARB/ALGINC (GAVISCON PO) Take  by mouth daily as needed. Past Medical History:   Diagnosis Date    Anemia     Benjamin Edd MM,seeing     Arrhythmia     SSS    Bradycardia     Essential hypertension     GERD (gastroesophageal reflux disease)     Psychiatric disorder     Anxiety    Psychotic disorder (HCC)     anxiety    Vertigo      Past Surgical History:   Procedure Laterality Date    HX BUNIONECTOMY      HX HYSTERECTOMY  1971    partial    HX ORTHOPAEDIC      Status post bunionectomy    HX PARTIAL HYSTERECTOMY         Review of Systems   Neurological: Positive for dizziness. Psychiatric/Behavioral: Positive for depression. The patient is nervous/anxious. All other systems reviewed and are negative. Visit Vitals  /80 (BP 1 Location: Left arm, BP Patient Position: Sitting)   Pulse 86   Resp 18   Ht 5' 2\" (1.575 m)   Wt 170 lb (77.1 kg)   SpO2 98%   BMI 31.09 kg/m²       Physical Exam   Constitutional: She appears well-nourished. HENT:   Head: Atraumatic. Eyes: Conjunctivae are normal.   Neck: Neck supple. Cardiovascular: Normal rate, regular rhythm and normal heart sounds. Exam reveals no gallop and no friction rub. No murmur heard. Pulmonary/Chest: No respiratory distress. She has no wheezes. Abdominal: Bowel sounds are normal.   Musculoskeletal:         General: No edema. Neurological: She is alert. Skin: Skin is dry. Nursing note and vitals reviewed. ASSESSMENT and PLAN  She is extremely anxious on this medication so I will have her stop the BuSpar and give her a prescription for the Ativan.   I have told her to follow-up with her primary care physician for further treatment in this regard. She also has a smoldering form of myeloma being followed by Dr. Karel Fink.

## 2021-01-28 NOTE — PROGRESS NOTES
Maribell Mooseheart is a 80 y.o. female    Chief Complaint   Patient presents with    Hypertension     1. Have you been to the ER, urgent care clinic since your last visit? Hospitalized since your last visit? No    2. Have you seen or consulted any other health care providers outside of the [address] [place] [address] since your last visit? Include any pap smears or colon screening.   No

## 2021-02-02 ENCOUNTER — HOSPITAL ENCOUNTER (OUTPATIENT)
Dept: MAMMOGRAPHY | Age: 86
Discharge: HOME | End: 2021-02-02
Attending: INTERNAL MEDICINE
Payer: MEDICARE

## 2021-02-02 ENCOUNTER — HOSPITAL ENCOUNTER (OUTPATIENT)
Dept: GENERAL RADIOLOGY | Age: 86
Discharge: HOME | End: 2021-02-02
Attending: INTERNAL MEDICINE
Payer: MEDICARE

## 2021-02-02 DIAGNOSIS — Z12.31 VISIT FOR SCREENING MAMMOGRAM: ICD-10-CM

## 2021-02-02 DIAGNOSIS — D64.9 ANEMIA, UNSPECIFIED: ICD-10-CM

## 2021-02-02 PROCEDURE — 77067 SCR MAMMO BI INCL CAD: CPT

## 2021-02-02 PROCEDURE — 77075 RADEX OSSEOUS SURVEY COMPL: CPT

## 2021-05-12 ENCOUNTER — OFFICE VISIT (OUTPATIENT)
Dept: CARDIOLOGY CLINIC | Age: 86
End: 2021-05-12
Payer: MEDICARE

## 2021-05-12 VITALS
SYSTOLIC BLOOD PRESSURE: 120 MMHG | BODY MASS INDEX: 30.55 KG/M2 | HEART RATE: 67 BPM | RESPIRATION RATE: 16 BRPM | WEIGHT: 166 LBS | DIASTOLIC BLOOD PRESSURE: 76 MMHG | OXYGEN SATURATION: 98 % | HEIGHT: 62 IN

## 2021-05-12 DIAGNOSIS — I49.5 SSS (SICK SINUS SYNDROME) (HCC): ICD-10-CM

## 2021-05-12 DIAGNOSIS — I10 ESSENTIAL HYPERTENSION: Primary | ICD-10-CM

## 2021-05-12 DIAGNOSIS — E66.09 NON MORBID OBESITY DUE TO EXCESS CALORIES: ICD-10-CM

## 2021-05-12 DIAGNOSIS — F41.9 ANXIETY: ICD-10-CM

## 2021-05-12 DIAGNOSIS — R06.02 SOB (SHORTNESS OF BREATH): ICD-10-CM

## 2021-05-12 DIAGNOSIS — M17.0 PRIMARY OSTEOARTHRITIS OF BOTH KNEES: ICD-10-CM

## 2021-05-12 DIAGNOSIS — D47.2 SMOLDERING MYELOMA: ICD-10-CM

## 2021-05-12 DIAGNOSIS — D64.9 ANEMIA, UNSPECIFIED TYPE: ICD-10-CM

## 2021-05-12 PROCEDURE — 99214 OFFICE O/P EST MOD 30 MIN: CPT | Performed by: SPECIALIST

## 2021-05-12 PROCEDURE — G8427 DOCREV CUR MEDS BY ELIG CLIN: HCPCS | Performed by: SPECIALIST

## 2021-05-12 PROCEDURE — 1101F PT FALLS ASSESS-DOCD LE1/YR: CPT | Performed by: SPECIALIST

## 2021-05-12 PROCEDURE — G8510 SCR DEP NEG, NO PLAN REQD: HCPCS | Performed by: SPECIALIST

## 2021-05-12 PROCEDURE — G8536 NO DOC ELDER MAL SCRN: HCPCS | Performed by: SPECIALIST

## 2021-05-12 PROCEDURE — 1090F PRES/ABSN URINE INCON ASSESS: CPT | Performed by: SPECIALIST

## 2021-05-12 PROCEDURE — G8417 CALC BMI ABV UP PARAM F/U: HCPCS | Performed by: SPECIALIST

## 2021-05-12 PROCEDURE — G0463 HOSPITAL OUTPT CLINIC VISIT: HCPCS | Performed by: SPECIALIST

## 2021-05-12 RX ORDER — BUSPIRONE HYDROCHLORIDE 5 MG/1
TABLET ORAL
COMMUNITY
Start: 2021-04-29 | End: 2021-05-12 | Stop reason: ALTCHOICE

## 2021-05-12 RX ORDER — LOSARTAN POTASSIUM AND HYDROCHLOROTHIAZIDE 12.5; 5 MG/1; MG/1
1 TABLET ORAL DAILY
Qty: 90 TAB | Refills: 3 | Status: SHIPPED | OUTPATIENT
Start: 2021-05-12 | End: 2022-05-12 | Stop reason: SDUPTHER

## 2021-05-12 RX ORDER — TRIAMCINOLONE ACETONIDE 5 MG/G
CREAM TOPICAL
Status: ON HOLD | COMMUNITY
End: 2021-12-07

## 2021-05-12 RX ORDER — LORAZEPAM 0.5 MG/1
0.5 TABLET ORAL
Qty: 30 TAB | Refills: 5 | Status: SHIPPED | OUTPATIENT
Start: 2021-05-12 | End: 2021-12-13

## 2021-05-12 RX ORDER — NYSTATIN 100000 U/G
CREAM TOPICAL
Status: ON HOLD | COMMUNITY
End: 2021-12-07

## 2021-05-12 RX ORDER — INFLUENZA A VIRUS A/MICHIGAN/45/2015 X-275 (H1N1) ANTIGEN (FORMALDEHYDE INACTIVATED), INFLUENZA A VIRUS A/SINGAPORE/INFIMH-16-0019/2016 IVR-186 (H3N2) ANTIGEN (FORMALDEHYDE INACTIVATED), AND INFLUENZA B VIRUS B/MARYLAND/15/2016 BX-69A (A B/COLORADO/6/2017-LIKE VIRUS) ANTIGEN (FORMALDEHYDE INACTIVATED) 60; 60; 60 UG/.5ML; UG/.5ML; UG/.5ML
INJECTION, SUSPENSION INTRAMUSCULAR
Status: ON HOLD | COMMUNITY
End: 2021-12-07

## 2021-05-12 RX ORDER — FAMOTIDINE 40 MG/5ML
POWDER, FOR SUSPENSION ORAL
Status: ON HOLD | COMMUNITY
Start: 2021-04-12 | End: 2021-12-07

## 2021-05-12 NOTE — PROGRESS NOTES
HISTORY OF PRESENT ILLNESS  Rosie Espinoza is a 80 y.o. female. She has a history of a generalized anxiety disorder as well as hypertension and sick sinus syndrome. She also has impaired bone marrow function. She had been placed on BuSpar but had a severe reaction to this and I placed her back on Ativan which she had taken previously and she is feeling much better. HPI  Patient Active Problem List   Diagnosis Code    Syncope and collapse R55    Obesity E66.9    Bradycardia R00.1    HTN (hypertension) I10    Sinus congestion R09.81    SSS (sick sinus syndrome) ([facility]) I49.5    Vasomotor rhinitis J30.0    Non morbid obesity due to excess calories E66.09    Anemia D64.9    Primary osteoarthritis of both knees M17.0    Benign recurrent vertigo H81.10    Anxiety tension state F41.9    Dyspepsia R10.13    Uterine prolapse N81.4    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    Primary osteoarthritis of both hands M19.041, M19.042    Smoldering myeloma ([facility]) C90.00    Bilateral carpal tunnel syndrome G56.03     Current Outpatient Medications   Medication Sig Dispense Refill    influenza vaccine 2019-20, 65 yrs+,,PF, (Fluzone High-Dose 2019-20, PF,) syrg injection Fluzone High-Dose 2019-20 (PF) 180 mcg/0.5 mL intramuscular syringe   ADM 0.5ML IM UTD      losartan-hydroCHLOROthiazide (HYZAAR) 50-12.5 mg per tablet Take 1 Tab by mouth daily. 90 Tab 3    LORazepam (ATIVAN) 0.5 mg tablet Take 1 Tab by mouth every eight (8) hours as needed for Anxiety. Max Daily Amount: 1.5 mg. 30 Tab 5    Vitamin D3 25 mcg (1,000 unit) tablet TAKE 1 TABLET BY MOUTH EVERY DAY      citalopram (CELEXA) 20 mg tablet TAKE 1 TABLET BY MOUTH EVERY DAY 90 Tab 3    LORazepam (ATIVAN) 0.5 mg tablet TAKE 1 TABLET BY MOUTH TWICE A DAY AS NEEDED FOR ANXIETY 50 Tab 4    folic acid/multivit-min/lutein (CENTRUM SILVER PO) Take 1 Tab by mouth daily.       meclizine (ANTIVERT) 12.5 mg tablet 1 tablet 3 times daily as needed for dizziness 60 Tab 3    DEXILANT 60 mg CpDB capsule (delayed release) TAKE ONE CAPSULE BY MOUTH EVERY DAY 30 TO 60 MINS PRIOR TO MEAL  4    docusate sodium (COLACE) 50 mg capsule Take 1 tablet twice a day 60 Cap 11    famotidine (PEPCID) 40 mg/5 mL (8 mg/mL) suspension 40 mg = 5 mL each dose, PO, bid, # 300 mL, 11 Refills, Pharmacy: [facility]/pharmacy #[id number]      nystatin (MYCOSTATIN) topical cream nystatin 100,000 unit/gram topical cream   APPLY TO AFFECTED AREA 3 TIMES A DAY      triamcinolone (ARISTOCORT) 0.5 % topical cream triamcinolone acetonide 0.5 % topical cream      benzonatate (TESSALON) 100 mg capsule TAKE 1 CAPSULE BY MOUTH THREE TIMES A DAY AS NEEDED COUGH      ibuprofen (MOTRIN) 600 mg tablet Take  by mouth every six (6) hours as needed for Pain.  MAG/ALUMINUM/SOD BICARB/ALGINC (GAVISCON PO) Take  by mouth daily as needed. Past Medical History:   Diagnosis Date    Anemia     Bro Siemens MM,seeing     Arrhythmia     SSS    Bradycardia     Essential hypertension     GERD (gastroesophageal reflux disease)     Psychiatric disorder     Anxiety    Psychotic disorder (HCC)     anxiety    Vertigo      Past Surgical History:   Procedure Laterality Date    HX BUNIONECTOMY      HX HYSTERECTOMY  1971    partial    HX ORTHOPAEDIC      Status post bunionectomy    HX PARTIAL HYSTERECTOMY         Review of Systems   Musculoskeletal: Positive for joint pain. Neurological: Positive for headaches. Psychiatric/Behavioral: The patient is nervous/anxious. All other systems reviewed and are negative. Visit Vitals  /76 (BP 1 Location: Right arm, BP Patient Position: Sitting, BP Cuff Size: Adult)   Pulse 67   Resp 16   Ht 5' 2\" (1.575 m)   Wt 166 lb (75.3 kg)   SpO2 98%   BMI 30.36 kg/m²       Physical Exam   Constitutional: She appears well-nourished. HENT:   Head: Normocephalic. Eyes: Conjunctivae are normal.   Neck: Normal range of motion.    Cardiovascular: Normal rate, regular rhythm and normal heart sounds. Exam reveals no gallop and no friction rub. No murmur heard. Pulmonary/Chest: Breath sounds normal. She has no wheezes. She has no rales. Abdominal: Bowel sounds are normal.   Musculoskeletal:         General: No edema. Neurological: She is alert. Skin: Skin is warm. Psychiatric: Thought content normal.   Nursing note and vitals reviewed. ASSESSMENT and PLAN  She is doing better overall with good control of her blood pressure and improvement in her generalized anxiety. I will continue this regimen and see her in 6 months.

## 2021-08-01 RX ORDER — CITALOPRAM 20 MG/1
TABLET, FILM COATED ORAL
Qty: 90 TABLET | Refills: 3 | Status: SHIPPED | OUTPATIENT
Start: 2021-08-01 | End: 2022-04-11 | Stop reason: SDUPTHER

## 2021-10-25 ENCOUNTER — TELEPHONE (OUTPATIENT)
Dept: INTERNAL MEDICINE CLINIC | Age: 86
End: 2021-10-25

## 2021-10-25 NOTE — TELEPHONE ENCOUNTER
----- Message from Parveen Tian sent at 10/25/2021  1:44 PM EDT -----  Subject: Appointment Request    Reason for Call: Routine (Patient Request) No Script    QUESTIONS  Type of Appointment? New Patient/New to Provider  Reason for appointment request? Requested Provider unavailable Denis López   Additional Information for Provider? New patient req. to reschedule ,   personal health concerns- Screened [place]   ---------------------------------------------------------------------------  --------------  [employer]  What is the best way for the office to contact you? OK to leave message on   voicemail  Preferred Call Back Phone Number? [phone]  ---------------------------------------------------------------------------  --------------  SCRIPT ANSWERS  Relationship to Patient? Self  Have your symptoms changed? No  (Is the patient requesting to see the provider for a procedure?)? No  (Is the patient requesting to see the provider urgently  today or   tomorrow. )? No  Have you been diagnosed with, awaiting test results for, or told that you   are suspected of having COVID-19 (Coronavirus)? (If patient has tested   negative or was tested as a requirement for work, school, or travel and   not based on symptoms, answer no)? No  Within the past two weeks have you developed any of the following symptoms   (answer no if symptoms have been present longer than 2 weeks or began   more than 2 weeks ago)? Fever or Chills, Cough, Shortness of breath or   difficulty breathing, Loss of taste or smell, Sore throat, Nasal   congestion, Sneezing or runny nose, Fatigue or generalized body aches   (answer no if pain is specific to a body part e.g. back pain), Diarrhea,   Headache? No  Have you had close contact with someone with COVID-19 in the last 14 days? No  (Service Expert  click yes below to proceed with [employer] As Usual   Scheduling)?  Yes

## 2021-11-18 ENCOUNTER — OFFICE VISIT (OUTPATIENT)
Dept: CARDIOLOGY CLINIC | Age: 86
End: 2021-11-18
Payer: MEDICARE

## 2021-11-18 VITALS
HEIGHT: 62 IN | OXYGEN SATURATION: 99 % | DIASTOLIC BLOOD PRESSURE: 82 MMHG | WEIGHT: 170 LBS | HEART RATE: 62 BPM | RESPIRATION RATE: 18 BRPM | SYSTOLIC BLOOD PRESSURE: 130 MMHG | BODY MASS INDEX: 31.28 KG/M2

## 2021-11-18 DIAGNOSIS — I49.5 SSS (SICK SINUS SYNDROME) (HCC): ICD-10-CM

## 2021-11-18 DIAGNOSIS — D47.2 SMOLDERING MYELOMA: ICD-10-CM

## 2021-11-18 DIAGNOSIS — E66.09 NON MORBID OBESITY DUE TO EXCESS CALORIES: ICD-10-CM

## 2021-11-18 DIAGNOSIS — D61.89 ANEMIA DUE TO OTHER BONE MARROW FAILURE (HCC): ICD-10-CM

## 2021-11-18 DIAGNOSIS — I10 ESSENTIAL HYPERTENSION: Primary | ICD-10-CM

## 2021-11-18 DIAGNOSIS — D64.9 ANEMIA, UNSPECIFIED TYPE: ICD-10-CM

## 2021-11-18 DIAGNOSIS — F41.9 ANXIETY: ICD-10-CM

## 2021-11-18 PROCEDURE — G8510 SCR DEP NEG, NO PLAN REQD: HCPCS | Performed by: SPECIALIST

## 2021-11-18 PROCEDURE — G8427 DOCREV CUR MEDS BY ELIG CLIN: HCPCS | Performed by: SPECIALIST

## 2021-11-18 PROCEDURE — 1101F PT FALLS ASSESS-DOCD LE1/YR: CPT | Performed by: SPECIALIST

## 2021-11-18 PROCEDURE — 99214 OFFICE O/P EST MOD 30 MIN: CPT | Performed by: SPECIALIST

## 2021-11-18 PROCEDURE — 1090F PRES/ABSN URINE INCON ASSESS: CPT | Performed by: SPECIALIST

## 2021-11-18 PROCEDURE — G8417 CALC BMI ABV UP PARAM F/U: HCPCS | Performed by: SPECIALIST

## 2021-11-18 PROCEDURE — G0463 HOSPITAL OUTPT CLINIC VISIT: HCPCS | Performed by: SPECIALIST

## 2021-11-18 PROCEDURE — G8536 NO DOC ELDER MAL SCRN: HCPCS | Performed by: SPECIALIST

## 2021-11-18 RX ORDER — BUSPIRONE HYDROCHLORIDE 10 MG/1
10 TABLET ORAL 2 TIMES DAILY
COMMUNITY
Start: 2021-09-20 | End: 2022-06-10 | Stop reason: ALTCHOICE

## 2021-11-18 NOTE — PROGRESS NOTES
HISTORY OF PRESENT ILLNESS  Yudi Humphreys is a 80 y.o. female. She is seen in follow-up for hypertension. She also has some degree of bone marrow failure although her last blood work showed a hemoglobin of 10.9. She has a great deal of anxiety and the only thing that helps her is Ativan which she does not take every day. She has been tried on antidepressant medications and these did not help. She does her housework and cooks without difficulty. She is afraid that she will fall and she is progressed to using a cane and sometimes a Rollator. She no longer drives. 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5 mg per tablet Take 1 Tab by mouth daily.  90 Tab 3    Vitamin D3 25 mcg (1,000 unit) tablet TAKE 1 TABLET BY MOUTH EVERY DAY      benzonatate (TESSALON) 100 mg capsule TAKE 1 CAPSULE BY MOUTH THREE TIMES A DAY AS NEEDED COUGH      LORazepam (ATIVAN) 0.5 mg tablet TAKE 1 TABLET BY MOUTH TWICE A DAY AS NEEDED FOR ANXIETY 50 Tab 4    folic acid/multivit-min/lutein (CENTRUM SILVER PO) Take 1 Tab by mouth daily.  ibuprofen (MOTRIN) 600 mg tablet Take  by mouth every six (6) hours as needed for Pain.  meclizine (ANTIVERT) 12.5 mg tablet 1 tablet 3 times daily as needed for dizziness 60 Tab 3    DEXILANT 60 mg CpDB capsule (delayed release) TAKE ONE CAPSULE BY MOUTH EVERY DAY 30 TO 60 MINS PRIOR TO MEAL  4    docusate sodium (COLACE) 50 mg capsule Take 1 tablet twice a day 60 Cap 11    MAG/ALUMINUM/SOD BICARB/ALGINC (GAVISCON PO) Take  by mouth daily as needed.  busPIRone (BUSPAR) 10 mg tablet Take 10 mg by mouth two (2) times a day.  influenza vaccine 2019-20, 65 yrs+,,PF, (Fluzone High-Dose 2019-20, PF,) syrg injection Fluzone High-Dose 2019-20 (PF) 180 mcg/0.5 mL intramuscular syringe   ADM 0.5ML IM UTD (Patient not taking: Reported on 11/18/2021)      nystatin (MYCOSTATIN) topical cream nystatin 100,000 unit/gram topical cream   APPLY TO AFFECTED AREA 3 TIMES A DAY (Patient not taking: Reported on 11/18/2021)      LORazepam (ATIVAN) 0.5 mg tablet Take 1 Tab by mouth every eight (8) hours as needed for Anxiety. Max Daily Amount: 1.5 mg. (Patient not taking: Reported on 11/18/2021) 30 Tab 5     Past Medical History:   Diagnosis Date    Anemia     Roni Burgess MM,seeing     Arrhythmia     SSS    Bradycardia     Essential hypertension     GERD (gastroesophageal reflux disease)     Psychiatric disorder     Anxiety    Psychotic disorder (HCC)     anxiety    Vertigo      Past Surgical History:   Procedure Laterality Date    HX BUNIONECTOMY      HX HYSTERECTOMY  1971    partial    HX ORTHOPAEDIC      Status post bunionectomy    HX PARTIAL HYSTERECTOMY         Review of Systems   Psychiatric/Behavioral: The patient is nervous/anxious. All other systems reviewed and are negative.     Visit Vitals  /82 (BP 1 Location: Left upper arm, BP Patient Position: Sitting, BP Cuff Size: Adult)   Pulse 62   Resp 18   Ht 5' 2\" (1.575 m)   Wt 170 lb (77.1 kg)   SpO2 99%   BMI 31.09 kg/m²       Physical Exam  Constitutional:       Appearance: Normal appearance. HENT:      Head: Normocephalic. Right Ear: External ear normal.      Left Ear: External ear normal.      Nose: Nose normal.      Mouth/Throat:      Mouth: Mucous membranes are moist.   Eyes:      Extraocular Movements: Extraocular movements intact. Cardiovascular:      Rate and Rhythm: Normal rate and regular rhythm. Heart sounds: Normal heart sounds. Pulmonary:      Effort: Pulmonary effort is normal.   Abdominal:      Palpations: Abdomen is soft. Musculoskeletal:         General: No tenderness or signs of injury. Cervical back: Neck supple. Skin:     General: Skin is warm. Neurological:      Mental Status: She is alert and oriented to person, place, and time. Psychiatric:         Behavior: Behavior normal.         ASSESSMENT and PLAN  Her blood pressure is well controlled today. She does seem to have increasing problems with balance and I suggested that she be very careful in this regard and use the Rollator more often. She is not taking much of the Ativan but it does help her when she becomes extremely anxious. I will plan to see her back in 3 months time.

## 2021-11-26 DIAGNOSIS — F41.9 ANXIETY: ICD-10-CM

## 2021-11-30 RX ORDER — LORAZEPAM 0.5 MG/1
0.5 TABLET ORAL
Qty: 30 TABLET | OUTPATIENT
Start: 2021-11-30

## 2021-12-07 ENCOUNTER — APPOINTMENT (OUTPATIENT)
Dept: NON INVASIVE DIAGNOSTICS | Age: 86
End: 2021-12-07
Attending: STUDENT IN AN ORGANIZED HEALTH CARE EDUCATION/TRAINING PROGRAM
Payer: MEDICARE

## 2021-12-07 ENCOUNTER — APPOINTMENT (OUTPATIENT)
Dept: CT IMAGING | Age: 86
End: 2021-12-07
Attending: STUDENT IN AN ORGANIZED HEALTH CARE EDUCATION/TRAINING PROGRAM
Payer: MEDICARE

## 2021-12-07 ENCOUNTER — APPOINTMENT (OUTPATIENT)
Dept: CT IMAGING | Age: 86
End: 2021-12-07
Attending: EMERGENCY MEDICINE
Payer: MEDICARE

## 2021-12-07 ENCOUNTER — HOSPITAL ENCOUNTER (OUTPATIENT)
Age: 86
Setting detail: OBSERVATION
Discharge: ANOTHER HOSPITAL | End: 2021-12-08
Attending: EMERGENCY MEDICINE | Admitting: STUDENT IN AN ORGANIZED HEALTH CARE EDUCATION/TRAINING PROGRAM
Payer: MEDICARE

## 2021-12-07 DIAGNOSIS — K21.00 GASTROESOPHAGEAL REFLUX DISEASE WITH ESOPHAGITIS WITHOUT HEMORRHAGE: ICD-10-CM

## 2021-12-07 DIAGNOSIS — R42 DIZZINESS: ICD-10-CM

## 2021-12-07 DIAGNOSIS — G81.94 LEFT HEMIPARESIS (HCC): ICD-10-CM

## 2021-12-07 DIAGNOSIS — R42 VERTIGO: Primary | ICD-10-CM

## 2021-12-07 DIAGNOSIS — I67.82 CEREBRAL ISCHEMIA: ICD-10-CM

## 2021-12-07 LAB
ALBUMIN SERPL-MCNC: 3.8 G/DL (ref 3.5–5)
ALBUMIN/GLOB SERPL: 1.2 {RATIO} (ref 1.1–2.2)
ALP SERPL-CCNC: 73 U/L (ref 45–117)
ALT SERPL-CCNC: 16 U/L (ref 12–78)
ANION GAP SERPL CALC-SCNC: 8 MMOL/L (ref 5–15)
AST SERPL-CCNC: 17 U/L (ref 15–37)
BASOPHILS # BLD: 0.1 K/UL (ref 0–0.1)
BASOPHILS NFR BLD: 1 % (ref 0–1)
BILIRUB SERPL-MCNC: 0.7 MG/DL (ref 0.2–1)
BUN SERPL-MCNC: 30 MG/DL (ref 6–20)
BUN/CREAT SERPL: 26 (ref 12–20)
CALCIUM SERPL-MCNC: 9.1 MG/DL (ref 8.5–10.1)
CHLORIDE SERPL-SCNC: 101 MMOL/L (ref 97–108)
CO2 SERPL-SCNC: 26 MMOL/L (ref 21–32)
CREAT SERPL-MCNC: 1.14 MG/DL (ref 0.55–1.02)
DIFFERENTIAL METHOD BLD: ABNORMAL
ECHO AO ROOT DIAM: 3.09 CM
ECHO AV AREA PLAN: 3.09 CM2
ECHO EST RA PRESSURE: 5 MMHG
ECHO LA AREA 4C: 22.96 CM2
ECHO LA MAJOR AXIS: 2.52 CM
ECHO LA MINOR AXIS: 1.41 CM
ECHO LA VOL 4C: 60.75 ML (ref 22–52)
ECHO LA VOLUME INDEX A4C: 33.94 ML/M2 (ref 16–28)
ECHO LV EDV A4C: 98.51 ML
ECHO LV EDV INDEX A4C: 55 ML/M2
ECHO LV EJECTION FRACTION A4C: 92 PERCENT
ECHO LV ESV A4C: 8.33 ML
ECHO LV ESV INDEX A4C: 4.7 ML/M2
ECHO LV INTERNAL DIMENSION DIASTOLIC: 3.71 CM (ref 3.9–5.3)
ECHO LV INTERNAL DIMENSION SYSTOLIC: 1.47 CM
ECHO LV IVSD: 1.46 CM (ref 0.6–0.9)
ECHO LV MASS 2D: 190.8 G (ref 67–162)
ECHO LV MASS INDEX 2D: 106.6 G/M2 (ref 43–95)
ECHO LV POSTERIOR WALL DIASTOLIC: 1.37 CM (ref 0.6–0.9)
ECHO LVOT DIAM: 1.65 CM
ECHO LVOT PEAK GRADIENT: 3.4 MMHG
ECHO LVOT PEAK VELOCITY: 92.1 CM/S
ECHO MV A VELOCITY: 70.37 CM/S
ECHO MV AREA PHT: 3.53 CM2
ECHO MV AREA PHT: 4.24 CM2
ECHO MV AREA PLAN: 3.61 CM2
ECHO MV E DECELERATION TIME (DT): 178.8 MS
ECHO MV E VELOCITY: 45.07 CM/S
ECHO MV E/A RATIO: 0.64
ECHO MV MAX VELOCITY: 72.29 CM/S
ECHO MV MEAN GRADIENT: 0.66 MMHG
ECHO MV PEAK GRADIENT: 2.19 MMHG
ECHO MV PRESSURE HALF TIME (PHT): 51.85 MS
ECHO MV PRESSURE HALF TIME (PHT): 62.28 MS
ECHO MV VTI: 24.85 CM
ECHO PV PEAK INSTANTANEOUS GRADIENT SYSTOLIC: 1.3 MMHG
ECHO RIGHT VENTRICULAR SYSTOLIC PRESSURE (RVSP): 30.78 MMHG
ECHO TV REGURGITANT MAX VELOCITY: 253.81 CM/S
ECHO TV REGURGITANT PEAK GRADIENT: 25.78 MMHG
EOSINOPHIL # BLD: 0 K/UL (ref 0–0.4)
EOSINOPHIL NFR BLD: 0 % (ref 0–7)
ERYTHROCYTE [DISTWIDTH] IN BLOOD BY AUTOMATED COUNT: 22.5 % (ref 11.5–14.5)
GLOBULIN SER CALC-MCNC: 3.3 G/DL (ref 2–4)
GLUCOSE BLD STRIP.AUTO-MCNC: 146 MG/DL (ref 65–117)
GLUCOSE SERPL-MCNC: 113 MG/DL (ref 65–100)
HCT VFR BLD AUTO: 28.3 % (ref 35–47)
HGB BLD-MCNC: 9.6 G/DL (ref 11.5–16)
IMM GRANULOCYTES # BLD AUTO: 0.1 K/UL (ref 0–0.04)
IMM GRANULOCYTES NFR BLD AUTO: 1 % (ref 0–0.5)
LYMPHOCYTES # BLD: 0.9 K/UL (ref 0.8–3.5)
LYMPHOCYTES NFR BLD: 13 % (ref 12–49)
MCH RBC QN AUTO: 32.9 PG (ref 26–34)
MCHC RBC AUTO-ENTMCNC: 33.9 G/DL (ref 30–36.5)
MCV RBC AUTO: 96.9 FL (ref 80–99)
MONOCYTES # BLD: 0.3 K/UL (ref 0–1)
MONOCYTES NFR BLD: 5 % (ref 5–13)
NEUTS SEG # BLD: 5.3 K/UL (ref 1.8–8)
NEUTS SEG NFR BLD: 80 % (ref 32–75)
NRBC # BLD: 0.07 K/UL (ref 0–0.01)
NRBC BLD-RTO: 1 PER 100 WBC
PLATELET # BLD AUTO: 189 K/UL (ref 150–400)
PMV BLD AUTO: 11.2 FL (ref 8.9–12.9)
POTASSIUM SERPL-SCNC: 3.6 MMOL/L (ref 3.5–5.1)
PROT SERPL-MCNC: 7.1 G/DL (ref 6.4–8.2)
RBC # BLD AUTO: 2.92 M/UL (ref 3.8–5.2)
RBC MORPH BLD: ABNORMAL
SERVICE CMNT-IMP: ABNORMAL
SODIUM SERPL-SCNC: 135 MMOL/L (ref 136–145)
TROPONIN-HIGH SENSITIVITY: 46 NG/L (ref 0–51)
WBC # BLD AUTO: 6.7 K/UL (ref 3.6–11)

## 2021-12-07 PROCEDURE — 74011000636 HC RX REV CODE- 636: Performed by: STUDENT IN AN ORGANIZED HEALTH CARE EDUCATION/TRAINING PROGRAM

## 2021-12-07 PROCEDURE — 96374 THER/PROPH/DIAG INJ IV PUSH: CPT

## 2021-12-07 PROCEDURE — 70450 CT HEAD/BRAIN W/O DYE: CPT

## 2021-12-07 PROCEDURE — 99285 EMERGENCY DEPT VISIT HI MDM: CPT

## 2021-12-07 PROCEDURE — 96372 THER/PROPH/DIAG INJ SC/IM: CPT

## 2021-12-07 PROCEDURE — 80053 COMPREHEN METABOLIC PANEL: CPT

## 2021-12-07 PROCEDURE — 84484 ASSAY OF TROPONIN QUANT: CPT

## 2021-12-07 PROCEDURE — 70498 CT ANGIOGRAPHY NECK: CPT

## 2021-12-07 PROCEDURE — G0378 HOSPITAL OBSERVATION PER HR: HCPCS

## 2021-12-07 PROCEDURE — 74011250636 HC RX REV CODE- 250/636: Performed by: STUDENT IN AN ORGANIZED HEALTH CARE EDUCATION/TRAINING PROGRAM

## 2021-12-07 PROCEDURE — 74011250637 HC RX REV CODE- 250/637: Performed by: STUDENT IN AN ORGANIZED HEALTH CARE EDUCATION/TRAINING PROGRAM

## 2021-12-07 PROCEDURE — 74011000250 HC RX REV CODE- 250: Performed by: EMERGENCY MEDICINE

## 2021-12-07 PROCEDURE — 74011250636 HC RX REV CODE- 250/636: Performed by: EMERGENCY MEDICINE

## 2021-12-07 PROCEDURE — 93306 TTE W/DOPPLER COMPLETE: CPT | Performed by: SPECIALIST

## 2021-12-07 PROCEDURE — 99213 OFFICE O/P EST LOW 20 MIN: CPT | Performed by: NURSE PRACTITIONER

## 2021-12-07 PROCEDURE — 93005 ELECTROCARDIOGRAM TRACING: CPT

## 2021-12-07 PROCEDURE — 36415 COLL VENOUS BLD VENIPUNCTURE: CPT

## 2021-12-07 PROCEDURE — 82962 GLUCOSE BLOOD TEST: CPT

## 2021-12-07 PROCEDURE — 85025 COMPLETE CBC W/AUTO DIFF WBC: CPT

## 2021-12-07 PROCEDURE — 74011250637 HC RX REV CODE- 250/637: Performed by: EMERGENCY MEDICINE

## 2021-12-07 PROCEDURE — 70496 CT ANGIOGRAPHY HEAD: CPT

## 2021-12-07 RX ORDER — GUAIFENESIN 100 MG/5ML
81 LIQUID (ML) ORAL DAILY
Status: DISCONTINUED | OUTPATIENT
Start: 2021-12-07 | End: 2021-12-08 | Stop reason: HOSPADM

## 2021-12-07 RX ORDER — CITALOPRAM 20 MG/1
20 TABLET, FILM COATED ORAL DAILY
Status: DISCONTINUED | OUTPATIENT
Start: 2021-12-08 | End: 2021-12-08 | Stop reason: HOSPADM

## 2021-12-07 RX ORDER — DIAZEPAM 10 MG/2ML
5 INJECTION INTRAMUSCULAR ONCE
Status: DISCONTINUED | OUTPATIENT
Start: 2021-12-07 | End: 2021-12-07

## 2021-12-07 RX ORDER — MECLIZINE HCL 12.5 MG 12.5 MG/1
25 TABLET ORAL 3 TIMES DAILY
Status: DISCONTINUED | OUTPATIENT
Start: 2021-12-07 | End: 2021-12-07

## 2021-12-07 RX ORDER — LORAZEPAM 0.5 MG/1
0.5 TABLET ORAL
Status: DISCONTINUED | OUTPATIENT
Start: 2021-12-07 | End: 2021-12-08 | Stop reason: HOSPADM

## 2021-12-07 RX ORDER — ENOXAPARIN SODIUM 100 MG/ML
40 INJECTION SUBCUTANEOUS EVERY 24 HOURS
Status: DISCONTINUED | OUTPATIENT
Start: 2021-12-07 | End: 2021-12-08 | Stop reason: HOSPADM

## 2021-12-07 RX ORDER — CLOPIDOGREL BISULFATE 75 MG/1
75 TABLET ORAL DAILY
Status: DISCONTINUED | OUTPATIENT
Start: 2021-12-07 | End: 2021-12-08 | Stop reason: HOSPADM

## 2021-12-07 RX ORDER — FAMOTIDINE 40 MG/1
40 TABLET, FILM COATED ORAL DAILY
COMMUNITY
End: 2021-12-13

## 2021-12-07 RX ORDER — BUSPIRONE HYDROCHLORIDE 10 MG/1
10 TABLET ORAL 2 TIMES DAILY
Status: DISCONTINUED | OUTPATIENT
Start: 2021-12-07 | End: 2021-12-08 | Stop reason: HOSPADM

## 2021-12-07 RX ORDER — SODIUM CHLORIDE 0.9 % (FLUSH) 0.9 %
10 SYRINGE (ML) INJECTION AS NEEDED
Status: DISCONTINUED | OUTPATIENT
Start: 2021-12-07 | End: 2021-12-08 | Stop reason: HOSPADM

## 2021-12-07 RX ORDER — FAMOTIDINE 20 MG/1
40 TABLET, FILM COATED ORAL DAILY
Status: DISCONTINUED | OUTPATIENT
Start: 2021-12-08 | End: 2021-12-08 | Stop reason: HOSPADM

## 2021-12-07 RX ORDER — ACETAMINOPHEN 650 MG/1
650 SUPPOSITORY RECTAL
Status: DISCONTINUED | OUTPATIENT
Start: 2021-12-07 | End: 2021-12-08 | Stop reason: HOSPADM

## 2021-12-07 RX ORDER — MECLIZINE HCL 12.5 MG 12.5 MG/1
25 TABLET ORAL
Status: COMPLETED | OUTPATIENT
Start: 2021-12-07 | End: 2021-12-07

## 2021-12-07 RX ORDER — ACETAMINOPHEN 325 MG/1
650 TABLET ORAL
Status: DISCONTINUED | OUTPATIENT
Start: 2021-12-07 | End: 2021-12-08 | Stop reason: HOSPADM

## 2021-12-07 RX ORDER — ONDANSETRON 4 MG/1
4 TABLET, ORALLY DISINTEGRATING ORAL
Status: COMPLETED | OUTPATIENT
Start: 2021-12-07 | End: 2021-12-07

## 2021-12-07 RX ADMIN — ONDANSETRON 4 MG: 4 TABLET, ORALLY DISINTEGRATING ORAL at 08:11

## 2021-12-07 RX ADMIN — IOPAMIDOL 100 ML: 755 INJECTION, SOLUTION INTRAVENOUS at 13:47

## 2021-12-07 RX ADMIN — Medication 10 ML: at 16:10

## 2021-12-07 RX ADMIN — Medication 10 ML: at 21:22

## 2021-12-07 RX ADMIN — BUSPIRONE HYDROCHLORIDE 10 MG: 10 TABLET ORAL at 20:39

## 2021-12-07 RX ADMIN — ASPIRIN 81 MG CHEWABLE TABLET 81 MG: 81 TABLET CHEWABLE at 15:18

## 2021-12-07 RX ADMIN — CLOPIDOGREL BISULFATE 75 MG: 75 TABLET, FILM COATED ORAL at 15:18

## 2021-12-07 RX ADMIN — Medication 10 ML: at 16:00

## 2021-12-07 RX ADMIN — MECLIZINE 25 MG: 12.5 TABLET ORAL at 08:11

## 2021-12-07 RX ADMIN — LIDOCAINE HYDROCHLORIDE 40 ML: 20 SOLUTION TOPICAL at 08:11

## 2021-12-07 RX ADMIN — ENOXAPARIN SODIUM 40 MG: 100 INJECTION SUBCUTANEOUS at 20:38

## 2021-12-07 RX ADMIN — MECLIZINE 25 MG: 12.5 TABLET ORAL at 18:02

## 2021-12-07 RX ADMIN — Medication 10 ML: at 16:13

## 2021-12-07 NOTE — PROGRESS NOTES
1330 Verbal report given to Ophelia Carrera from [address]. Report included the following; SBAR, MAR, Kardex, and Recent results. 1400 Pt arrived on floor via stretcher from ED. 2 person assist from stretcher to wheelchair to pt bed. VS stable, w/ exception of elevated pulse. Pt denied any pain at this time. Admission database & assessment complete. Dual skin done w/ TINY Swenson. Pt changed into gown. Pt oriented to room, call light & bed alarm placed. 1440 Pt assisted to bedside commode by 793 Britton Guan RN.    1515 VS stable w/ exception of soft BP & pulse. Scheduled meds admin. Pt denies any further needs. 1601 2 second pause informed by Philomena Coppola, monitor tech     1626 ECHO being completed at bedside. Dr. Adis Sanchez at bedside     21  Pt assisted to bedside commode w/ 1 assist. Pt placed in pull-ups once returned to bed. 80 Pt assisted to bedside commode w/ 1 assist by TINY Brewer. Purewick placed at this time     1802 Scheduled med admin. Pt resting in bed, watching TV. Pt complaining of sore throat, will notify MD.    Juan Castillo RN noticed change in facial droop. Pt reports L arm is hard to use. MD at bedside discussing w/ Dr. Ramakrishna Walker. Neurologist stated no need to call code stroke at this time. CT & CTA of head & neck completed earlier today & Neurologist at bedside ([phone]). NIH completed, score of 6.       1930 [facility]  from [address] (offgoing). Report included the following; SBAR, MAR, Kardex, and Recent results.

## 2021-12-07 NOTE — ED PROVIDER NOTES
EMERGENCY DEPARTMENT HISTORY AND PHYSICAL EXAM      Date: 2021  Patient Name: Maribell Kent    History of Presenting Illness     Chief Complaint   Patient presents with    Dizziness    Vomiting     History Provided By: Patient and Friend    HPI: Maribell Kent, 80 y.o. female with past medical history significant for GERD, anemia, sick sinus syndrome, hypertension, anxiety, and vertigo who presents via private vehicle to the ED with cc of dizziness and vomiting that started this morning when she woke up. Patient states she has had this kind of dizziness before and was diagnosed with vertigo. Her dizziness is worse with positional changes or any head movement and nothing makes the dizziness better. She does endorse vomiting when the dizziness gets bad. She is also complaining of indigestion and has not been taking her acid reflux medications as prescribed. She denies any shortness of breath, lightheadedness, blurry vision, slurred speech, or strokelike symptoms. Her friend who drove her to the emergency department states that she is extremely off balance and cannot walk without assistance. She does have a prescription for meclizine, but had not taken it. PMHx: GERD, anemia, sick sinus syndrome, hypertension, anxiety, and vertigo  Social Hx: Denies alcohol, tobacco, or illegal drug use    PCP: Jerzy, MD Gege    There are no other complaints, changes, or physical findings at this time. No current facility-administered medications on file prior to encounter. Current Outpatient Medications on File Prior to Encounter   Medication Sig Dispense Refill    magnesium oxide,aspartate,citr 400 mg magnesium cap Take  by mouth.  busPIRone (BUSPAR) 10 mg tablet Take 10 mg by mouth two (2) times a day.  losartan-hydroCHLOROthiazide (HYZAAR) 50-12.5 mg per tablet Take 1 Tab by mouth daily. 90 Tab 3    LORazepam (ATIVAN) 0.5 mg tablet Take 1 Tab by mouth every eight (8) hours as needed for Anxiety. Max Daily Amount: 1.5 mg. 30 Tab 5    citalopram (CELEXA) 20 mg tablet TAKE 1 TABLET BY MOUTH EVERY DAY (Patient not taking: Reported on 12/7/2021) 90 Tablet 3    famotidine (PEPCID) 40 mg/5 mL (8 mg/mL) suspension 40 mg = 5 mL each dose, PO, bid, # 300 mL, 11 Refills, Pharmacy: [facility]/pharmacy #[id number] (Patient not taking: Reported on 12/7/2021)      influenza vaccine 2019-20, 65 yrs+,,PF, (Fluzone High-Dose 2019-20, PF,) syrg injection Fluzone High-Dose 2019-20 (PF) 180 mcg/0.5 mL intramuscular syringe   ADM 0.5ML IM UTD (Patient not taking: Reported on 11/18/2021)      nystatin (MYCOSTATIN) topical cream nystatin 100,000 unit/gram topical cream   APPLY TO AFFECTED AREA 3 TIMES A DAY (Patient not taking: Reported on 11/18/2021)      triamcinolone (ARISTOCORT) 0.5 % topical cream triamcinolone acetonide 0.5 % topical cream (Patient not taking: Reported on 12/7/2021)      Vitamin D3 25 mcg (1,000 unit) tablet TAKE 1 TABLET BY MOUTH EVERY DAY (Patient not taking: Reported on 12/7/2021)      benzonatate (TESSALON) 100 mg capsule TAKE 1 CAPSULE BY MOUTH THREE TIMES A DAY AS NEEDED COUGH (Patient not taking: Reported on 12/7/2021)      [DISCONTINUED] LORazepam (ATIVAN) 0.5 mg tablet TAKE 1 TABLET BY MOUTH TWICE A DAY AS NEEDED FOR ANXIETY 50 Tab 4    folic acid/multivit-min/lutein (CENTRUM SILVER PO) Take 1 Tab by mouth daily. (Patient not taking: Reported on 12/7/2021)      ibuprofen (MOTRIN) 600 mg tablet Take  by mouth every six (6) hours as needed for Pain.  (Patient not taking: Reported on 12/7/2021)      meclizine (ANTIVERT) 12.5 mg tablet 1 tablet 3 times daily as needed for dizziness (Patient not taking: Reported on 12/7/2021) 60 Tab 3    DEXILANT 60 mg CpDB capsule (delayed release) TAKE ONE CAPSULE BY MOUTH EVERY DAY 30 TO 60 MINS PRIOR TO MEAL (Patient not taking: Reported on 12/7/2021)  4    docusate sodium (COLACE) 50 mg capsule Take 1 tablet twice a day (Patient not taking: Reported on 12/7/2021) 60 Cap 11    MAG/ALUMINUM/SOD BICARB/ALGINC (GAVISCON PO) Take  by mouth daily as needed. (Patient not taking: Reported on 12/7/2021)       Past History     Past Medical History:  Past Medical History:   Diagnosis Date    Anemia     Dicie Big MM,seeing     Arrhythmia     SSS    Bradycardia     Essential hypertension     GERD (gastroesophageal reflux disease)     Psychiatric disorder     Anxiety    Psychotic disorder (HCC)     anxiety    Vertigo      Past Surgical History:  Past Surgical History:   Procedure Laterality Date    HX BUNIONECTOMY      HX HYSTERECTOMY  1971    partial    HX ORTHOPAEDIC      Status post bunionectomy    HX PARTIAL HYSTERECTOMY       Family History:  Family History   Problem Relation Age of Onset    Hypertension Mother      Social History:  Social History     Tobacco Use    Smoking status: Never Smoker    Smokeless tobacco: Never Used   Vaping Use    Vaping Use: Never used   Substance Use Topics    Alcohol use: No    Drug use: No     Allergies:  No Known Allergies  Review of Systems   Review of Systems   Constitutional: Negative for chills and fever. HENT: Negative for congestion, rhinorrhea, sneezing and sore throat. Eyes: Negative for redness and visual disturbance. Respiratory: Negative for shortness of breath. Cardiovascular: Positive for chest pain (\"heartburn\"). Negative for leg swelling. Gastrointestinal: Positive for nausea and vomiting. Negative for abdominal pain. Genitourinary: Negative for difficulty urinating and frequency. Musculoskeletal: Negative for back pain, myalgias and neck stiffness. Skin: Negative for rash. Neurological: Positive for dizziness. Negative for syncope, facial asymmetry, speech difficulty, weakness, numbness and headaches. Hematological: Negative for adenopathy. All other systems reviewed and are negative. Physical Exam   Physical Exam  Vitals and nursing note reviewed.    Constitutional:       Appearance: Normal appearance. She is well-developed. HENT:      Head: Normocephalic and atraumatic. Eyes:      Extraocular Movements:      Right eye: Nystagmus present. Left eye: Nystagmus present. Conjunctiva/sclera: Conjunctivae normal.   Cardiovascular:      Rate and Rhythm: Normal rate and regular rhythm. Pulses: Normal pulses. Heart sounds: Normal heart sounds, S1 normal and S2 normal.   Pulmonary:      Effort: Pulmonary effort is normal. No respiratory distress. Breath sounds: Normal breath sounds. No wheezing. Abdominal:      General: Bowel sounds are normal. There is no distension. Palpations: Abdomen is soft. Tenderness: There is no abdominal tenderness. There is no rebound. Musculoskeletal:         General: Normal range of motion. Cervical back: Full passive range of motion without pain, normal range of motion and neck supple. Skin:     General: Skin is warm and dry. Findings: No rash. Neurological:      Mental Status: She is alert and oriented to person, place, and time. Cranial Nerves: Cranial nerves are intact. Sensory: Sensation is intact. Motor: Motor function is intact. Coordination: Finger-Nose-Finger Test and Heel to [employer] normal. Rapid alternating movements normal.   Psychiatric:         Speech: Speech normal.         Behavior: Behavior normal.         Thought Content:  Thought content normal.         Judgment: Judgment normal.       Diagnostic Study Results   Labs -     Recent Results (from the past 12 hour(s))   GLUCOSE, POC    Collection Time: 12/07/21  7:48 AM   Result Value Ref Range    Glucose (POC) 146 (H) 65 - 117 mg/dL    Performed by Aleta MEIER    EKG, 12 LEAD, INITIAL    Collection Time: 12/07/21 10:13 AM   Result Value Ref Range    Ventricular Rate 46 BPM    Atrial Rate 46 BPM    P-R Interval 262 ms    QRS Duration 82 ms    Q-T Interval 478 ms    QTC Calculation (Bezet) 418 ms    Calculated P Axis 47 degrees Calculated R Axis -84 degrees    Calculated T Axis 31 degrees    Diagnosis       Sinus bradycardia with 1st degree AV block  Left axis deviation  Low voltage QRS  Cannot rule out Anterior infarct , age undetermined  Abnormal ECG  When compared with ECG of 20-DEC-2018 17:31,  AZ interval has increased  Vent. rate has decreased BY  30 BPM  QRS voltage has decreased     TROPONIN-HIGH SENSITIVITY    Collection Time: 12/07/21 10:28 AM   Result Value Ref Range    Troponin-High Sensitivity 46 0 - 51 ng/L   CBC WITH AUTOMATED DIFF    Collection Time: 12/07/21 10:28 AM   Result Value Ref Range    WBC 6.7 3.6 - 11.0 K/uL    RBC 2.92 (L) 3.80 - 5.20 M/uL    HGB 9.6 (L) 11.5 - 16.0 g/dL    HCT 28.3 (L) 35.0 - 47.0 %    MCV 96.9 80.0 - 99.0 FL    MCH 32.9 26.0 - 34.0 PG    MCHC 33.9 30.0 - 36.5 g/dL    RDW 22.5 (H) 11.5 - 14.5 %    PLATELET 577 246 - 440 K/uL    MPV 11.2 8.9 - 12.9 FL    NRBC 1.0 (H) 0  WBC    ABSOLUTE NRBC 0.07 (H) 0.00 - 0.01 K/uL    NEUTROPHILS 80 (H) 32 - 75 %    LYMPHOCYTES 13 12 - 49 %    MONOCYTES 5 5 - 13 %    EOSINOPHILS 0 0 - 7 %    BASOPHILS 1 0 - 1 %    IMMATURE GRANULOCYTES 1 (H) 0.0 - 0.5 %    ABS. NEUTROPHILS 5.3 1.8 - 8.0 K/UL    ABS. LYMPHOCYTES 0.9 0.8 - 3.5 K/UL    ABS. MONOCYTES 0.3 0.0 - 1.0 K/UL    ABS. EOSINOPHILS 0.0 0.0 - 0.4 K/UL    ABS. BASOPHILS 0.1 0.0 - 0.1 K/UL    ABS. IMM.  GRANS. 0.1 (H) 0.00 - 0.04 K/UL    DF SMEAR SCANNED      RBC COMMENTS ANISOCYTOSIS  1+       METABOLIC PANEL, COMPREHENSIVE    Collection Time: 12/07/21 10:28 AM   Result Value Ref Range    Sodium 135 (L) 136 - 145 mmol/L    Potassium 3.6 3.5 - 5.1 mmol/L    Chloride 101 97 - 108 mmol/L    CO2 26 21 - 32 mmol/L    Anion gap 8 5 - 15 mmol/L    Glucose 113 (H) 65 - 100 mg/dL    BUN 30 (H) 6 - 20 MG/DL    Creatinine 1.14 (H) 0.55 - 1.02 MG/DL    BUN/Creatinine ratio 26 (H) 12 - 20      GFR est AA 54 (L) >60 ml/min/1.73m2    GFR est non-AA 45 (L) >60 ml/min/1.73m2    Calcium 9.1 8.5 - 10.1 MG/DL    Bilirubin, total 0.7 0.2 - 1.0 MG/DL    ALT (SGPT) 16 12 - 78 U/L    AST (SGOT) 17 15 - 37 U/L    Alk. phosphatase 73 45 - 117 U/L    Protein, total 7.1 6.4 - 8.2 g/dL    Albumin 3.8 3.5 - 5.0 g/dL    Globulin 3.3 2.0 - 4.0 g/dL    A-G Ratio 1.2 1.1 - 2.2         Radiologic Studies -   CT HEAD WO CONT   Final Result   No acute intracranial process identified        CT HEAD WO CONT    Result Date: 12/7/2021  No acute intracranial process identified    Medical Decision Making   I am the first provider for this patient. I reviewed the vital signs, available nursing notes, past medical history, past surgical history, family history and social history. Vital Signs-Reviewed the patient's vital signs. Patient Vitals for the past 24 hrs:   Temp Pulse Resp BP SpO2   12/07/21 1126  (!) 53 20 119/76 96 %   12/07/21 0900    (!) 124/56    12/07/21 0751 97.8 °F (36.6 °C) 67 18 (!) 144/51 99 %     Pulse Oximetry Analysis - 99% on RA (normal)    Cardiac Monitor:   Rate: 67 bpm  Rhythm: Normal Sinus Rhythm     ED EKG interpretation: 10:13  Rhythm: sinus bradycardia and 1st degree block; and regular . Rate (approx.): 46; Axis: left axis deviation; P wave: prolonged; QRS interval: normal ; ST/T wave: normal; Other findings: abnormal ekg. This EKG was interpreted by Benito Anglin MD,ED Provider. Records Reviewed: Nursing Notes, Old Medical Records, Previous Radiology Studies and Previous Laboratory Studies    Provider Notes (Medical Decision Making):   80-year-old female presents with acute onset of dizziness, nausea, vomiting, and acid reflux that started this morning when she woke up. Her symptoms are positional and she has bilateral horizontal nystagmus. Differential includes BPPV, posterior stroke, electrolyte abnormality, anemia, and low suspicion for anxiety. She has no focal neurologic deficits and has classic symptoms for BPPV. Will treat with meclizine and reassess.   If her symptoms do not improve, will check labs and image her head. ED Course:   Initial assessment performed. The patients presenting problems have been discussed, and they are in agreement with the care plan formulated and outlined with them. I have encouraged them to ask questions as they arise throughout their visit. Progress Note  9:09 AM  I have re-evaluated pt and she states her dizziness is improved but not resolved. Her nausea and acid reflux symptoms have completely resolved. She feels well enough for discharge. Will ambulate her prior to discharge. Progress Note  9:48 AM  I have re-evaluated pt and she is extremely dizzy with positional change. She lives by herself and is unable to be discharged at this time due to her instability. Will place an IV, check an EKG, basic labs, and CT her head. Progress Note  12:02 PM  I have re-evaluated pt and she is still dizzy with positional changes. Her head CT is negative and she remains bradycardic. Unclear if this is the cause of her dizziness or if she truly is vertiginous or if there is a posterior stroke. Will discuss with hospitalist for possible admission. 12:17 PM  Bertha Reynolds MD spoke with Dr. Jordan Turcios, Consult for Hospitalist. Discussed HPI and PE, available diagnostic tests and clinical findings. She is in agreement with care plans as outlined. She agrees to admit for dizziness. Progress Note:   Updated pt on all returned results and findings. Discussed the importance of proper follow up as referred below along with return precautions. Pt in agreement with the care plan and expresses agreement with and understanding of all items discussed. Disposition:  ADMIT NOTE:  The patient is being admitted to the hospital by Dr. Jordan Turcios. The results of their tests and reasons for their admission have been discussed with the patient and/or available family. They convey agreement and understanding for the need to be admitted and for their admission diagnosis. PLAN:  1. Admit    Diagnosis     Clinical Impression:   1. Vertigo    2. Gastroesophageal reflux disease with esophagitis without hemorrhage            Please note that this dictation was completed with Dragon, computer voice recognition software. Quite often unanticipated grammatical, syntax, homophones, and other interpretive errors are inadvertently transcribed by the computer software. Please disregard these errors. Additionally, please excuse any errors that have escaped final proofreading.

## 2021-12-07 NOTE — ED NOTES
Family member at bedside  Emergency [address] is developed from the Nursing assessment and Emergency Department Attending provider initial evaluation. The plan of care may be reviewed in the ED Provider note.     The Plan of Care was developed with the following considerations:   Patient / Family readiness to learn indicated by:verbalized understanding  Persons(s) to be included in education: patient  Barriers to Learning/Limitations:No    Signed     Milly Swan RN    12/7/2021   7:52 AM

## 2021-12-07 NOTE — ED NOTES
TRANSFER - OUT REPORT:    Verbal report given to Niurka FRANKS (name) on Garett Mcwilliams  being transferred to Med/Tele 210 (unit) for routine progression of care       Report consisted of patients Situation, Background, Assessment and   Recommendations(SBAR). Information from the following report(s) SBAR, Kardex, ED Summary, Procedure Summary, MAR and Recent Results was reviewed with the receiving nurse. Lines:   Peripheral IV 12/07/21 Right Antecubital (Active)   Site Assessment Clean, dry, & intact 12/07/21 1037   Phlebitis Assessment 0 12/07/21 1037   Infiltration Assessment 0 12/07/21 1037   Dressing Status Clean, dry, & intact 12/07/21 1037   Hub Color/Line Status Pink 12/07/21 1037   Action Taken Blood drawn 12/07/21 1037        Opportunity for questions and clarification was provided.       Patient transported with:   Registered Nurse    Cardiac Monitor

## 2021-12-07 NOTE — PROGRESS NOTES
[facility] Medication Reconciliation     Information obtained from: Stella Eisenberg ([phone])  RxQuery data available1: Yes     Comments/recommendations:    Medication reconciliation attempted with patient via telephone. Patient is a poor historian, however states she picks up medications at [facility]. PTA medication list updated from recent dispense history from [facility]. Please note fill dates for buspirone and famotidine, suggesting medication non-compliance. Buspirone- Last Filled 9/20/21; 30 days supply   Famotidine- Last Filled 9/24/21; 30 days supply  Lorazepam- Last Filled 9/14/21; 10 days supply   Losartan-HCTZ- Last Filled 9/5/21; 90 days supply   Citalopram- Last Filled 11/26/21; 30 days supply      Compliance could not be assessed. Please interpret PTA list with caution. Medication changes (since last review): Added  None  Removed  Benzonatate  Dexilant  Docusate  Centrum  Ibuprofen  Influenza Vaccine  Gaviscon  Magnesium Oxide  Meclizine  Nystatin  Triamcinolone  Vitamin D3  Adjusted  None     1RxQuery pharmacy benefit data reflects medications filled and processed through the patient's insurance, however                this data does NOT capture whether the medication was picked up or is currently being taken by the patient. Patient allergies: Allergies as of 12/07/2021    (No Known Allergies)     Prior to Admission Medications   Prescriptions Last Dose Informant Patient Reported? Taking? LORazepam (ATIVAN) 0.5 mg tablet  Other No No   Sig: Take 1 Tab by mouth every eight (8) hours as needed for Anxiety. Max Daily Amount: 1.5 mg.   busPIRone (BUSPAR) 10 mg tablet  Other Yes No   Sig: Take 10 mg by mouth two (2) times a day. citalopram (CELEXA) 20 mg tablet  Other No No   Sig: TAKE 1 TABLET BY MOUTH EVERY DAY   famotidine (PEPCID) 40 mg tablet  Other Yes No   Sig: Take 40 mg by mouth daily.    losartan-hydroCHLOROthiazide (HYZAAR) 50-12.5 mg per tablet  Other No No   Sig: Take 1 Tab by mouth daily.       Facility-Administered Medications: None     Thank you,     Tyrone Bryson, PharmD   Contact: [phone]

## 2021-12-07 NOTE — CONSULTS
1950 [address], [address]   Office Phone [phone]      CARDIOLOGY CONSULTATION       Date of  Admission: 12/7/2021  7:43 AM     Admission type:Emergency   Primary Care Physician:Gege Bertrand MD     Attending Provider: Josephine Romero MD  Cardiology Provider: Ree Ybarra DNP, ANP-BC    CC/REASON FOR CONSULT: Bradycardia      Subjective:     Es Moyer is a 80 y.o. female admitted for Dizziness [R42]. Pt reports long standing hx of dizziness and vertigo. Presented to ER with inability to ambulate due to dizziness. Cardiology consulted due to bradycardia on telemetry. Pt denies syncopal or presyncopal episodes. Dizziness has intermittently responded to antivert. Presently denies headache or blurred vision. In chart reviewed followed outpatient by Dr Jina Kimball (Cardiology [facility]) for hx of bradycardia and SSS. Not on franklin agents. Previous holter monitor demonstrated Hr as low as 48bpm back in 2012. Last out patient EKG performed 12/2018 Hr in the 70's.      Patient Active Problem List    Diagnosis Date Noted    Dizziness 12/07/2021    Bilateral carpal tunnel syndrome 03/05/2019    Smoldering myeloma ([facility] [address] [address] 09/06/2018    Primary osteoarthritis of both hands 01/30/2018    Uterine prolapse 02/14/2017    Osteoarthritis of lumbar spine 02/14/2017    Vasomotor rhinitis 11/04/2016    Non morbid obesity due to excess calories 11/04/2016    Anemia 11/04/2016    Primary osteoarthritis of both knees 11/04/2016    Benign recurrent vertigo 11/04/2016    Anxiety tension state 11/04/2016    Dyspepsia 11/04/2016    SSS (sick sinus syndrome) ([facility] [address] 02/16/2015    Sinus congestion 08/23/2013    Obesity 10/09/2012    Bradycardia 10/09/2012    HTN (hypertension) 10/09/2012    Syncope and collapse 09/18/2012      Gege Bertrand MD  Past Medical History:   Diagnosis Date    Anemia     Dona Mora MM,seeing     Arrhythmia     SSS    Bradycardia     Essential hypertension     GERD (gastroesophageal reflux disease)     Psychiatric disorder     Anxiety    Psychotic disorder (HCC)     anxiety    Vertigo       Social History     Socioeconomic History    Marital status:    Tobacco Use    Smoking status: Never Smoker    Smokeless tobacco: Never Used   Vaping Use    Vaping Use: Never used   Substance and Sexual Activity    Alcohol use: No    Drug use: No    Sexual activity: Not Currently     Comment: ,2 children,     No Known Allergies   Family History   Problem Relation Age of Onset    Hypertension Mother       Current Facility-Administered Medications   Medication Dose Route Frequency    diazePAM (VALIUM) injection 5 mg  5 mg IntraVENous ONCE    acetaminophen (TYLENOL) tablet 650 mg  650 mg Oral Q4H PRN    Or    acetaminophen (TYLENOL) solution 650 mg  650 mg Per NG tube Q4H PRN    Or    acetaminophen (TYLENOL) suppository 650 mg  650 mg Rectal Q4H PRN    aspirin chewable tablet 81 mg  81 mg Oral DAILY    clopidogreL (PLAVIX) tablet 75 mg  75 mg Oral DAILY    enoxaparin (LOVENOX) injection 40 mg  40 mg SubCUTAneous Q24H        Review of Symptoms:   11 systems reviewed, negative other than as stated in the HPI        Objective:      Visit Vitals  BP (!) 118/39 (BP 1 Location: Right upper arm, BP Patient Position: At rest)   Pulse (!) 53   Temp 97.8 °F (36.6 °C)   Resp 18   Ht 5' 2\" (1.575 m)   Wt 172 lb 4.8 oz (78.2 kg)   SpO2 99%   BMI 31.51 kg/m²       Physical Exam     General: Well developed, in no acute distress, cooperative and alert  HEENT: No carotid bruits, no JVD, trach is midline. Neck Supple. Heart:  Normal S1/S2 negative S3 or S4. Regular, no murmur, gallop or rub. Respiratory: Clear bilaterally x 4, no wheezing or rales  Abdomen:   Soft, non-tender, no masses, bowel sounds are active. Extremities:  No edema, normal cap refill, no cyanosis, atraumatic.    Neuro: A&Ox3, speech clear, MAEx4   Skin: Skin color is normal. No rashes or lesions. Non diaphoretic  Vascular: 2+ pulses symmetric in all extremities    Data Review:   Recent Labs     12/07/21  1028   WBC 6.7   HGB 9.6*   HCT 28.3*        Recent Labs     12/07/21  1028   *   K 3.6      CO2 26   *   BUN 30*   CREA 1.14*   CA 9.1   ALB 3.8   TBILI 0.7   ALT 16       No results for input(s): TROIQ, CPK, CKMB in the last 72 hours. No intake or output data in the 24 hours ending 12/07/21 1642     Cardiographics    Telemetry: Sinus bradycardia   ECG: Sinus bradycardia   Echocardiogram: Pending  CXRAY: No acute Process        Assessment:       Active Problems:    Dizziness (12/7/2021)      Bradycardia    Plan:     79 y/o female with hx of sinus bradycardia and SSS in chart review. Presented with episodic dizziness that she feels is secondary to her underlying vertigo. Stroke work up in progress. Not on franklin agents, continue telemetry, if profound episodes of bradycardia will need pacemaker placement. Marshall Ordonez DNP, ANP-BC  Chart and note reviewed, discussed with advanced practioner and agree with recommendations and treatment plan as outlined.   MD Mao Sam MD

## 2021-12-07 NOTE — ED NOTES
Kieran RN and Gabriele Montemayor RN assisted pt to restroom. Pt reports feeling dizzy when standing and was unable to support herself when standing. Pt's gait was unsteady. Pt reported feeling \"like the room is spinning\" when sitting on the toilet. MD made aware.

## 2021-12-07 NOTE — PROGRESS NOTES
PLAN:   1. PCP appointment with Prabhu Samuel on 12/17 at [place] [address], updated on AVS.  2. Call friend Sarah Titus upon discharge for transport. Reason for Admission:  Duglas Rubalcava, 80 y.o. female with past medical history significant for GERD, anemia, sick sinus syndrome, hypertension, anxiety, and vertigo who presents via private vehicle to the ED with cc of dizziness and vomiting that started this morning when she woke up. Patient states she has had this kind of dizziness before and was diagnosed with vertigo. Her dizziness is worse with positional changes or any head movement and nothing makes the dizziness better. She does endorse vomiting when the dizziness gets bad. She is also complaining of indigestion and has not been taking her acid reflux medications as prescribed. She denies any shortness of breath, lightheadedness, blurry vision, slurred speech, or strokelike symptoms. Her friend who drove her to the emergency department states that she is extremely off balance and cannot walk without assistance. She does have a prescription for meclizine, but had not taken it. RUR Score:     NA                Plan for utilizing home health:      Unknown at this time     PCP: First and Last name:  Devang Poe     Name of Practice:    Are you a current patient: Yes/No: yes   Approximate date of last visit:    Can you participate in a virtual visit with your PCP:                     Current Advanced Directive/Advance Care Plan: Full Code Patient understands advance directive discussion and wants CPR and ventilation if needed. She states her son, Manuel Lemus as next of kin ([phone]) and Willy Gomes (Worship friend) as emergency contact. ([phone]).        Healthcare Decision Maker:   Click here to complete [address] including selection of the Healthcare Decision Maker Relationship (ie \"Primary\")                             Transition of Care Plan:    CM assessed patient at bedside, along with friend Samia Jose. Verified demographics. Patient address and phone number correct as per facesheet. Lives alone in [address]. Uses friends and cabs for transportation. Receives SSI monthly. Independent with ADLS. Has cane and rollator at home. Patient has Medicare Part A & B. Under OBSERVATION. Discussed MOONS with patient. She understands and signs. Copy given to patient and placed on chart. Updated on document list.          PCP is Markell Varela. Uses [facility] pharmacy on [address]. Sees [facility] for cardiology. Takes medications at home. Ruddy Scottse will take home upon discharge. Care Management Interventions  PCP Verified by CM:  Yes  Mode of Transport at Discharge: Self  Transition of Care Consult (CM Consult): Discharge Planning  Health Maintenance Reviewed: Yes  Support Systems: Child(naresh), [employer]  Confirm Follow Up Transport: Friends  The Plan for Transition of Care is Related to the Following Treatment Goals : pcp follow up  The Patient and/or Patient Representative was Provided with a Choice of Provider and Agrees with the Discharge Plan?: Yes  Freedom of Choice List was Provided with Basic Dialogue that Supports the Patient's Individualized Plan of Care/Goals, Treatment Preferences and Shares the Quality Data Associated with the Providers?: Yes  Discharge Location  Discharge Placement: Home    KARLA Landrum, [facility], [place] [address]

## 2021-12-07 NOTE — H&P
Hospitalist Admission Note    NAME: Eugenia Costa   :  1932   MRN:  [id number]   Room Number: 563/98  @ [facility]     Date/Time:  2021 5:26 PM    Patient PCP: Gege Bertrand MD  ______________________________________________________________________  Given the patient's current clinical presentation, I have a high level of concern for decompensation if discharged from the emergency department. Complex decision making was performed, which includes reviewing the patient's available past medical records, laboratory results, and x-ray films. My assessment of this patient's clinical condition and my plan of care is as follows. Assessment / Plan: Active Problems:    Dizziness (2021)      Dizziness POA   Vertigo POA   Suspected CVA POA  Unsteady gait POA  Hx of peripheral vertigo diagnosed in the past. CT Head interpreted independently - no acute infarct hemorrhage or mass noted. CTA Head and Neck showed no vascular abnormality, Slightly ectatic paraclinoid segment of the right internal carotid artery,  with a 1.5 mm outpouching of the proximal right-sided cavernous segment, a  2.1 mm outpouching of the left distal cavernous segment. - MRI Brain without contrast  - Neurology consulted. Discussed case with Dr Thony Hill. Due to unclear onset of symptoms, tPA not recommended  - PT/OT  - ECHO  - Stroke education binder          Sinus bradycardia with pauses POA  Sick sinus syndrome POA  EKG interpreted independently - sinus bradycardia. Followed by Dr Jyotsna Gonzalez at [facility] for Hx of sick sinus syndrome. Last EKG in chart from 2018. Cardiology consulted. If she continues to have profound bradycardia, may need pacemaker placement.    No indication for pacemaker placement at this time       Essential hypertension POA  - on losartan-HCTZ at home  - Holding as patient is normotensive      GERD POA  - famotidine     Anxiety POA  - continue ativan PRN  - Continue citalopram, buspirone    Body mass index is 31.51 kg/m². Code Status: full   Surrogate Decision Maker:  Name:     Rel: Lena Panchal     Son Home Ph:  Work Ph:  Mobile Ph: [phone]          DVT Prophylaxis: Lovenox  GI Prophylaxis: not indicated  Baseline: amb independently        Subjective:   CHIEF COMPLAINT: dizziness    HISTORY OF PRESENT ILLNESS:     Kamryn Rubalcava is a 80 y.o.  female with PMH of bradycardia, anxiety, HTN, GERD who presents to ED with c/o vertigo. Patient reports dizziness described as unsteady gait upon waking up this morning worse with any head movement and associated with vomitting and unsteady gait. Patient denies lightheadedness, dysphagia, head trauma, falls. Patient did not have facial droop or left sided weakness on presentation in the ER. However once patient arrived to med surg unit she developed acute left facial droop, left sided weakness and numbness. We were asked to admit for work up and evaluation of the above problems. Past Medical History:   Diagnosis Date    Anemia     Ameya Richards MM,seeing     Arrhythmia     SSS    Bradycardia     Essential hypertension     GERD (gastroesophageal reflux disease)     Psychiatric disorder     Anxiety    Psychotic disorder (HCC)     anxiety    Vertigo         Past Surgical History:   Procedure Laterality Date    HX BUNIONECTOMY      HX HYSTERECTOMY  1971    partial    HX ORTHOPAEDIC      Status post bunionectomy    HX PARTIAL HYSTERECTOMY         Social History     Tobacco Use    Smoking status: Never Smoker    Smokeless tobacco: Never Used   Substance Use Topics    Alcohol use: No        Family History   Problem Relation Age of Onset    Hypertension Mother      No Known Allergies     Prior to Admission medications    Medication Sig Start Date End Date Taking? Authorizing Provider   famotidine (PEPCID) 40 mg tablet Take 40 mg by mouth daily.     Provider, Historical   busPIRone (BUSPAR) 10 mg tablet Take 10 mg by mouth two (2) times a day. 9/20/21   Provider, Historical   citalopram (CELEXA) 20 mg tablet TAKE 1 TABLET BY MOUTH EVERY DAY 8/1/21   Liyah Jones MD   losartan-hydroCHLOROthiazide [facility]) 50-12.5 mg per tablet Take 1 Tab by mouth daily. 5/12/21   Sandie Deleon MD   LORazepam (ATIVAN) 0.5 mg tablet Take 1 Tab by mouth every eight (8) hours as needed for Anxiety. Max Daily Amount: 1.5 mg. 5/12/21   Sandie Deleon MD       REVIEW OF SYSTEMS:     I am not able to complete the review of systems because:    The patient is intubated and sedated    The patient has altered mental status due to his acute medical problems    The patient has baseline aphasia from prior stroke(s)    The patient has baseline dementia and is not reliable historian    The patient is in acute medical distress and unable to provide information           Total of 12 systems reviewed as follows:       POSITIVE= underlined text  Negative = text not underlined  General:  fever, chills, sweats, generalized weakness, weight loss/gain,      loss of appetite   Eyes:    blurred vision, eye pain, loss of vision, double vision  ENT:    rhinorrhea, pharyngitis   Respiratory:   cough, sputum production, SOB, QUIÑONES, wheezing, pleuritic pain   Cardiology:   chest pain, palpitations, orthopnea, PND, edema, syncope   Gastrointestinal:  abdominal pain , N/V, diarrhea, dysphagia, constipation, bleeding   Genitourinary:  frequency, urgency, dysuria, hematuria, incontinence   Muskuloskeletal :  arthralgia, myalgia, back pain  Hematology:  easy bruising, nose or gum bleeding, lymphadenopathy   Dermatological: rash, ulceration, pruritis, color change / jaundice  Endocrine:   hot flashes or polydipsia   Neurological:  headache, dizziness, confusion, focal weakness, paresthesia,     Speech difficulties, memory loss, gait difficulty  Psychological: Feelings of anxiety, depression, agitation    Objective:   VITALS:    Visit Vitals  BP (!) 118/39 (BP 1 Location: Right upper arm, BP Patient Position: At rest)   Pulse (!) 53   Temp 97.8 °F (36.6 °C)   Resp 18   Ht 5' 2\" (1.575 m)   Wt 78.2 kg (172 lb 4.8 oz)   SpO2 99%   BMI 31.51 kg/m²       PHYSICAL EXAM:    General:    Alert, cooperative, no distress, appears stated age. HEENT: Atraumatic, anicteric sclerae, pink conjunctivae     No oral ulcers, mucosa moist, throat clear, dentition fair  Neck:  Supple, symmetrical,  thyroid: non tender  Lungs:   Clear to auscultation bilaterally. No Wheezing or Rhonchi. No rales. Chest wall:  No tenderness  No Accessory muscle use. Heart:   Regular  rhythm,  No  murmur   No edema  Abdomen:   Soft, non-tender. Not distended. Bowel sounds normal  Extremities: No cyanosis. No clubbing,      Skin turgor normal, Capillary refill normal, Radial dial pulse 2+  Skin:     Not pale. Not Jaundiced  No rashes   Psych:  Good insight. Not depressed. Not anxious or agitated. Neurologic: EOMs intact. Left facial droop. No aphasia or slurred speech. Left pronator drift. LUE and LLE Strength 3/5, Sensation decreased on left Alert and oriented X 4.     ______________________________________________________________________    Care Plan discussed with:  Patient/Family, Nurse and     Expected  Disposition:   PT, OT, RN  ________________________________________________________________________  TOTAL TIME:  28 Minutes    Critical Care Provided     Minutes non procedure based      Comments    x Reviewed previous records   >50% of visit spent in counseling and coordination of care x Discussion with patient and/or family and questions answered       ________________________________________________________________________  Signed: Scarlett Chang MD    Procedures: see electronic medical records for all procedures/Xrays and details which were not copied into this note but were reviewed prior to creation of Plan.     LAB DATA REVIEWED:    Recent Results (from the past 24 hour(s))   GLUCOSE, POC Collection Time: 12/07/21  7:48 AM   Result Value Ref Range    Glucose (POC) 146 (H) 65 - 117 mg/dL    Performed by Mary Alice MEIER    EKG, 12 LEAD, INITIAL    Collection Time: 12/07/21 10:13 AM   Result Value Ref Range    Ventricular Rate 46 BPM    Atrial Rate 46 BPM    P-R Interval 262 ms    QRS Duration 82 ms    Q-T Interval 478 ms    QTC Calculation (Bezet) 418 ms    Calculated P Axis 47 degrees    Calculated R Axis -84 degrees    Calculated T Axis 31 degrees    Diagnosis       Sinus bradycardia with 1st degree AV block  Left axis deviation  Low voltage QRS  Cannot rule out Anterior infarct , age undetermined  Abnormal ECG  When compared with ECG of 20-DEC-2018 17:31,  UT interval has increased  Vent. rate has decreased BY  30 BPM  QRS voltage has decreased     TROPONIN-HIGH SENSITIVITY    Collection Time: 12/07/21 10:28 AM   Result Value Ref Range    Troponin-High Sensitivity 46 0 - 51 ng/L   CBC WITH AUTOMATED DIFF    Collection Time: 12/07/21 10:28 AM   Result Value Ref Range    WBC 6.7 3.6 - 11.0 K/uL    RBC 2.92 (L) 3.80 - 5.20 M/uL    HGB 9.6 (L) 11.5 - 16.0 g/dL    HCT 28.3 (L) 35.0 - 47.0 %    MCV 96.9 80.0 - 99.0 FL    MCH 32.9 26.0 - 34.0 PG    MCHC 33.9 30.0 - 36.5 g/dL    RDW 22.5 (H) 11.5 - 14.5 %    PLATELET 795 976 - 345 K/uL    MPV 11.2 8.9 - 12.9 FL    NRBC 1.0 (H) 0  WBC    ABSOLUTE NRBC 0.07 (H) 0.00 - 0.01 K/uL    NEUTROPHILS 80 (H) 32 - 75 %    LYMPHOCYTES 13 12 - 49 %    MONOCYTES 5 5 - 13 %    EOSINOPHILS 0 0 - 7 %    BASOPHILS 1 0 - 1 %    IMMATURE GRANULOCYTES 1 (H) 0.0 - 0.5 %    ABS. NEUTROPHILS 5.3 1.8 - 8.0 K/UL    ABS. LYMPHOCYTES 0.9 0.8 - 3.5 K/UL    ABS. MONOCYTES 0.3 0.0 - 1.0 K/UL    ABS. EOSINOPHILS 0.0 0.0 - 0.4 K/UL    ABS. BASOPHILS 0.1 0.0 - 0.1 K/UL    ABS. IMM.  GRANS. 0.1 (H) 0.00 - 0.04 K/UL    DF SMEAR SCANNED      RBC COMMENTS ANISOCYTOSIS  1+       METABOLIC PANEL, COMPREHENSIVE    Collection Time: 12/07/21 10:28 AM   Result Value Ref Range    Sodium 135 (L) 136 - 145 mmol/L    Potassium 3.6 3.5 - 5.1 mmol/L    Chloride 101 97 - 108 mmol/L    CO2 26 21 - 32 mmol/L    Anion gap 8 5 - 15 mmol/L    Glucose 113 (H) 65 - 100 mg/dL    BUN 30 (H) 6 - 20 MG/DL    Creatinine 1.14 (H) 0.55 - 1.02 MG/DL    BUN/Creatinine ratio 26 (H) 12 - 20      GFR est AA 54 (L) >60 ml/min/1.73m2    GFR est non-AA 45 (L) >60 ml/min/1.73m2    Calcium 9.1 8.5 - 10.1 MG/DL    Bilirubin, total 0.7 0.2 - 1.0 MG/DL    ALT (SGPT) 16 12 - 78 U/L    AST (SGOT) 17 15 - 37 U/L    Alk.  phosphatase 73 45 - 117 U/L    Protein, total 7.1 6.4 - 8.2 g/dL    Albumin 3.8 3.5 - 5.0 g/dL    Globulin 3.3 2.0 - 4.0 g/dL    A-G Ratio 1.2 1.1 - 2.2     ECHO ADULT COMPLETE    Collection Time: 12/07/21  4:58 PM   Result Value Ref Range    IVSd 1.46 (A) 0.6 - 0.9 cm    LVIDd 3.71 (A) 3.9 - 5.3 cm    LVIDs 1.47 cm    LVOT d 1.65 cm    LVPWd 1.37 (A) 0.6 - 0.9 cm    LV Ejection Fraction MOD 4C 92 percent    LV ED Vol A4C 98.51 mL    LV ES Vol A4C 8.33 mL    LVOT Peak Gradient 3.40 mmHg    LVOT Peak Velocity 92.10 cm/s    RVSP 30.78 mmHg    Left Atrium Major Axis 2.52 cm    LA Area 4C 22.96 cm2    LA Vol 4C 60.75 (A) 22 - 52 mL    Est. RA Pressure 5.00 mmHg    Aortic Valve Area by Planimetry 3.09 cm2    Mitral Valve Area by Planimetry 3.61 cm2    MV A Bossman 70.37 cm/s    Mitral Valve E Wave Deceleration Time 178.80 ms    MV E Bossman 45.07 cm/s    Mitral Valve Pressure Half-time 51.85 ms    MVA (PHT) 4.24 cm2    MV Peak Gradient 2.19 mmHg    MV Mean Gradient 0.66 mmHg    Mitral Valve Pressure Half-time 62.28 ms    Mitral Valve Max Velocity 72.29 cm/s    Mitral Valve Annulus Velocity Time Integral 24.85 cm    MVA (PHT) 3.53 cm2    Pulmonic Valve Systolic Peak Instantaneous Gradient 1.30 mmHg    Triscuspid Valve Regurgitation Peak Gradient 25.78 mmHg    TR Max Velocity 253.81 cm/s    Ao Root D 3.09 cm    MV E/A 0.64     LV Mass .8 67 - 162 g    LV Mass AL Index 106.6 43 - 95 g/m2    Left Atrium Minor Axis 1.41 cm    LA Vol Index 33.94 16 - 28 ml/m2    LVED Vol Index A4C 55.0 mL/m2    LVES Vol Index A4C 4.7 mL/m2

## 2021-12-07 NOTE — ED TRIAGE NOTES
Pt in with dizziness that started this morning. Pt's son with her, states she lives at home alone and called him this morning stating she was dizzy. He states she vomited twice this morning. Pt contributes to acid reflux. Pt has a history of vertigo, has meds for this. Son concerned for patient having difficulty walking this morning.

## 2021-12-07 NOTE — ED NOTES
Pt reports dizziness and acid reflux has improved. Pt states \"im ready to go. \" Dr eldridge made aware.

## 2021-12-07 NOTE — ED NOTES
Pt reports feeling dizzy when going from supine to sitting and standing. This writer and Anne Smith Rn attempted to ambulate patient but patient was having difficulty walking d/t dizziness. Dr. Tera Barkley made aware.

## 2021-12-07 NOTE — ED NOTES
Verbal shift change report given to [phone] (oncoming nurse) by Mookie Ordonez RN (offgoing nurse). Report included the following information SBAR, ED Summary, Procedure Summary, MAR and Recent Results.

## 2021-12-07 NOTE — ACP (ADVANCE CARE PLANNING)
Full Code Patient understands advance directive discussion and wants CPR and ventilation if needed. She states her son, Mat Malave as next of kin ([phone]) and Homar Degree (Jewish friend) as emergency contact. ([phone]).        Kris Ballard, BSN, [address] [facility]  [phone]

## 2021-12-07 NOTE — ED NOTES
Pt resting contently in room, in no acute distress, and updated on plan of care. Pt reports she feeling dizzy when she sits up in the bed.

## 2021-12-08 ENCOUNTER — APPOINTMENT (OUTPATIENT)
Dept: MRI IMAGING | Age: 86
End: 2021-12-08
Attending: STUDENT IN AN ORGANIZED HEALTH CARE EDUCATION/TRAINING PROGRAM
Payer: MEDICARE

## 2021-12-08 ENCOUNTER — HOSPITAL ENCOUNTER (INPATIENT)
Age: 86
LOS: 5 days | Discharge: NURSING, REHAB OR LONG-TERM CARE | DRG: 242 | End: 2021-12-13
Attending: INTERNAL MEDICINE | Admitting: INTERNAL MEDICINE
Payer: MEDICARE

## 2021-12-08 VITALS
RESPIRATION RATE: 12 BRPM | SYSTOLIC BLOOD PRESSURE: 98 MMHG | BODY MASS INDEX: 31.12 KG/M2 | DIASTOLIC BLOOD PRESSURE: 76 MMHG | WEIGHT: 169.09 LBS | HEART RATE: 34 BPM | TEMPERATURE: 98.1 F | HEIGHT: 62 IN | OXYGEN SATURATION: 100 %

## 2021-12-08 DIAGNOSIS — I49.5 SSS (SICK SINUS SYNDROME) (HCC): ICD-10-CM

## 2021-12-08 PROBLEM — R00.1 BRADYCARDIC CARDIAC ARREST (HCC): Status: ACTIVE | Noted: 2021-12-08

## 2021-12-08 PROBLEM — I46.2 BRADYCARDIC CARDIAC ARREST (HCC): Status: ACTIVE | Noted: 2021-12-08

## 2021-12-08 PROBLEM — R00.1 SYMPTOMATIC BRADYCARDIA: Status: ACTIVE | Noted: 2021-12-08

## 2021-12-08 LAB
ANION GAP SERPL CALC-SCNC: 6 MMOL/L (ref 5–15)
ATRIAL RATE: 46 BPM
BASOPHILS # BLD: 0.1 K/UL (ref 0–0.1)
BASOPHILS NFR BLD: 1 % (ref 0–1)
BUN SERPL-MCNC: 20 MG/DL (ref 6–20)
BUN/CREAT SERPL: 18 (ref 12–20)
CALCIUM SERPL-MCNC: 9 MG/DL (ref 8.5–10.1)
CALCULATED P AXIS, ECG09: 47 DEGREES
CALCULATED R AXIS, ECG10: -84 DEGREES
CALCULATED T AXIS, ECG11: 31 DEGREES
CHLORIDE SERPL-SCNC: 106 MMOL/L (ref 97–108)
CHOLEST SERPL-MCNC: 179 MG/DL
CO2 SERPL-SCNC: 27 MMOL/L (ref 21–32)
CREAT SERPL-MCNC: 1.12 MG/DL (ref 0.55–1.02)
DIAGNOSIS, 93000: NORMAL
DIFFERENTIAL METHOD BLD: ABNORMAL
EOSINOPHIL # BLD: 0.1 K/UL (ref 0–0.4)
EOSINOPHIL NFR BLD: 1 % (ref 0–7)
ERYTHROCYTE [DISTWIDTH] IN BLOOD BY AUTOMATED COUNT: 21 % (ref 11.5–14.5)
EST. AVERAGE GLUCOSE BLD GHB EST-MCNC: 114 MG/DL
GLUCOSE BLD STRIP.AUTO-MCNC: 110 MG/DL (ref 65–117)
GLUCOSE SERPL-MCNC: 92 MG/DL (ref 65–100)
HBA1C MFR BLD: 5.6 % (ref 4–5.6)
HCT VFR BLD AUTO: 29.7 % (ref 35–47)
HDLC SERPL-MCNC: 67 MG/DL
HDLC SERPL: 2.7 {RATIO} (ref 0–5)
HGB BLD-MCNC: 10 G/DL (ref 11.5–16)
IMM GRANULOCYTES # BLD AUTO: 0 K/UL (ref 0–0.04)
IMM GRANULOCYTES NFR BLD AUTO: 0 % (ref 0–0.5)
LDLC SERPL CALC-MCNC: 105.4 MG/DL (ref 0–100)
LYMPHOCYTES # BLD: 1.7 K/UL (ref 0.8–3.5)
LYMPHOCYTES NFR BLD: 29 % (ref 12–49)
MAGNESIUM SERPL-MCNC: 1.9 MG/DL (ref 1.6–2.4)
MCH RBC QN AUTO: 31.8 PG (ref 26–34)
MCHC RBC AUTO-ENTMCNC: 33.7 G/DL (ref 30–36.5)
MCV RBC AUTO: 94.6 FL (ref 80–99)
MONOCYTES # BLD: 0.5 K/UL (ref 0–1)
MONOCYTES NFR BLD: 9 % (ref 5–13)
NEUTS SEG # BLD: 3.4 K/UL (ref 1.8–8)
NEUTS SEG NFR BLD: 60 % (ref 32–75)
NRBC # BLD: 0.09 K/UL (ref 0–0.01)
NRBC BLD-RTO: 1.6 PER 100 WBC
P-R INTERVAL, ECG05: 262 MS
PHOSPHATE SERPL-MCNC: 2.6 MG/DL (ref 2.6–4.7)
PLATELET # BLD AUTO: 165 K/UL (ref 150–400)
PMV BLD AUTO: 10.7 FL (ref 8.9–12.9)
POTASSIUM SERPL-SCNC: 3.6 MMOL/L (ref 3.5–5.1)
Q-T INTERVAL, ECG07: 478 MS
QRS DURATION, ECG06: 82 MS
QTC CALCULATION (BEZET), ECG08: 418 MS
RBC # BLD AUTO: 3.14 M/UL (ref 3.8–5.2)
RBC MORPH BLD: ABNORMAL
SERVICE CMNT-IMP: NORMAL
SODIUM SERPL-SCNC: 139 MMOL/L (ref 136–145)
TRIGL SERPL-MCNC: 33 MG/DL (ref ?–150)
VENTRICULAR RATE, ECG03: 46 BPM
VLDLC SERPL CALC-MCNC: 6.6 MG/DL
WBC # BLD AUTO: 5.8 K/UL (ref 3.6–11)

## 2021-12-08 PROCEDURE — 74011250636 HC RX REV CODE- 250/636: Performed by: PSYCHIATRY & NEUROLOGY

## 2021-12-08 PROCEDURE — 80061 LIPID PANEL: CPT

## 2021-12-08 PROCEDURE — 97530 THERAPEUTIC ACTIVITIES: CPT

## 2021-12-08 PROCEDURE — G0378 HOSPITAL OBSERVATION PER HR: HCPCS

## 2021-12-08 PROCEDURE — 83036 HEMOGLOBIN GLYCOSYLATED A1C: CPT

## 2021-12-08 PROCEDURE — 96374 THER/PROPH/DIAG INJ IV PUSH: CPT

## 2021-12-08 PROCEDURE — 36415 COLL VENOUS BLD VENIPUNCTURE: CPT

## 2021-12-08 PROCEDURE — 80048 BASIC METABOLIC PNL TOTAL CA: CPT

## 2021-12-08 PROCEDURE — 97535 SELF CARE MNGMENT TRAINING: CPT

## 2021-12-08 PROCEDURE — 99220 PR INITIAL OBSERVATION CARE/DAY 70 MINUTES: CPT | Performed by: PSYCHIATRY & NEUROLOGY

## 2021-12-08 PROCEDURE — 65660000000 HC RM CCU STEPDOWN

## 2021-12-08 PROCEDURE — 84100 ASSAY OF PHOSPHORUS: CPT

## 2021-12-08 PROCEDURE — 82962 GLUCOSE BLOOD TEST: CPT

## 2021-12-08 PROCEDURE — 83735 ASSAY OF MAGNESIUM: CPT

## 2021-12-08 PROCEDURE — 93005 ELECTROCARDIOGRAM TRACING: CPT

## 2021-12-08 PROCEDURE — 77030038269 HC DRN EXT URIN PURWCK BARD -A

## 2021-12-08 PROCEDURE — 97112 NEUROMUSCULAR REEDUCATION: CPT

## 2021-12-08 PROCEDURE — 74011250637 HC RX REV CODE- 250/637: Performed by: INTERNAL MEDICINE

## 2021-12-08 PROCEDURE — 74011250637 HC RX REV CODE- 250/637: Performed by: STUDENT IN AN ORGANIZED HEALTH CARE EDUCATION/TRAINING PROGRAM

## 2021-12-08 PROCEDURE — 74011250636 HC RX REV CODE- 250/636

## 2021-12-08 PROCEDURE — 97165 OT EVAL LOW COMPLEX 30 MIN: CPT

## 2021-12-08 PROCEDURE — 85025 COMPLETE CBC W/AUTO DIFF WBC: CPT

## 2021-12-08 PROCEDURE — 97162 PT EVAL MOD COMPLEX 30 MIN: CPT

## 2021-12-08 RX ORDER — CLOPIDOGREL BISULFATE 75 MG/1
75 TABLET ORAL DAILY
Status: CANCELLED | OUTPATIENT
Start: 2021-12-09

## 2021-12-08 RX ORDER — GUAIFENESIN 100 MG/5ML
81 LIQUID (ML) ORAL DAILY
Status: CANCELLED | OUTPATIENT
Start: 2021-12-09

## 2021-12-08 RX ORDER — ATROPINE SULFATE 1 MG/ML
INJECTION, SOLUTION INTRAVENOUS
Status: DISCONTINUED
Start: 2021-12-08 | End: 2021-12-08 | Stop reason: HOSPADM

## 2021-12-08 RX ORDER — FAMOTIDINE 20 MG/1
40 TABLET, FILM COATED ORAL DAILY
Status: DISCONTINUED | OUTPATIENT
Start: 2021-12-09 | End: 2021-12-12

## 2021-12-08 RX ORDER — CITALOPRAM 20 MG/1
20 TABLET, FILM COATED ORAL DAILY
Status: CANCELLED | OUTPATIENT
Start: 2021-12-09

## 2021-12-08 RX ORDER — ENOXAPARIN SODIUM 100 MG/ML
40 INJECTION SUBCUTANEOUS EVERY 24 HOURS
Status: CANCELLED | OUTPATIENT
Start: 2021-12-08

## 2021-12-08 RX ORDER — SODIUM CHLORIDE 0.9 % (FLUSH) 0.9 %
10 SYRINGE (ML) INJECTION AS NEEDED
Status: CANCELLED | OUTPATIENT
Start: 2021-12-08

## 2021-12-08 RX ORDER — ACETAMINOPHEN 325 MG/1
650 TABLET ORAL
Status: CANCELLED | OUTPATIENT
Start: 2021-12-08

## 2021-12-08 RX ORDER — ATORVASTATIN CALCIUM 10 MG/1
10 TABLET, FILM COATED ORAL
Status: CANCELLED | OUTPATIENT
Start: 2021-12-08

## 2021-12-08 RX ORDER — ACETAMINOPHEN 650 MG/1
650 SUPPOSITORY RECTAL
Status: CANCELLED | OUTPATIENT
Start: 2021-12-08

## 2021-12-08 RX ORDER — BUSPIRONE HYDROCHLORIDE 10 MG/1
10 TABLET ORAL 2 TIMES DAILY
Status: DISCONTINUED | OUTPATIENT
Start: 2021-12-08 | End: 2021-12-13 | Stop reason: HOSPADM

## 2021-12-08 RX ORDER — LORAZEPAM 2 MG/ML
1 INJECTION INTRAMUSCULAR
Status: DISCONTINUED | OUTPATIENT
Start: 2021-12-08 | End: 2021-12-08 | Stop reason: HOSPADM

## 2021-12-08 RX ORDER — LORAZEPAM 0.5 MG/1
0.5 TABLET ORAL
Status: CANCELLED | OUTPATIENT
Start: 2021-12-08

## 2021-12-08 RX ORDER — LORAZEPAM 0.5 MG/1
0.5 TABLET ORAL
Status: DISCONTINUED | OUTPATIENT
Start: 2021-12-08 | End: 2021-12-10

## 2021-12-08 RX ORDER — LORAZEPAM 2 MG/ML
1 INJECTION INTRAMUSCULAR ONCE
Status: COMPLETED | OUTPATIENT
Start: 2021-12-08 | End: 2021-12-08

## 2021-12-08 RX ORDER — CITALOPRAM 20 MG/1
20 TABLET, FILM COATED ORAL DAILY
Status: DISCONTINUED | OUTPATIENT
Start: 2021-12-09 | End: 2021-12-13 | Stop reason: HOSPADM

## 2021-12-08 RX ORDER — CLOPIDOGREL BISULFATE 75 MG/1
75 TABLET ORAL DAILY
Status: DISCONTINUED | OUTPATIENT
Start: 2021-12-09 | End: 2021-12-13 | Stop reason: HOSPADM

## 2021-12-08 RX ORDER — ATORVASTATIN CALCIUM 10 MG/1
10 TABLET, FILM COATED ORAL
Status: DISCONTINUED | OUTPATIENT
Start: 2021-12-08 | End: 2021-12-08 | Stop reason: HOSPADM

## 2021-12-08 RX ORDER — ACETAMINOPHEN 650 MG/1
650 SUPPOSITORY RECTAL
Status: DISCONTINUED | OUTPATIENT
Start: 2021-12-08 | End: 2021-12-13 | Stop reason: HOSPADM

## 2021-12-08 RX ORDER — ACETAMINOPHEN 325 MG/1
650 TABLET ORAL
Status: DISCONTINUED | OUTPATIENT
Start: 2021-12-08 | End: 2021-12-13 | Stop reason: HOSPADM

## 2021-12-08 RX ORDER — SODIUM CHLORIDE 0.9 % (FLUSH) 0.9 %
10 SYRINGE (ML) INJECTION AS NEEDED
Status: DISCONTINUED | OUTPATIENT
Start: 2021-12-08 | End: 2021-12-09 | Stop reason: SDUPTHER

## 2021-12-08 RX ORDER — ATORVASTATIN CALCIUM 10 MG/1
10 TABLET, FILM COATED ORAL
Status: DISCONTINUED | OUTPATIENT
Start: 2021-12-08 | End: 2021-12-13 | Stop reason: HOSPADM

## 2021-12-08 RX ORDER — BUSPIRONE HYDROCHLORIDE 10 MG/1
10 TABLET ORAL 2 TIMES DAILY
Status: CANCELLED | OUTPATIENT
Start: 2021-12-08

## 2021-12-08 RX ORDER — GUAIFENESIN 100 MG/5ML
81 LIQUID (ML) ORAL DAILY
Status: DISCONTINUED | OUTPATIENT
Start: 2021-12-09 | End: 2021-12-13 | Stop reason: HOSPADM

## 2021-12-08 RX ORDER — FAMOTIDINE 20 MG/1
40 TABLET, FILM COATED ORAL DAILY
Status: CANCELLED | OUTPATIENT
Start: 2021-12-09

## 2021-12-08 RX ORDER — ENOXAPARIN SODIUM 100 MG/ML
40 INJECTION SUBCUTANEOUS EVERY 24 HOURS
Status: DISCONTINUED | OUTPATIENT
Start: 2021-12-08 | End: 2021-12-13 | Stop reason: HOSPADM

## 2021-12-08 RX ADMIN — ASPIRIN 81 MG CHEWABLE TABLET 81 MG: 81 TABLET CHEWABLE at 08:24

## 2021-12-08 RX ADMIN — CITALOPRAM HYDROBROMIDE 20 MG: 20 TABLET ORAL at 08:24

## 2021-12-08 RX ADMIN — ATORVASTATIN CALCIUM 10 MG: 10 TABLET, FILM COATED ORAL at 23:18

## 2021-12-08 RX ADMIN — BUSPIRONE HYDROCHLORIDE 10 MG: 10 TABLET ORAL at 23:17

## 2021-12-08 RX ADMIN — FAMOTIDINE 40 MG: 20 TABLET ORAL at 08:24

## 2021-12-08 RX ADMIN — Medication 10 ML: at 06:21

## 2021-12-08 RX ADMIN — CLOPIDOGREL BISULFATE 75 MG: 75 TABLET, FILM COATED ORAL at 08:24

## 2021-12-08 RX ADMIN — BUSPIRONE HYDROCHLORIDE 10 MG: 10 TABLET ORAL at 08:24

## 2021-12-08 RX ADMIN — LORAZEPAM 1 MG: 2 INJECTION INTRAMUSCULAR; INTRAVENOUS at 10:10

## 2021-12-08 RX ADMIN — ATROPINE SULFATE 0.5 MG: 1 INJECTION, SOLUTION INTRAMUSCULAR; INTRAVENOUS; SUBCUTANEOUS at 14:34

## 2021-12-08 NOTE — PROGRESS NOTES
Alina.Keither) Verbal shift change report given to Tyler Thorne RN (oncoming nurse) by Felisha De Leon RN (offgoing nurse). Report included the following information SBAR, Kardex, Intake/Output, MAR, Recent Results and Cardiac Rhythm 1st degree AV block. 0815) Pt assessed and vital signs obtained. /100 and HR 50 and BP elevated. Pt receiving incontinence care and linen changes by tech. Morning meds given and pt left resting in bed at this time. 1010) 1 mg ativan given as scheduled. Pt getting moved to PCU level care at this time. 1030) Interdisciplinary team rounds were held 12/8/2021 with the following team members:Care Management, Nursing, Pharmacy and Physician . Plan of care discussed. See clinical pathway and/or care plan for interventions and desired outcomes. 1115) Transport here to take pt down to MRI. Pt incontinent at this time. OT assisted with cleaning up to assess mobility. MRI called and stated if unable to get pt down by 1130, MRI will need to be done at 1400. Pt left resting in bed, purwick replaced. 1240) Pt assessed and no changes to note. Vital signs stable and pt has no c/o pain at this time. Pt left eating in chair, visitor present at the bedside. 1415) Pt resting in chair at this time and made aware of MRI and ativan prior. Pt stated no needs at this time and left resting in room with visitors at the bedside. 1423) This writer went to the room to assess pt after family called out for help. Pt sitting up in chair with eyes fixed and unresponsive. Please see Paul Gomez RN note regarding Rapid response. 1535) TRANSFER - OUT REPORT:    Verbal report given to [facility] TINY ALCANTARA(name) on Lincoln Ovalle  being transferred to Cardiac (unit) for urgent transfer       Report consisted of patients Situation, Background, Assessment and   Recommendations(SBAR).      Information from the following report(s) SBAR, Kardex, Intake/Output, MAR, Recent Results and Cardiac Rhythm Mauro was reviewed with the receiving nurse. Opportunity for questions and clarification was provided.       Patient transported with:   O2 @ 2 liters  Registered Nurse

## 2021-12-08 NOTE — PROGRESS NOTES
[facility] [address], [address]  Office Phone [phone]    CARDIOLOGY PROGRESS NOTE    12/8/2021 8:18 AM    Admit Date: 12/7/2021    Admit Diagnosis:   Dizziness [R42]    Subjective:     Bakari Noe presents in bed in NAD, reports continued episodes of dizziness overnight. Denies chest pain or shortness of breath     Visit Vitals  /69 (BP 1 Location: Right upper arm, BP Patient Position: At rest)   Pulse (!) 48   Temp 97.8 °F (36.6 °C)   Resp 18   Ht 5' 2\" (1.575 m)   Wt 169 lb 1.5 oz (76.7 kg)   SpO2 97%   BMI 30.93 kg/m²       Current Facility-Administered Medications   Medication Dose Route Frequency    acetaminophen (TYLENOL) tablet 650 mg  650 mg Oral Q4H PRN    Or    acetaminophen (TYLENOL) solution 650 mg  650 mg Per NG tube Q4H PRN    Or    acetaminophen (TYLENOL) suppository 650 mg  650 mg Rectal Q4H PRN    aspirin chewable tablet 81 mg  81 mg Oral DAILY    clopidogreL (PLAVIX) tablet 75 mg  75 mg Oral DAILY    enoxaparin (LOVENOX) injection 40 mg  40 mg SubCUTAneous Q24H    sodium chloride (NS) flush 10 mL  10 mL IntraVENous PRN    citalopram (CELEXA) tablet 20 mg  20 mg Oral DAILY    busPIRone (BUSPAR) tablet 10 mg  10 mg Oral BID    famotidine (PEPCID) tablet 40 mg  40 mg Oral DAILY    LORazepam (ATIVAN) tablet 0.5 mg  0.5 mg Oral Q8H PRN    benzocaine-menthoL (CHLORASEPTIC MAX) lozenge 1 Lozenge  1 Lozenge Oral Q2H PRN         Objective:      Physical Exam    General: Well developed, in no acute distress, cooperative and alert  HEENT: No carotid bruits, no JVD, trach is midline. Neck Supple. Heart:  Normal S1/S2 negative S3 or S4. Regular, no murmur, gallop or rub. Respiratory: Clear bilaterally x 4, no wheezing or rales  Abdomen:   Soft, non-tender, no masses, bowel sounds are active. Extremities:  No edema, normal cap refill, no cyanosis, atraumatic.    Neuro: A&Ox3, speech clear       Data Review:   Recent Labs     12/08/21  0751 12/07/21  1028   WBC 5.8 6.7   HGB 10.0* 9.6*   HCT 29.7* 28.3*    189     Recent Labs     12/07/21  1028   *   K 3.6      CO2 26   *   BUN 30*   CREA 1.14*   CA 9.1   ALB 3.8   TBILI 0.7   ALT 16       No results for input(s): TROIQ, CPK, CKMB in the last 72 hours. Intake/Output Summary (Last 24 hours) at 12/8/2021 0818  Last data filed at 12/7/2021 2323  Gross per 24 hour   Intake --   Output 900 ml   Net -900 ml        Telemetry: Sinus bradycardia   EKG:  Cxray:    Assessment:     Active Problems:    Dizziness (12/7/2021)      Sick Sinus Syndrome   Plan:     79 y/o female admitted for recurrent dizziness, hx of sinus bradycardia and SSS. Not on franklin agents, experiencing symptomatic bradycardia as heart on tele 38 bpm at time of this assessment while patient awake and reported lightheadedness during the event. Discussed risk and benefits of PPM placement and she is willing to proceed. Discussed with hospitalist [facility] to hospitalist service,  Accepting EP consult Dr. Kristen Khalil / Matt Walker NP upon pt arrival.       Jena Rahman DNP-ANP-BC  Chart and note reviewed, discussed with advanced practioner and agree with recommendations and treatment plan as outlined.   Kyung Jacobs MD

## 2021-12-08 NOTE — Clinical Note
TRANSFER - OUT REPORT:     Verbal report given to: [facility]. Report consisted of patient's Situation, Background, Assessment and   Recommendations(SBAR). Opportunity for questions and clarification was provided. Patient transported with a Registered Nurse and [address] [place] [address] / [employer] [facility] [employer]. Patient transported to: [facility].

## 2021-12-08 NOTE — PROGRESS NOTES
Rapid response initiated due to patient unresponsive sitting in chair,   eyes open staring up and fixed, sternal rub not effective , HR is 34 on monitor, pulse weak/thready and unable to palpate radially consistently. RRT called. Patient placed in bed with 4 assist. RR 8-10 with agonal sounds. Atropine 0.5mg IVP  Given at 1434 emergently with Dr. Khurram Emery at bedside. Patient became minimally responsive approx 3 minutes later, blinking, moving extremities and responding verbally but drowsy. HR 72 on monitor. Patient placed on 2 liters oxygen via NC. Family in hallway at this time, writer updated along with Dr. Khurram Emery on plan to transfer patient to ED [facility] for need for pacing and tx for symptomatic bradycardia. ,    IVF  NS wide open with pressure bag, patent IV in RAC. Repeat /81 and 76 at 1436 post atropine. BP now 118/65 at 1441. Request sent for emergent transfer lights and sirens to [facility] and Dr. Khurram Emery spoke with ED and confirmed plan. Patient placed on external pacer settings on demand only with HR setting 30 and mv 35. During transfer to ED at 1500, pacer did not fire. Brenda RN gave report to ED [facility] ED RN and ED RN's assisted in preparing patient for transfer. Family had taken all belongings except for jacket and scarf which were given to Ambulance crew. Ambulance crew transferred patient to their defib monitor at settings specified, on demand, 35 HR and 35 MV. At time of transfer patient is alert and oriented but drowsy.

## 2021-12-08 NOTE — PROGRESS NOTES
Patient has presented on floor. Symptomatic bradycardia. Npo p mn. Pacer pads on patient. For permanent pacer at 730 am    Thank you for allowing me to participate in this patients care.     Michelle Gay MD, Ayaan Israel

## 2021-12-08 NOTE — PROGRESS NOTES
Spiritual Care Assessment/Progress Note  [facility]      NAME: Cristiane Elizabeth      MRN: [id number]  AGE: 80 y.o.  SEX: female  Yarsanism Affiliation: Barry witness   Language: English     12/8/2021     Total Time (in minutes): 13     Spiritual Assessment begun in [place] [address] PROGRESSIVE CARE through conversation with:         []Patient        [] Family    [] Friend(s)        Reason for Consult: Request by staff     Spiritual beliefs: (Please include comment if needed)     [x] Identifies with a roe tradition:   Barry's Witnesses       [x] Supported by a roe community:            [] Claims no spiritual orientation:           [] Seeking spiritual identity:                [] Adheres to an individual form of spirituality:           [] Not able to assess:                           Identified resources for coping:      [] Prayer                               [] Music                  [] Guided Imagery     [x] Family/friends                 [] Pet visits     [] Devotional reading                         [] Unknown     [] Other:                                                Interventions offered during this visit: (See comments for more details)    Patient Interventions: Affirmation of emotions/emotional suffering, Initial/Spiritual assessment, patient floor, Prayer (assurance of)     Family/Friend(s): Initial Assessment, Yarsanism beliefs/image of God discussed, Normalization of emotional/spiritual concerns     Plan of Care:     [] Support spiritual and/or cultural needs    [] Support AMD and/or advance care planning process      [] Support grieving process   [] Coordinate Rites and/or Rituals    [] Coordination with community clergy   [] No spiritual needs identified at this time   [] Detailed Plan of Care below (See Comments)  [] Make referral to Music Therapy  [] Make referral to Pet Therapy     [] Make referral to Addiction services  [] Make referral to [facility]  [] Make referral to Spiritual Care Partner  [x] No future visits requested        [] Contact Spiritual Care for further referrals     Comments: [facility]iles on 2 Md Surg & Tele unit, patient's nurse requested for spiritual support for Miss Ricardo Carcamo. Nurse stated she faced some medical crises the previous day and was tearful. Patient had two visitors in her room when  entered. After  introduced himself and his role, patient stated that visit was well needed. She indicated she was doing better though, and that having friends around was uplifting. One of the visitors introduced herself as spiritual family to patient. They are Jehovah's Witnesses. She shared that she bears the same last name as patient, and that there is much support from their spiritual community and thus patient requires no need for support at this time. Thoughts and felings affirmed. Patient and friends assured of patient's prayer. Visited by: Liat Yadav    Paging Service: [phone])

## 2021-12-08 NOTE — DISCHARGE SUMMARY
Hospitalist Discharge Summary     Patient ID:  Larry Watts  [id number]  69 y.o.  4/19/1932    PCP on record: Jerzy, MD Gege    Admit date: 12/7/2021  Discharge date and time: 12/8/2021      Admission Diagnoses: Dizziness [R42]    Discharge Diagnoses: Active Problems:    Dizziness (12/7/2021)           Hospital Course:     Dizziness POA   Vertigo POA   Unsteady gait POA  -Differentials include posterior circulation CVA versus symptomatic bradycardia  Hx of peripheral vertigo diagnosed in the past. CT Head interpreted independently - no acute infarct hemorrhage or mass noted. CTA Head and Neck showed no vascular abnormality, Slightly ectatic paraclinoid segment of the right internal carotid artery, with a 1.5 mm outpouching of the proximal right-sided cavernous segment, a 2.1 mm outpouching of the left distal cavernous segment. -TTE with small left-to-right intracardiac shunt  -Lipid panel A1c pending  -MRI brain pending  -Patient has been started on aspirin Plavix and statin. If CVA is ruled out after MRI can be DC'd    -Patient be transferred to [facility] for pacemaker placement as it was determined by cardiology this morning that patient symptoms are secondary to her bradycardia  -Patient has been accepted by the hospitalist service appreciate help                 Sinus bradycardia with pauses POA  Sick sinus syndrome POA  EKG interpreted independently - sinus bradycardia. Followed by Dr Nidhi Alvares at [facility] for Hx of sick sinus syndrome. Last EKG in chart from 2018. Cardiology consulted.    Cardiology recommended transfer to [facility] for pacemaker placement given symptomatic bradycardia        Essential hypertension POA  - on losartan-HCTZ at home        GERD POA  - famotidine      Anxiety POA  - continue ativan PRN  - Continue citalopram, buspirone      CONSULTATIONS:  IP CONSULT TO NEUROLOGY    Excerpted HPI from H&P of Angela Burrell MD:  Perlita Dunham Stas Saenz is a 80 y.o.  female with PMH of bradycardia, anxiety, HTN, GERD who presents to ED with c/o vertigo. Patient reports dizziness described as unsteady gait upon waking up this morning worse with any head movement and associated with vomitting and unsteady gait. Patient denies lightheadedness, dysphagia, head trauma, falls. Patient did not have facial droop or left sided weakness on presentation in the ER. However once patient arrived to med surg unit she developed acute left facial droop, left sided weakness and numbness. ______________________________________________________________________  DISCHARGE SUMMARY/HOSPITAL COURSE:  for full details see H&P, daily progress notes, labs, consult notes. _______________________________________________________________________  Patient seen and examined by me on discharge day. Pertinent Findings:  Gen:    Not in distress  Chest: Clear lungs  CVS:   Regular rhythm. No edema  Abd:  Soft, not distended, not tender  Neuro:  Alert with good insight. Oriented to person, place, and time   _______________________________________________________________________  DISCHARGE MEDICATIONS:   Current Discharge Medication List          My Recommended Diet, Activity, Wound Care, and follow-up labs are listed in the patient's Discharge Insturctions which I have personally completed and reviewed.     _______________________________________________________________________  DISPOSITION:     Home with Family:    Home with HH/PT/OT/RN:    SNF/LTC:    DELFINO:    OTHER:        Condition at Discharge:  [facility]  _______________________________________________________________________  Follow up with:   PCP : Other, MD Gege  Follow-up Information     Follow up With Specialties Details Why Contact Info    Lavern Sanchez NP Nurse Practitioner Go on 12/17/2021 10AM, , Please arrive 15 mins prior, Please take ID and insurance card, Please wear a mask [id number] - [place] EMERGENCY DEPT Emergency Medicine  As needed, If symptoms worsen [place]  [phone]              Total time in minutes spent coordinating this discharge (includes going over instructions, follow-up, prescriptions, and preparing report for sign off to her PCP) :45minutes    Signed:  Adeola Mathews MD

## 2021-12-08 NOTE — Clinical Note
TRANSFER - IN REPORT:     Verbal report received from: [facility]. Report consisted of patient's Situation, Background, Assessment and   Recommendations(SBAR). Opportunity for questions and clarification was provided. Assessment completed upon patient's arrival to unit and care assumed. Patient transported with a Registered Nurse and [address] [place] [address] / [facility] [employer].

## 2021-12-08 NOTE — PROGRESS NOTES
Problem: Mobility Impaired (Adult and Pediatric)  Goal: *Acute Goals and Plan of Care (Insert Text)  Description: FUNCTIONAL STATUS PRIOR TO ADMISSION: The patient reports she did not use an AD in the home. Patient was modified independent using a rollator or hurry cane in the community. HOME SUPPORT PRIOR TO ADMISSION: The patient lived alone with local family and friends to provide assistance. Physical Therapy Goals  Initiated 12/8/2021  1. Patient will move from supine to sit and sit to supine  in bed with minimal assistance within 7 day(s). 2.  Patient will transfer from bed to chair and chair to bed with maximal assistance using the least restrictive device within 7 day(s). 3.  Patient will perform sit to stand with moderate assistance  within 7 day(s). 4.  Patient will ambulate with maximal assistance for 5 feet with the least restrictive device within 7 day(s). Outcome: Progressing Towards Goal   PHYSICAL THERAPY EVALUATION  Patient: Brittany Reed (46 y.o. female)  Date: 12/8/2021  Primary Diagnosis: Dizziness [R42]        Precautions:   Fall    ASSESSMENT  Based on the objective data described below, the patient presents with decreased balance, decreased strength, decreased coordination, and decreased independence with functional mobility S/P admission for suspected CVA. CT is negative and patient is awaiting MRI. Patient also with symptomatic bradycardia requiring transfer to [facility] ER for place maker placement. She demonstrates inability to use the L UE demonstrating trace muscle contraction and the need for physical assistance to move the upper extremity. Patient demonstrates partial L LE ROM in all planes. The L LE does not buckle in standing but patient is unable to weight shift or move the leg in weight bearing. She is Max A x2 for stand pivot transfer to the L and R requiring assistance with L UE, walker management, and weight shift.      Current Level of Function Impacting Discharge (mobility/balance): Max Ax2 stand pivot transfer     Functional Outcome Measure: The patient scored 4/56 on the KAMARA outcome measure. Other factors to consider for discharge: medical stability, decreased balance, increased risk for falls, decreased ROM, decreased coordination, lives alone, PLOF- Mod I      Patient will benefit from skilled therapy intervention to address the above noted impairments. PLAN :  Recommendations and Planned Interventions: bed mobility training, transfer training, gait training, therapeutic exercises, neuromuscular re-education, edema management/control, patient and family training/education, and therapeutic activities      Frequency/Duration: Patient will be followed by physical therapy:  5 times a week to address goals.     Recommendation for discharge: (in order for the patient to meet his/her long term goals)  Therapy 3 hours per day 5-7 days per week    This discharge recommendation:  Has been made in collaboration with the attending provider and/or case management    IF patient discharges home will need the following DME: to be determined (TBD)         SUBJECTIVE:   Patient stated I want to go HOME.    OBJECTIVE DATA SUMMARY:   HISTORY:    Past Medical History:   Diagnosis Date    Anemia     /possible MM,seeing     Arrhythmia     SSS    Bradycardia     Essential hypertension     GERD (gastroesophageal reflux disease)     Psychiatric disorder     Anxiety    Psychotic disorder (HCC)     anxiety    Vertigo      Past Surgical History:   Procedure Laterality Date    HX BUNIONECTOMY      HX HYSTERECTOMY  1971    partial    HX ORTHOPAEDIC      Status post bunionectomy    HX PARTIAL HYSTERECTOMY         Personal factors and/or comorbidities impacting plan of care:     Home Situation  Home Environment: Apartment  # Steps to Enter: 0  One/Two Story Residence: One story  Living Alone: Yes  Support Systems: Friend/Neighbor, [employer]  Patient Expects to be Discharged to[de-identified]  [facility], P for PPM)  Current DME Used/Available at Home: Grab bars, Walker, rollator, Brant Canary, straight, Shower chair, Raised toilet seat  Tub or Shower Type: Shower    EXAMINATION/PRESENTATION/DECISION MAKING:   Critical Behavior:  Neurologic State: Alert, Appropriate for age  Orientation Level: Oriented X4  Cognition: Follows commands  Safety/Judgement: Awareness of environment, Fall prevention, Decreased awareness of need for safety  Hearing: Auditory  Auditory Impairment: None  Skin:    Edema:   Range Of Motion:  AROM: Grossly decreased, non-functional (L UE )           PROM: Generally decreased, functional           Strength:    Strength: Grossly decreased, non-functional (L UE, L LE 2-/5)                    Tone & Sensation:   Tone: Abnormal              Sensation: Impaired               Coordination:  Coordination: Grossly decreased, non-functional  Vision:   Acuity: Within Defined Limits  Corrective Lenses: Reading glasses  Functional Mobility:  Bed Mobility:     Supine to Sit: Moderate assistance     Scooting: Maximum assistance  Transfers:  Sit to Stand: Moderate assistance; Assist x2  Stand to Sit: Moderate assistance; Assist x2  Stand Pivot Transfers: Maximum assistance; Assist x2     Bed to Chair: Maximum assistance; Assist x2              Balance:   Sitting: Impaired; Without support  Sitting - Static: Fair (occasional)  Sitting - Dynamic: Not tested  Standing: Impaired; With support  Standing - Static: Constant support; Fair; Poor  Standing - Dynamic : Constant support; Poor  Ambulation/Gait Training:                                                         Stairs:               Therapeutic Exercises:       Functional Measure:  Palomares Balance Test:    Sitting to Standin  Standing Unsupported: 0  Sitting with Back Unsupported: 4  Standing to Sittin  Transfers: 0  Standing Unsupported with Eyes Closed: 0  Standing Unsupported with Feet Together: 0  Reach Forward with Outstretched Arm: 0   Object: 0  Turn to Look Over Shoulders: 0  Turn 360 Degrees: 0  Alternate Foot on Step/Stool: 0  Standing Unsupported One Foot in Front: 0  Stand on One Le  Total: 4/56         56=Maximum possible score;   0-20=High fall risk  21-40=Moderate fall risk   41-56=Low fall risk                Physical Therapy Evaluation Charge Determination   History Examination Presentation Decision-Making   HIGH Complexity :3+ comorbidities / personal factors will impact the outcome/ POC  MEDIUM Complexity : 3 Standardized tests and measures addressing body structure, function, activity limitation and / or participation in recreation  MEDIUM Complexity : Evolving with changing characteristics  Other outcome measures KAMARA  HIGH       Based on the above components, the patient evaluation is determined to be of the following complexity level: MEDIUM    Pain Rating:      Activity Tolerance:   Fair    After treatment patient left in no apparent distress:   Sitting in chair, Call bell within reach, and Caregiver / family present    COMMUNICATION/EDUCATION:   The patients plan of care was discussed with: Occupational therapist and Registered nurse. Fall prevention education was provided and the patient/caregiver indicated understanding., Patient/family have participated as able in goal setting and plan of care. , and Patient/family agree to work toward stated goals and plan of care.     Thank you for this referral.  Alexsandra Ge, PT   Time Calculation: 31 mins

## 2021-12-08 NOTE — PROGRESS NOTES
Rapid Response Team Note    Called by RN at 3:04 PM to see patient for bradycardia stat. Upon entering the room the patient was unconscious        Visit Vitals  BP 98/76 (BP 1 Location: Right upper arm, BP Patient Position: At rest;Sitting)   Pulse (!) 34   Temp 98.1 °F (36.7 °C)   Resp 12   Ht 5' 2\" (1.575 m)   Wt 76.7 kg (169 lb 1.5 oz)   SpO2 100%   BMI 30.93 kg/m²       Bradycardic and cold extremity  Blood pressure 80/40    Pertinent Recent Labs and Xrays:  Recent Labs     12/08/21  0751 12/07/21  1028   WBC 5.8 6.7   HGB 10.0* 9.6*   HCT 29.7* 28.3*    189     Recent Labs     12/08/21  0751 12/07/21  1028    135*   K 3.6 3.6    101   CO2 27 26   BUN 20 30*   CREA 1.12* 1.14*   GLU 92 113*   CA 9.0 9.1   MG 1.9  --    PHOS 2.6  --      No results for input(s): INR, INREXT in the last 72 hours. No results for input(s): PH, PCO2, PO2 in the last 72 hours. No results for input(s): PHI, PO2I, PCO2I in the last 72 hours. No results for input(s): CPK, CKNDX, TROIQ in the last 72 hours. No lab exists for component: CPKMB  Lab Results   Component Value Date/Time    Glucose (POC) 110 12/08/2021 02:32 PM    Glucose (POC) 146 (H) 12/07/2021 07:48 AM    Glucose (POC) 123 (H) 12/20/2018 05:31 PM    Glucose (POC) 114 (H) 10/01/2010 11:15 PM         -Atropine 0.5 mg given with resolution of bradycardia and hypotension  -Transcutaneous padding were applied to patient's chest.  Patient was discussed with cardiology Jimmykenny Alcantara .  recommendation received to pace transcutaneously if heart rate low less than 35.   -Patient was transferred [facility]  ED for critical services   -Patient was accepted to [facility] ED and will be taken to Cath Lab per cardiology at [address] [place] [address], MD  Critical care time unrelated to prior notes:55

## 2021-12-08 NOTE — CONSULTS
NEUROLOGY CONSULTATION    DATE OF CONSULTATION: 12/7/2021  CONSULTED BY:Dr ALEC Barber    REASON FOR CONSULT: Dizziness/CVA      HISTORY OF PRESENT ILLNESS  Elizabeth Connor is a 80 y.o. right handed black female with history of anemia, arrhythmia, sick sinus syndrome, bradycardia, hypertension, GERD, anxiety disorder, vertigo, presented to the emergency room with dizziness. According to patient, when she woke up on the day of presentation, she was very unsteady, she had to hold on something to move around, there was associated nausea, vomiting, as this persisted, patient says he will go to emergency room. Patient however pointed out that she used to l have vertigo and apparently diagnosed with benign positional vertigo for which she says she takes meclizine as needed. However the dizziness did not subside as she expected. At the emergency room, initial CT scan with CTA was unremarkable. Patient was subsequently admitted for further evaluation and management and neurology was consulted. While patient was on admission she was noted to have left-sided weakness with facial droop which was not there in the initial ER presentation. I evaluated the patient within 30 minutes when the symptoms and signs were noted, however, even though patient might be within the window of TPA, I believe the initial dizziness that patient came in with may be a component of evolving CVA and considering her age, I decided against TPA. .  She denies any more vomiting, minimal nausea, loss of consciousness, dysphagia or odynophagia.   Review of Systems - General ROS: positive for  - fatigue and sleep disturbance  Psychological ROS: positive for - anxiety and sleep disturbances  Ophthalmic ROS: positive for - blurry vision and decreased vision  ENT ROS: positive for - vertigo and visual changes  Allergy and Immunology ROS: negative  Hematological and Lymphatic ROS: negative  Endocrine ROS: negative  Respiratory ROS: no cough, shortness of breath, or wheezing  Cardiovascular ROS: no chest pain or dyspnea on exertion  Gastrointestinal ROS: no abdominal pain, change in bowel habits, or black or bloody stools  Genito-Urinary ROS: no dysuria, trouble voiding, or hematuria  Musculoskeletal ROS: positive for - gait disturbance, joint pain and muscular weakness  Neurological ROS: positive for - dizziness, gait disturbance, impaired coordination/balance, visual changes and weakness  Dermatological ROS: negative      PMH  Past Medical History:   Diagnosis Date    Anemia     /possible MM,seeing     Arrhythmia     SSS    Bradycardia     Essential hypertension     GERD (gastroesophageal reflux disease)     Psychiatric disorder     Anxiety    Psychotic disorder (HCC)     anxiety    Vertigo        SH  Social History     Socioeconomic History    Marital status:    Tobacco Use    Smoking status: Never Smoker    Smokeless tobacco: Never Used   Vaping Use    Vaping Use: Never used   Substance and Sexual Activity    Alcohol use: No    Drug use: No    Sexual activity: Not Currently     Comment: ,2 children,       FH  Family History   Problem Relation Age of Onset    Hypertension Mother        ALLERGIES  No Known Allergies    CURRENT MEDS  Current Facility-Administered Medications   Medication Dose Route Frequency Provider Last Rate Last Admin    acetaminophen (TYLENOL) tablet 650 mg  650 mg Oral Q4H PRN Pawan Flowers MD        Or    acetaminophen (TYLENOL) solution 650 mg  650 mg Per NG tube Q4H PRN Pawan Flowers MD        Or   [facility] acetaminophen (TYLENOL) suppository 650 mg  650 mg Rectal Q4H PRN Pawan Flowers MD        aspirin chewable tablet 81 mg  81 mg Oral DAILY Pawan Flowers MD   81 mg at 12/07/21 1518    clopidogreL (PLAVIX) tablet 75 mg  75 mg Oral DAILY Pawan Flowers MD   75 mg at 12/07/21 1518    enoxaparin (LOVENOX) injection 40 mg  40 mg SubCUTAneous Q24H Pawan Flowers MD   40 mg at 12/07/21 2038    sodium chloride (NS) flush 10 mL  10 mL IntraVENous PRN Jeanna Diallo MD   10 mL at 12/08/21 9842    citalopram (CELEXA) tablet 20 mg  20 mg Oral DAILY Jeanna Diallo MD        busPIRone (BUSPAR) tablet 10 mg  10 mg Oral BID Jeanna Diallo MD   10 mg at 12/07/21 2039    famotidine (PEPCID) tablet 40 mg  40 mg Oral DAILY Jeanna Diallo MD        LORazepam (ATIVAN) tablet 0.5 mg  0.5 mg Oral Q8H PRN Jeanna Diallo MD        benzocaine-menthoL (CHLORASEPTIC MAX) lozenge 1 Lozenge  1 Lozenge Oral Q2H PRN Jeanna Diallo MD           ROS  As per HPI                                  CLINICAL DATA REVIEW  IMAGING: (I personally reviewed these images in PACS )        LABS  Recent Results (from the past 50 hour(s))   GLUCOSE, POC    Collection Time: 12/07/21  7:48 AM   Result Value Ref Range    Glucose (POC) 146 (H) 65 - 117 mg/dL    Performed by Alysia MEIER    EKG, 12 LEAD, INITIAL    Collection Time: 12/07/21 10:13 AM   Result Value Ref Range    Ventricular Rate 46 BPM    Atrial Rate 46 BPM    P-R Interval 262 ms    QRS Duration 82 ms    Q-T Interval 478 ms    QTC Calculation (Bezet) 418 ms    Calculated P Axis 47 degrees    Calculated R Axis -84 degrees    Calculated T Axis 31 degrees    Diagnosis       Sinus bradycardia with 1st degree AV block  Left axis deviation  Low voltage QRS  Poor R-wave Progression (consider lead placement or loss of anterior forces)  Abnormal ECG  When compared with ECG of 20-DEC-2018 17:31,  UT interval has increased  Vent.  rate has decreased BY  30 BPM  QRS voltage has decreased  Confirmed by Brigette Braga M.D., [facility] ([id number]) on 12/8/2021 7:44:25 AM     TROPONIN-HIGH SENSITIVITY    Collection Time: 12/07/21 10:28 AM   Result Value Ref Range    Troponin-High Sensitivity 46 0 - 51 ng/L   CBC WITH AUTOMATED DIFF    Collection Time: 12/07/21 10:28 AM   Result Value Ref Range    WBC 6.7 3.6 - 11.0 K/uL    RBC 2.92 (L) 3.80 - 5.20 M/uL    HGB 9.6 (L) 11.5 - 16.0 g/dL    HCT 28.3 (L) 35.0 - 47.0 %    MCV 96.9 80.0 - 99.0 FL    MCH 32.9 26.0 - 34.0 PG    MCHC 33.9 30.0 - 36.5 g/dL    RDW 22.5 (H) 11.5 - 14.5 %    PLATELET 147 327 - 607 K/uL    MPV 11.2 8.9 - 12.9 FL    NRBC 1.0 (H) 0  WBC    ABSOLUTE NRBC 0.07 (H) 0.00 - 0.01 K/uL    NEUTROPHILS 80 (H) 32 - 75 %    LYMPHOCYTES 13 12 - 49 %    MONOCYTES 5 5 - 13 %    EOSINOPHILS 0 0 - 7 %    BASOPHILS 1 0 - 1 %    IMMATURE GRANULOCYTES 1 (H) 0.0 - 0.5 %    ABS. NEUTROPHILS 5.3 1.8 - 8.0 K/UL    ABS. LYMPHOCYTES 0.9 0.8 - 3.5 K/UL    ABS. MONOCYTES 0.3 0.0 - 1.0 K/UL    ABS. EOSINOPHILS 0.0 0.0 - 0.4 K/UL    ABS. BASOPHILS 0.1 0.0 - 0.1 K/UL    ABS. IMM. GRANS. 0.1 (H) 0.00 - 0.04 K/UL    DF SMEAR SCANNED      RBC COMMENTS ANISOCYTOSIS  1+       METABOLIC PANEL, COMPREHENSIVE    Collection Time: 12/07/21 10:28 AM   Result Value Ref Range    Sodium 135 (L) 136 - 145 mmol/L    Potassium 3.6 3.5 - 5.1 mmol/L    Chloride 101 97 - 108 mmol/L    CO2 26 21 - 32 mmol/L    Anion gap 8 5 - 15 mmol/L    Glucose 113 (H) 65 - 100 mg/dL    BUN 30 (H) 6 - 20 MG/DL    Creatinine 1.14 (H) 0.55 - 1.02 MG/DL    BUN/Creatinine ratio 26 (H) 12 - 20      GFR est AA 54 (L) >60 ml/min/1.73m2    GFR est non-AA 45 (L) >60 ml/min/1.73m2    Calcium 9.1 8.5 - 10.1 MG/DL    Bilirubin, total 0.7 0.2 - 1.0 MG/DL    ALT (SGPT) 16 12 - 78 U/L    AST (SGOT) 17 15 - 37 U/L    Alk.  phosphatase 73 45 - 117 U/L    Protein, total 7.1 6.4 - 8.2 g/dL    Albumin 3.8 3.5 - 5.0 g/dL    Globulin 3.3 2.0 - 4.0 g/dL    A-G Ratio 1.2 1.1 - 2.2     ECHO ADULT COMPLETE    Collection Time: 12/07/21  4:58 PM   Result Value Ref Range    IVSd 1.46 (A) 0.6 - 0.9 cm    LVIDd 3.71 (A) 3.9 - 5.3 cm    LVIDs 1.47 cm    LVOT d 1.65 cm    LVPWd 1.37 (A) 0.6 - 0.9 cm    LV Ejection Fraction MOD 4C 92 percent    LV ED Vol A4C 98.51 mL    LV ES Vol A4C 8.33 mL    LVOT Peak Gradient 3.40 mmHg    LVOT Peak Velocity 92.10 cm/s    RVSP 30.78 mmHg    Left Atrium Major Axis 2.52 cm    LA Area 4C 22.96 cm2    LA Vol 4C 60.75 (A) 22 - 52 mL    Est. RA Pressure 5.00 mmHg    Aortic Valve Area by Planimetry 3.09 cm2    Mitral Valve Area by Planimetry 3.61 cm2    MV A Bossman 70.37 cm/s    Mitral Valve E Wave Deceleration Time 178.80 ms    MV E Bossman 45.07 cm/s    Mitral Valve Pressure Half-time 51.85 ms    MVA (PHT) 4.24 cm2    MV Peak Gradient 2.19 mmHg    MV Mean Gradient 0.66 mmHg    Mitral Valve Pressure Half-time 62.28 ms    Mitral Valve Max Velocity 72.29 cm/s    Mitral Valve Annulus Velocity Time Integral 24.85 cm    MVA (PHT) 3.53 cm2    Pulmonic Valve Systolic Peak Instantaneous Gradient 1.30 mmHg    Triscuspid Valve Regurgitation Peak Gradient 25.78 mmHg    TR Max Velocity 253.81 cm/s    Ao Root D 3.09 cm    MV E/A 0.64     LV Mass .8 67 - 162 g    LV Mass AL Index 106.6 43 - 95 g/m2    Left Atrium Minor Axis 1.41 cm    LA Vol Index 33.94 16 - 28 ml/m2    LVED Vol Index A4C 55.0 mL/m2    LVES Vol Index A4C 4.7 mL/m2        PHYSICAL EXAM  Visit Vitals  /74 (BP 1 Location: Right upper arm, BP Patient Position: At rest)   Pulse (!) 48   Temp 97.7 °F (36.5 °C)   Resp 18   Ht 5' 2\" (1.575 m)   Wt 169 lb 1.5 oz (76.7 kg)   SpO2 96%   BMI 30.93 kg/m²     General:  Alert, cooperative, no distress. Head:  Normocephalic, without obvious abnormality, atraumatic. Eyes:  Conjunctivae/corneas clear. Pupils equal, round, reactive to light. Extraocular movements intact, VFF, NO papilledema   Lungs:  Heart:   Non labored breathing  Regular rate and rhythm, no carotid bruits   Abdomen:   Soft, non-distended   Extremities: Extremities normal, atraumatic, no cyanosis or edema. Pulses: 2+ and symmetric all extremities. Skin: Skin color, texture, turgor normal. No rashes or lesions.    Neurologic:  Gen: Attention normal             Language: naming, repetition, dysarthria             Memory: intact recent and remote memory  Cranial Nerves:  I: smell Not tested   II: visual fields Full to confrontation   II: pupils Equal, round, reactive to light   II: optic disc No papilledema   III,VII: ptosis none   III,IV,VI: extraocular muscles  Full ROM   V: mastication normal   V: facial light touch sensation  normal   VII: facial muscle function    asymmetric, right facial droop   VIII: hearing symmetric   IX: soft palate elevation  normal   XI: trapezius strength  5/5   XI: sternocleidomastoid strength 5/5   XI: neck flexion strength  5/5   XII: tongue  midline     Motor: normal bulk and tone, no tremor              Strength: 4/5 left upper and lower extremity, 5-/5 right upper and lower extremity  Sensory: Equivocal sensation to LT, PP, vibration, and temperature  Coordination: FTN and HTS abnormal left,  Gait: Not assessed at this time  Reflexes: 2+ throughout  Plantar: Mute on the left, withdrawing on the right       IMPRESSION: This is an 80-year-old right-handed black female admitted for dizziness/vertigo, with subsequent left facial weakness and left hemiparesis. We are dealing with most likely acute cerebral ischemia involving the brainstem, patient was not given TPA because of the timing and age. Acute CVA  Left hemiparesis  Dizziness      RECOMMENDATIONS:  1. MRI brain/ MRA head and carotids  2. TTE  3. Telemetry  4. Permissive HTN (SBP<180/<100)  5. Stroke labs (HgbA1c, TSH, lipid panel)  6. Start ASA 81 mg daily, Plavix 75 mg p.o. daily  7. Start statin-Lipitor 40 mg p.o. nightly  8. ST/OT/PT eval ordered and will assess patient for rehab  9. Discussed personal risk factors for stroke including:Discussed the risk factors for stroke including cardiac, metabolic, genetic, endocrine and life style. Discussed the importance of identifying these risk factors and addressing the reversible risk factors. Discussed the preventive medications for stroke the benefits and risks of each. Medications discussed and all questions answered. 10. Discussed signs and symptoms of stroke and when to alert 911  11.  VTE prophylaxis: Lovenox    Thank you very much for this consultation.        Miguel Schofield MD

## 2021-12-08 NOTE — PROGRESS NOTES
1920: received verbal bedside shift change report from Sharon Young RN to include SBAR, Kardex, [facility] and recent results. 2323: Pure wick container emptied w/ 900mL clear yellow urine output. Pure wick, jade and linen changed. 0130: Telemed message sent to MD:  Pt has been running sinus kolby in the upper 40's and low 50's but HR has decreased into the  upper 30's for 1-2 seconds, as low as 35, a few times in the past 1-2 hrs. She is completely asymptomatic. Please advise. Thank you. 0130 - labs drawn by Colleen Fuller LPN and taken to laboratory    0135: MD returned message and called to discuss the above concerns. Cardiology assessed pt yesterday and is aware of bradycardia. Per notes, if bradycardia continues, pacemaker will be considered. tPA was decided against. MD informed symptoms remain unchanged to include L sided facial numbness and mouth droop, LLE weakness and LUE w/ decreased ROM including inability to move the 4 fingers on LH but is able to move her thumb. Pt can raise arm up from shoulder but does not have control of movement from her elbow down to her hand. MD aware. 0300 - Neuro checks, NIH, dysphagia all being completed. No significant changes noted. A&O x4. LUE remains weak w/ limited ROM. Denies any numbness. Able to move L thumb. Unable to move 4 fingers on LH. Pt able to abduct shoulder upward on LUE but unable to extend arm out from elbow. LLE w/ weakness but pt still has full ROM. L facial droop w/ alternating clear and garbled speech as a result. Pt unable to sleep. Appears anxious about her condition. Pt became tearful when speaking w/ this writer stating she's afraid. Reassured pt we will take excellent care of her and will keep her updated on pending tests ordered along w/ results and plan of care to include medication regimen.  Pt stated understanding and expressed appreciation for the care she's receiving.     9960 - pt continues to have brief periodic episodes of severe bradycardia down to 35bpm w/ a infrequent pause in cardiac rhythm, not lasting more than 2 seconds. MD aware. Will ensure day shift RN is aware to notify attending physician today. 3448 - looking for lab results - unable to locate them. Called lab and was told they could not find any pending labs nor any final results on this patient. Labs were unable to be found. Ryan Hester LPN re-mitch pt labs and walked them down to laboratory. 0 - gave verbal bedside shift change report to Tari Gardner RN to include SBAR, Kardex, MAR and recent results.

## 2021-12-08 NOTE — PROGRESS NOTES
Problem: Self Care Deficits Care Plan (Adult)  Goal: *Acute Goals and Plan of Care (Insert Text)  Description: FUNCTIONAL STATUS PRIOR TO ADMISSION: Patient was modified independent using a rollator for functional mobility outside the apartment. Per patient, no DME used in the apartment. Patient is independent in BADLs and active in IADLs, grocery shopping while using cart for support and cooking at apartment. Patient has walk-in shower and sits to bathe. HOME SUPPORT: Patient lived in an apartment alone, however has supportive friends/family and is social at [facility]. Occupational Therapy Goals  Initiated 12/8/2021   1. Patient will perform upper body dressing with minimal assistance using compensatory strategies within 7 day(s). 2.  Patient will perform lower body dressing with moderate assistance using most appropriate DME prn within 7 day(s). 3.  Patient will perform grooming with moderate assistance for container management using LUE to stabilize within 7 day(s). 4.  Patient will perform toilet transfers with minimal assistance using most appropriate DME prn within 7 day(s). 5.  Patient will perform all aspects of toileting with moderate assistance using most appropriate DME prn within 7 day(s). 6.  Patient will participate in upper extremity therapeutic exercise/activities with moderate assistance within 7 day(s). 7.  Patient will utilize energy conservation and fall prevention techniques during functional activities with verbal cues within 7 day(s). 8.  Patient will improve their Fugl Ross score by 5 points in prep for ADLs within 7 days.        Outcome: Progressing Towards Goal   OCCUPATIONAL THERAPY EVALUATION  Patient: Bhavesh Rocha (80 y.o. female)  Date: 12/8/2021  Primary Diagnosis: Dizziness [R42]        Precautions:        ASSESSMENT  Based on the objective data described below, the patient presents with impaired functional mobility and balance, decreased activity tolerance, and L-sided weakness with LUE more affected than LLE. Patient received in bed with gown and linens saturated with urine  (purewick malfunction), agreeable to session and transitioning to EOB with mod assist. Patient sitting balance intact, however great difficulty with sit<>stand and stand-pivot transfer, requiring mod-max assist x2 with additional assist for weightshifting. Following neuro re-education, patient using RUE to assist LUE for donning clean hospital gown, however still requiring max assist to effectively perform task. Overall, patient with trace LUE movement, biceps/triceps reflexes intact, and active movement of thumb only. Per patient, this is a decline in function since admission to hospital. Patient tolerating two stand-pivot transfers, requiring manual facilitation to engage LUE in efforts and to position LUE for safety. Patient is far from baseline at this time, requiring heavy assist x2 for stand-pivot and up to total assist for self-care as LUE grossly non-functional. Encouraged patient to engage in PROM of LUE and attempt to use LUE to stabilize items during material management - patient verbalized agreement, however understandably very overwhelmed and anxious. At this time, patient unsafe to return home to living alone. For best outcome, recommend discharge to [facility] as patient motivated and with good efforts and tolerance. Current Level of Function Impacting Discharge (ADLs/self-care): up to total    Functional Outcome Measure: The patient scored Total A-D  Total A-D (Motor Function): 4/66 on the Fugl-Ross Assessment which is indicative of severe impairment in upper extremity functional status. Other factors to consider for discharge: lives alone; independent - mod I with BADLs and IADLs at baseline     Patient will benefit from skilled therapy intervention to address the above noted impairments.        PLAN :  Recommendations and Planned Interventions: self care training, functional mobility training, therapeutic exercise, balance training, therapeutic activities, endurance activities, neuromuscular re-education, patient education, and home safety training    Frequency/Duration: Patient will be followed by occupational therapy 5 times a week to address goals. Recommendation for discharge: (in order for the patient to meet his/her long term goals)  Therapy 3 hours per day 5-7 days per week    This discharge recommendation:  Has not yet been discussed the attending provider and/or case management    IF patient discharges home will need the following DME: TBD       SUBJECTIVE:   Patient stated it doesn't hurt, I just can't do anything with it in reference to LUE. OBJECTIVE DATA SUMMARY:   HISTORY:   Past Medical History:   Diagnosis Date    Anemia     /possible MM,seeing     Arrhythmia     SSS    Bradycardia     Essential hypertension     GERD (gastroesophageal reflux disease)     Psychiatric disorder     Anxiety    Psychotic disorder (HCC)     anxiety    Vertigo      Past Surgical History:   Procedure Laterality Date    HX BUNIONECTOMY      HX HYSTERECTOMY  1971    partial    HX ORTHOPAEDIC      Status post bunionectomy    HX PARTIAL HYSTERECTOMY       Expanded or extensive additional review of patient history:     Home Situation  Home Environment: Apartment  # Steps to Enter: 0  One/Two Story Residence: One story  Living Alone: Yes  Support Systems: Friend/Neighbor, Religion/[place] [employer]  Patient Expects to be Discharged to[de-identified]  ([facility] for PPM)  Current DME Used/Available at Home: Grab bars, Walker, rollator, Cane, straight, Shower chair, Raised toilet seat  Tub or Shower Type: Shower    Hand dominance: Right    EXAMINATION OF PERFORMANCE DEFICITS:  Cognitive/Behavioral Status:  Neurologic State: Alert;  Appropriate for age  Orientation Level: Oriented X4  Cognition: Follows commands  Perception: Appears intact  Perseveration: No perseveration noted  Safety/Judgement: Awareness of environment; Fall prevention; Decreased awareness of need for safety    Skin:     Edema:     Hearing: Auditory  Auditory Impairment: None    Vision/Perceptual:    Acuity: Within Defined Limits    Corrective Lenses: Reading glasses    Range of Motion:  AROM: Grossly decreased, non-functional (L UE )  PROM: Generally decreased, functional    Strength:  Strength: Grossly decreased, non-functional (L UE trace, L LE 2-/5)    LUE: 1/5 in RUE, 0/5 in digits 2-5, and active opposition in 1st digit    Coordination:  Coordination: Grossly decreased, non-functional  Fine Motor Skills-Upper: Left Impaired; Right Intact    Gross Motor Skills-Upper: Left Impaired; Right Intact    Tone & Sensation:  Tone: Abnormal  Sensation: Impaired    Balance:  Sitting: Impaired; Without support  Sitting - Static: Fair (occasional)  Sitting - Dynamic: Not tested  Standing: Impaired; With support  Standing - Static: Constant support; Fair; Poor  Standing - Dynamic : Constant support; Poor    Functional Mobility and Transfers for ADLs:  Bed Mobility:  Supine to Sit: Moderate assistance  Scooting: Maximum assistance    Transfers:  Sit to Stand: Moderate assistance; Assist x2  Stand to Sit: Moderate assistance; Assist x2  Bed to Chair: Maximum assistance; Assist x2  Toilet Transfer : Maximum assistance; Assist x2 (infer to [facility] via stand-pivot)    ADL Assessment:  Feeding: Moderate assistance    Oral Facial Hygiene/Grooming: Moderate assistance    Bathing: Moderate assistance    Upper Body Dressing: Maximum assistance    Lower Body Dressing: Total assistance    Toileting: Total assistance    ADL Intervention and task modifications:  Grooming  Grooming Assistance: Set-up;  Stand-by assistance  Washing Hands: Stand-by assistance    Lower Body Bathing  Bathing Assistance: Minimum assistance  Lower Body : Minimum assistance  Position Performed: Seated in chair    Upper Body Dressing Assistance  Dressing Assistance: Maximum assistance  Hospital Gown: Maximum assistance    Lower Body Dressing Assistance  Dressing Assistance: Total assistance(dependent)  Protective Undergarmet: Total assistance (dependent)  Socks: Total assistance (dependent)  Position Performed: Seated in chair; Standing    Toileting  Toileting Assistance: Total assistance(dependent)  Bladder Hygiene: Stand-by assistance  Clothing Management: Total assistance (dependent)    Cognitive Retraining  Safety/Judgement: Awareness of environment;  Fall prevention; Decreased awareness of need for safety    Functional Measure:  Fugl-Ross Assessment of Motor Recovery after Stroke:   Reflex Activity  Flexors/Biceps/Fingers: Can be elicited  Extensors/Triceps: Can be elicited  Reflex Subtotal: 4    Volitional Movement Within Synergies  Shoulder Retraction: None  Shoulder Elevation: None  Shoulder Abduction (90 degrees): None  Shoulder External Rotation: None  Elbow Flexion: None  Forearm Supination: None  Shoulder Adduction/Internal Rotation: None  Elbow Extension: None  Forearm Pronation: None  Subtotal: 0    Volitional Movement Mixing Synergies  Hand to Lumbar Spine: None  Shoulder Flexion (0-90 degrees): None  Pronation-Supination: None  Subtotal: 0    Volitional Movement With Little or No Synergy  Shoulder Abduction (0-90 degrees): None  Shoulder Flexion ( degrees): None  Pronation/Supination: None  Subtotal : 0    Normal Reflex Activity  Biceps, Triceps, Finger Flexors: None  Subtotal : 0    Upper Extremity Total   Upper Extremity Total: 4    Wrist  Stability at 15 Degree Dorsiflexion: None  Repeated Dorsiflexion/ Volar Flexion: None  Stability at 15 Degree Dorsiflexion: None  Repeated Dorsiflexion/ Volar Flexion: None  Circumduction: None  Wrist Total: 0    Hand  Mass Flexion: None  Mass Extension: None  Grasp A: None  Grasp B: None  Grasp C: None  Grasp D: None  Grasp E: None  Hand Total: 0    Coordination/Speed  Tremor: Marked  Dysmetria: Marked  Time: >5s  Coordination/Speed Total : 0    Total A-D  Total A-D (Motor Function): 4/66     This is a reliable/valid measure of arm function after a neurological event. It has established value to characterize functional status and for measuring spontaneous and therapy-induced recovery; tests proximal and distal motor functions. Fugl-Ross Assessment  UE scores recorded between five and 30 days post neurologic event can be used to predict UE recovery at six months post neurologic event. Severe = 0-21 points   Moderately Severe = 22-33 points   Moderate = 34-47 points   Mild = 48-66 points  KEYON Naylor, YOBANY Katz, & HANANE Worrell (1992). Measurement of motor recovery after stroke: Outcome assessment and sample size requirements. Stroke, 23, pp. 4851-4310.   ------------------------------------------------------------------------------------------------------------------------------------------------------------------  MCID:  Stroke:   Jessa Cao et al, 2001; n = 171; mean age 79 (5) years; assessed within 16 (12) days of stroke, Acute Stroke)  FMA Motor Scores from Admission to Discharge    10 point increase in FMA Upper Extremity = 1.5 change in discharge FIM    10 point increase in FMA Lower Extremity = 1.9 change in discharge FIM  MDC:   Stroke:   Ernestina Mcdonald et al, 2008, n = 14, mean age = 59.9 (14.6) years, assessed on average 14 (6.5) months post stroke, Chronic Stroke)    FMA = 5.2 points for the Upper Extremity portion of the assessment     Occupational Therapy Evaluation Charge Determination   History Examination Decision-Making   LOW Complexity : Brief history review  HIGH Complexity : 5 or more performance deficits relating to physical, cognitive , or psychosocial skils that result in activity limitations and / or participation restrictions HIGH Complexity : Patient presents with comorbidities that affect occupational performance.  Signifigant modification of tasks or assistance (eg, physical or verbal) with assessment (s) is necessary to enable patient to complete evaluation       Based on the above components, the patient evaluation is determined to be of the following complexity level: LOW   Pain Rating:  No reports. Activity Tolerance:   Fair and requires rest breaks    After treatment patient left in no apparent distress:    Sitting in chair, Call bell within reach and Caregiver / family present    COMMUNICATION/EDUCATION:   The patients plan of care was discussed with: Physical therapist and Registered nurse. Patient was educated regarding her deficit(s) of L-side deficits as this relates to her diagnosis of CVA work-up. She demonstrated Guarded understanding as evidenced by verbalization. Patient and/or family was verbally educated on the BE FAST acronym for signs/symptoms of CVA and TIA. BE FAST was written on patient's communication board  for visual education and reinforcement. All questions answered with patient indicating fair understanding. Home safety education was provided and the patient/caregiver indicated understanding., Patient/family have participated as able in goal setting and plan of care. and Patient/family agree to work toward stated goals and plan of care.     Thank you for this referral.  Kika Reed OT  Time Calculation: 31 mins

## 2021-12-08 NOTE — PROGRESS NOTES
Speech pathology note  Reviewed chart and note patient transferring to [facility] for PPM placement. If SLP evaluation still desired, please re-consult after transfer. Thank you.     Gaudencio Rosales., CCC-SLP

## 2021-12-09 ENCOUNTER — APPOINTMENT (OUTPATIENT)
Dept: MRI IMAGING | Age: 86
DRG: 242 | End: 2021-12-09
Attending: INTERNAL MEDICINE
Payer: MEDICARE

## 2021-12-09 ENCOUNTER — APPOINTMENT (OUTPATIENT)
Dept: GENERAL RADIOLOGY | Age: 86
DRG: 242 | End: 2021-12-09
Attending: INTERNAL MEDICINE
Payer: MEDICARE

## 2021-12-09 ENCOUNTER — APPOINTMENT (OUTPATIENT)
Dept: CT IMAGING | Age: 86
DRG: 242 | End: 2021-12-09
Attending: PSYCHIATRY & NEUROLOGY
Payer: MEDICARE

## 2021-12-09 ENCOUNTER — APPOINTMENT (OUTPATIENT)
Dept: VASCULAR SURGERY | Age: 86
DRG: 242 | End: 2021-12-09
Attending: PSYCHIATRY & NEUROLOGY
Payer: MEDICARE

## 2021-12-09 PROBLEM — I67.89 HEMIPARESIS OF LEFT NONDOMINANT SIDE DUE TO ACUTE CEREBROVASCULAR DISEASE (HCC): Status: ACTIVE | Noted: 2021-12-09

## 2021-12-09 PROBLEM — G45.0 VERTEBRAL BASILAR INSUFFICIENCY: Status: ACTIVE | Noted: 2021-12-09

## 2021-12-09 PROBLEM — R42 DIZZINESS AND GIDDINESS: Status: ACTIVE | Noted: 2021-12-09

## 2021-12-09 PROBLEM — G81.94 HEMIPARESIS OF LEFT NONDOMINANT SIDE DUE TO ACUTE CEREBROVASCULAR DISEASE (HCC): Status: ACTIVE | Noted: 2021-12-09

## 2021-12-09 PROBLEM — I63.311 THROMBOTIC STROKE INVOLVING RIGHT MIDDLE CEREBRAL ARTERY (HCC): Status: ACTIVE | Noted: 2021-12-09

## 2021-12-09 PROBLEM — I65.23 BILATERAL CAROTID ARTERY STENOSIS: Status: ACTIVE | Noted: 2021-12-09

## 2021-12-09 LAB
LEFT CCA DIST DIAS: 15.5 CM/S
LEFT CCA DIST SYS: 53.3 CM/S
LEFT CCA PROX DIAS: 14.2 CM/S
LEFT CCA PROX SYS: 52 CM/S
LEFT ECA DIAS: 7.7 CM/S
LEFT ECA SYS: 69 CM/S
LEFT ICA DIST DIAS: 23.3 CM/S
LEFT ICA DIST SYS: 69 CM/S
LEFT ICA MID DIAS: 14.2 CM/S
LEFT ICA MID SYS: 46.8 CM/S
LEFT ICA PROX DIAS: 12.9 CM/S
LEFT ICA PROX SYS: 52 CM/S
LEFT ICA/CCA SYS: 1.3
LEFT VERTEBRAL DIAS: 7.7 CM/S
LEFT VERTEBRAL SYS: 27.3 CM/S
RIGHT CCA DIST DIAS: 14.2 CM/S
RIGHT CCA DIST SYS: 52 CM/S
RIGHT CCA PROX DIAS: 12.9 CM/S
RIGHT CCA PROX SYS: 59.9 CM/S
RIGHT ECA DIAS: 6.4 CM/S
RIGHT ECA SYS: 62.5 CM/S
RIGHT ICA DIST DIAS: 14.1 CM/S
RIGHT ICA DIST SYS: 59.8 CM/S
RIGHT ICA MID DIAS: 15.5 CM/S
RIGHT ICA MID SYS: 42.9 CM/S
RIGHT ICA PROX DIAS: 18.1 CM/S
RIGHT ICA PROX SYS: 66.4 CM/S
RIGHT ICA/CCA SYS: 1.3
RIGHT VERTEBRAL DIAS: 6.4 CM/S
RIGHT VERTEBRAL SYS: 24.7 CM/S
VAS LEFT SUBCLAVIAN PROX EDV: 0 CM/S
VAS LEFT SUBCLAVIAN PROX PSV: 71.6 CM/S
VAS RIGHT SUBCLAVIAN PROX EDV: 0 CM/S
VAS RIGHT SUBCLAVIAN PROX PSV: 63.8 CM/S

## 2021-12-09 PROCEDURE — 74011250636 HC RX REV CODE- 250/636: Performed by: INTERNAL MEDICINE

## 2021-12-09 PROCEDURE — 2709999900 HC NON-CHARGEABLE SUPPLY: Performed by: INTERNAL MEDICINE

## 2021-12-09 PROCEDURE — 2709999900 HC NON-CHARGEABLE SUPPLY

## 2021-12-09 PROCEDURE — 0JH606Z INSERTION OF PACEMAKER, DUAL CHAMBER INTO CHEST SUBCUTANEOUS TISSUE AND FASCIA, OPEN APPROACH: ICD-10-PCS | Performed by: INTERNAL MEDICINE

## 2021-12-09 PROCEDURE — 77030022704 HC SUT VLOC COVD -B: Performed by: INTERNAL MEDICINE

## 2021-12-09 PROCEDURE — 65660000000 HC RM CCU STEPDOWN

## 2021-12-09 PROCEDURE — 92522 EVALUATE SPEECH PRODUCTION: CPT

## 2021-12-09 PROCEDURE — 99223 1ST HOSP IP/OBS HIGH 75: CPT | Performed by: PSYCHIATRY & NEUROLOGY

## 2021-12-09 PROCEDURE — 97530 THERAPEUTIC ACTIVITIES: CPT

## 2021-12-09 PROCEDURE — 02H63JZ INSERTION OF PACEMAKER LEAD INTO RIGHT ATRIUM, PERCUTANEOUS APPROACH: ICD-10-PCS | Performed by: INTERNAL MEDICINE

## 2021-12-09 PROCEDURE — 74011250637 HC RX REV CODE- 250/637: Performed by: INTERNAL MEDICINE

## 2021-12-09 PROCEDURE — 99153 MOD SED SAME PHYS/QHP EA: CPT | Performed by: INTERNAL MEDICINE

## 2021-12-09 PROCEDURE — 02HK3JZ INSERTION OF PACEMAKER LEAD INTO RIGHT VENTRICLE, PERCUTANEOUS APPROACH: ICD-10-PCS | Performed by: INTERNAL MEDICINE

## 2021-12-09 PROCEDURE — 99223 1ST HOSP IP/OBS HIGH 75: CPT | Performed by: INTERNAL MEDICINE

## 2021-12-09 PROCEDURE — 71045 X-RAY EXAM CHEST 1 VIEW: CPT

## 2021-12-09 PROCEDURE — 93880 EXTRACRANIAL BILAT STUDY: CPT | Performed by: PSYCHIATRY & NEUROLOGY

## 2021-12-09 PROCEDURE — 97164 PT RE-EVAL EST PLAN CARE: CPT

## 2021-12-09 PROCEDURE — 97165 OT EVAL LOW COMPLEX 30 MIN: CPT

## 2021-12-09 PROCEDURE — C1898 LEAD, PMKR, OTHER THAN TRANS: HCPCS | Performed by: INTERNAL MEDICINE

## 2021-12-09 PROCEDURE — 97535 SELF CARE MNGMENT TRAINING: CPT

## 2021-12-09 PROCEDURE — C1894 INTRO/SHEATH, NON-LASER: HCPCS | Performed by: INTERNAL MEDICINE

## 2021-12-09 PROCEDURE — C1785 PMKR, DUAL, RATE-RESP: HCPCS | Performed by: INTERNAL MEDICINE

## 2021-12-09 PROCEDURE — 70450 CT HEAD/BRAIN W/O DYE: CPT

## 2021-12-09 PROCEDURE — 77030037400 HC ADH TISS HI VISC EXOFIN CHMP -B: Performed by: INTERNAL MEDICINE

## 2021-12-09 PROCEDURE — 99152 MOD SED SAME PHYS/QHP 5/>YRS: CPT | Performed by: INTERNAL MEDICINE

## 2021-12-09 PROCEDURE — 92610 EVALUATE SWALLOWING FUNCTION: CPT

## 2021-12-09 PROCEDURE — 77030018729 HC ELECTRD DEFIB PAD CARD -B: Performed by: INTERNAL MEDICINE

## 2021-12-09 PROCEDURE — 33208 INSRT HEART PM ATRIAL & VENT: CPT | Performed by: INTERNAL MEDICINE

## 2021-12-09 PROCEDURE — 97162 PT EVAL MOD COMPLEX 30 MIN: CPT

## 2021-12-09 PROCEDURE — 74011000272 HC RX REV CODE- 272: Performed by: INTERNAL MEDICINE

## 2021-12-09 PROCEDURE — 74011000250 HC RX REV CODE- 250: Performed by: INTERNAL MEDICINE

## 2021-12-09 PROCEDURE — 93880 EXTRACRANIAL BILAT STUDY: CPT

## 2021-12-09 PROCEDURE — C1893 INTRO/SHEATH, FIXED,NON-PEEL: HCPCS | Performed by: INTERNAL MEDICINE

## 2021-12-09 PROCEDURE — 77030002996 HC SUT SLK J&J -A: Performed by: INTERNAL MEDICINE

## 2021-12-09 DEVICE — IPG W1DR01 AZURE XT DR MRI USA
Type: IMPLANTABLE DEVICE | Status: FUNCTIONAL
Brand: AZURE™ XT DR MRI SURESCAN™

## 2021-12-09 DEVICE — LEAD 5076-52 MRI US RCMCRD
Type: IMPLANTABLE DEVICE | Status: FUNCTIONAL
Brand: CAPSUREFIX NOVUS MRI™ SURESCAN®

## 2021-12-09 DEVICE — LEAD 5076-58 MRI US RCMCRD
Type: IMPLANTABLE DEVICE | Status: FUNCTIONAL
Brand: CAPSUREFIX NOVUS MRI™ SURESCAN®

## 2021-12-09 RX ORDER — LIDOCAINE HYDROCHLORIDE 10 MG/ML
INJECTION INFILTRATION; PERINEURAL AS NEEDED
Status: DISCONTINUED | OUTPATIENT
Start: 2021-12-09 | End: 2021-12-09 | Stop reason: HOSPADM

## 2021-12-09 RX ORDER — SODIUM CHLORIDE 0.9 % (FLUSH) 0.9 %
5-40 SYRINGE (ML) INJECTION EVERY 8 HOURS
Status: DISCONTINUED | OUTPATIENT
Start: 2021-12-09 | End: 2021-12-13 | Stop reason: HOSPADM

## 2021-12-09 RX ORDER — MIDAZOLAM HYDROCHLORIDE 1 MG/ML
INJECTION, SOLUTION INTRAMUSCULAR; INTRAVENOUS AS NEEDED
Status: DISCONTINUED | OUTPATIENT
Start: 2021-12-09 | End: 2021-12-09 | Stop reason: HOSPADM

## 2021-12-09 RX ORDER — SODIUM CHLORIDE 0.9 % (FLUSH) 0.9 %
5-40 SYRINGE (ML) INJECTION AS NEEDED
Status: DISCONTINUED | OUTPATIENT
Start: 2021-12-09 | End: 2021-12-13 | Stop reason: HOSPADM

## 2021-12-09 RX ORDER — FENTANYL CITRATE 50 UG/ML
INJECTION, SOLUTION INTRAMUSCULAR; INTRAVENOUS AS NEEDED
Status: DISCONTINUED | OUTPATIENT
Start: 2021-12-09 | End: 2021-12-09 | Stop reason: HOSPADM

## 2021-12-09 RX ORDER — HYDRALAZINE HYDROCHLORIDE 20 MG/ML
10 INJECTION INTRAMUSCULAR; INTRAVENOUS
Status: DISCONTINUED | OUTPATIENT
Start: 2021-12-09 | End: 2021-12-13 | Stop reason: HOSPADM

## 2021-12-09 RX ORDER — NALOXONE HYDROCHLORIDE 0.4 MG/ML
0.4 INJECTION, SOLUTION INTRAMUSCULAR; INTRAVENOUS; SUBCUTANEOUS AS NEEDED
Status: DISCONTINUED | OUTPATIENT
Start: 2021-12-09 | End: 2021-12-13 | Stop reason: HOSPADM

## 2021-12-09 RX ADMIN — ENOXAPARIN SODIUM 40 MG: 100 INJECTION SUBCUTANEOUS at 23:18

## 2021-12-09 RX ADMIN — BUSPIRONE HYDROCHLORIDE 10 MG: 10 TABLET ORAL at 10:35

## 2021-12-09 RX ADMIN — CITALOPRAM HYDROBROMIDE 20 MG: 20 TABLET ORAL at 10:35

## 2021-12-09 RX ADMIN — FAMOTIDINE 40 MG: 20 TABLET ORAL at 10:35

## 2021-12-09 RX ADMIN — ASPIRIN 81 MG CHEWABLE TABLET 81 MG: 81 TABLET CHEWABLE at 10:35

## 2021-12-09 RX ADMIN — LORAZEPAM 0.5 MG: 0.5 TABLET ORAL at 10:37

## 2021-12-09 RX ADMIN — CLOPIDOGREL BISULFATE 75 MG: 75 TABLET ORAL at 10:35

## 2021-12-09 RX ADMIN — BUSPIRONE HYDROCHLORIDE 10 MG: 10 TABLET ORAL at 17:50

## 2021-12-09 RX ADMIN — ATORVASTATIN CALCIUM 10 MG: 10 TABLET, FILM COATED ORAL at 23:18

## 2021-12-09 RX ADMIN — Medication 10 ML: at 10:36

## 2021-12-09 RX ADMIN — Medication 10 ML: at 22:00

## 2021-12-09 NOTE — PROGRESS NOTES
Occupational Therapy Note:    Orders acknowledged and chart reviewed. Pt had pacemaker placement this morning and is currently on bedrest. Will defer OT evaluation this morning and attempt this afternoon. Thank you.     Tiera Pinto OTR/L

## 2021-12-09 NOTE — CONSULTS
Subjective:                  [address], [place], [address] [facility]  [phone]    PLEASE NOTE THAT WE CONFIRMED WITH THE REFERRING PHYSICIAN PRIOR TO SEEING THE PATIENT THAT THE PATIENT IS BEING REFERRED FOR INITIAL HOSPITAL EVALUATION AND FOR LONG-TERM ONGOING CARDIAC CARE. Date of  Admission: 12/8/2021  4:00 PM     Admission type:Urgent    Bakari Noe is a 80 y.o. female admitted for Symptomatic bradycardia [R00.1]; Bradycardic cardiac arrest (HCC) [R00.1, I46.2]. Pt with bradycardia arrest at [facility]. Upon admission was noted to have right facial droop with left sided weakness. She claims that her left sided weakness has resolved.  Has had multiple episodes of near syncope with documented HRs in the 30s      Patient Active Problem List    Diagnosis Date Noted    Symptomatic bradycardia 12/08/2021    Bradycardic cardiac arrest ([facility] [address] 12/08/2021    Dizziness 12/07/2021    Bilateral carpal tunnel syndrome 03/05/2019    Smoldering myeloma ([facility] [address] [address] 09/06/2018    Primary osteoarthritis of both hands 01/30/2018    Uterine prolapse 02/14/2017    Osteoarthritis of lumbar spine 02/14/2017    Vasomotor rhinitis 11/04/2016    Non morbid obesity due to excess calories 11/04/2016    Anemia 11/04/2016    Primary osteoarthritis of both knees 11/04/2016    Benign recurrent vertigo 11/04/2016    Anxiety tension state 11/04/2016    Dyspepsia 11/04/2016    SSS (sick sinus syndrome) (HCC) 02/16/2015    Sinus congestion 08/23/2013    Obesity 10/09/2012    Bradycardia 10/09/2012    HTN (hypertension) 10/09/2012    Syncope and collapse 09/18/2012      Other, MD Gege  Past Medical History:   Diagnosis Date    Anemia     Sigifredo Madsen MM,seeing     Arrhythmia     SSS    Bradycardia     Essential hypertension     GERD (gastroesophageal reflux disease)     Psychiatric disorder     Anxiety    Psychotic disorder (HCC)     anxiety    Vertigo       Past Surgical History:   Procedure Laterality Date  HX BUNIONECTOMY      HX HYSTERECTOMY  1971    partial    HX ORTHOPAEDIC      Status post bunionectomy    HX PARTIAL HYSTERECTOMY       No Known Allergies  Social History     Tobacco Use    Smoking status: Never Smoker    Smokeless tobacco: Never Used   Vaping Use    Vaping Use: Never used   Substance Use Topics    Alcohol use: No    Drug use: No     Family History   Problem Relation Age of Onset    Hypertension Mother       Current Facility-Administered Medications   Medication Dose Route Frequency    acetaminophen (TYLENOL) tablet 650 mg  650 mg Oral Q4H PRN    Or    acetaminophen (TYLENOL) solution 650 mg  650 mg Per NG tube Q4H PRN    Or    acetaminophen (TYLENOL) suppository 650 mg  650 mg Rectal Q4H PRN    aspirin chewable tablet 81 mg  81 mg Oral DAILY    clopidogreL (PLAVIX) tablet 75 mg  75 mg Oral DAILY    enoxaparin (LOVENOX) injection 40 mg  40 mg SubCUTAneous Q24H    sodium chloride (NS) flush 10 mL  10 mL IntraVENous PRN    citalopram (CELEXA) tablet 20 mg  20 mg Oral DAILY    busPIRone (BUSPAR) tablet 10 mg  10 mg Oral BID    famotidine (PEPCID) tablet 40 mg  40 mg Oral DAILY    LORazepam (ATIVAN) tablet 0.5 mg  0.5 mg Oral Q8H PRN    benzocaine-menthoL (CHLORASEPTIC MAX) lozenge 1 Lozenge  1 Lozenge Oral Q2H PRN    atorvastatin (LIPITOR) tablet 10 mg  10 mg Oral QHS         Review of Symptoms:  Constitutional: negative  Eyes: negative  Ears, nose, mouth, throat, and face: negative  Respiratory: negative  Cardiovascular: negative  Gastrointestinal: negative  Genitourinary: negative  Musculoskeletal: negative  Neurological: negative  Behvioral/Psych: negative  Endocrine: negative     Subjective:      Visit Vitals  BP (!) 115/59 (BP 1 Location: Right arm, BP Patient Position: At rest)   Pulse (!) 46   Temp 98.4 °F (36.9 °C)   Resp 18   SpO2 100%       Physical Exam:  General:  Alert, cooperative, well noursished, well developed, appears stated age   Eyes:  Sclera anicteric. Pupils equally round and reactive to light. Mouth/Throat: Mucous membranes normal, oral pharynx clear   Neck: Supple   Lungs:   Clear to auscultation bilaterally, good effort   CV:  Regular rate and rhythm,no murmur, click, rub or gallop   Abdomen:   Soft, non-tender. bowel sounds normal. non-distended   Extremities: No cyanosis or edema   Skin: Skin color, texture, turgor normal. no acute rash or lesions   Lymph nodes: Cervical and supraclavicular normal   Musculoskeletal: No swelling or deformity   Neuro 4/5++ weakness LUE, no facial droop, 5/5++ strength lower ext and RUE   Psych: Alert and oriented, normal mood affect given the setting         Cardiographics    Telemetry: nsr      Labs:  Recent Labs     21  07521  1028   WBC 5.8 6.7   HGB 10.0* 9.6*   HCT 29.7* 28.3*    189     Recent Labs     21  07521  1028    135*   K 3.6 3.6    101   CO2 27 26   GLU 92 113*   BUN 20 30*   CREA 1.12* 1.14*   CA 9.0 9.1   MG 1.9  --    PHOS 2.6  --    ALB  --  3.8   TBILI  --  0.7   ALT  --  16       No results for input(s): TROIQ, CPK, CKMB in the last 72 hours. Intake/Output Summary (Last 24 hours) at 2021 0728  Last data filed at 2021 1628  Gross per 24 hour   Intake 240 ml   Output --   Net 240 ml         Assessment:     Assessment:       Active Problems:    Syncope and collapse (2012)      Symptomatic bradycardia (2021)      Bradycardic cardiac arrest [facility]) (2021)         Plan:     Maribell Kent is a pleasant lady who had syncope in the hospital with documented HR of 34 that responded to atropine - documented by hospitalist at [address] [place] [address]. She is a candidate for a dc ppm for symptomatic bradycardia. I reviewed the neurologists note and the hospitalists note. The neurologist was concerned about a possible CVA/TIA. Her facial droop has resolved as well as her LLE weakness.  She has slight left UE weakness on exam. MRI would be for dx only and can be done in 4-6 weeks post PPM implant. In light of her kolby arrest at [facility], I think waiting for the MRI to be done late today or tmrw would not be worth the risks of waiting for her to have another kolby arrest. I discussed the risks/benefits/alternatives of the procedure with the patient. Risks include (but are not limited to) bleeding, heart block, infection, cva/mi/tamponade/death. The patient understands and agrees to proceed. Thank you for this interesting consultation.         Pablo Medina MD, [facility] [place]    12/9/2021

## 2021-12-09 NOTE — PROGRESS NOTES
Physical Therapy    Chart reviewed and discussed with RN, patient had pacemaker placement this morning and is currently on bedrest.  Plan to defer this morning and will see for PT eval this afternoon.     Denise Islas

## 2021-12-09 NOTE — PROGRESS NOTES
Sp ppm    cxr pending    If cxr wnl, then ok for dc this afternoon. F/u in 2 weeks as outpt    Thank you for allowing me to participate in this patients care.     Dennis Larson MD, Valerie Tian

## 2021-12-09 NOTE — PROGRESS NOTES
Speech pathology brief note; full note to follow. Patient with [facility] oral/pharyngeal swallow despite L labial droop. SLP will follow for motor speech treatment.

## 2021-12-09 NOTE — PROGRESS NOTES
Problem: Self Care Deficits Care Plan (Adult)  Goal: *Acute Goals and Plan of Care (Insert Text)  Description: FUNCTIONAL STATUS PRIOR TO ADMISSION: Patient was modified independent using a rollator for functional mobility outside the apartment. Per patient, no DME used in the apartment. Patient is independent in BADLs and active in IADLs, grocery shopping while using cart for support and cooking at apartment. Patient has walk-in shower and sits to bathe. HOME SUPPORT: Patient lived alone with support from family and friends. Occupational Therapy Goals  Initiated 12/9/2021  1. Patient will perform grooming with supervision/set-up within 7 day(s). 2.  Patient will perform upper body dressing with moderate assistance  within 7 day(s). 3.  Patient will maintain pacemaker precautions without prompting within 7 day(s). 4.  Patient will perform toilet transfers with moderate assistance within 7 day(s). 5.  Patient will perform all aspects of toileting with moderate assistance  within 7 day(s). 6.  Patient will participate in upper extremity therapeutic exercise/activities with minimal assistance for 5 minutes within 7 day(s). 7.  Patient will utilize energy conservation techniques during functional activities with verbal cues within 7 day(s). Outcome: Progressing Towards Goal   OCCUPATIONAL THERAPY EVALUATION  Patient: Feliberto Huang (31 y.o. female)  Date: 12/9/2021  Primary Diagnosis: Symptomatic bradycardia [R00.1]  Bradycardic cardiac arrest (HCC) [R00.1, I46.2]  Procedure(s) (LRB):  INSERT PPM DUAL (N/A) Day of Surgery   Precautions:       ASSESSMENT  Based on the objective data described below, the patient presents with pacemaker precautions, L sided weakness, decreased balance and endurance, and impaired functional mobility following admission for bradycardia, s/p pacemaker placement. At baseline she lives alone and is mod I with ADLs and mobility using rollator.  She was received semisupine in bed, agreeable to participate, LUE sling donned. She reports having intact sensation to L side, demo'd slight thumb AROM and partial elbow extension. She transferred supine>sit with mod A x2, fair-poor sitting balance that improved with time. She stood with mod A x2 and performed stand pivot to chair with max A x2, assistance for weight shifting and to safely position LUE. She was able to wash face using R hand once seated in chair. Pt educated on BEFAST and pacemaker precautions but will require reinforcemen. At this time pt is functioning well below her mod I baseline and will benefit from skilled therapy intervention to address the above noted impairments. Recommend rehab at discharge, pt is motivated to work with therapy to improve her functional performance. Current Level of Function Impacting Discharge (ADLs/self-care): mod-max A x2 tranfers, min-total A ADLs    Functional Outcome Measure: The patient scored 15/100 on the Barthel Index outcome measure. Other factors to consider for discharge: fall risk, well below PLOF, new pacemaker, lives alone        PLAN :  Recommendations and Planned Interventions: self care training, functional mobility training, therapeutic exercise, balance training, therapeutic activities, endurance activities, patient education, home safety training, and family training/education    Frequency/Duration: Patient will be followed by occupational therapy 5 times a week to address goals. Recommendation for discharge: (in order for the patient to meet his/her long term goals)  Therapy 3 hours per day 5-7 days per week    This discharge recommendation:  Has been made in collaboration with the attending provider and/or case management    IF patient discharges home will need the following DME: TBD       SUBJECTIVE:   Patient stated [facility] can't I move this leg.     OBJECTIVE DATA SUMMARY:   HISTORY:   Past Medical History:   Diagnosis Date    Anemia     /possible MM,seeing Jackelyn Hyman Arrhythmia     SSS    Bradycardia     Essential hypertension     GERD (gastroesophageal reflux disease)     Psychiatric disorder     Anxiety    Psychotic disorder (HCC)     anxiety    Thrombotic stroke involving right middle cerebral artery ([facility] [address] 12/9/2021    Vertigo      Past Surgical History:   Procedure Laterality Date    HX BUNIONECTOMY      HX HYSTERECTOMY  1971    partial    HX ORTHOPAEDIC      Status post bunionectomy    HX PARTIAL HYSTERECTOMY         Expanded or extensive additional review of patient history:          Hand dominance: Right    EXAMINATION OF PERFORMANCE DEFICITS:  Cognitive/Behavioral Status:  Neurologic State: Alert; Appropriate for age  Orientation Level: Oriented X4  Cognition: Follows commands           Range of Motion:  AROM: Generally decreased, functional (left UE non functional)          Strength:  Strength: Generally decreased, functional (left UE flaccid)        Coordination:  Coordination: Generally decreased, functional            Tone & Sensation:  Tone: Abnormal             Balance:  Sitting: Impaired  Sitting - Static: Fair (occasional); Poor (constant support)  Sitting - Dynamic: Poor (constant support)  Standing: Impaired  Standing - Static: Constant support; Poor  Standing - Dynamic : Constant support; Poor    Functional Mobility and Transfers for ADLs:  Bed Mobility:  Rolling: Moderate assistance; Assist x2  Supine to Sit: Moderate assistance; Assist x2  Scooting: Maximum assistance    Transfers:  Sit to Stand: Moderate assistance; Assist x2  Stand to Sit: Moderate assistance; Assist x2  Bed to Chair: Moderate assistance; Maximum assistance; Assist x2    ADL Assessment:  Feeding: Moderate assistance    Oral Facial Hygiene/Grooming: Moderate assistance    Bathing: Maximum assistance    Upper Body Dressing: Total assistance    Lower Body Dressing: Total assistance    Toileting:  Total assistance           ADL Intervention and task modifications:       Grooming  Position Performed: Seated in chair  Washing Face: Set-up         Lower Body Dressing Assistance  Socks: Total assistance (dependent)     Functional Measure:    Barthel Index:  Bathin  Bladder: 0  Bowels: 5  Groomin  Dressin  Feedin  Mobility: 0  Stairs: 0  Toilet Use: 0  Transfer (Bed to Chair and Back): 5  Total: 15/100      The Barthel ADL Index: Guidelines  1. The index should be used as a record of what a patient does, not as a record of what a patient could do. 2. The main aim is to establish degree of independence from any help, physical or verbal, however minor and for whatever reason. 3. The need for supervision renders the patient not independent. 4. A patient's performance should be established using the best available evidence. Asking the patient, friends/relatives and nurses are the usual sources, but direct observation and common sense are also important. However direct testing is not needed. 5. Usually the patient's performance over the preceding 24-48 hours is important, but occasionally longer periods will be relevant. 6. Middle categories imply that the patient supplies over 50 per cent of the effort. 7. Use of aids to be independent is allowed. Score Interpretation (from [address] [id number])    Independent   60-79 Minimally independent   40-59 Partially dependent   20-39 Very dependent   <20 Totally dependent     -Tai Beard., Barthel, D.W. (1965). Functional evaluation: the Barthel Index. [facility] ([address] (1997). The Barthel activities of daily living index: self-reporting versus actual performance in the old (> or = 75 years). Journal of [address] [place] [address] 45(7), [facility], Dov Ruth., Mary Anne Soni (1999). Measuring the change in disability after inpatient rehabilitation; comparison of the responsiveness of the Barthel Index and Functional [place] Measure.  Journal of Neurology, Neurosurgery, and Psychiatry, 66(4), 987-832. REI Juarez, CL Gray, & En Trotter M.A. (2004) Assessment of post-stroke quality of life in cost-effectiveness studies: The usefulness of the Barthel Index and the EuroQoL-5D. Quality of Life Research, 15, 158-26      Occupational Therapy Evaluation Charge Determination   History Examination Decision-Making   MEDIUM Complexity : Expanded review of history including physical, cognitive and psychosocial  history  MEDIUM Complexity : 3-5 performance deficits relating to physical, cognitive , or psychosocial skils that result in activity limitations and / or participation restrictions MEDIUM Complexity : Patient may present with comorbidities that affect occupational performnce. Miniml to moderate modification of tasks or assistance (eg, physical or verbal ) with assesment(s) is necessary to enable patient to complete evaluation       Based on the above components, the patient evaluation is determined to be of the following complexity level: MEDIUM  Pain Rating:  Pt did not c/o pain    Activity Tolerance:   Fair    After treatment patient left in no apparent distress:    Sitting in chair, Call bell within reach, and Bed / chair alarm activated    COMMUNICATION/EDUCATION:   The patients plan of care was discussed with: Physical therapist and Registered nurse. Home safety education was provided and the patient/caregiver indicated understanding., Patient/family have participated as able in goal setting and plan of care. , and Patient/family agree to work toward stated goals and plan of care. Patient was educated regarding her deficit(s) of L sided weakness as this relates to her diagnosis of CVA workup. She demonstrated Fair understanding as evidenced by teachback. Patient and/or family was verbally educated on the BE FAST acronym for signs/symptoms of CVA and TIA. BE FAST was written on patient's communication board  for visual education and reinforcement. All questions answered with patient indicating fair understanding. This patients plan of care is appropriate for delegation to SUN.     Thank you for this referral.  Dannie Desai, OT  Time Calculation: 45 mins

## 2021-12-09 NOTE — PROGRESS NOTES
Per chart review, Pt transferred from [facility]. Pacemaker placement this am. CM will continue to follow for discharge planning needs.        Mariah Moran, [facility], ANIBAL Minaya

## 2021-12-09 NOTE — H&P
Hospitalist Admission Note    NAME: Lincoln Ovalle   :  1932   MRN:  [id number]     Date/Time:  2021 7:19 PM    Patient PCP: Gege Bertrand MD  ______________________________________________________________________  Given the patient's current clinical presentation, I have a high level of concern for decompensation if discharged from the emergency department. Complex decision making was performed, which includes reviewing the patient's available past medical records, laboratory results, and x-ray films. My assessment of this patient's clinical condition and my plan of care is as follows. Assessment / Plan:  Symptomatic bradycardia with arrest (at [facility])  Sick sinus syndrome  Most likely cause for patient's dizziness/vertigo POA  Rule out suspected CVA POA  Recent echo ()= EF 60 to 65%      Patient directly admitted to [facility] floor bed after being transferred from [facility] for bradycardic cardiac arrest today morning. Patient was admitted for TIA stroke work-up for the complaint of dizziness/vertigo at [facility] work-up was unremarkable except MRI that was not done yet. N.p.o. after midnight for Colin Villeda MD-plan for PPM in a.m. noted  Try to get MRI to complete stroke work-up if able before pacemaker  Inpatient neurology consulted [address] MRI of brain to complete work-up  Inpatient PT OT eval for DC planning      Essential hypertension POA  - on losartan-HCTZ at home  - Holding as patient is normotensive        GERD POA  - famotidine      Anxiety POA  - continue ativan PRN  - Continue citalopram, buspirone     Body mass index is 31.51 kg/m². Code Status: full       Subjective:   CHIEF COMPLAINT: Bradycardic arrest at [facility] as inpatient    HISTORY OF PRESENT ILLNESS:     Mendel Bourdon is a 80 y.o.    female who presents with above complaints transfer from [facility] for cardiology/electrophysiology eval for pacemaker placement/  Patient had an episode of unresponsiveness today evening when patient was found to have severe bradycardia with hypotension and hypothermia-heart rate dipped down to 30s-responded to atropine dose x1, was put on transcutaneous pacing pads and transferred to MR [address] for likely pacemaker placement with Dr. Joyce Hastings.  Patient was found to be stable with no new complaints when seen on arrival on IVCU bed 2155. We were asked to admit for work up and evaluation of the above problems. Past Medical History:   Diagnosis Date    Anemia     Rajeshel Alekabran MM,seeing     Arrhythmia     SSS    Bradycardia     Essential hypertension     GERD (gastroesophageal reflux disease)     Psychiatric disorder     Anxiety    Psychotic disorder (HCC)     anxiety    Vertigo         Past Surgical History:   Procedure Laterality Date    HX BUNIONECTOMY      HX HYSTERECTOMY  1971    partial    HX ORTHOPAEDIC      Status post bunionectomy    HX PARTIAL HYSTERECTOMY         Social History     Tobacco Use    Smoking status: Never Smoker    Smokeless tobacco: Never Used   Substance Use Topics    Alcohol use: No        Family History   Problem Relation Age of Onset    Hypertension Mother      No Known Allergies     Prior to Admission medications    Medication Sig Start Date End Date Taking? Authorizing Provider   famotidine (PEPCID) 40 mg tablet Take 40 mg by mouth daily. Provider, Historical   busPIRone (BUSPAR) 10 mg tablet Take 10 mg by mouth two (2) times a day. 9/20/21   Provider, Historical   citalopram (CELEXA) 20 mg tablet TAKE 1 TABLET BY MOUTH EVERY DAY 8/1/21   Ed Bhakta MD   losartan-hydroCHLOROthiazide [facility]) 50-12.5 mg per tablet Take 1 Tab by mouth daily. 5/12/21   Alan Nino MD   LORazepam (ATIVAN) 0.5 mg tablet Take 1 Tab by mouth every eight (8) hours as needed for Anxiety.  Max Daily Amount: 1.5 mg. 5/12/21   Adrianne Gaviria MD       REVIEW OF SYSTEMS:        Total of 12 systems reviewed as follows:       POSITIVE= underlined text  Negative = text not underlined  General:  fever, chills, sweats, generalized weakness, weight loss/gain,      loss of appetite   Eyes:    blurred vision, eye pain, loss of vision, double vision  ENT:    rhinorrhea, pharyngitis   Respiratory:   cough, sputum production, SOB, QUIÑONES, wheezing, pleuritic pain   Cardiology:   chest pain, palpitations, orthopnea, PND, edema, syncope   Gastrointestinal:  abdominal pain , N/V, diarrhea, dysphagia, constipation, bleeding   Genitourinary:  frequency, urgency, dysuria, hematuria, incontinence   Muskuloskeletal :  arthralgia, myalgia, back pain  Hematology:  easy bruising, nose or gum bleeding, lymphadenopathy   Dermatological: rash, ulceration, pruritis, color change / jaundice  Endocrine:   hot flashes or polydipsia   Neurological:  headache, dizziness, confusion, focal weakness, paresthesia,     Speech difficulties, memory loss, gait difficulty  Psychological: Feelings of anxiety, depression, agitation    Objective:   VITALS:    Visit Vitals  /69 (BP 1 Location: Right upper arm, BP Patient Position: At rest)   Pulse (!) 53   Temp 97.7 °F (36.5 °C)   Resp 16   SpO2 100%       PHYSICAL EXAM:    General:    Alert, cooperative, no distress, appears stated age. HEENT: Atraumatic, anicteric sclerae, pink conjunctivae     No oral ulcers, mucosa moist, throat clear, dentition fair  Neck:  Supple, symmetrical,  thyroid: non tender  Lungs:   Clear to auscultation bilaterally. No Wheezing or Rhonchi. No rales. Chest wall:  No tenderness  No Accessory muscle use. Heart:   Regular  rhythm,  No  murmur   No edema  Abdomen:   Soft, non-tender. Not distended. Bowel sounds normal  Extremities: No cyanosis. No clubbing,      Skin turgor normal, Capillary refill normal, Radial dial pulse 2+  Skin:     Not pale.   Not Jaundiced No rashes   Psych:  Good insight. Not depressed. Not anxious or agitated. Neurologic: EOMs intact. No facial asymmetry. No aphasia or slurred speech. Symmetrical strength, Sensation grossly intact. Alert and oriented X 4.     _______________________________________________________________________  Care Plan discussed with:    Comments   Patient x    Family      RN x    Care Manager                    Consultant:      _______________________________________________________________________  Expected  Disposition:   Home with Family    HH/PT/OT/RN ?   SNF/LTC ? [facility]    ________________________________________________________________________  TOTAL TIME:  46 Minutes    Critical Care Provided     Minutes non procedure based      Comments    x Reviewed previous records   >50% of visit spent in counseling and coordination of care x Discussion with patient and questions answered       ________________________________________________________________________  Signed: Jorge Alberto Rey MD    Procedures: see electronic medical records for all procedures/Xrays and details which were not copied into this note but were reviewed prior to creation of Plan.     LAB DATA REVIEWED:    Recent Results (from the past 24 hour(s))   LIPID PANEL    Collection Time: 12/08/21  1:30 AM   Result Value Ref Range    Cholesterol, total 179 <200 MG/DL    Triglyceride 33 <150 MG/DL    HDL Cholesterol 67 MG/DL    LDL, calculated 105.4 (H) 0 - 100 MG/DL    VLDL, calculated 6.6 MG/DL    CHOL/HDL Ratio 2.7 0.0 - 5.0     HEMOGLOBIN A1C WITH EAG    Collection Time: 12/08/21  1:30 AM   Result Value Ref Range    Hemoglobin A1c 5.6 4.0 - 5.6 %    Est. average glucose 114 mg/dL   CBC WITH AUTOMATED DIFF    Collection Time: 12/08/21  7:51 AM   Result Value Ref Range    WBC 5.8 3.6 - 11.0 K/uL    RBC 3.14 (L) 3.80 - 5.20 M/uL    HGB 10.0 (L) 11.5 - 16.0 g/dL    HCT 29.7 (L) 35.0 - 47.0 %    MCV 94.6 80.0 - 99.0 FL    MCH 31.8 26.0 - 34.0 PG    MCHC 33.7 30.0 - 36.5 g/dL    RDW 21.0 (H) 11.5 - 14.5 %    PLATELET 892 807 - 905 K/uL    MPV 10.7 8.9 - 12.9 FL    NRBC 1.6 (H) 0  WBC    ABSOLUTE NRBC 0.09 (H) 0.00 - 0.01 K/uL    NEUTROPHILS 60 32 - 75 %    LYMPHOCYTES 29 12 - 49 %    MONOCYTES 9 5 - 13 %    EOSINOPHILS 1 0 - 7 %    BASOPHILS 1 0 - 1 %    IMMATURE GRANULOCYTES 0 0.0 - 0.5 %    ABS. NEUTROPHILS 3.4 1.8 - 8.0 K/UL    ABS. LYMPHOCYTES 1.7 0.8 - 3.5 K/UL    ABS. MONOCYTES 0.5 0.0 - 1.0 K/UL    ABS. EOSINOPHILS 0.1 0.0 - 0.4 K/UL    ABS. BASOPHILS 0.1 0.0 - 0.1 K/UL    ABS. IMM.  GRANS. 0.0 0.00 - 0.04 K/UL    DF AUTOMATED      RBC COMMENTS ANISOCYTOSIS  1+       METABOLIC PANEL, BASIC    Collection Time: 12/08/21  7:51 AM   Result Value Ref Range    Sodium 139 136 - 145 mmol/L    Potassium 3.6 3.5 - 5.1 mmol/L    Chloride 106 97 - 108 mmol/L    CO2 27 21 - 32 mmol/L    Anion gap 6 5 - 15 mmol/L    Glucose 92 65 - 100 mg/dL    BUN 20 6 - 20 MG/DL    Creatinine 1.12 (H) 0.55 - 1.02 MG/DL    BUN/Creatinine ratio 18 12 - 20      GFR est AA 56 (L) >60 ml/min/1.73m2    GFR est non-AA 46 (L) >60 ml/min/1.73m2    Calcium 9.0 8.5 - 10.1 MG/DL   MAGNESIUM    Collection Time: 12/08/21  7:51 AM   Result Value Ref Range    Magnesium 1.9 1.6 - 2.4 mg/dL   PHOSPHORUS    Collection Time: 12/08/21  7:51 AM   Result Value Ref Range    Phosphorus 2.6 2.6 - 4.7 MG/DL   GLUCOSE, POC    Collection Time: 12/08/21  2:32 PM   Result Value Ref Range    Glucose (POC) 110 65 - 117 mg/dL    Performed by Ann Meigs RN

## 2021-12-09 NOTE — DISCHARGE INSTRUCTIONS
[address] [place] [address]  [phone]        NEW PACEMAKER IMPLANT DISCHARGE INSTRUCTIONS    Patient ID:  Cristiane Elizabeth  [id number]  89 y.o.  4/19/1932    Admit Date: 12/8/2021    Discharge Date: 12/9/2021     Admitting Physician: Galo Moss MD     Discharge Physician: Galo Moss MD    Admission Diagnoses:   Symptomatic bradycardia [R00.1]  Bradycardic cardiac arrest ([address] [R00.1, I46.2]    Discharge Diagnoses: Active Problems:    Syncope and collapse (9/18/2012)      Symptomatic bradycardia (12/8/2021)      Bradycardic cardiac arrest [facility]) (12/8/2021)        Discharge Condition: Good    Cardiology Procedures this Admission:  Pacemaker insertion. Disposition: home    Reference discharge instructions provided by nursing for diet and activity. Follow-up with device clinic in three weeks. Call [phone] to make an appointment. Signed:  Leo Burrell MD  12/9/2021  8:11 AM      DISCHARGE INSTRUCTIONS FOR PATIENTS WITH PACEMAKERS    1. Remember to call for an appointment for 3 weeks [phone] to check healing and implant programming. 2. Medic Alert Bracelets are available from your pharmacist to wear at all times if you choose to wear one. 3. Carry your ID card for pacemaker with you at all times. This card will be given to you in the hospital or mailed to you. 4. The pacemaker will bulge slightly under your skin. The bulge will decrease in size over the next few weeks. Please notify the doctor's office if you notice any of the following around your site:   A.  A bruise that does not go away. B.  Soreness or yellow, green, or brown drainage from the site. C. Any swelling from the site. D. If you have a fever of 100 degrees or higher that lasts for a few days. INCISION CARE       1.  Leave the dressing over your site until your follow up visit. 2.  You may not shower until after follow up visit.    3.  For comfort, wear loose fitting clothing. 1. 4.  Ice pack to affected shoulder for first 24 hours, wear your sling for 2 days. 2. 5.  Report any signs of infection, fever, pain, swelling, redness, oozing, or heat at site especially if these symptoms increase after the first 3 to 4 days. ACTIVITY PRECAUTIONS     1. Avoid rough contact with the implant site. 2. No driving for 14 days. 3. Avoid lifting your arm over your head, carrying anything on the affected side, or lifting over 10 pounds for 90 days. For the first 2 days only bend your arm at the elbow. 4. Any extreme activity such as golf, weight lifting or exercise biking should be restricted for 60 days. 5. Do not carry objects by holding them against your implant site. 6.  No shooting rifles or any type of gun with the affected shoulder permanently. SPECIAL PRECAUTIONS     1. You should avoid all strong magnetic fields, such as arc welding, large transformers, large motors. 2.  You may or may not (depending on your device) have an MRI which uses a strong magnet to take pictures. 3.  Treatments or surgery that requires diathermy or electrocautery should be discussed with your doctor before scheduled. 4. Avoid radio frequency transmitters, including radar. 5. Advise dentist or other medical personnel you see that you have a pacemaker. 6.  Cell phones and microwave oven use is okay. 7.  If you plan to move or take a trip to a new area, the doctor's office will give you a name of a doctor to contact for any problems. ANTIBIOTIC THERAPY    During the first 8 weeks after your pacemaker insertion, you may need antibiotics before any dental work or certain tests or operations. Let the dentist or doctor who is caring for you know that you have had an implanted device.

## 2021-12-09 NOTE — PROGRESS NOTES
Problem: Motor Speech Impaired (Adult)  Goal: *Acute Goals and Plan of Care (Insert Text)  Description: Speech pathology goals  Initiated 12/9/2021  1. Patient will achieve 80% intelligibility at the conversation level with use of compensatory strategies given no cues within 7 days  Outcome: Progressing Towards Goal     SPEECH [facility]  Patient: Sonja Pressley (89 y.o. female)  Date: 12/9/2021  Primary Diagnosis: Symptomatic bradycardia [R00.1]  Bradycardic cardiac arrest (HCC) [R00.1, I46.2]  Procedure(s) (LRB):  INSERT PPM DUAL (N/A) Day of Surgery   Precautions:        ASSESSMENT :  Based on the objective data described below, the patient presents with [facility] oral/pharyngeal swallow, and no overt s/s aspiration observed. Patient with moderate dysarthria characterized by imprecise articulation, blended word boundaries, and increased rate of speech, however patient and family at bedside reported that her speech is only slightly off from baseline, and sometimes she sounds normal. Introduced motor speech compensatory strategies, including slow rate and clear speech, and both verbalized understanding. SLP will follow to improve functional communication. Patient will benefit from skilled intervention to address the above impairments. Patients rehabilitation potential is considered to be Good     PLAN :  Recommendations and Planned Interventions:  -Regular/thin liquid diet, straws ok  -Slow, clear speech  -SLP to follow for motor speech treatment  Frequency/Duration: Patient will be followed by speech-language pathology 2 times a week to address goals. Discharge Recommendations: To Be Determined     SUBJECTIVE:   Patient stated I'm so thirsty.  Ox4. Denied decline in language, cognitive, or swallow function.     OBJECTIVE:     Past Medical History:   Diagnosis Date    Anemia     Jocelyn Denise MM,seeing     Arrhythmia     SSS    Bradycardia     Essential hypertension     GERD (gastroesophageal reflux disease)     Psychiatric disorder     Anxiety    Psychotic disorder (HCC)     anxiety    Thrombotic stroke involving right middle cerebral artery ([facility] [address] 12/9/2021    Vertigo      Past Surgical History:   Procedure Laterality Date    HX BUNIONECTOMY      HX HYSTERECTOMY  1971    partial    HX ORTHOPAEDIC      Status post bunionectomy    HX PARTIAL HYSTERECTOMY       Prior Level of Function/Home Situation:      Diet prior to admission: regular/thin  Current Diet:  Regular/thin   Cognitive and Communication Status:  Neurologic State: Alert, Appropriate for age  Orientation Level: Oriented X4  Cognition: Follows commands           Swallowing Evaluation:   Oral Assessment:  Oral Assessment  Labial: Left droop  Dentition: Upper & lower dentures  Oral Hygiene: moist oral mucosa  Lingual: No impairment  Velum: Unable to visualize  Mandible: No impairment  P.O. Trials:  Patient Position: upright in bed  Vocal quality prior to P.O.: No impairment  Consistency Presented: Thin liquid; Solid; Pill/Tablet; Other (comment) (pills given by RN)  How Presented: Self-fed/presented; Straw; Successive swallows     Bolus Acceptance: No impairment  Bolus Formation/Control: No impairment     Propulsion: No impairment  Oral Residue: None  Initiation of Swallow: No impairment  Laryngeal Elevation: Functional  Aspiration Signs/Symptoms: None  Pharyngeal Phase Characteristics: No impairment, issues, or problems              Oral Phase Severity: No impairment  Pharyngeal Phase Severity : No impairment    NOMS:   The NOMS functional outcome measure was used to quantify this patient's level of swallowing impairment.   Based on the NOMS, the patient was determined to be at level 7 for swallow function       NOMS Swallowing Levels:  Level 1 (CN): NPO  Level 2 (CM): NPO but takes consistency in therapy  Level 3 (CL): Takes less than 50% of nutrition p.o. and continues with nonoral feedings; and/or safe with mod cues; and/or max diet restriction  Level 4 (CK): Safe swallow but needs mod cues; and/or mod diet restriction; and/or still requires some nonoral feeding/supplements  Level 5 (CJ): Safe swallow with min diet restriction; and/or needs min cues  Level 6 (CI): Independent with p.o.; rare cues; usually self cues; may need to avoid some foods or needs extra time  Level 7 ([facility]): Independent for all p.o.  EDYTA. (2003). National Outcomes Measurement System (NOMS): Adult Speech-Language Pathology User's Guide. Speech/Language Evaluation  Motor Speech:  Oral-Motor Structure/Motor Speech  Labial: Left droop  Dentition: Upper & lower dentures  Oral Hygiene: moist oral mucosa  Lingual: No impairment  Velum: Unable to visualize  Mandible: No impairment  Apraxic Characteristics: None  Dysarthric Characteristics: Blended word boundaries; Increased rate; Imprecise  Intelligibility: Impaired  Conversation Intelligibility (%): 60 %  Overall Impairment Severity: Moderate  Compensatory Strategies for Motor Speech: slow, clear  Language Comprehension and Expression:              Neuro-Linguistics:                                                  Pragmatics:        Voice:                 Vocal Quality: No impairment                                 NOMS:   The NOMS functional outcome measure was used to quantify this patient's level of motor speech impairment. Based on the NOMS, the patient was determined to be at level 4 for motor speech function. NOMS Motor Speech:  Level 1 (CN):  100% unintelligible  Level 2 (CM):  Communication partner responsible for message; can do CV or automatic words w/ max cues but rarely intelligible in context  Level 3 (CL): communication partner primarily responsible for message but says CV/automatic words intelligibly; mod cues to say simple words/phrases  Level 4 (CK): In structured conversation with familiar listener can say simple words and phrases.   Mod cues for simple sentences  Level 5 (CJ):  Uses simple sentences for ADLs with familiar and unfamiliar listener; min cues for complex sentences  Level 6 (CI):  Intelligible in ADLs; difficulty in voc/social activites; rare cues for complex message; uses comp strategies  Level 7 ([address] [place] [address]):  Intelligible in all activities. May occasionally use compensatory strategies. EDYTA. (2003). National Outcomes Measurement System (NOMS): Adult Speech-Language Pathology User's Guide. Pain:  Pain Scale 1: Numeric (0 - 10)  Pain Intensity 1: 0       After treatment:   Patient left in no apparent distress in bed, Call bell within reach, Nursing notified and Caregiver / family present    COMMUNICATION/EDUCATION:   Patient was educated regarding her deficit(s) of dysarthria, WFL swallow as this relates to her diagnosis of ?CVA. She demonstrated Good understanding as evidenced by verbalizing understanding. The patient's plan of care including recommendations, planned interventions, and recommended diet changes were discussed with: Registered nurse. Patient/family have participated as able in goal setting and plan of care. Patient/family agree to work toward stated goals and plan of care.     Thank you for this referral.  Jose Painting, SLP  Time Calculation: 20 mins

## 2021-12-09 NOTE — PROGRESS NOTES
Transition of Care Plan:    RUR:15% moderate  Disposition: potentially IPR referral sent to [facility] (pending)  Will need rapid COVID test  Follow up appointments: Pt will discharge to rehab   DME needed:not indicated  Transportation at Discharge: [facility]  Keys or means to access home:  N/a      IM Medicare Letter: N/a  Is patient a BCPI-A Bundle: N/a          If yes, was Bundle Letter given?:    Is patient a [place] and connected with the [place]? N/a  If yes, was [place] transfer form completed and [facility] notified? Caregiver Contact: Bharti Saenz friend: [phone]   Discharge Caregiver contacted prior to discharge? To be contacted                   CM met with Pt and Pt's friend Keena Tyler to discuss potential discharge plans for Rehab. Pt stated that she would like to discharge home with home health, however, Pt does not have adequate support/supervision at home. Pt was receptive to Rehab after CM provided education on rehab services and benefits. Pt reports that she would like to go to [facility] if at all possible. Pt was also provided SNF listing for back-up. CM notified attending of discussion with Pt. CM notified attending that Pt will likely need a Rapid COVID test prior to discharge.        Jennifer Aparicio, [facility], ANIBAL Minaya

## 2021-12-09 NOTE — PROGRESS NOTES
Hospitalist Progress Note    NAME: Kim Dumont   :  1932   MRN:  [id number]       Assessment / Plan:  New onset of L sided weakness concerning for CVA  Symptomatic bradycardia/SSS s/p PPM placement on   HTn  Initial head CT and repeat CT neg for acute process  CTA shows no major occlusive disease of the cerebral vessels  Unable to do MRI due to newly placed PPM   Duplex US pending  , A1C 5.6, TSH  Will cont' ASA, plavix, statin  SLP to eval  PT/OT (pt lives alone. Prefers to go home).      GERD POA  Famotidine      Anxiety POA  Continue ativan PRN  Continue citalopram, buspirone    Estimated discharge date: 12/10  Barriers: clinical improvement    Code status: Full  Prophylaxis: Lovenox  Recommended Disposition: Home w/Family     Subjective:     Chief Complaint / Reason for Physician Visit  Pt is awake, NAD. Seen post procedure, falls asleep   Discussed with RN events overnight. Review of Systems:  Symptom Y/N Comments  Symptom Y/N Comments   Fever/Chills    Chest Pain n    Poor Appetite    Edema     Cough    Abdominal Pain     Sputum    Joint Pain     SOB/QUIÑONES n   Pruritis/Rash     Nausea/vomit    Tolerating PT/OT     Diarrhea    Tolerating Diet     Constipation    Other       Could NOT obtain due to:      Objective:     VITALS:   Last 24hrs VS reviewed since prior progress note.  Most recent are:  Patient Vitals for the past 24 hrs:   Temp Pulse Resp BP SpO2   21 1215 98.1 °F (36.7 °C) 62 20 (!) 108/90 100 %   21 1100 -- 61 -- 136/74 100 %   21 1045 -- 60 -- 112/65 100 %   21 1015 -- 60 -- 117/65 100 %   21 0945 -- (!) 58 -- (!) 149/107 100 %   21 0930 -- 60 -- -- 100 %   21 0915 -- 60 -- -- 100 %   21 0900 -- 60 -- -- 100 %   21 0845 -- (!) 57 -- -- 100 %   21 0830 -- 65 -- (!) 107/53 100 %   21 0816 98.1 °F (36.7 °C) 60 18 (!) 107/53 100 %   21 0657 98.4 °F (36.9 °C) (!) 46 18 (!) 115/59 100 %   21 0314 98.2 °F (36.8 °C) (!) 53 18 130/81 100 %   12/08/21 2255 97.8 °F (36.6 °C) (!) 53 18 116/71 100 %   12/08/21 1913 97.7 °F (36.5 °C) (!) 53 16 106/69 100 %   12/08/21 1628 97.7 °F (36.5 °C) 62 16 (!) 116/49 98 %       Intake/Output Summary (Last 24 hours) at 12/9/2021 1400  Last data filed at 12/8/2021 1628  Gross per 24 hour   Intake 240 ml   Output --   Net 240 ml        I had a face to face encounter and independently examined this patient on 12/9/2021, as outlined below:  PHYSICAL EXAM:  General: WD, WN. Alert, cooperative, no acute distress    EENT:  EOMI. Anicteric sclerae. MMM  Resp:  CTA bilaterally, no wheezing or rales. No accessory muscle use  CV:  Regular  rhythm,  No edema  GI:  Soft, Non distended, Non tender. +BS  Neurologic:  Alert and oriented X 3, L sided weakness  Psych:   Not anxious nor agitated  Skin:  No rashes. No jaundice    Reviewed most current lab test results and cultures  YES  Reviewed most current radiology test results   YES  Review and summation of old records today    NO  Reviewed patient's current orders and MAR    YES  PMH/SH reviewed - no change compared to H&P  ________________________________________________________________________  Care Plan discussed with:    Comments   Patient x    Family      RN x    Care Manager     Consultant                        Multidiciplinary team rounds were held today with , nursing, pharmacist and clinical coordinator. Patient's plan of care was discussed; medications were reviewed and discharge planning was addressed.      ________________________________________________________________________  Total NON critical care TIME: 35  Minutes    Total CRITICAL CARE TIME Spent:   Minutes non procedure based      Comments   >50% of visit spent in counseling and coordination of care     ________________________________________________________________________  Wayne Oakley MD     Procedures: see electronic medical records for all procedures/Xrays and details which were not copied into this note but were reviewed prior to creation of Plan. LABS:  I reviewed today's most current labs and imaging studies.   Pertinent labs include:  Recent Labs     12/08/21  0751 12/07/21  1028   WBC 5.8 6.7   HGB 10.0* 9.6*   HCT 29.7* 28.3*    189     Recent Labs     12/08/21  0751 12/07/21  1028    135*   K 3.6 3.6    101   CO2 27 26   GLU 92 113*   BUN 20 30*   CREA 1.12* 1.14*   CA 9.0 9.1   MG 1.9  --    PHOS 2.6  --    ALB  --  3.8   TBILI  --  0.7   ALT  --  16       Signed: Gregg Shah MD

## 2021-12-09 NOTE — CONSULTS
Consult  REFERRED BY:  Gege Bertrand MD    CHIEF COMPLAINT: Dizziness and nearly passing out      Subjective:     Mariella Abdul is a 80 y.o. right-handed -American female who came to the hospital 2 days ago at [facility] was undergoing evaluation because of dizziness and severe bradycardia and hypotension, and because of her cardiac issues was transferred to [facility] urgently for cardiac pacemaker. She just had that put in at 7 AM.  We are hoping to get an MRI scan that we can get one now. He had a normal CT of the head on admission CTA of the head neck basically was normal except for some slight outpocketing of the proximal right-sided distal internal carotid arteries possibly infundibulums. No major large vessel occlusive disease was found. The patient sometime in the last 2 days developed left-sided weakness that was new. That has persisted. She has no headache, no sensory loss, no speech difficulty, no visual difficulty, and no other focal neurological symptoms. She is 80 very healthy and very active in the [address] [place] [address] [employer]. Is never had a history of stroke or TIA before. Past medical history pertinent for anemia for whom she sees Dr. Mark Anthony Flores, sick sinus syndrome and bradycardia, central hypertension, GERD, anxiety depression, and vertigo. Medications from admission were BuSpar Celexa Pepcid Ativan and Hyzaar. She does not smoke and does not use drugs, and she is not a diabetic, and her main risk factors seem to be hypertension and she has no history of hyperlipidemia. She is currently on aspirin and Plavix we will start a high-dose statin. She has a repeat CT scan because of her new stroke and pacemaker, cannot get an MRI for 6 weeks. Again she has no other new focal deficits and very cooperative and enjoyable person. I discussed her condition with the patient and her daughter in detail.     Past Medical History:   Diagnosis Date    Anemia     /possible MM,seeing     Arrhythmia     SSS    Bradycardia     Essential hypertension     GERD (gastroesophageal reflux disease)     Psychiatric disorder     Anxiety    Psychotic disorder (HCC)     anxiety    Thrombotic stroke involving right middle cerebral artery ([facility] [address] 12/9/2021    Vertigo       Past Surgical History:   Procedure Laterality Date    HX BUNIONECTOMY      HX HYSTERECTOMY  1971    partial    HX ORTHOPAEDIC      Status post bunionectomy    HX PARTIAL HYSTERECTOMY       Family History   Problem Relation Age of Onset    Hypertension Mother       Social History     Tobacco Use    Smoking status: Never Smoker    Smokeless tobacco: Never Used   Substance Use Topics    Alcohol use: No         Current Facility-Administered Medications:     sodium chloride (NS) flush 5-40 mL, 5-40 mL, IntraVENous, Q8H, Colin Villeda MD    sodium chloride (NS) flush 5-40 mL, 5-40 mL, IntraVENous, PRN, Colin Villeda MD    naloxone (NARCAN) injection 0.4 mg, 0.4 mg, IntraVENous, PRN, Guru Villeda MD    acetaminophen (TYLENOL) tablet 650 mg, 650 mg, Oral, Q4H PRN **OR** acetaminophen (TYLENOL) solution 650 mg, 650 mg, Per NG tube, Q4H PRN **OR** acetaminophen (TYLENOL) suppository 650 mg, 650 mg, Rectal, Q4H PRN, Charu SPANGLER MD    aspirin chewable tablet 81 mg, 81 mg, Oral, DAILY, Alexandrea Leslie MD    clopidogreL (PLAVIX) tablet 75 mg, 75 mg, Oral, DAILY, Alexandrea Leslie MD    enoxaparin (LOVENOX) injection 40 mg, 40 mg, SubCUTAneous, Q24H, Alexandrea Leslie MD    citalopram (CELEXA) tablet 20 mg, 20 mg, Oral, DAILY, Alexandrea Leslie MD    busPIRone (BUSPAR) tablet 10 mg, 10 mg, Oral, BID, Alexandrea Leslie MD, 10 mg at 12/08/21 1387    famotidine (PEPCID) tablet 40 mg, 40 mg, Oral, DAILY, Alexandrea Leslie MD    LORazepam (ATIVAN) tablet 0.5 mg, 0.5 mg, Oral, Q8H PRN, Alexandrea Santos MD    benzocaine-menthoL (CHLORASEPTIC MAX) lozenge 1 Lozenge, 1 Lozenge, Oral, Q2H PRN, Carmen Hunter MD    atorvastatin (LIPITOR) tablet 10 mg, 10 mg, Oral, QHS, Alexandrea Leslie MD, 10 mg at 12/08/21 2318        No Known Allergies   MRI Results (most recent):  No results found for this or any previous visit. No results found for this or any previous visit. Review of Systems:  A comprehensive review of systems was negative except for: Constitutional: positive for fatigue and malaise  Cardiovascular: positive for chest pressure/discomfort, dyspnea, palpitations, irregular heart beats, near-syncope, syncope, dyspnea on exertion, dizziness  Musculoskeletal: positive for myalgias, arthralgias and stiff joints  Neurological: positive for dizziness, vertigo, paresthesia, coordination problems, gait problems and weakness  Behvioral/Psych: positive for anxiety and depression   Vitals:    12/09/21 0900 12/09/21 0915 12/09/21 0930 12/09/21 0945   BP:       Pulse: 60 60 60 (!) 58   Resp:       Temp:       SpO2: 100% 100% 100% 100%     Objective:     I      NEUROLOGICAL EXAM:    Appearance: The patient is well developed, well nourished, provides a coherent history and is in no acute distress. Mental Status: Oriented to time, place and person, and the president, cognitive function is normal and speech is fluent and no aphasia or dysarthria. Mood and affect appropriate. Cranial Nerves:   Intact visual fields. Fundi are benign, disc are flat, no lesions seen on funduscopy. ARIADNA, EOM's full, no nystagmus, no ptosis. Facial sensation is normal. Corneal reflexes are not tested. Facial movement is showing a left central facial weakness. Hearing is abnormal bilaterally. Palate is midline with normal sternocleidomastoid and trapezius muscles are normal. Tongue is midline.   Neck without meningismus or bruits  Temporal arteries are not tender or enlarged  TMJ areas are not tender on palpation   Motor:  5/5 strength in upper and lower proximal and distal muscles on the right, but patient has pain only about 1/5 in the left arm and leg. Normal bulk and tone. No fasciculations. Rapid alternating movement is decreased on the left but normal on the right   Reflexes:   Deep tendon reflexes 1+/4 and symmetrical.  No babinski or clonus present   Sensory:   Normal to touch, pinprick and vibration and temperature are slightly decreased in the feet. DSS is intact   Gait:  Not tested because of her left foot hemiparesis. Tremor:   No tremor noted. Cerebellar:  Not  tested abnormal cerebellar signs present on Romberg and tandem testing and finger-nose-finger exam.   Neurovascular:  Normal heart sounds and regular rhythm, peripheral pulses decreased, and no carotid bruits. Assessment:       ICD-10-CM ICD-9-CM    1. SSS (sick sinus syndrome) ([facility])  I49.5 427.81 ELECTROPHYSIOLOGY PROCEDURE      ELECTROPHYSIOLOGY PROCEDURE     Active Problems:    Syncope and collapse (9/18/2012)      Symptomatic bradycardia (12/8/2021)      Bradycardic cardiac arrest ([address] (12/8/2021)      Thrombotic stroke involving right middle cerebral artery ([address] (12/9/2021)      Bilateral carotid artery stenosis (12/9/2021)      Hemiparesis of left nondominant side due to acute cerebrovascular disease ([address] (12/9/2021)      Vertebral basilar insufficiency (12/9/2021)      Dizziness and giddiness (12/9/2021)        Plan:     Patient with new onset of left-sided weakness that most likely results from a lacunar small vessel infarct in the right hemisphere. This may well be from hypotension due to her bradycardia and significant arrhythmia which is better now on her pacemaker as she is retained this morning.   We cannot get an MRI scan because of the new pacemaker, so we will repeat her CT scan, she is already had a CTA that shows no major occlusive disease of the cerebral vessels   Continue dual antiplatelet therapy and will add a statin if possible  Discussed with the patient and her daughter in detail, we will check a carotid Doppler study also for completeness. She will need PT OT and speech therapy for her stroke. Cardiovascular examination otherwise stable on the new pacemaker  Continue excellent medical care as you are, we will follow carefully with you. .  Signed By: Alessandra Cunha MD     December 9, 2021       CC:  Other, MD Gege  FAX: None

## 2021-12-09 NOTE — PROGRESS NOTES
Problem: Mobility Impaired (Adult and Pediatric)  Goal: *Acute Goals and Plan of Care (Insert Text)  Description:   FUNCTIONAL STATUS PRIOR TO ADMISSION: Patient ambulated without AD for household distances, used rollator or cane in the community. HOME SUPPORT PRIOR TO ADMISSION: Patient was indep with ADLs, driving and shopping. Physical Therapy Goals  Initiated 12/9/2021  1. Patient will move from supine to sit and sit to supine  in bed with supervision/set-up within 7 day(s). 2.  Patient will transfer from bed to chair and chair to bed with minimal assistance/contact guard assist using the least restrictive device within 7 day(s). 3.  Patient will perform sit to stand with minimal assistance/contact guard assist within 7 day(s). 4.  Patient will ambulate with minimal assistance/contact guard assist for 50 feet with the least restrictive device within 7 day(s). 5.  Patient will improve Palomares Balance score by 7 points within 7 days. Outcome: Not Met   PHYSICAL THERAPY EVALUATION- NEURO POPULATION  Patient: Bakari Noe (43 y.o. female)  Date: 12/9/2021  Primary Diagnosis: Symptomatic bradycardia [R00.1]  Bradycardic cardiac arrest (HCC) [R00.1, I46.2]  Procedure(s) (LRB):  INSERT PPM DUAL (N/A) Day of Surgery   Precautions:         ASSESSMENT  Based on the objective data described below, the patient presents with left sided weakness, impaired functional mobility, pacemaker precautions, high fall risk, and decreased activity tolerance following admission for bradycardia and pacemaker placement. Patient also present with sxs indicative of  CVA but cannot have an MRI for 4 weeks. Patient required mod assist x 2 to come to sit edge of bed and required constant support in sitting position. Patient with limited strength and AROM left LE, required mod assist x 2 to stand and then mod to max x 2 to slowly sidestep to chair.   Patient educated on BEFAST and PM precautions but may need continued teaching  as she appears to have limited medical understanding. Patient is well below baseline and has significant left sided weakness, would benefit from intensive inpatient therapy. Patient is motivated to progress and can tolerate three hours of therapy per day. Current Level of Function Impacting Discharge (mobility/balance): mod to max x 2 to chair     Functional Outcome Measure: The patient scored Total: 0/56 on the Palomares Balance Assessment which is indicative of high fall risk. Other factors to consider for discharge: high falls risk, lives alone, below baseline     Patient will benefit from skilled therapy intervention to address the above noted impairments. PLAN :  Recommendations and Planned Interventions: bed mobility training, transfer training, gait training, therapeutic exercises, neuromuscular re-education, patient and family training/education, and therapeutic activities      Frequency/Duration: Patient will be followed by physical therapy:  5 times a week to address goals. Recommendation for discharge: (in order for the patient to meet his/her long term goals)  Therapy 3 hours per day 5-7 days per week    This discharge recommendation:  Has been made in collaboration with the attending provider and/or case management    IF patient discharges home will need the following DME: to be determined (TBD)         SUBJECTIVE:   Patient stated why can't I move my arm? Erika Jung  asked numerous times    OBJECTIVE DATA SUMMARY:   HISTORY:    Past Medical History:   Diagnosis Date    Anemia     /possible MM,seeing     Arrhythmia     SSS    Bradycardia     Essential hypertension     GERD (gastroesophageal reflux disease)     Psychiatric disorder     Anxiety    Psychotic disorder (HCC)     anxiety    Thrombotic stroke involving right middle cerebral artery ([facility] [address] 12/9/2021    Vertigo      Past Surgical History:   Procedure Laterality Date    HX BUNIONECTOMY      HX HYSTERECTOMY  1971    partial HX ORTHOPAEDIC      Status post bunionectomy    HX PARTIAL HYSTERECTOMY         Personal factors and/or comorbidities impacting plan of care: PM         EXAMINATION/PRESENTATION/DECISION MAKING:   Critical Behavior:  Neurologic State: Alert, Appropriate for age  Orientation Level: Oriented X4  Cognition: Follows commands     Hearing:     Range Of Motion:  AROM: Generally decreased, functional (left UE non functional)                       Strength:    Strength: Generally decreased, functional (left UE flaccid)                    Tone & Sensation:   Tone: Abnormal                              Coordination:  Coordination: Generally decreased, functional  Vision:      Functional Mobility:  Bed Mobility:  Rolling: Moderate assistance; Assist x2  Supine to Sit: Moderate assistance; Assist x2     Scooting: Maximum assistance  Transfers:  Sit to Stand: Moderate assistance; Assist x2  Stand to Sit: Moderate assistance; Assist x2        Bed to Chair: Moderate assistance; Maximum assistance; Assist x2              Balance:   Sitting: Impaired  Sitting - Static: Fair (occasional);  Poor (constant support)  Sitting - Dynamic: Poor (constant support)  Standing: Impaired  Standing - Static: Constant support; Poor  Standing - Dynamic : Constant support; Poor  Ambulation/Gait Training:              Gait Description (WDL):  (sidestepped to chair)                                          Stairs:                Functional Measure  Palomares Balance Test:    Sitting to Standin  Standing Unsupported: 0  Sitting with Back Unsupported: 0  Standing to Sittin  Transfers: 0  Standing Unsupported with Eyes Closed: 0  Standing Unsupported with Feet Together: 0  Reach Forward with Outstretched Arm: 0   Object: 0  Turn to Look Over Shoulders: 0  Turn 360 Degrees: 0  Alternate Foot on Step/Stool: 0  Standing Unsupported One Foot in Front: 0  Stand on One Le  Total: 0/56         56=Maximum possible score;   0-20=High fall risk  21-40=Moderate fall risk   41-56=Low fall risk        Physical Therapy Evaluation Charge Determination   History Examination Presentation Decision-Making   MEDIUM  Complexity : 1-2 comorbidities / personal factors will impact the outcome/ POC  MEDIUM Complexity : 3 Standardized tests and measures addressing body structure, function, activity limitation and / or participation in recreation  MEDIUM Complexity : Evolving with changing characteristics  MEDIUM Complexity : FOTO score of 26-74      Based on the above components, the patient evaluation is determined to be of the following complexity level: MEDIUM    Pain Rating:      Activity Tolerance:   Good      After treatment patient left in no apparent distress:   Sitting in chair, Call bell within reach, and Bed / chair alarm activated    COMMUNICATION/EDUCATION:   The patients plan of care was discussed with: Occupational therapist and Registered nurse. Patient was educated regarding her deficit(s) of left sided weakness as this relates to her diagnosis of CVA. She demonstrated Fair understanding as evidenced by discussion, but will need continued education. .    Patient and/or family was verbally educated on the BE FAST acronym for signs/symptoms of CVA and TIA. BE FAST was written on patient's communication board  for visual education and reinforcement. All questions answered with patient indicating good understanding. Fall prevention education was provided and the patient/caregiver indicated understanding. and Patient/family agree to work toward stated goals and plan of care.     Thank you for this referral.  Eduard Gruber, PT   Time Calculation: 48 mins

## 2021-12-10 LAB
25(OH)D3 SERPL-MCNC: 31.3 NG/ML (ref 30–100)
ANION GAP SERPL CALC-SCNC: 7 MMOL/L (ref 5–15)
ATRIAL RATE: 70 BPM
BASOPHILS # BLD: 0.1 K/UL (ref 0–0.1)
BASOPHILS NFR BLD: 1 % (ref 0–1)
BUN SERPL-MCNC: 19 MG/DL (ref 6–20)
BUN/CREAT SERPL: 18 (ref 12–20)
CALCIUM SERPL-MCNC: 8.7 MG/DL (ref 8.5–10.1)
CALCULATED P AXIS, ECG09: 51 DEGREES
CALCULATED R AXIS, ECG10: -33 DEGREES
CALCULATED T AXIS, ECG11: 17 DEGREES
CHLORIDE SERPL-SCNC: 109 MMOL/L (ref 97–108)
CK SERPL-CCNC: 162 U/L (ref 26–192)
CO2 SERPL-SCNC: 24 MMOL/L (ref 21–32)
CREAT SERPL-MCNC: 1.08 MG/DL (ref 0.55–1.02)
DIAGNOSIS, 93000: NORMAL
DIFFERENTIAL METHOD BLD: ABNORMAL
EOSINOPHIL # BLD: 0.2 K/UL (ref 0–0.4)
EOSINOPHIL NFR BLD: 3 % (ref 0–7)
ERYTHROCYTE [DISTWIDTH] IN BLOOD BY AUTOMATED COUNT: 21.1 % (ref 11.5–14.5)
ERYTHROCYTE [SEDIMENTATION RATE] IN BLOOD: 35 MM/HR (ref 0–30)
GLUCOSE SERPL-MCNC: 96 MG/DL (ref 65–100)
HCT VFR BLD AUTO: 26.5 % (ref 35–47)
HCYS SERPL-SCNC: 14.4 UMOL/L (ref 3.7–13.9)
HGB BLD-MCNC: 9 G/DL (ref 11.5–16)
IMM GRANULOCYTES # BLD AUTO: 0 K/UL (ref 0–0.04)
IMM GRANULOCYTES NFR BLD AUTO: 0 % (ref 0–0.5)
LYMPHOCYTES # BLD: 1.4 K/UL (ref 0.8–3.5)
LYMPHOCYTES NFR BLD: 21 % (ref 12–49)
MCH RBC QN AUTO: 32.1 PG (ref 26–34)
MCHC RBC AUTO-ENTMCNC: 34 G/DL (ref 30–36.5)
MCV RBC AUTO: 94.6 FL (ref 80–99)
MONOCYTES # BLD: 0.6 K/UL (ref 0–1)
MONOCYTES NFR BLD: 8 % (ref 5–13)
NEUTS SEG # BLD: 4.6 K/UL (ref 1.8–8)
NEUTS SEG NFR BLD: 67 % (ref 32–75)
NRBC # BLD: 0.06 K/UL (ref 0–0.01)
NRBC BLD-RTO: 0.9 PER 100 WBC
P-R INTERVAL, ECG05: 226 MS
PLATELET # BLD AUTO: 153 K/UL (ref 150–400)
PMV BLD AUTO: 12.3 FL (ref 8.9–12.9)
POTASSIUM SERPL-SCNC: 3.9 MMOL/L (ref 3.5–5.1)
Q-T INTERVAL, ECG07: 420 MS
QRS DURATION, ECG06: 86 MS
QTC CALCULATION (BEZET), ECG08: 453 MS
RBC # BLD AUTO: 2.8 M/UL (ref 3.8–5.2)
RBC MORPH BLD: ABNORMAL
RBC MORPH BLD: ABNORMAL
SARS-COV-2, COV2: NORMAL
SODIUM SERPL-SCNC: 140 MMOL/L (ref 136–145)
TSH SERPL DL<=0.05 MIU/L-ACNC: 3.9 UIU/ML (ref 0.36–3.74)
VENTRICULAR RATE, ECG03: 70 BPM
VIT B12 SERPL-MCNC: 308 PG/ML (ref 193–986)
WBC # BLD AUTO: 6.9 K/UL (ref 3.6–11)

## 2021-12-10 PROCEDURE — 86038 ANTINUCLEAR ANTIBODIES: CPT

## 2021-12-10 PROCEDURE — 74011250637 HC RX REV CODE- 250/637: Performed by: INTERNAL MEDICINE

## 2021-12-10 PROCEDURE — 85652 RBC SED RATE AUTOMATED: CPT

## 2021-12-10 PROCEDURE — 97530 THERAPEUTIC ACTIVITIES: CPT

## 2021-12-10 PROCEDURE — 97535 SELF CARE MNGMENT TRAINING: CPT

## 2021-12-10 PROCEDURE — 36415 COLL VENOUS BLD VENIPUNCTURE: CPT

## 2021-12-10 PROCEDURE — U0005 INFEC AGEN DETEC AMPLI PROBE: HCPCS

## 2021-12-10 PROCEDURE — 82306 VITAMIN D 25 HYDROXY: CPT

## 2021-12-10 PROCEDURE — 65660000000 HC RM CCU STEPDOWN

## 2021-12-10 PROCEDURE — 80048 BASIC METABOLIC PNL TOTAL CA: CPT

## 2021-12-10 PROCEDURE — 74011250636 HC RX REV CODE- 250/636: Performed by: INTERNAL MEDICINE

## 2021-12-10 PROCEDURE — 83090 ASSAY OF HOMOCYSTEINE: CPT

## 2021-12-10 PROCEDURE — 85025 COMPLETE CBC W/AUTO DIFF WBC: CPT

## 2021-12-10 PROCEDURE — 99233 SBSQ HOSP IP/OBS HIGH 50: CPT | Performed by: PSYCHIATRY & NEUROLOGY

## 2021-12-10 PROCEDURE — 82550 ASSAY OF CK (CPK): CPT

## 2021-12-10 PROCEDURE — 82607 VITAMIN B-12: CPT

## 2021-12-10 PROCEDURE — 84443 ASSAY THYROID STIM HORMONE: CPT

## 2021-12-10 RX ORDER — CLOPIDOGREL BISULFATE 75 MG/1
75 TABLET ORAL DAILY
Qty: 30 TABLET | Refills: 0 | Status: SHIPPED
Start: 2021-12-11 | End: 2022-06-10 | Stop reason: ALTCHOICE

## 2021-12-10 RX ORDER — GUAIFENESIN 100 MG/5ML
81 LIQUID (ML) ORAL DAILY
Qty: 30 TABLET | Refills: 0 | Status: SHIPPED
Start: 2021-12-11

## 2021-12-10 RX ORDER — ATORVASTATIN CALCIUM 10 MG/1
10 TABLET, FILM COATED ORAL
Qty: 30 TABLET | Refills: 0 | Status: SHIPPED
Start: 2021-12-10 | End: 2022-04-27 | Stop reason: SDUPTHER

## 2021-12-10 RX ADMIN — FAMOTIDINE 40 MG: 20 TABLET ORAL at 08:39

## 2021-12-10 RX ADMIN — ASPIRIN 81 MG CHEWABLE TABLET 81 MG: 81 TABLET CHEWABLE at 08:40

## 2021-12-10 RX ADMIN — Medication 10 ML: at 22:12

## 2021-12-10 RX ADMIN — ENOXAPARIN SODIUM 40 MG: 100 INJECTION SUBCUTANEOUS at 22:11

## 2021-12-10 RX ADMIN — BUSPIRONE HYDROCHLORIDE 10 MG: 10 TABLET ORAL at 08:38

## 2021-12-10 RX ADMIN — CITALOPRAM HYDROBROMIDE 20 MG: 20 TABLET ORAL at 08:39

## 2021-12-10 RX ADMIN — BUSPIRONE HYDROCHLORIDE 10 MG: 10 TABLET ORAL at 18:46

## 2021-12-10 RX ADMIN — CLOPIDOGREL BISULFATE 75 MG: 75 TABLET ORAL at 08:38

## 2021-12-10 RX ADMIN — ATORVASTATIN CALCIUM 10 MG: 10 TABLET, FILM COATED ORAL at 22:09

## 2021-12-10 NOTE — PROGRESS NOTES
Problem: Self Care Deficits Care Plan (Adult)  Goal: *Acute Goals and Plan of Care (Insert Text)  Description: FUNCTIONAL STATUS PRIOR TO ADMISSION: Patient was modified independent using a rollator for functional mobility outside the apartment. Per patient, no DME used in the apartment. Patient is independent in BADLs and active in IADLs, grocery shopping while using cart for support and cooking at apartment. Patient has walk-in shower and sits to bathe. HOME SUPPORT: Patient lived alone with support from family and friends. Occupational Therapy Goals  Initiated 12/9/2021  1. Patient will perform grooming with supervision/set-up within 7 day(s). 2.  Patient will perform upper body dressing with moderate assistance  within 7 day(s). 3.  Patient will maintain pacemaker precautions without prompting within 7 day(s). 4.  Patient will perform toilet transfers with moderate assistance within 7 day(s). 5.  Patient will perform all aspects of toileting with moderate assistance  within 7 day(s). 6.  Patient will participate in upper extremity therapeutic exercise/activities with minimal assistance for 5 minutes within 7 day(s). 7.  Patient will utilize energy conservation techniques during functional activities with verbal cues within 7 day(s). Outcome: Progressing Towards Goal   OCCUPATIONAL THERAPY TREATMENT  Patient: Lincoln Ovalle (78 y.o. female)  Date: 12/10/2021  Diagnosis: Symptomatic bradycardia [R00.1]  Bradycardic cardiac arrest (HCC) [R00.1, I46.2]   <principal problem not specified>  Procedure(s) (LRB):  INSERT PPM DUAL (N/A) 1 Day Post-Op  Precautions:    Chart, occupational therapy assessment, plan of care, and goals were reviewed. ASSESSMENT  Patient continues with skilled OT services and is progressing towards goals. Pt remains most limited d/t orthostatic hypotension, new L hemiparesis and impaired sitting/standing balance on POD #1 s/p PPM placement.  Pt was cleared for session by RN and Pt was very motivated to participate. On arrival Pt requested assist to don clean gown. Pt performed supine<>sit with Mod Assist x2 and BP stable from 128/57 supine to 153/76 in sitting. Pt donned clean gown with Max A and LUE sling and Pt dependent to for adjusting LUE sling. Pt stood and completed stand pivot transfer bed<>chair with Max Assist x2. Once seated, BP noted to drop to 126/64 and with gradual decline. Pt became increasingly diaphoretic and BP did not increase with chair recliner, therefore Pt was assisted back from chair<>bed with Total assist x3 with RN present. Pts BP did recover after several minutes with bed in trendelenburg. LUE was supported with pillow and readjusted LUE sling following transfer. Pt continues with significant LUE weakness and will benefit from ongoing neuro re-ed and PPM precautions. Pt also very anxious t/o session, benefiting from simple one step commands. Pt was left supine, HOB elevated and tray setup for lunch with assist for managing containers and cutting food items. Pt will require intensive rehab to maximize safest return to her active and independent PLOF. Current Level of Function Impacting Discharge (ADLs): Setup to Total A with ADL. Mod Assist x2 supine<>sit, Max Assist x2 sit<>stand and Total Assist x2 supine<>sit. Max Assist x2 stand pivot bed<>chair and Mod Assist x1 static standing at chair with additional assist for bowel hygiene. Other factors to consider for discharge: New pacemaker, now L ciara (unable to have MRI for x6 weeks per chart), High fall risk, Mod I PLOF with ADL and IADL. Lived alone with limited support. PLAN :  Patient continues to benefit from skilled intervention to address the above impairments. Continue treatment per established plan of care to address goals. Recommend with staff: Pillow support beneath LUE.  Bed pan v BSC for toileting only if BP stable and with x2 assist.     Recommend next OT session: Continue POC. Monitor BP. Recommendation for discharge: (in order for the patient to meet his/her long term goals)  Therapy 3 hours per day 5-7 days per week    This discharge recommendation:  Has been made in collaboration with the attending provider and/or case management    IF patient discharges home will need the following DME: Defer to rehab       SUBJECTIVE:   Patient stated I'll do what you tell me to. .. I want to get better. \" and \"Am I ever going to walk again? \"    OBJECTIVE DATA SUMMARY:     Functional Mobility and Transfers for ADLs:  Bed Mobility:  Supine to Sit: Moderate assistance; Assist x2; Bed Modified  Sit to Supine: Total assistance; Assist x2 (impacted by symptomatic orthostasis)    Transfers:  Sit to Stand: Maximum assistance; Assist x2  Bed to Chair: Maximum assistance; Assist x2 (stand pivot to R, unable to clear floor w/L foot )    Balance:  Sitting: Impaired  Sitting - Static: Fair (occasional)  Sitting - Dynamic: Fair (occasional)  Standing: Impaired; With support (2 person assist)  Standing - Static: Poor  Standing - Dynamic : Poor    ADL Intervention:    Facilitated Pts independence with self-feeding, UB/LB dressing and bowel hygiene via multimodal cues for precaution adherence, maintaining balance and standing tolerance as appropriate. Feeding  Container Management: Moderate assistance  Cutting Food: Maximum assistance  Utensil Management: Set-up (R hand)  Drink to Mouth: Set-up    Grooming  Position Performed: Long sitting on bed  Washing Hands: Set-up (hand wipes)    Lower Body Bathing  Perineal  : Total assistance (dependent)  Position Performed: Standing    Upper Body [address] Gown: Maximum assistance    Lower Body Dressing Assistance  Protective Undergarmet: Total assistance (dependent)  Socks: Total assistance (dependent)  Position Performed: Seated edge of bed    Toileting  Bowel Hygiene:  Total assistance (dependent) (standing at chair- Mod Ax1 to stand and x1 for noe care)  Cues: Physical assistance for pants down; Physical assistance for pants up; Visual/perceptual training/retraining    Pain:  Pt c/o p! at L forearm IV site with minimal active bleeding. RN was present and intervened. Activity Tolerance:   Fair and signs and symptoms of orthostatic hypotension    After treatment patient left in no apparent distress:   Supine in bed, Call bell within reach, Bed / chair alarm activated, and Side rails x 3    COMMUNICATION/COLLABORATION:   The patients plan of care was discussed with: Physical therapist, Registered nurse, and Patient .      Zaida Winter, OTR/L  Time Calculation: 60 mins

## 2021-12-10 NOTE — PROGRESS NOTES
0700 Received report on patient from night shift nurse Marcel Colon. Pt is alert and oriented. Pt is showing signs of residual L sided weakness throughout. 0730 Obtaine pts VS. VSS. Administered morning meds and assisted patient with her breakfast.    1100 Pts VSS. Pt is alert and oriented. Pt has had 3x bouts of bowel incontinence. Her stools have been watery with small clumps of formed stool. PT and PT worked with patient and while working with patient become orthostatic.     1300 pt had another bout of incontinence of the bowels.

## 2021-12-10 NOTE — PROGRESS NOTES
1015 - Left chest PPM insertion site noted to be slightly puffy, no hematoma, no bleeding. Pressure dressing applied, vitals stable. 1300 - pressure dressing removed, PPM insertion site CDI. 1745 - patient moved from chair back to bed with x2 assistance, PPM insertion site noted to be slightly puffy again.  Pressure dressing reapplied, vitals stable  1925 - bedside report and site assessment to [facility]

## 2021-12-10 NOTE — PROGRESS NOTES
Update 2:10pm: CM met with patient and she is agreeable to having referrals sent to [facility] and [facility]. Waiting on information on if either facility can accept. Preference for [facility] as it is closer for family. However, patient is open to going to first accepting facility with bed availability. CM contacted patient's emergency contact, Rosalva Adair, at patient's request to provide updated information. Alesha Douglass will be present in patient's room tomorrow around 12:00pm noon. Transition of Care Plan:     RUR:15% moderate  Disposition: IPR ([facility] has accepted but does not have a bed until next week) will send referral to other locations. Follow up appointments: Pt will discharge to rehab   DME needed:not indicated  Transportation at Discharge: [facility]/BLS  Keys or means to access home:  N/a      IM Medicare Letter: N/a  Is patient a BCPI-A Bundle: pt will need 2nd IMM prior to discharge                    If yes, was Bundle Letter given?:    Is patient a [place] and connected with the [place]? N/a  If yes, was [place] transfer form completed and [facility] notified? Caregiver Contact: Rosalva Adair friend: [phone]   Discharge Caregiver contacted prior to discharge? Family to be provided updates    CM received telephone call from nurse, Perla Suazo, asking about patient's need for COVID test.  CM called [facility] and was informed that they do not currently have beds available for admission until next week. CM will talk with patient and inquire about sending other referrals out for acceptance for a IPR facility that can accept sooner than next week. Pt to get PCR COVID test for IPR admission. Discharge Location  Discharge Placement: Rehab hospital/unit acute    Danita Adam Person, [address] - ED [facility]  [employer] ACP Facilitator  Zone Phone: [phone]

## 2021-12-10 NOTE — PROGRESS NOTES
Problem: Mobility Impaired (Adult and Pediatric)  Goal: *Acute Goals and Plan of Care (Insert Text)  Description:   FUNCTIONAL STATUS PRIOR TO ADMISSION: Patient ambulated without AD for household distances, used rollator or cane in the community. HOME SUPPORT PRIOR TO ADMISSION: Patient was indep with ADLs, driving and shopping. Physical Therapy Goals  Initiated 12/9/2021  1. Patient will move from supine to sit and sit to supine  in bed with supervision/set-up within 7 day(s). 2.  Patient will transfer from bed to chair and chair to bed with minimal assistance/contact guard assist using the least restrictive device within 7 day(s). 3.  Patient will perform sit to stand with minimal assistance/contact guard assist within 7 day(s). 4.  Patient will ambulate with minimal assistance/contact guard assist for 50 feet with the least restrictive device within 7 day(s). 5.  Patient will improve Palomares Balance score by 7 points within 7 days. Outcome: Progressing Towards Goal   PHYSICAL THERAPY TREATMENT  Patient: Mariella Abdul (22 y.o. female)  Date: 12/10/2021  Diagnosis: Symptomatic bradycardia [R00.1]  Bradycardic cardiac arrest (HCC) [R00.1, I46.2]   <principal problem not specified>  Procedure(s) (LRB):  INSERT PPM DUAL (N/A) 1 Day Post-Op  Precautions: PPM L UE   Chart, physical therapy assessment, plan of care and goals were reviewed. ASSESSMENT  Patient continues with skilled PT services and is progressing towards goals. Barriers to function with mobility include PPM precautions, new L hemiparesis, impaired balance, decreased activity tolerance, increased risk for fall. Pt tolerated transfer training bed to chair stand pivot to R with max A x 2 for lift/ balance, guarding bilat knees. Pt unable to clear floor for L LE mgmt during pivot. Noted soiled brief during transfer.  Pt transferred sit to stand from chair with max A x 2; maintained static stand ~ 3 min with R UE support and mod A for balance to facilitate total A noe care. Returned to seated position in chair; c/o nausea, noted diaphoresis, BP as low as 102/84 (SBP 120s supine, 153 EOB). Pt reported additional bowel urgency; unsafe to transfer to [facility] due to orthostasis. Required A x 3 people for squat pivot transfer back to bed. Placed in Trendelenburg position for BP recovery, then rolled R/L for pericare and linen change. RN in to assist with pt return to bed and aware of orthostasis. Pt remains below functional baseline with strong motivation to participate in therapy. Will benefit from con't PT for education on PPM precautions, nuero re-ed, functional mobility training. Recommend follow up IPR at d/c to facilitate pt return to max level of functional independence. Current Level of Function Impacting Discharge (mobility/balance): bed mob mod A x 2, transfers max A x 2, static stand mod A    Other factors to consider for discharge: L weakness with inability to have MRI at this time         PLAN :  Patient continues to benefit from skilled intervention to address the above impairments. Continue treatment per established plan of care. to address goals. Recommendation for discharge: (in order for the patient to meet his/her long term goals)  Therapy 3 hours per day 5-7 days per week    This discharge recommendation:  Has been made in collaboration with the attending provider and/or case management    IF patient discharges home will need the following DME: to be determined (TBD)       SUBJECTIVE:   Patient reported ongoing weakness L side    OBJECTIVE DATA SUMMARY:   Critical Behavior:  Neurologic State: Alert, Appropriate for age  Orientation Level: Oriented X4  Cognition: Follows commands     Functional Mobility Training:  Bed Mobility:     Supine to Sit: Moderate assistance; Assist x2; Bed Modified  Sit to Supine:  Total assistance; Assist x2 (impacted by symptomatic orthostasis)           Transfers:  Sit to Stand: Maximum assistance; Assist x2  Stand to Sit: Moderate assistance; Assist x2        Bed to Chair: Maximum assistance; Assist x2 (stand pivot to R, unable to clear floor w/L foot )                    Balance:  Sitting: Impaired  Sitting - Static: Fair (occasional)  Sitting - Dynamic: Fair (occasional)  Standing: Impaired; With support (2 person assist)  Standing - Static: Poor  Standing - Dynamic : Poor     Pain Rating:  Pt reports discomfort L UE at end of session    Activity Tolerance:   Fair and signs and symptoms of orthostatic hypotension       After treatment patient left in no apparent distress:   Supine in bed, Call bell within reach, Bed / chair alarm activated, Caregiver / family present, Side rails x 3, and HOB elevated with lunch tray set up    COMMUNICATION/COLLABORATION:   The patients plan of care was discussed with: Occupational therapist and Registered nurse.      Levi Horn, PT   Time Calculation: 55 mins

## 2021-12-10 NOTE — PROGRESS NOTES
Hospitalist Progress Note    NAME: Guevara Delgado   :  1932   MRN:  [id number]       Assessment / Plan:  New onset of L sided weakness concerning for CVA  Symptomatic bradycardia/SSS s/p PPM placement on   HTn  Initial head CT and repeat CT neg for acute process  CTA shows no major occlusive disease of the cerebral vessels  Unable to do MRI due to newly placed PPM   Duplex US without significant stenosis  , A1C 5.6, TSH 3.9  Will cont' ASA, plavix, statin  Appreciate neurology and cardiology following  PT/OT, IPR, pt is in agreement, pending acceptance     GERD POA  Famotidine      Anxiety POA  Should avoid using benzos due to high fall risk given advanced age  Continue citalopram, buspirone    Estimated discharge date: 12/10 awaiting placement  Code status: Full  Prophylaxis: Lovenox  Recommended Disposition: Home w/Family     Subjective:     Chief Complaint / Reason for Physician Visit  No new complaint. Discussed with RN events overnight. Review of Systems:  Symptom Y/N Comments  Symptom Y/N Comments   Fever/Chills    Chest Pain n    Poor Appetite    Edema     Cough    Abdominal Pain     Sputum    Joint Pain     SOB/QUIÑONES n   Pruritis/Rash     Nausea/vomit    Tolerating PT/OT     Diarrhea    Tolerating Diet     Constipation    Other       Could NOT obtain due to:      Objective:     VITALS:   Last 24hrs VS reviewed since prior progress note.  Most recent are:  Patient Vitals for the past 24 hrs:   Temp Pulse Resp BP SpO2   12/10/21 1050 -- 67 -- 126/64 --   12/10/21 1038 -- -- -- (!) 153/76 --   12/10/21 1033 -- 60 -- (!) 128/57 --   12/10/21 0809 98.3 °F (36.8 °C) 66 18 (!) 127/56 98 %   12/10/21 0100 -- 60 -- -- 99 %   12/10/21 0000 -- 60 -- -- --   21 2300 -- 60 -- -- --   21 2230 -- 65 -- -- --   21 2130 -- 65 -- -- --   21 2030 -- 60 -- -- --   21 1545 98 °F (36.7 °C) 60 18 107/63 100 %   21 1430 -- 74 -- (!) 113/95 --   21 1415 -- 60 -- 137/73 100 %   12/09/21 1400 -- 60 -- 102/78 100 %   12/09/21 1300 -- 61 -- 124/60 100 %   12/09/21 1215 98.1 °F (36.7 °C) 62 20 (!) 108/90 100 %     No intake or output data in the 24 hours ending 12/10/21 1100     I had a face to face encounter and independently examined this patient on 12/10/2021, as outlined below:  PHYSICAL EXAM:  General: WD, WN. Alert, cooperative, no acute distress    EENT:  EOMI. Anicteric sclerae. MMM  Resp:  CTA bilaterally, no wheezing or rales. No accessory muscle use  CV:  Regular  rhythm,  No edema  GI:  Soft, Non distended, Non tender. +BS  Neurologic:  Alert and oriented X 3, L sided weakness  Psych:   Not anxious nor agitated  Skin:  No rashes. No jaundice    Reviewed most current lab test results and cultures  YES  Reviewed most current radiology test results   YES  Review and summation of old records today    NO  Reviewed patient's current orders and MAR    YES  PMH/ reviewed - no change compared to H&P  ________________________________________________________________________  Care Plan discussed with:    Comments   Patient x    Family      RN x    Care Manager     Consultant                        Multidiciplinary team rounds were held today with , nursing, pharmacist and clinical coordinator. Patient's plan of care was discussed; medications were reviewed and discharge planning was addressed. ________________________________________________________________________  Total NON critical care TIME: 35  Minutes    Total CRITICAL CARE TIME Spent:   Minutes non procedure based      Comments   >50% of visit spent in counseling and coordination of care     ________________________________________________________________________  Joselo Fernandes MD     Procedures: see electronic medical records for all procedures/Xrays and details which were not copied into this note but were reviewed prior to creation of Plan.       LABS:  I reviewed today's most current labs and imaging studies.   Pertinent labs include:  Recent Labs     12/10/21  0516 12/08/21  0751   WBC 6.9 5.8   HGB 9.0* 10.0*   HCT 26.5* 29.7*    165     Recent Labs     12/10/21  0516 12/08/21  0751    139   K 3.9 3.6   * 106   CO2 24 27   GLU 96 92   BUN 19 20   CREA 1.08* 1.12*   CA 8.7 9.0   MG  --  1.9   PHOS  --  2.6       Signed: Juany Ambriz MD

## 2021-12-10 NOTE — PROGRESS NOTES
Consult  REFERRED BY:  Gege Bertrand MD    CHIEF COMPLAINT: Dizziness and nearly passing out      Subjective:     Steff Carlson is a 80 y.o. right-handed -American female who came to the hospital 2 days ago at [facility] was undergoing evaluation because of dizziness and severe bradycardia and hypotension, and because of her cardiac issues was transferred to [facility] urgently for cardiac pacemaker. She just had that put in at 7 AM.  We are hoping to get an MRI scan that we can get one now. He had a normal CT of the head on admission CTA of the head neck basically was normal except for some slight outpocketing of the proximal right-sided distal internal carotid arteries possibly infundibulums. No major large vessel occlusive disease was found. The patient sometime in the last 2 days developed left-sided weakness that was new. That has persisted. She has no headache, no sensory loss, no speech difficulty, no visual difficulty, and no other focal neurological symptoms. She is 80 very healthy and very active in the [address] [place] [address] [employer]. Is never had a history of stroke or TIA before. Past medical history pertinent for anemia for whom she sees Dr. Sukhjinder Turcios, sick sinus syndrome and bradycardia, central hypertension, GERD, anxiety depression, and vertigo. Medications from admission were BuSpar Celexa Pepcid Ativan and Hyzaar. She does not smoke and does not use drugs, and she is not a diabetic, and her main risk factors seem to be hypertension and she has no history of hyperlipidemia. She is currently on aspirin and Plavix we will start a high-dose statin. She has a repeat CT scan because of her new stroke and pacemaker, cannot get an MRI for 6 weeks. Again she has no other new focal deficits and very cooperative and enjoyable person. I discussed her condition with the patient and her daughter in detail.   Patient's repeat CT scan was stable, showing no change, and patient remains neurologically normal.  She will go to rehab hospital for strengthening and gait training because of her generalized weakness from her cardiovascular condition.     Past Medical History:   Diagnosis Date    Anemia     Suhas Gastonabran MM,seeing     Arrhythmia     SSS    Bradycardia     Essential hypertension     GERD (gastroesophageal reflux disease)     Psychiatric disorder     Anxiety    Psychotic disorder (HCC)     anxiety    Thrombotic stroke involving right middle cerebral artery ([facility] [address] 12/9/2021    Vertigo       Past Surgical History:   Procedure Laterality Date    HX BUNIONECTOMY      HX HYSTERECTOMY  1971    partial    HX ORTHOPAEDIC      Status post bunionectomy    HX PARTIAL HYSTERECTOMY       Family History   Problem Relation Age of Onset    Hypertension Mother       Social History     Tobacco Use    Smoking status: Never Smoker    Smokeless tobacco: Never Used   Substance Use Topics    Alcohol use: No         Current Facility-Administered Medications:     sodium chloride (NS) flush 5-40 mL, 5-40 mL, IntraVENous, Q8H, Colin Villeda MD, 10 mL at 12/09/21 2200    sodium chloride (NS) flush 5-40 mL, 5-40 mL, IntraVENous, PRN, Colin Villeda MD    naloxone (NARCAN) injection 0.4 mg, 0.4 mg, IntraVENous, PRN, Colin Villeda MD    hydrALAZINE (APRESOLINE) 20 mg/mL injection 10 mg, 10 mg, IntraVENous, Q6H PRN, Felicia Sullivan MD    acetaminophen (TYLENOL) tablet 650 mg, 650 mg, Oral, Q4H PRN **OR** acetaminophen (TYLENOL) solution 650 mg, 650 mg, Per NG tube, Q4H PRN **OR** acetaminophen (TYLENOL) suppository 650 mg, 650 mg, Rectal, Q4H PRN, Alexandrea Reardon MD    aspirin chewable tablet 81 mg, 81 mg, Oral, DAILY, Alexandrea Leslie MD, 81 mg at 12/10/21 0840    clopidogreL (PLAVIX) tablet 75 mg, 75 mg, Oral, DAILY, Alexandrea Leslie MD, 75 mg at 12/10/21 0838    enoxaparin (LOVENOX) injection 40 mg, 40 mg, SubCUTAneous, Q24H, Alexandrea Leslie MD, 40 mg at 12/09/21 2318    citalopram (CELEXA) tablet 20 mg, 20 mg, Oral, DAILY, Alexandrea Leslie MD, 20 mg at 12/10/21 0839    busPIRone (BUSPAR) tablet 10 mg, 10 mg, Oral, BID, Alexandrea Smith MD, 10 mg at 12/10/21 0838    famotidine (PEPCID) tablet 40 mg, 40 mg, Oral, DAILY, Alexandrea Leslie MD, 40 mg at 12/10/21 0839    benzocaine-menthoL (CHLORASEPTIC MAX) lozenge 1 Lozenge, 1 Lozenge, Oral, Q2H PRN, Kena SPANGLER MD    atorvastatin (LIPITOR) tablet 10 mg, 10 mg, Oral, QHS, Alexandrea Leslie MD, 10 mg at 12/09/21 2318        No Known Allergies   MRI Results (most recent):  No results found for this or any previous visit. No results found for this or any previous visit. Review of Systems:  A comprehensive review of systems was negative except for: Constitutional: positive for fatigue and malaise  Cardiovascular: positive for chest pressure/discomfort, dyspnea, palpitations, irregular heart beats, near-syncope, syncope, dyspnea on exertion, dizziness  Musculoskeletal: positive for myalgias, arthralgias and stiff joints  Neurological: positive for dizziness, vertigo, paresthesia, coordination problems, gait problems and weakness  Behvioral/Psych: positive for anxiety and depression   Vitals:    12/10/21 1054 12/10/21 1056 12/10/21 1058 12/10/21 1523   BP: 107/88 102/84 (!) 119/55 (!) 115/51   Pulse:    65   Resp:    18   Temp:    98.3 °F (36.8 °C)   SpO2:    100%     Objective:     I      NEUROLOGICAL EXAM:    Appearance: The patient is well developed, well nourished, provides a coherent history and is in no acute distress. Mental Status: Oriented to time, place and person, and the president, cognitive function is normal and speech is fluent and no aphasia or dysarthria. Mood and affect appropriate. Cranial Nerves:   Intact visual fields. Fundi are benign, disc are flat, no lesions seen on funduscopy. ARIADNA, EOM's full, no nystagmus, no ptosis.  Facial sensation is normal. Corneal reflexes are not tested. Facial movement is showing a left central facial weakness. Hearing is abnormal bilaterally. Palate is midline with normal sternocleidomastoid and trapezius muscles are normal. Tongue is midline. Neck without meningismus or bruits  Temporal arteries are not tender or enlarged  TMJ areas are not tender on palpation   Motor:  5/5 strength in upper and lower proximal and distal muscles on the right, but patient has pain only about 1/5 in the left arm and leg. Normal bulk and tone. No fasciculations. Rapid alternating movement is decreased on the left but normal on the right   Reflexes:   Deep tendon reflexes 1+/4 and symmetrical.  No babinski or clonus present   Sensory:   Normal to touch, pinprick and vibration and temperature are slightly decreased in the feet. DSS is intact   Gait:  Not tested because of her left foot hemiparesis. Tremor:   No tremor noted. Cerebellar:  Not  tested abnormal cerebellar signs present on Romberg and tandem testing and finger-nose-finger exam.   Neurovascular:  Normal heart sounds and regular rhythm, peripheral pulses decreased, and no carotid bruits. Assessment:       ICD-10-CM ICD-9-CM    1. SSS (sick sinus syndrome) ([facility])  I49.5 427.81 ELECTROPHYSIOLOGY PROCEDURE      ELECTROPHYSIOLOGY PROCEDURE     Active Problems:    Syncope and collapse (9/18/2012)      Symptomatic bradycardia (12/8/2021)      Bradycardic cardiac arrest ([address] (12/8/2021)      Thrombotic stroke involving right middle cerebral artery ([address] (12/9/2021)      Bilateral carotid artery stenosis (12/9/2021)      Hemiparesis of left nondominant side due to acute cerebrovascular disease ([address] (12/9/2021)      Vertebral basilar insufficiency (12/9/2021)      Dizziness and giddiness (12/9/2021)        Plan:     Patient with new onset of left-sided weakness that most likely results from a lacunar small vessel infarct in the right hemisphere.   This may well be from hypotension due to her bradycardia and significant arrhythmia which is better now on her pacemaker as she is retained this morning. We cannot get an MRI scan because of the new pacemaker, so we will repeat her CT scan, she is already had a CTA that shows no major occlusive disease of the cerebral vessels   Continue dual antiplatelet therapy and will add a statin if possible  Discussed with the patient and her daughter in detail, we will check a carotid Doppler study also for completeness. She will need PT OT and speech therapy for her stroke. Cardiovascular examination otherwise stable on the new pacemaker  Patient's repeat CT scan was stable, showing no change, and patient remains neurologically normal.  She will go to rehab hospital for strengthening and gait training because of her generalized weakness from her cardiovascular condition. We will follow as needed for now, call if we can help I have reviewed the CT scan personally on the PACS system myself and talk to the patient. Continue excellent medical care as you are, we will follow as needed for now, call if we can help  Signed By: Carmen Xiao MD     December 10, 2021       CC:  Other, MD Gege  FAX: None

## 2021-12-11 LAB
ANA SER QL: NEGATIVE
SARS-COV-2, XPLCVT: NOT DETECTED
SOURCE, COVRS: NORMAL

## 2021-12-11 PROCEDURE — 65660000000 HC RM CCU STEPDOWN

## 2021-12-11 PROCEDURE — 74011250636 HC RX REV CODE- 250/636: Performed by: INTERNAL MEDICINE

## 2021-12-11 PROCEDURE — 77030038269 HC DRN EXT URIN PURWCK BARD -A

## 2021-12-11 PROCEDURE — 74011250637 HC RX REV CODE- 250/637: Performed by: INTERNAL MEDICINE

## 2021-12-11 RX ORDER — ALPRAZOLAM 0.5 MG/1
0.25 TABLET ORAL
Status: DISCONTINUED | OUTPATIENT
Start: 2021-12-11 | End: 2021-12-13 | Stop reason: HOSPADM

## 2021-12-11 RX ADMIN — ENOXAPARIN SODIUM 40 MG: 100 INJECTION SUBCUTANEOUS at 21:09

## 2021-12-11 RX ADMIN — CITALOPRAM HYDROBROMIDE 20 MG: 20 TABLET ORAL at 09:24

## 2021-12-11 RX ADMIN — FAMOTIDINE 40 MG: 20 TABLET ORAL at 09:23

## 2021-12-11 RX ADMIN — ALPRAZOLAM 0.25 MG: 0.5 TABLET ORAL at 09:23

## 2021-12-11 RX ADMIN — BUSPIRONE HYDROCHLORIDE 10 MG: 10 TABLET ORAL at 09:23

## 2021-12-11 RX ADMIN — Medication 10 ML: at 17:17

## 2021-12-11 RX ADMIN — ASPIRIN 81 MG CHEWABLE TABLET 81 MG: 81 TABLET CHEWABLE at 09:23

## 2021-12-11 RX ADMIN — BUSPIRONE HYDROCHLORIDE 10 MG: 10 TABLET ORAL at 17:16

## 2021-12-11 RX ADMIN — Medication 10 ML: at 21:09

## 2021-12-11 RX ADMIN — ACETAMINOPHEN 650 MG: 325 TABLET ORAL at 21:09

## 2021-12-11 RX ADMIN — CLOPIDOGREL BISULFATE 75 MG: 75 TABLET ORAL at 09:23

## 2021-12-11 RX ADMIN — ATORVASTATIN CALCIUM 10 MG: 10 TABLET, FILM COATED ORAL at 21:09

## 2021-12-11 RX ADMIN — ACETAMINOPHEN 650 MG: 325 TABLET ORAL at 09:24

## 2021-12-12 PROCEDURE — 74011250637 HC RX REV CODE- 250/637: Performed by: INTERNAL MEDICINE

## 2021-12-12 PROCEDURE — 65660000000 HC RM CCU STEPDOWN

## 2021-12-12 PROCEDURE — 74011250636 HC RX REV CODE- 250/636: Performed by: INTERNAL MEDICINE

## 2021-12-12 RX ORDER — NYSTATIN 100000 [USP'U]/G
POWDER TOPICAL 2 TIMES DAILY
Status: DISCONTINUED | OUTPATIENT
Start: 2021-12-12 | End: 2021-12-13 | Stop reason: HOSPADM

## 2021-12-12 RX ORDER — PANTOPRAZOLE SODIUM 40 MG/1
40 TABLET, DELAYED RELEASE ORAL
Status: DISCONTINUED | OUTPATIENT
Start: 2021-12-13 | End: 2021-12-13 | Stop reason: HOSPADM

## 2021-12-12 RX ORDER — CALCIUM CARBONATE 200(500)MG
200 TABLET,CHEWABLE ORAL
Status: DISCONTINUED | OUTPATIENT
Start: 2021-12-12 | End: 2021-12-12

## 2021-12-12 RX ORDER — CALCIUM CARBONATE 200(500)MG
200 TABLET,CHEWABLE ORAL
Status: DISCONTINUED | OUTPATIENT
Start: 2021-12-12 | End: 2021-12-13 | Stop reason: HOSPADM

## 2021-12-12 RX ADMIN — FAMOTIDINE 40 MG: 20 TABLET ORAL at 09:50

## 2021-12-12 RX ADMIN — CLOPIDOGREL BISULFATE 75 MG: 75 TABLET ORAL at 09:50

## 2021-12-12 RX ADMIN — CITALOPRAM HYDROBROMIDE 20 MG: 20 TABLET ORAL at 09:50

## 2021-12-12 RX ADMIN — CALCIUM CARBONATE (ANTACID) CHEW TAB 500 MG 200 MG: 500 CHEW TAB at 15:05

## 2021-12-12 RX ADMIN — ASPIRIN 81 MG CHEWABLE TABLET 81 MG: 81 TABLET CHEWABLE at 09:50

## 2021-12-12 RX ADMIN — ALPRAZOLAM 0.25 MG: 0.5 TABLET ORAL at 02:20

## 2021-12-12 RX ADMIN — Medication 10 ML: at 06:38

## 2021-12-12 RX ADMIN — BUSPIRONE HYDROCHLORIDE 10 MG: 10 TABLET ORAL at 17:46

## 2021-12-12 RX ADMIN — ATORVASTATIN CALCIUM 10 MG: 10 TABLET, FILM COATED ORAL at 22:05

## 2021-12-12 RX ADMIN — Medication 10 ML: at 22:06

## 2021-12-12 RX ADMIN — ALPRAZOLAM 0.25 MG: 0.5 TABLET ORAL at 15:45

## 2021-12-12 RX ADMIN — Medication 10 ML: at 15:46

## 2021-12-12 RX ADMIN — BUSPIRONE HYDROCHLORIDE 10 MG: 10 TABLET ORAL at 09:50

## 2021-12-12 RX ADMIN — ENOXAPARIN SODIUM 40 MG: 100 INJECTION SUBCUTANEOUS at 22:06

## 2021-12-12 NOTE — PROGRESS NOTES
Hospitalist Progress Note    NAME: Charli Mock   :  1932   MRN:  [id number]       Assessment / Plan:  New onset of L sided weakness concerning for CVA  Symptomatic bradycardia/SSS s/p PPM placement on   HTn  Initial head CT and repeat CT neg for acute process  CTA shows no major occlusive disease of the cerebral vessels  Unable to do MRI due to newly placed PPM   Duplex US without significant stenosis  , A1C 5.6, TSH 3.9  Will cont' ASA, plavix, statin  Appreciate neurology and cardiology following  PT/OT, IPR, pt is in agreement, pending acceptance     GERD POA  Famotidine      Anxiety POA  Should avoid using benzos due to high fall risk given advanced age  Continue citalopram, buspirone    Estimated discharge date: 12/10 awaiting placement  Code status: Full  Prophylaxis: Lovenox  Recommended Disposition: Home w/Family     Subjective:     Chief Complaint / Reason for Physician Visit  No new complaint. Anxious this am.  Discussed with RN events overnight. Review of Systems:  Symptom Y/N Comments  Symptom Y/N Comments   Fever/Chills    Chest Pain n    Poor Appetite    Edema     Cough    Abdominal Pain     Sputum    Joint Pain     SOB/QUIÑONES n   Pruritis/Rash     Nausea/vomit    Tolerating PT/OT     Diarrhea    Tolerating Diet     Constipation    Other       Could NOT obtain due to:      Objective:     VITALS:   Last 24hrs VS reviewed since prior progress note.  Most recent are:  Patient Vitals for the past 24 hrs:   Temp Pulse Resp BP SpO2   21 1105 98.2 °F (36.8 °C) 61 -- (!) 120/49 98 %   21 0745 98 °F (36.7 °C) 60 17 121/69 100 %   21 0245 97.5 °F (36.4 °C) 60 18 (!) 130/59 100 %   21 2246 98 °F (36.7 °C) 60 18 134/62 100 %   21 97.8 °F (36.6 °C) 60 16 130/89 99 %   21 1445 98.2 °F (36.8 °C) 61 18 (!) 123/51 99 %       Intake/Output Summary (Last 24 hours) at 2021 1153  Last data filed at 2021 1129  Gross per 24 hour   Intake 240 ml Output 200 ml   Net 40 ml        I had a face to face encounter and independently examined this patient on 12/12/2021, as outlined below:  PHYSICAL EXAM:  General: WD, WN. Alert, cooperative, no acute distress    EENT:  EOMI. Anicteric sclerae. MMM  Resp:  CTA bilaterally, no wheezing or rales. No accessory muscle use  CV:  Regular  rhythm,  No edema  GI:  Soft, Non distended, Non tender. +BS  Neurologic:  Alert and oriented X 3, L sided weakness  Psych:   Mild anxiety  Skin:  No rashes. No jaundice    Reviewed most current lab test results and cultures  YES  Reviewed most current radiology test results   YES  Review and summation of old records today    NO  Reviewed patient's current orders and MAR    YES  PMH/SH reviewed - no change compared to H&P  ________________________________________________________________________  Care Plan discussed with:    Comments   Patient x    Family      RN x    Care Manager     Consultant                        Multidiciplinary team rounds were held today with , nursing, pharmacist and clinical coordinator. Patient's plan of care was discussed; medications were reviewed and discharge planning was addressed. ________________________________________________________________________  Total NON critical care TIME: 35  Minutes    Total CRITICAL CARE TIME Spent:   Minutes non procedure based      Comments   >50% of visit spent in counseling and coordination of care     ________________________________________________________________________  Arlen Cha MD     Procedures: see electronic medical records for all procedures/Xrays and details which were not copied into this note but were reviewed prior to creation of Plan. LABS:  I reviewed today's most current labs and imaging studies.   Pertinent labs include:  Recent Labs     12/10/21  0516   WBC 6.9   HGB 9.0*   HCT 26.5*        Recent Labs     12/10/21  0516      K 3.9   *   CO2 24   GLU 96 BUN 19   CREA 1.08*   CA 8.7       Signed: Prieto Hemphill MD

## 2021-12-12 NOTE — PROGRESS NOTES
Hospitalist Progress Note    NAME: Hill Posada   :  1932   MRN:  [id number]       Assessment / Plan:  New onset of L sided weakness concerning for CVA  Symptomatic bradycardia/SSS s/p PPM placement on   HTn  Initial head CT and repeat CT neg for acute process  CTA shows no major occlusive disease of the cerebral vessels  Unable to do MRI due to newly placed PPM   Duplex US without significant stenosis  , A1C 5.6, TSH 3.9  Will cont' ASA, plavix, statin  Appreciate neurology and cardiology following  PT/OT, IPR, pt is in agreement, pending acceptance     GERD POA  Famotidine      Anxiety POA  Should avoid using benzos due to high fall risk given advanced age  Continue citalopram, buspirone      Estimated discharge date: stable for discharge, awaiting placement  Code status: Full  Prophylaxis: Lovenox  Recommended Disposition: Home w/Family     Subjective:     Chief Complaint / Reason for Physician Visit  Discussed with RN events overnight. Review of Systems:  Symptom Y/N Comments  Symptom Y/N Comments   Fever/Chills    Chest Pain n    Poor Appetite    Edema     Cough    Abdominal Pain     Sputum    Joint Pain     SOB/QUIÑONES n   Pruritis/Rash     Nausea/vomit    Tolerating PT/OT     Diarrhea    Tolerating Diet     Constipation    Other       Could NOT obtain due to:      Objective:     VITALS:   Last 24hrs VS reviewed since prior progress note.  Most recent are:  Patient Vitals for the past 24 hrs:   Temp Pulse Resp BP SpO2   21 1105 98.2 °F (36.8 °C) 61 -- (!) 120/49 98 %   21 0745 98 °F (36.7 °C) 60 17 121/69 100 %   21 0245 97.5 °F (36.4 °C) 60 18 (!) 130/59 100 %   21 2246 98 °F (36.7 °C) 60 18 134/62 100 %   21 97.8 °F (36.6 °C) 60 16 130/89 99 %   21 1445 98.2 °F (36.8 °C) 61 18 (!) 123/51 99 %       Intake/Output Summary (Last 24 hours) at 2021 1145  Last data filed at 2021 1129  Gross per 24 hour   Intake 240 ml   Output 200 ml   Net 40 ml        I had a face to face encounter and independently examined this patient on 12/12/2021, as outlined below:  PHYSICAL EXAM:  General: WD, WN. Alert, cooperative, no acute distress    EENT:  EOMI. Anicteric sclerae. MMM  Resp:  CTA bilaterally, no wheezing or rales. No accessory muscle use  CV:  Regular  rhythm,  No edema  GI:  Soft, Non distended, Non tender. +BS  Neurologic:  Alert and oriented X 3, L sided weakness  Psych:   Not anxious nor agitated  Skin:  No rashes. No jaundice    Reviewed most current lab test results and cultures  YES  Reviewed most current radiology test results   YES  Review and summation of old records today    NO  Reviewed patient's current orders and MAR    YES  PMH/SH reviewed - no change compared to H&P  ________________________________________________________________________  Care Plan discussed with:    Comments   Patient x    Family      RN x    Care Manager     Consultant                        Multidiciplinary team rounds were held today with , nursing, pharmacist and clinical coordinator. Patient's plan of care was discussed; medications were reviewed and discharge planning was addressed. ________________________________________________________________________  Total NON critical care TIME: 35  Minutes    Total CRITICAL CARE TIME Spent:   Minutes non procedure based      Comments   >50% of visit spent in counseling and coordination of care     ________________________________________________________________________  Violet Keys MD     Procedures: see electronic medical records for all procedures/Xrays and details which were not copied into this note but were reviewed prior to creation of Plan. LABS:  I reviewed today's most current labs and imaging studies.   Pertinent labs include:  Recent Labs     12/10/21  0516   WBC 6.9   HGB 9.0*   HCT 26.5*        Recent Labs     12/10/21  0516      K 3.9   *   CO2 24   GLU 96   BUN 19 CREA 1.08*   CA 8.7       Signed: Jermain Chavez MD

## 2021-12-12 NOTE — PROGRESS NOTES
TRANSFER - OUT REPORT:    Verbal report given to Sahra Ellis RN(name) on [address]  being transferred to neuro  3120(unit) for routine progression of care         Report consisted of patients Situation, Background, Assessment and   Recommendations(SBAR). Information from the following report(s) SBAR and Intake/Output was reviewed with the receiving nurse. Lines:   Peripheral IV 12/08/21 Anterior; Left; Proximal Forearm (Active)   Site Assessment Clean, dry, & intact 12/12/21 0245   Phlebitis Assessment 0 12/12/21 0245   Infiltration Assessment 0 12/12/21 0245   Dressing Status Clean, dry, & intact 12/12/21 0245   Dressing Type Transparent 12/12/21 0245   Hub Color/Line Status Pink 12/12/21 0245        Opportunity for questions and clarification was provided.       Patient transported with:   [employer]

## 2021-12-12 NOTE — PROGRESS NOTES
Problem: Patient Education: Go to Patient Education Activity  Goal: Patient/Family Education  Outcome: Progressing Towards Goal     Problem: TIA/CVA Stroke: 0-24 hours  Goal: Off Pathway (Use only if patient is Off Pathway)  Outcome: Progressing Towards Goal  Goal: Activity/Safety  Outcome: Progressing Towards Goal  Goal: Consults, if ordered  Outcome: Progressing Towards Goal  Goal: Diagnostic Test/Procedures  Outcome: Progressing Towards Goal  Goal: Nutrition/Diet  Outcome: Progressing Towards Goal  Goal: Discharge Planning  Outcome: Progressing Towards Goal  Goal: Medications  Outcome: Progressing Towards Goal  Goal: Respiratory  Outcome: Progressing Towards Goal  Goal: Treatments/Interventions/Procedures  Outcome: Progressing Towards Goal  Goal: Minimize risk of bleeding post-thrombolytic infusion  Outcome: Progressing Towards Goal  Goal: Monitor for complications post-thrombolytic infusion  Outcome: Progressing Towards Goal  Goal: Psychosocial  Outcome: Progressing Towards Goal  Goal: *Hemodynamically stable  Outcome: Progressing Towards Goal  Goal: *Neurologically stable  Description: Absence of additional neurological deficits    Outcome: Progressing Towards Goal  Goal: *Verbalizes anxiety and depression are reduced or absent  Outcome: Progressing Towards Goal  Goal: *Absence of Signs of Aspiration on Current Diet  Outcome: Progressing Towards Goal  Goal: *Absence of deep venous thrombosis signs and symptoms(Stroke Metric)  Outcome: Progressing Towards Goal  Goal: *Ability to perform ADLs and demonstrates progressive mobility and function  Outcome: Progressing Towards Goal  Goal: *Stroke education started(Stroke Metric)  Outcome: Progressing Towards Goal  Goal: *Dysphagia screen performed(Stroke Metric)  Outcome: Progressing Towards Goal  Goal: *Rehab consulted(Stroke Metric)  Outcome: Progressing Towards Goal     Problem: TIA/CVA Stroke: Day 2 Until Discharge  Goal: Off Pathway (Use only if patient is Off Pathway)  Outcome: Progressing Towards Goal  Goal: Activity/Safety  Outcome: Progressing Towards Goal  Goal: Diagnostic Test/Procedures  Outcome: Progressing Towards Goal  Goal: Nutrition/Diet  Outcome: Progressing Towards Goal  Goal: Discharge Planning  Outcome: Progressing Towards Goal  Goal: Medications  Outcome: Progressing Towards Goal  Goal: Respiratory  Outcome: Progressing Towards Goal  Goal: Treatments/Interventions/Procedures  Outcome: Progressing Towards Goal  Goal: Psychosocial  Outcome: Progressing Towards Goal  Goal: *Verbalizes anxiety and depression are reduced or absent  Outcome: Progressing Towards Goal  Goal: *Absence of aspiration  Outcome: Progressing Towards Goal  Goal: *Absence of deep venous thrombosis signs and symptoms(Stroke Metric)  Outcome: Progressing Towards Goal  Goal: *Optimal pain control at patient's stated goal  Outcome: Progressing Towards Goal  Goal: *Tolerating diet  Outcome: Progressing Towards Goal  Goal: *Ability to perform ADLs and demonstrates progressive mobility and function  Outcome: Progressing Towards Goal  Goal: *Stroke education continued(Stroke Metric)  Outcome: Progressing Towards Goal     Problem: Ischemic Stroke: Discharge Outcomes  Goal: *Verbalizes anxiety and depression are reduced or absent  Outcome: Progressing Towards Goal  Goal: *Verbalize understanding of risk factor modification(Stroke Metric)  Outcome: Progressing Towards Goal  Goal: *Hemodynamically stable  Outcome: Progressing Towards Goal  Goal: *Absence of aspiration pneumonia  Outcome: Progressing Towards Goal  Goal: *Aware of needed dietary changes  Outcome: Progressing Towards Goal  Goal: *Verbalize understanding of prescribed medications including anti-coagulants, anti-lipid, and/or anti-platelets(Stroke Metric)  Outcome: Progressing Towards Goal  Goal: *Tolerating diet  Outcome: Progressing Towards Goal  Goal: *Aware of follow-up diagnostics related to anticoagulants  Outcome: Progressing Towards Goal  Goal: *Ability to perform ADLs and demonstrates progressive mobility and function  Outcome: Progressing Towards Goal  Goal: *Absence of DVT(Stroke Metric)  Outcome: Progressing Towards Goal  Goal: *Absence of aspiration  Outcome: Progressing Towards Goal  Goal: *Optimal pain control at patient's stated goal  Outcome: Progressing Towards Goal  Goal: *Home safety concerns addressed  Outcome: Progressing Towards Goal  Goal: *Describes available resources and support systems  Outcome: Progressing Towards Goal  Goal: *Verbalizes understanding of activation of EMS(911) for stroke symptoms(Stroke Metric)  Outcome: Progressing Towards Goal  Goal: *Understands and describes signs and symptoms to report to providers(Stroke Metric)  Outcome: Progressing Towards Goal  Goal: *Neurolgocially stable (absence of additional neurological deficits)  Outcome: Progressing Towards Goal  Goal: *Verbalizes importance of follow-up with primary care physician(Stroke Metric)  Outcome: Progressing Towards Goal  Goal: *Smoking cessation discussed,if applicable(Stroke Metric)  Outcome: Progressing Towards Goal  Goal: *Depression screening completed(Stroke Metric)  Outcome: Progressing Towards Goal     Problem: Falls - Risk of  Goal: *Absence of Falls  Description: Document Darcy Fall Risk and appropriate interventions in the flowsheet.   Outcome: Progressing Towards Goal  Note: Fall Risk Interventions:  Mobility Interventions: Assess mobility with egress test, Patient to call before getting OOB         Medication Interventions: Bed/chair exit alarm, Patient to call before getting OOB, Teach patient to arise slowly    Elimination Interventions: Bed/chair exit alarm, Call light in reach, Patient to call for help with toileting needs    History of Falls Interventions: Bed/chair exit alarm, Consult care management for discharge planning, Door open when patient unattended         Problem: Patient Education: Go to Patient Education Activity  Goal: Patient/Family Education  Outcome: Progressing Towards Goal     Problem: Pressure Injury - Risk of  Goal: *Prevention of pressure injury  Description: Document Sid Scale and appropriate interventions in the flowsheet.   Outcome: Progressing Towards Goal     Problem: Patient Education: Go to Patient Education Activity  Goal: Patient/Family Education  Outcome: Progressing Towards Goal     Problem: Patient Education: Go to Patient Education Activity  Goal: Patient/Family Education  Outcome: Progressing Towards Goal     Problem: Patient Education: Go to Patient Education Activity  Goal: Patient/Family Education  Outcome: Progressing Towards Goal     Problem: Patient Education: Go to Patient Education Activity  Goal: Patient/Family Education  Outcome: Progressing Towards Goal

## 2021-12-13 ENCOUNTER — TELEPHONE (OUTPATIENT)
Dept: NEUROLOGY | Age: 86
End: 2021-12-13

## 2021-12-13 VITALS
HEART RATE: 60 BPM | TEMPERATURE: 98.4 F | RESPIRATION RATE: 16 BRPM | SYSTOLIC BLOOD PRESSURE: 122 MMHG | DIASTOLIC BLOOD PRESSURE: 58 MMHG | OXYGEN SATURATION: 96 %

## 2021-12-13 PROCEDURE — 74011250637 HC RX REV CODE- 250/637: Performed by: INTERNAL MEDICINE

## 2021-12-13 RX ORDER — PANTOPRAZOLE SODIUM 40 MG/1
40 TABLET, DELAYED RELEASE ORAL
Qty: 30 TABLET | Refills: 0 | Status: SHIPPED
Start: 2021-12-13

## 2021-12-13 RX ADMIN — Medication 10 ML: at 05:10

## 2021-12-13 RX ADMIN — Medication 10 ML: at 05:09

## 2021-12-13 RX ADMIN — BUSPIRONE HYDROCHLORIDE 10 MG: 10 TABLET ORAL at 08:42

## 2021-12-13 RX ADMIN — CLOPIDOGREL BISULFATE 75 MG: 75 TABLET ORAL at 08:43

## 2021-12-13 RX ADMIN — Medication 10 ML: at 14:00

## 2021-12-13 RX ADMIN — NYSTATIN: 100000 POWDER TOPICAL at 09:00

## 2021-12-13 RX ADMIN — ALPRAZOLAM 0.25 MG: 0.5 TABLET ORAL at 08:43

## 2021-12-13 RX ADMIN — CITALOPRAM HYDROBROMIDE 20 MG: 20 TABLET ORAL at 08:43

## 2021-12-13 RX ADMIN — PANTOPRAZOLE SODIUM 40 MG: 40 TABLET, DELAYED RELEASE ORAL at 08:42

## 2021-12-13 RX ADMIN — ASPIRIN 81 MG CHEWABLE TABLET 81 MG: 81 TABLET CHEWABLE at 08:43

## 2021-12-13 NOTE — PROGRESS NOTES
[facility] documentation completed. MD made aware signature needed on hard copy in pt chart. RN attempted to called report to [facility]. RN was informed once they know the nurse taking over care the nurse would call for report. Call back number given.

## 2021-12-13 NOTE — PROGRESS NOTES
End of Shift Note    Bedside shift change report given to Saritha Comer RN  (oncoming nurse) by Austin Jacobs RN (offgoing nurse).   Report included the following information SBAR, Kardex, Intake/Output and MAR    Shift worked:  night   Shift summary and any significant changes:     uneventful night, surgical site on left chest dry and intact open to air, call bell and phone within reach of patient, able to make needs known        Concerns for physician to address:  none   Zone phone for oncoming shift:   [phone]     Patient Information  Guevara Delgado  80 y.o.  12/8/2021  4:00 PM by Giovanny Paige MD. Guevara Delgado was admitted from Home    Problem List  Patient Active Problem List    Diagnosis Date Noted    Thrombotic stroke involving right middle cerebral artery ([address] 12/09/2021    Bilateral carotid artery stenosis 12/09/2021    Hemiparesis of left nondominant side due to acute cerebrovascular disease ([address] 12/09/2021    Vertebral basilar insufficiency 12/09/2021    Dizziness and giddiness 12/09/2021    Symptomatic bradycardia 12/08/2021    Bradycardic cardiac arrest ([address] 12/08/2021    Dizziness 12/07/2021    Bilateral carpal tunnel syndrome 03/05/2019    Smoldering myeloma ([address] 09/06/2018    Primary osteoarthritis of both hands 01/30/2018    Uterine prolapse 02/14/2017    Osteoarthritis of lumbar spine 02/14/2017    Vasomotor rhinitis 11/04/2016    Non morbid obesity due to excess calories 11/04/2016    Anemia 11/04/2016    Primary osteoarthritis of both knees 11/04/2016    Benign recurrent vertigo 11/04/2016    Anxiety tension state 11/04/2016    Dyspepsia 11/04/2016    SSS (sick sinus syndrome) ([address] 02/16/2015    Sinus congestion 08/23/2013    Obesity 10/09/2012    Bradycardia 10/09/2012    HTN (hypertension) 10/09/2012    Syncope and collapse 09/18/2012     Past Medical History:   Diagnosis Date    Anemia     Liset Veronica MM,seeing     Arrhythmia     SSS    Bradycardia     Essential hypertension     GERD (gastroesophageal reflux disease)     Psychiatric disorder     Anxiety    Psychotic disorder (HCC)     anxiety    Thrombotic stroke involving right middle cerebral artery ([facility] [address] 12/9/2021    Vertigo        Activity:  Activity Level: Bed Rest  Number times ambulated in hallways past shift: 0  Number of times OOB to chair past shift: 0    Cardiac:   Cardiac Monitoring: Yes      Cardiac Rhythm: AV Paced    Access:   Current line(s): PIV     Genitourinary:   Urinary status: voiding and external catheter    Respiratory:   O2 Device: None (Room air)  Chronic home O2 use?: NO  Incentive spirometer at bedside: NO       GI:  Last Bowel Movement Date: 12/11/21  Current diet:  ADULT DIET Regular  DIET ONE TIME MESSAGE  DIET ONE TIME MESSAGE  Passing flatus: YES  Tolerating current diet: YES       Pain Management:   Patient states pain is manageable on current regimen: YES    Skin:  Sid Score: 14  Interventions: PT/OT consult    Patient Safety:  Fall Score:  Total Score: 3  Interventions: bed/chair alarm, assistive device (walker, cane, etc), gripper socks and pt to call before getting OOB  High Fall Risk: Yes  @Rollbelt  @dexterity to release roll belt  Yes/No ( must document dexterity  here by stating Yes or No here, otherwise this is a restraint and must follow restraint documentation policy.)    DVT prophylaxis:  DVT prophylaxis Med-   DVT prophylaxis SCD or WES-    Wounds: (If Applicable)  Wounds- Yes  Location left chest    Active Consults:  IP CONSULT TO NEUROLOGY    Length of Stay:  Expected LOS: 2d 21h  Actual LOS: 5  Discharge Plan: No       Annamaria Gao RN

## 2021-12-13 NOTE — PROGRESS NOTES
Bedside shift change report given to Evelyn (oncoming nurse) by Viraj Johnston nurse).  Report included the following information SBAR, Kardex, ED Summary, Intake/Output, MAR and Recent Resultsette (oncoming nurse) by Pato Harrison nurse).   Report included the following information SBAR, Kardex, ED Summary, Intake/Output, MAR and Recent Results

## 2021-12-13 NOTE — PROGRESS NOTES
Pharmacist Discharge Medication Reconciliation    Significant PMH:   Past Medical History:   Diagnosis Date    Anemia     Dona Mora MM,seeing     Arrhythmia     SSS    Bradycardia     Essential hypertension     GERD (gastroesophageal reflux disease)     Psychiatric disorder     Anxiety    Psychotic disorder ([facility])     anxiety    Thrombotic stroke involving right middle cerebral artery ([facility] [address] 12/9/2021    Vertigo      Encounter Diagnoses:   Encounter Diagnosis   Name Primary? SSS (sick sinus syndrome) ([facility])    New onset of L sided weakness most likely due to acute lacunar CVA    Allergies: Patient has no known allergies. Discharge Medications:   Current Discharge Medication List        START taking these medications    Details   pantoprazole (PROTONIX) 40 mg tablet Take 1 Tablet by mouth Daily (before breakfast). Qty: 30 Tablet, Refills: 0  Start date: 12/13/2021      aspirin 81 mg chewable tablet Take 1 Tablet by mouth daily. Qty: 30 Tablet, Refills: 0  Start date: 12/11/2021      atorvastatin (LIPITOR) 10 mg tablet Take 1 Tablet by mouth nightly. Qty: 30 Tablet, Refills: 0  Start date: 12/10/2021      clopidogreL (PLAVIX) 75 mg tab Take 1 Tablet by mouth daily. Qty: 30 Tablet, Refills: 0  Start date: 12/11/2021           CONTINUE these medications which have NOT CHANGED    Details   busPIRone (BUSPAR) 10 mg tablet Take 10 mg by mouth two (2) times a day. citalopram (CELEXA) 20 mg tablet TAKE 1 TABLET BY MOUTH EVERY DAY  Qty: 90 Tablet, Refills: 3      losartan-hydroCHLOROthiazide (HYZAAR) 50-12.5 mg per tablet Take 1 Tab by mouth daily. Qty: 90 Tab, Refills: 3    Associated Diagnoses: Essential hypertension           STOP taking these medications       famotidine (PEPCID) 40 mg tablet Comments:   Reason for Stopping:         LORazepam (ATIVAN) 0.5 mg tablet Comments:   Reason for Stopping:               The patient's chart, MAR and AVS were reviewed by Mynor Galvin RPH.     Discharging Provider: Susa Mortimer MD    Thank you,     Nicole Padgett, [facility]

## 2021-12-13 NOTE — PROGRESS NOTES
Problem: Patient Education: Go to Patient Education Activity  Goal: Patient/Family Education  Outcome: Progressing Towards Goal     Problem: TIA/CVA Stroke: 0-24 hours  Goal: Off Pathway (Use only if patient is Off Pathway)  Outcome: Progressing Towards Goal  Goal: Activity/Safety  Outcome: Progressing Towards Goal  Goal: Consults, if ordered  Outcome: Progressing Towards Goal  Goal: Diagnostic Test/Procedures  Outcome: Progressing Towards Goal  Goal: Nutrition/Diet  Outcome: Progressing Towards Goal  Goal: Discharge Planning  Outcome: Progressing Towards Goal  Goal: Medications  Outcome: Progressing Towards Goal  Goal: Respiratory  Outcome: Progressing Towards Goal  Goal: Treatments/Interventions/Procedures  Outcome: Progressing Towards Goal  Goal: Minimize risk of bleeding post-thrombolytic infusion  Outcome: Progressing Towards Goal  Goal: Monitor for complications post-thrombolytic infusion  Outcome: Progressing Towards Goal  Goal: Psychosocial  Outcome: Progressing Towards Goal  Goal: *Hemodynamically stable  Outcome: Progressing Towards Goal  Goal: *Neurologically stable  Description: Absence of additional neurological deficits    Outcome: Progressing Towards Goal  Goal: *Verbalizes anxiety and depression are reduced or absent  Outcome: Progressing Towards Goal  Goal: *Absence of Signs of Aspiration on Current Diet  Outcome: Progressing Towards Goal  Goal: *Absence of deep venous thrombosis signs and symptoms(Stroke Metric)  Outcome: Progressing Towards Goal  Goal: *Ability to perform ADLs and demonstrates progressive mobility and function  Outcome: Progressing Towards Goal  Goal: *Stroke education started(Stroke Metric)  Outcome: Progressing Towards Goal  Goal: *Dysphagia screen performed(Stroke Metric)  Outcome: Progressing Towards Goal  Goal: *Rehab consulted(Stroke Metric)  Outcome: Progressing Towards Goal     Problem: TIA/CVA Stroke: Day 2 Until Discharge  Goal: Off Pathway (Use only if patient is Off Pathway)  Outcome: Progressing Towards Goal  Goal: Activity/Safety  Outcome: Progressing Towards Goal  Goal: Diagnostic Test/Procedures  Outcome: Progressing Towards Goal  Goal: Nutrition/Diet  Outcome: Progressing Towards Goal  Goal: Discharge Planning  Outcome: Progressing Towards Goal  Goal: Medications  Outcome: Progressing Towards Goal  Goal: Respiratory  Outcome: Progressing Towards Goal  Goal: Treatments/Interventions/Procedures  Outcome: Progressing Towards Goal  Goal: Psychosocial  Outcome: Progressing Towards Goal  Goal: *Verbalizes anxiety and depression are reduced or absent  Outcome: Progressing Towards Goal  Goal: *Absence of aspiration  Outcome: Progressing Towards Goal  Goal: *Absence of deep venous thrombosis signs and symptoms(Stroke Metric)  Outcome: Progressing Towards Goal  Goal: *Optimal pain control at patient's stated goal  Outcome: Progressing Towards Goal  Goal: *Tolerating diet  Outcome: Progressing Towards Goal  Goal: *Ability to perform ADLs and demonstrates progressive mobility and function  Outcome: Progressing Towards Goal  Goal: *Stroke education continued(Stroke Metric)  Outcome: Progressing Towards Goal     Problem: Ischemic Stroke: Discharge Outcomes  Goal: *Verbalizes anxiety and depression are reduced or absent  Outcome: Progressing Towards Goal  Goal: *Verbalize understanding of risk factor modification(Stroke Metric)  Outcome: Progressing Towards Goal  Goal: *Hemodynamically stable  Outcome: Progressing Towards Goal  Goal: *Absence of aspiration pneumonia  Outcome: Progressing Towards Goal  Goal: *Aware of needed dietary changes  Outcome: Progressing Towards Goal  Goal: *Verbalize understanding of prescribed medications including anti-coagulants, anti-lipid, and/or anti-platelets(Stroke Metric)  Outcome: Progressing Towards Goal  Goal: *Tolerating diet  Outcome: Progressing Towards Goal  Goal: *Aware of follow-up diagnostics related to anticoagulants  Outcome: Progressing Towards Goal  Goal: *Ability to perform ADLs and demonstrates progressive mobility and function  Outcome: Progressing Towards Goal  Goal: *Absence of DVT(Stroke Metric)  Outcome: Progressing Towards Goal  Goal: *Absence of aspiration  Outcome: Progressing Towards Goal  Goal: *Optimal pain control at patient's stated goal  Outcome: Progressing Towards Goal  Goal: *Home safety concerns addressed  Outcome: Progressing Towards Goal  Goal: *Describes available resources and support systems  Outcome: Progressing Towards Goal  Goal: *Verbalizes understanding of activation of EMS(911) for stroke symptoms(Stroke Metric)  Outcome: Progressing Towards Goal  Goal: *Understands and describes signs and symptoms to report to providers(Stroke Metric)  Outcome: Progressing Towards Goal  Goal: *Neurolgocially stable (absence of additional neurological deficits)  Outcome: Progressing Towards Goal  Goal: *Verbalizes importance of follow-up with primary care physician(Stroke Metric)  Outcome: Progressing Towards Goal  Goal: *Smoking cessation discussed,if applicable(Stroke Metric)  Outcome: Progressing Towards Goal  Goal: *Depression screening completed(Stroke Metric)  Outcome: Progressing Towards Goal     Problem: Falls - Risk of  Goal: *Absence of Falls  Description: Document Darcy Fall Risk and appropriate interventions in the flowsheet. Outcome: Progressing Towards Goal  Note: Fall Risk Interventions:  Mobility Interventions: Bed/chair exit alarm         Medication Interventions: Bed/chair exit alarm    Elimination Interventions: Call light in reach, Bed/chair exit alarm    History of Falls Interventions: Bed/chair exit alarm         Problem: Patient Education: Go to Patient Education Activity  Goal: Patient/Family Education  Outcome: Progressing Towards Goal     Problem: Pressure Injury - Risk of  Goal: *Prevention of pressure injury  Description: Document Sid Scale and appropriate interventions in the flowsheet.   Outcome: Progressing Towards Goal     Problem: Patient Education: Go to Patient Education Activity  Goal: Patient/Family Education  Outcome: Progressing Towards Goal     Problem: Patient Education: Go to Patient Education Activity  Goal: Patient/Family Education  Outcome: Progressing Towards Goal     Problem: Patient Education: Go to Patient Education Activity  Goal: Patient/Family Education  Outcome: Progressing Towards Goal

## 2021-12-13 NOTE — PROGRESS NOTES
Transition of Care Plan:     RUR:15% moderate    Disposition: [facility]. RN to call report [phone]  RN will provide room number at that time. EMTALA per hospital policy: Accepting MD: Dr. Dwaine Leblanc  [facility] will be here to transport at 6:30 PM today! PCS on bedside chart. Ambulance on door. [facility]:    -11:46 AM: Talked to [facility] and they can accept today however, it has to be around 7 PM d/t bed availability. CM working on setting up transport through [facility] via [employer]. CM reviewed plan with Pt and DTR, Melquiades Veronica in room.    -9 AM Talked to [facility] Sommers and she stated that they might be able to take today. Miranda wanted to see DC Summary. CM called Dr. Dang Stanford and she indicated she was rounding and she would put it in after she rounds. -8:58 AM Miranda called back and stated that they have 5 DC tomorrow and 6 on Wednesday. She thinks they will be able to take her tomorrow. CM will have to check with her 12/14 in AM.   -8:45 AM: CM called [facility] ([phone]) and they are assessing bed availability and will let us know if they have a bed or not. [place]: Reviewing. They wanted us to assess plan for after MARCELLA. [facility]: Declined referral: No available beds. Follow up appointments: Cardiology: Ronal: 12:50 PM CM called and left  for Ronal RN to make appt. Neurology: Jordin Contreras: 12:50 PM CM called and had to leave . DME needed:not indicated  Transportation at Discharge: [facility] EMTALA will need to be arranged. Keys or means to access home:  N/a      IM Medicare Letter: Second IM letter delivered today to Pt with Jasmin Lassiter in the room. Medicare pt has received, reviewed, and signed 2nd IM letter informing them of their right to appeal the discharge. Signed copy has been placed on pt bedside chart.     Is patient a BCPI-A Bundle: n/a              If yes, was Bundle Letter given?:    Is patient a  and connected with the [facility]? N/a  If yes, was [place] transfer form completed and [facility] notified? Caregiver Contact: Soha Goldstein friend: [phone]   Discharge Caregiver contacted prior to discharge?  11:46 AM Reviewed plan with Pt and DTR in room. CM will continue to monitor discharge plan.      Luis Munguia, [address]  [phone]

## 2021-12-13 NOTE — DISCHARGE SUMMARY
Discharge Summary      Name: Yanni Rey  [id number]  YOB: 1932 (Age: 80 y.o.)   Date of Admission: 12/8/2021  Date of Discharge: 12/13/2021  Attending Physician: Jacy Grant MD    Discharge Diagnosis:   New onset of L sided weakness most likely due to acute lacunar CVA possibly due to hypotension from bradycardia and significant arrhythmia which is now improved after PPM placement on 12/8/21 by Dr Huber Cerda  Symptomatic bradycardia/SSS s/p PPM  HTN  GERD POA  Anxiety    Consultations:  IP CONSULT TO NEUROLOGY      Brief Admission History/Reason for Admission Per Keagan Clifford MD:   Ana Graves is a 80 y.o.  female who presents with above complaints transfer from [facility] for cardiology/electrophysiology eval for pacemaker placement/  Patient had an episode of unresponsiveness today evening when patient was found to have severe bradycardia with hypotension and hypothermia-heart rate dipped down to 30s-responded to atropine dose x1, was put on transcutaneous pacing pads and transferred to [address] for likely pacemaker placement with Dr. Huber Cerda.  Patient was found to be stable with no new complaints when seen on arrival on IVCU bed 2155.     We were asked to admit for work up and evaluation of the above problems. Brief Hospital Course by Main Problems:   New onset of L sided weakness most likely due to acute lacunar CVA possibly due to hypotension from bradycardia and significant arrhythmia which is now improved after PPM placement on 12/8/21 by Dr Huber Cerda  Symptomatic bradycardia/SSS s/p PPM  HTN  Initial head CT and repeat CT neg for acute process. CTA head and neck neg for major occlusive disease of the cerebral vessels. Pt was unable to do MRI due to newly placed PPM.  Duplex US without significant stenosis. , A1C 5.6, TSH 3.9.   She was evaluated by neurology, recommended to continue with ASA, plavix, statin  Outpt f/u with neuro and cardiology in 2 weeks. PT/OT, recommended IPR.       GERD POA  Cont' PPI     Anxiety POA  Should avoid using benzos due to high fall risk given advanced age  Continue citalopram, buspirone       Discharge Exam:  Patient seen and examined by me on discharge day. Pertinent Findings:  Visit Vitals  /60 (BP 1 Location: Right upper arm, BP Patient Position: At rest;Lying)   Pulse (!) 59   Temp 98.4 °F (36.9 °C)   Resp 18   SpO2 96%     Gen:    Not in distress  Chest: Clear lungs  CVS:   Regular rhythm. No edema  Abd:  Soft, not distended, not tender    Discharge/Recent Laboratory Results:  No results for input(s): NA, K, CL, CO2, BUN, GLU, CA, PHOS, MG in the last 72 hours. No lab exists for component: CREATININE  No results for input(s): HGB, HCT, WBC, PLT, HGBEXT, HCTEXT, PLTEXT in the last 72 hours. Discharge Medications:  Current Discharge Medication List      START taking these medications    Details   pantoprazole (PROTONIX) 40 mg tablet Take 1 Tablet by mouth Daily (before breakfast). Qty: 30 Tablet, Refills: 0  Start date: 12/13/2021      aspirin 81 mg chewable tablet Take 1 Tablet by mouth daily. Qty: 30 Tablet, Refills: 0  Start date: 12/11/2021      atorvastatin (LIPITOR) 10 mg tablet Take 1 Tablet by mouth nightly. Qty: 30 Tablet, Refills: 0  Start date: 12/10/2021      clopidogreL (PLAVIX) 75 mg tab Take 1 Tablet by mouth daily. Qty: 30 Tablet, Refills: 0  Start date: 12/11/2021         CONTINUE these medications which have NOT CHANGED    Details   busPIRone (BUSPAR) 10 mg tablet Take 10 mg by mouth two (2) times a day. citalopram (CELEXA) 20 mg tablet TAKE 1 TABLET BY MOUTH EVERY DAY  Qty: 90 Tablet, Refills: 3      losartan-hydroCHLOROthiazide (HYZAAR) 50-12.5 mg per tablet Take 1 Tab by mouth daily.   Qty: 90 Tab, Refills: 3    Associated Diagnoses: Essential hypertension         STOP taking these medications       famotidine (PEPCID) 40 mg tablet Comments: Reason for Stopping:         LORazepam (ATIVAN) 0.5 mg tablet Comments:   Reason for Stopping:                 DISPOSITION:    Home with Family:    Home with HH/PT/OT/RN:    SNF/LTC: x   DELFINO:    OTHER:          Follow up with:   PCP : Gege Bertrand MD  Follow-up Information     Follow up With Specialties Details Why Javier Fischer MD Neurology In 2 weeks  [address]  1 [facility] 115      Sheldon Garber MD Cardiology, Cardio Vascular Surgery, Clinical Cardiac Electrophysiology In 2 weeks  [address]  [address] [facility] Gege Will MD    Patient can only remember the practice name and not the physician              Total time in minutes spent coordinating this discharge (includes going over instructions, follow-up, prescriptions, and preparing report for sign off to her PCP) :  35 minutes

## 2021-12-13 NOTE — PROGRESS NOTES
Speech path  Patient's speech has resolved and she is fully intelligible. The patient was satisfied that she had normal speech and her family member did as well. We will sign off.   Radha Hugo, SLP

## 2021-12-14 ENCOUNTER — PATIENT OUTREACH (OUTPATIENT)
Dept: CASE MANAGEMENT | Age: 86
End: 2021-12-14

## 2021-12-14 NOTE — PROGRESS NOTES
Transition of care outreach postponed for 14 days due to patient's discharge to SNF.  12/6-12/13/21 [facility] bradycardia D/C [facility] f/u 14d

## 2021-12-22 NOTE — TELEPHONE ENCOUNTER
VOICEMAIL    Diana Mcgraw w/ [facility] called. Pt is being released from rehab and needs a f/u appt w/ Dr. Cam Thomas. Please call Ad Yi with appt date & time. Her mailbox is private. If she does not answer please leave detailed information on voicemail.  [phone]

## 2022-01-04 ENCOUNTER — PATIENT OUTREACH (OUTPATIENT)
Dept: CASE MANAGEMENT | Age: 87
End: 2022-01-04

## 2022-01-04 NOTE — PROGRESS NOTES
12/8-12/13/21 [facility] bradycardia D/C [facility] and D/C to [facility] 12/23/21  Follow up 14d

## 2022-01-18 ENCOUNTER — PATIENT OUTREACH (OUTPATIENT)
Dept: CASE MANAGEMENT | Age: 87
End: 2022-01-18

## 2022-01-18 NOTE — PROGRESS NOTES
Transition of care outreach postponed for 14 days due to patient's discharge to SNF.   [facility]C [facility] 12/13/21 and readmitted D/C to [address] [place] [address] E 12/23/21 still admitted f/u 14d  bundle

## 2022-02-01 ENCOUNTER — PATIENT OUTREACH (OUTPATIENT)
Dept: CASE MANAGEMENT | Age: 87
End: 2022-02-01

## 2022-02-01 NOTE — PROGRESS NOTES
Transitions of Care Call  Call within 2 business days of discharge: No     Patient: Ricardo Sandra Patient : 1932 MRN: [id number]    Last Discharge [address]       Complaint Diagnosis Description Type Department Provider    21  SSS (sick sinus syndrome) [facility]) Admission (Discharged) Dougie Reno MD          Was this an external facility discharge? Yes, 22 Discharge Facility: [address] [place] [address] to be reviewed by the provider   Additional needs identified to be addressed with provider no  none           Encounter was not routed to provider for escalation. Method of communication with provider: none. Discussed COVID-19 related testing which was patient was tested while at [facility] and was positive for covid at this time. Test results were positive. Patient informed of results, if available? Already advised. Current Symptoms:no new symptoms    Reviewed New or Changed Meds: no    Do you have what you need at home?  Durable Medical Equipment ordered at discharge: None   Home Health/Outpatient orders at discharge: PT and OT with [facility] started 22  Pulse oximeter? No  Patient lives at [address] [place] [address] with assistance from her friend Ally Malik    Patient education provided: Reviewed appropriate site of care based on symptoms and resources available to patient including: PCP and When to call 911. Follow up appointment scheduled within 7 days of discharge: no. If no appointment scheduled, scheduling offered: no. Already seen by PCP 22  Future Appointments   Date Time Provider Precious Arrieta   2022 11:00 AM PACEMAKER, [facility] BS AMB       Interventions: Education of patient/family/caregiver/guardian to support self-management-gave Covid number to patient  CTN reviewed discharge instructions, medical action plan and red flags with patient who verbalized understanding. Provided contact information for future needs.  No further follow-up call indicated based on severity of symptoms and risk factors.       Liza Pulliam LPN

## 2022-02-02 ENCOUNTER — OFFICE VISIT (OUTPATIENT)
Dept: CARDIOLOGY CLINIC | Age: 87
End: 2022-02-02
Payer: MEDICARE

## 2022-02-02 DIAGNOSIS — I49.5 SSS (SICK SINUS SYNDROME) (HCC): ICD-10-CM

## 2022-02-02 DIAGNOSIS — Z95.0 CARDIAC PACEMAKER IN SITU: Primary | ICD-10-CM

## 2022-02-02 DIAGNOSIS — R00.1 BRADYCARDIA: ICD-10-CM

## 2022-02-02 PROCEDURE — 93280 PM DEVICE PROGR EVAL DUAL: CPT | Performed by: INTERNAL MEDICINE

## 2022-02-03 ENCOUNTER — TELEPHONE (OUTPATIENT)
Dept: CARDIOLOGY CLINIC | Age: 87
End: 2022-02-03

## 2022-02-03 NOTE — TELEPHONE ENCOUNTER
Patient's sister called to let the doctor know the patient had a pacemaker put in in December by Dr. Yordy Solano, patient will need a 3 month follow and the doctor stated if the patient would like the pacemaker monitor at this office a request will need to be put in by the doctor, please advise          Mela Loya  [phone]

## 2022-02-07 ENCOUNTER — APPOINTMENT (OUTPATIENT)
Dept: GENERAL RADIOLOGY | Age: 87
End: 2022-02-07
Attending: EMERGENCY MEDICINE
Payer: MEDICARE

## 2022-02-07 ENCOUNTER — HOSPITAL ENCOUNTER (EMERGENCY)
Age: 87
Discharge: HOME | End: 2022-02-07
Attending: EMERGENCY MEDICINE
Payer: MEDICARE

## 2022-02-07 VITALS
DIASTOLIC BLOOD PRESSURE: 90 MMHG | HEART RATE: 90 BPM | TEMPERATURE: 97.1 F | RESPIRATION RATE: 18 BRPM | SYSTOLIC BLOOD PRESSURE: 145 MMHG | OXYGEN SATURATION: 99 % | HEIGHT: 62 IN | WEIGHT: 161 LBS | BODY MASS INDEX: 29.63 KG/M2

## 2022-02-07 DIAGNOSIS — R09.89 FOREIGN BODY SENSATION IN THROAT: Primary | ICD-10-CM

## 2022-02-07 PROCEDURE — 74011000250 HC RX REV CODE- 250: Performed by: EMERGENCY MEDICINE

## 2022-02-07 PROCEDURE — 99284 EMERGENCY DEPT VISIT MOD MDM: CPT

## 2022-02-07 PROCEDURE — 70360 X-RAY EXAM OF NECK: CPT

## 2022-02-07 RX ORDER — LIDOCAINE HYDROCHLORIDE 20 MG/ML
15 SOLUTION OROPHARYNGEAL
Status: COMPLETED | OUTPATIENT
Start: 2022-02-07 | End: 2022-02-07

## 2022-02-07 RX ADMIN — LIDOCAINE HYDROCHLORIDE 15 ML: 20 SOLUTION ORAL; TOPICAL at 17:50

## 2022-02-07 NOTE — ED NOTES
Pt presents to ED ambulatory complaining of swallowing a fishbone and getting it stuck 1.5 hours PTA in ED. Pt denies difficulty managing airway at this time. Pt is alert and oriented x 4, RR even and unlabored, skin is warm and dry. Assessment completed and pt updated on plan of care. Call bell in reach. Emergency Department Nursing Plan of Care       The Nursing Plan of Care is developed from the Nursing assessment and Emergency Department Attending provider initial evaluation. The plan of care may be reviewed in the ED Provider note.     The Plan of Care was developed with the following considerations:   Patient / Family readiness to learn indicated by:verbalized understanding  Persons(s) to be included in education: patient  Barriers to Learning/Limitations:No    Signed     Loki Macario    2/7/2022   5:53 PM

## 2022-02-07 NOTE — ED PROVIDER NOTES
EMERGENCY DEPARTMENT HISTORY AND PHYSICAL EXAM      Date: 2/7/2022  Patient Name: Sheryle Boys    History of Presenting Illness     Chief Complaint   Patient presents with    Foreign Body Swallowed       History Provided By: Patient    HPI: Sheryle Boys, 80 y.o. female with PMHx of HTN, GERD, and psychotic disorder presents to the ED with cc of a foreign body stuck in her throat. Pt states <2hrs she was eating fish and believes she had a bone from the food was ingested and got lodged in the proximal esophagus. Denies CP, or dental changes. No n/v. There are no other complaints, changes, or physical findings at this time. PCP: Jerzy, MD Gege    No current facility-administered medications on file prior to encounter. Current Outpatient Medications on File Prior to Encounter   Medication Sig Dispense Refill    pantoprazole (PROTONIX) 40 mg tablet Take 1 Tablet by mouth Daily (before breakfast). 30 Tablet 0    aspirin 81 mg chewable tablet Take 1 Tablet by mouth daily. 30 Tablet 0    atorvastatin (LIPITOR) 10 mg tablet Take 1 Tablet by mouth nightly. 30 Tablet 0    clopidogreL (PLAVIX) 75 mg tab Take 1 Tablet by mouth daily. 30 Tablet 0    busPIRone (BUSPAR) 10 mg tablet Take 10 mg by mouth two (2) times a day.  citalopram (CELEXA) 20 mg tablet TAKE 1 TABLET BY MOUTH EVERY DAY 90 Tablet 3    losartan-hydroCHLOROthiazide (HYZAAR) 50-12.5 mg per tablet Take 1 Tab by mouth daily.  80 Tab 3       Past History     Past Medical History:  Past Medical History:   Diagnosis Date    Anemia     Ya Goodpasture MM,seeing     Arrhythmia     SSS    Bradycardia     Essential hypertension     GERD (gastroesophageal reflux disease)     Psychiatric disorder     Anxiety    Psychotic disorder (HCC)     anxiety    Thrombotic stroke involving right middle cerebral artery ([facility] [address] 12/9/2021    Vertigo        Past Surgical History:  Past Surgical History:   Procedure Laterality Date    HX BUNIONECTOMY  HX HYSTERECTOMY  1971    partial    HX ORTHOPAEDIC      Status post bunionectomy    HX PARTIAL HYSTERECTOMY         Family History:  Family History   Problem Relation Age of Onset    Hypertension Mother        Social History:  Social History     Tobacco Use    Smoking status: Never Smoker    Smokeless tobacco: Never Used   Vaping Use    Vaping Use: Never used   Substance Use Topics    Alcohol use: No    Drug use: No       Allergies:  No Known Allergies      Review of Systems   Review of Systems   Constitutional: Negative for chills, fatigue and fever. HENT: Negative. Negative for sore throat. Eyes: Negative. Respiratory: Negative for cough and shortness of breath. Cardiovascular: Negative for chest pain and palpitations. Gastrointestinal: Negative for abdominal pain, nausea and vomiting. Genitourinary: Negative for dysuria. Musculoskeletal: Negative. Skin: Negative. Neurological: Negative for dizziness, weakness, light-headedness and headaches. Psychiatric/Behavioral: Negative. All other systems reviewed and are negative. Physical Exam   Physical Exam  Vitals and nursing note reviewed. Exam conducted with a chaperone present. Constitutional:       Appearance: Normal appearance. She is not toxic-appearing. Comments: Tolerated PO fluids, no retching or vomiting   HENT:      Head: Normocephalic and atraumatic. Mouth/Throat:      Mouth: Mucous membranes are moist.   Eyes:      Extraocular Movements: Extraocular movements intact. Pupils: Pupils are equal, round, and reactive to light. Cardiovascular:      Rate and Rhythm: Normal rate and regular rhythm. Pulmonary:      Effort: Pulmonary effort is normal. No respiratory distress. Breath sounds: Normal breath sounds. No wheezing, rhonchi or rales. Abdominal:      General: Abdomen is flat. There is no distension. Palpations: Abdomen is soft. Tenderness: There is no abdominal tenderness. Hernia: No hernia is present. Musculoskeletal:         General: No swelling or tenderness. Normal range of motion. Skin:     General: Skin is warm and dry. Neurological:      General: No focal deficit present. Mental Status: She is alert and oriented to person, place, and time. Cranial Nerves: No cranial nerve deficit. Sensory: No sensory deficit. Psychiatric:         Mood and Affect: Mood normal.         Behavior: Behavior normal.         Diagnostic Study Results     Labs -   No results found for this or any previous visit (from the past 12 hour(s)). Radiologic Studies -   No orders to display     CT Results  (Last 48 hours)    None        CXR Results  (Last 48 hours)    None          Medical Decision Making   I am the first provider for this patient. I reviewed the vital signs, available nursing notes, past medical history, past surgical history, family history and social history. Vital Signs-Reviewed the patient's vital signs. Patient Vitals for the past 12 hrs:   Temp Pulse Resp BP SpO2   02/07/22 1732 97.1 °F (36.2 °C) (!) 107 18 (!) 142/73 99 %       Records Reviewed: Nursing Notes and Previous Radiology Studies    Provider Notes (Medical Decision Making):   Food bolus, esophageal stricture, Luana-Novoa syndrome    ED Course:   Initial assessment performed. The patients presenting problems have been discussed, and they are in agreement with the care plan formulated and outlined with them. I have encouraged them to ask questions as they arise throughout their visit. Critical Care Time:   none      Disposition:  DISCHARGE  6:24 PM  The patient has been re-evaluated and is ready for discharge. Reviewed available results with patient. Counseled pt on diagnosis and care plan. Pt has expressed understanding, and all questions have been answered. Pt agrees with plan and agrees to follow up as recommended, or return to the ED if their symptoms worsen.  Discharge instructions have been provided and explained to the pt, along with reasons to return to the ED. DISCHARGE PLAN:  1. Current Discharge Medication List        2. Follow-up Information    None       3. Return to ED if worse     Diagnosis     Clinical Impression: No diagnosis found. Attestations: This note is prepared by Landry Deluca, acting as Scribe for Estelle Gonzalez MD.     Estelle Gonzalez MD. The scribe's documentation has been prepared under my direction and personally reviewed by me in its entirety. I confirm that the note above accurately reflects all work, treatment, procedures and medical decision making performed by me.

## 2022-02-07 NOTE — ED TRIAGE NOTES
Patient presents to the Ed with c/o swallowing a foreign body. Pt reports swallowing a fish bone and now it is caught in her throat x1.5 hours. Pt reports pain with swallowing. Pt is able to speak in full sentences.

## 2022-02-07 NOTE — ED NOTES
Discharge instructions were given to the patient by Ramona Degroot RN. The patient left the Emergency Department ambulatory, alert and oriented and in no acute distress with 0 prescriptions. The patient was encouraged to call or return to the ED for worsening issues or problems and was encouraged to schedule a follow up appointment for continuing care. The patient verbalized understanding of discharge instructions and prescriptions, all questions were answered. The patient has no further concerns at this time.

## 2022-02-10 NOTE — TELEPHONE ENCOUNTER
Called pt. Verified patient's identity with two identifiers. I told her this was fine. She is hoping to find another general cardiologist in same building as Dr. Yordy Solano to make same day appointments. She said it is closer to her. She denied further questions or concerns.

## 2022-02-10 NOTE — TELEPHONE ENCOUNTER
Am I making a follow up for the patient with Dr. Parrish Members?  Patient's device is being follow at Dr. Jacky Dill office but not sure if she should be seen there for follow up as well, please advise      [phone]

## 2022-02-10 NOTE — TELEPHONE ENCOUNTER
Patient is going to stay with Dr. Lorenza Montenegro office/[facility] for her pacemaker checks and also make a follow up with a cardiologist at that office, please advise

## 2022-02-22 ENCOUNTER — PATIENT OUTREACH (OUTPATIENT)
Dept: CASE MANAGEMENT | Age: 87
End: 2022-02-22

## 2022-02-22 NOTE — PROGRESS NOTES
Care Transitions Initial Call    Call within 2 business days of discharge: No     Patient: King Anayeli Patient : 1932 MRN: [id number]    Last Discharge [address]       Complaint Diagnosis Description Type Department Provider    22 Foreign Body Swallowed Foreign body sensation in throat ED (DISCHARGE) Dallin Arceo MD          Was this an external facility discharge? Yes, 2022 Discharge Facility: [address] [place] [address] to be reviewed by the provider   Additional needs identified to be addressed with provider: yes  need to determine which cardiologist pt will follow up with         Method of communication with provider : none    Advance Care Planning:   Does patient have an Advance Directive:  yes; reviewed and current     Inpatient Readmission Risk score: Unplanned Readmit Risk Score: 12.6 ( )    Was this a readmission? no   Patient stated reason for the admission: pt states that she had a small stroke    Patients top risk factors for readmission: falls and medication management   Interventions to address risk factors: reviewed note from discharge    Care Transition Nurse (CTN) contacted the patient by telephone to perform post hospital discharge assessment. Verified name and  with patient as identifiers. Provided introduction to self, and explanation of the CTN role. CTN reviewed discharge instructions, medical action plan and red flags with patient who verbalized understanding. Were discharge instructions available to patient? no. Reviewed appropriate site of care based on symptoms and resources available to patient including: PCP, Specialist and When to call 911. Patient given an opportunity to ask questions and does not have any further questions or concerns at this time. The patient agrees to contact the PCP office for questions related to their healthcare.      Medication reconciliation was performed with patient, who verbalizes understanding of administration of home medications. Advised obtaining a 90-day supply of all daily and as-needed medications. Referral to Pharm D needed: no     Home Health/Outpatient orders at discharge: home health care  [address]: [facility]    Pt has a rollator. Discussed follow-up appointments. If no appointment was previously scheduled, appointment scheduling offered: no. Is follow up appointment scheduled within 7 days of discharge? unk. [facility] follow up appointment(s):   Future Appointments   Date Time Provider Precious Arrieta   5/5/2022 10:45 AM PACEMAKER, RADHA COLEY BS AMB   5/5/2022 11:00 AM Colin Villeda MD [facility] BS AMB     [facility] follow up appointment(s): [facility]    Plan for follow-up call in 5-7 days based on severity of symptoms and risk factors. Plan for next call: will review medicaitons and PT  CTN provided contact information for future needs. Goals Addressed                 This Visit's Progress     Knowledge and adherence of prescribed medication (ie. action, side effects, missed dose, etc.).        2/22/2022  performed medication reconciliation with pt. AFP       Understands red flags post discharge. 2/22/2022  pt states understanding to call 911 for medical emergency. Pt reports that she is working on medicaid application.   ELENA

## 2022-03-04 ENCOUNTER — PATIENT OUTREACH (OUTPATIENT)
Dept: CASE MANAGEMENT | Age: 87
End: 2022-03-04

## 2022-03-04 NOTE — PROGRESS NOTES
Care Transitions Outreach Attempt    Attempted to reach patient for transitions of care follow up. Unable to reach patient. Left voicemail stating my name, CTN with [facility], date and time of call, and return telephone number.       Patient: Norman Gomez Patient : 1932 MRN: [id number]    Last Discharge [address]       Complaint Diagnosis Description Type Department Provider    22 Foreign Body Swallowed Foreign body sensation in throat ED (Desi Darrin) Wayne Jackson MD        Noted following upcoming appointments from discharge chart review:   [facility]  follow up appointment(s):   Future Appointments   Date Time Provider Precious Arrieta   3/17/2022 10:30 AM PACEMAKER, [facility] BS AMB   3/17/2022 10:45 AM Nicol Villeda MD [facility] BS AMB   2022 10:45 AM PACEMAKER, [facility] BS AMB   2022 11:00 AM Colin Villeda MD [facility] BS AMB     Non-[facility] follow up appointment(s): nancyk

## 2022-03-05 ENCOUNTER — HOSPITAL ENCOUNTER (EMERGENCY)
Age: 87
Discharge: HOME | End: 2022-03-05
Attending: EMERGENCY MEDICINE
Payer: MEDICARE

## 2022-03-05 VITALS
RESPIRATION RATE: 16 BRPM | BODY MASS INDEX: 30.21 KG/M2 | WEIGHT: 160 LBS | OXYGEN SATURATION: 100 % | DIASTOLIC BLOOD PRESSURE: 73 MMHG | HEART RATE: 61 BPM | SYSTOLIC BLOOD PRESSURE: 124 MMHG | HEIGHT: 61 IN | TEMPERATURE: 98.2 F

## 2022-03-05 DIAGNOSIS — R07.89 CHEST WALL PAIN: Primary | ICD-10-CM

## 2022-03-05 DIAGNOSIS — T82.847A PAIN IN PACEMAKER POCKET DUE TO DEVICE: ICD-10-CM

## 2022-03-05 PROCEDURE — 99281 EMR DPT VST MAYX REQ PHY/QHP: CPT

## 2022-03-05 NOTE — ED PROVIDER NOTES
EMERGENCY DEPARTMENT HISTORY AND PHYSICAL EXAM      Date: 3/5/2022  Patient Name: Rachel Slade    History of Presenting Illness     History Provided By: Patient    HPI: Rachel Slade, 80 y.o. female with past medical history significant for anemia, sick sinus syndrome, CAD, hypertension, GERD, and anxiety who presents via private vehicle to the ED with cc of abnormal sensation where her pacemaker is. Patient states that her pacemaker feels larger and harder than it normally is. She states the symptoms have been present for the past 5 to 7 days and really bothers her. She denies any shortness of breath, lightheadedness, or palpitations. She does state that she has lost a significant amount of weight lately and believes that could be contributing to her symptoms. She has a follow-up appointment scheduled with her cardiologist in a week and a half, but was worried so she wanted to come get evaluated today. She denies any modifying factors    PMHx: Anemia, sick sinus syndrome, CAD, hypertension, GERD, and anxiety  Social Hx: Denies alcohol, tobacco, or illegal drug use    PCP: Gege Bertrand MD    There are no other complaints, changes, or physical findings at this time. No current facility-administered medications on file prior to encounter. Current Outpatient Medications on File Prior to Encounter   Medication Sig Dispense Refill    aspirin 81 mg chewable tablet Take 1 Tablet by mouth daily. 30 Tablet 0    atorvastatin (LIPITOR) 10 mg tablet Take 1 Tablet by mouth nightly. 30 Tablet 0    citalopram (CELEXA) 20 mg tablet TAKE 1 TABLET BY MOUTH EVERY DAY 90 Tablet 3    losartan-hydroCHLOROthiazide (HYZAAR) 50-12.5 mg per tablet Take 1 Tab by mouth daily. 90 Tab 3    pantoprazole (PROTONIX) 40 mg tablet Take 1 Tablet by mouth Daily (before breakfast). (Patient not taking: Reported on 2/22/2022) 30 Tablet 0    clopidogreL (PLAVIX) 75 mg tab Take 1 Tablet by mouth daily.  30 Tablet 0    busPIRone (BUSPAR) 10 mg tablet Take 10 mg by mouth two (2) times a day. Past History     Past Medical History:  Past Medical History:   Diagnosis Date    Anemia     [facility],seeing     Arrhythmia     SSS    Bradycardia     CAD (coronary artery disease)     Essential hypertension     GERD (gastroesophageal reflux disease)     Psychiatric disorder     Anxiety    Psychotic disorder (HCC)     anxiety    Thrombotic stroke involving right middle cerebral artery ([facility] [address] 12/9/2021    Vertigo      Past Surgical History:  Past Surgical History:   Procedure Laterality Date    HX BUNIONECTOMY      HX HYSTERECTOMY  1971    partial    HX ORTHOPAEDIC      Status post bunionectomy    HX PARTIAL HYSTERECTOMY       Family History:  Family History   Problem Relation Age of Onset    Hypertension Mother      Social History:  Social History     Tobacco Use    Smoking status: Never Smoker    Smokeless tobacco: Never Used   Vaping Use    Vaping Use: Never used   Substance Use Topics    Alcohol use: No    Drug use: No     Allergies:  No Known Allergies  Review of Systems   Review of Systems   Constitutional: Negative for chills and fever. HENT: Negative for congestion, rhinorrhea, sneezing and sore throat. Eyes: Negative for redness and visual disturbance. Respiratory: Negative for shortness of breath. Cardiovascular: Positive for chest pain (chest wall over pacemaker). Negative for palpitations and leg swelling. Gastrointestinal: Negative for abdominal pain, nausea and vomiting. Genitourinary: Negative for difficulty urinating and frequency. Musculoskeletal: Negative for back pain, myalgias and neck stiffness. Skin: Negative for rash. Neurological: Negative for dizziness, syncope, weakness and headaches. Hematological: Negative for adenopathy. All other systems reviewed and are negative. Physical Exam   Physical Exam  Vitals and nursing note reviewed. Exam conducted with a chaperone present. Constitutional:       Appearance: Normal appearance. She is well-developed. HENT:      Head: Normocephalic and atraumatic. Eyes:      Conjunctiva/sclera: Conjunctivae normal.   Cardiovascular:      Rate and Rhythm: Normal rate and regular rhythm. Pulses: Normal pulses. Heart sounds: Normal heart sounds, S1 normal and S2 normal.   Pulmonary:      Effort: Pulmonary effort is normal. No respiratory distress. Breath sounds: Normal breath sounds. No wheezing. Chest:       Abdominal:      General: Bowel sounds are normal. There is no distension. Palpations: Abdomen is soft. Tenderness: There is no abdominal tenderness. There is no rebound. Musculoskeletal:         General: Normal range of motion. Cervical back: Full passive range of motion without pain, normal range of motion and neck supple. Skin:     General: Skin is warm and dry. Findings: No rash. Neurological:      Mental Status: She is alert and oriented to person, place, and time. Psychiatric:         Speech: Speech normal.         Behavior: Behavior normal.         Thought Content: Thought content normal.         Judgment: Judgment normal.       Diagnostic Study Results   Labs -   No results found for this or any previous visit (from the past 12 hour(s)). Radiologic Studies -   No orders to display     No results found. Medical Decision Making   I am the first provider for this patient. I reviewed the vital signs, available nursing notes, past medical history, past surgical history, family history and social history. Vital Signs-Reviewed the patient's vital signs.   Patient Vitals for the past 24 hrs:   Temp Pulse Resp BP SpO2   03/05/22 0950  61  124/73 100 %   03/05/22 0858 98.2 °F (36.8 °C) 81 16 127/64 100 %     Pulse Oximetry Analysis - 100% on RA (normal)    Cardiac Monitor:   Rate: 61 bpm  Rhythm: Paced     Records Reviewed: Nursing Notes, Old Medical Records and Previous electrocardiograms    Provider Notes (Medical Decision Making):   80-year-old female presents with the sensation that her pacemaker is larger and harder than it was when it was first placed. She has normal pacer spikes on cardiac monitor and denies any lightheadedness/dizziness. Discussed with patient that she might feel this way secondary to the weight loss and there is less soft tissue overlying the pacemaker. Offered labs, imaging, and EKG but patient declines. Will discharge with cardiology follow-up at her scheduled appointment. If her symptoms persist or worsen, will have her call for an earlier appointment or return to the emergency department. ED Course:   Initial assessment performed. The patients presenting problems have been discussed, and they are in agreement with the care plan formulated and outlined with them. I have encouraged them to ask questions as they arise throughout their visit. Progress Note:   Updated pt on all returned results and findings. Discussed the importance of proper follow up as referred below along with return precautions. Pt in agreement with the care plan and expresses agreement with and understanding of all items discussed. Disposition:  Discharge Note:  The pt is ready for discharge. The pt's signs, symptoms, diagnosis, and discharge instructions have been discussed and pt has conveyed their understanding. The pt is to follow up as recommended or return to ER should their symptoms worsen. Plan has been discussed and pt is in agreement. PLAN:  1. Discharge Medication List as of 3/5/2022  9:40 AM        2.    Follow-up Information     Follow up With Specialties Details Why Cortez Benitez MD Cardiology, Cardio Vascular Surgery, Clinical Cardiac Electrophysiology Go on 3/17/2022 or sooner if necessary Milena Umanzor [address] [place] [address] EMERGENCY DEPT Emergency Medicine  As needed, If symptoms worsen [address] [facility]  [phone]        Return to ED if worse     Diagnosis     Clinical Impression:   1. Chest wall pain    2. Pain in pacemaker pocket due to device            Please note that this dictation was completed with Dragon, computer voice recognition software. Quite often unanticipated grammatical, syntax, homophones, and other interpretive errors are inadvertently transcribed by the computer software. Please disregard these errors. Additionally, please excuse any errors that have escaped final proofreading.

## 2022-03-05 NOTE — ED NOTES
Discharge instructions reviewed with the patient.   Patient discharged with aid of a rolling walker and accompanied by friend

## 2022-03-05 NOTE — ED TRIAGE NOTES
Patient comes to the ED for evaluation of her pacemaker.   Patient stated pacer feels \"bigger and harder\"

## 2022-03-05 NOTE — ED NOTES
Emergency Department Nursing Plan of Care       The Nursing Plan of Care is developed from the Nursing assessment and Emergency Department Attending provider initial evaluation. The plan of care may be reviewed in the ED Provider note.     The Plan of Care was developed with the following considerations:   Patient / Family readiness to learn indicated by:verbalized understanding  Persons(s) to be included in education: patient  Barriers to Learning/Limitations:No    Signed     Imelda Ross RN    3/5/2022   9:17 AM

## 2022-03-10 ENCOUNTER — PATIENT OUTREACH (OUTPATIENT)
Dept: CASE MANAGEMENT | Age: 87
End: 2022-03-10

## 2022-03-10 NOTE — PROGRESS NOTES
Goals      Knowledge and adherence of prescribed medication (ie. action, side effects, missed dose, etc.).      2/22/2022  performed medication reconciliation with pt. AFP       Understands red flags post discharge. 2/22/2022  pt states understanding to call 911 for medical emergency. Pt reports that she is working on medicaid application. AFP    3/10/2022  pt reports that she feels anxious but this is not new. Pt reports that she has been anxious all of her life. Pt reports that medication was stopped. Pt states that she feels depressed but reports that she feels nervous and reports that buspar does not work for her. Pt reports that anti-anxiety medication works. Pt is agreeable to a call next week. Pt reports that she is doing okay and staying active.   [facility]

## 2022-03-14 ENCOUNTER — TELEPHONE (OUTPATIENT)
Dept: CARDIOLOGY CLINIC | Age: 87
End: 2022-03-14

## 2022-03-15 NOTE — TELEPHONE ENCOUNTER
Called patient back to ask her more about her pacemaker site. Patient says at her pacemaker site she has been having some soreness, and pain on or off. Patient says she does not use any OTC medication and I encouraged her to use tylenol or ibuprofen as needed for the pain, I told her that sometimes that site can aggravate her nerves and cause that. I told her if she wants we can check her site her in the clinic and patient says she still wants to come in so we can check out the site. Otherwise she says her site is healing well and looks great. I told her I canceled Dr. Susan Rizzo appointment but will keep the pacemaker appointment. Patient verbalized understanding.

## 2022-03-17 ENCOUNTER — OFFICE VISIT (OUTPATIENT)
Dept: CARDIOLOGY CLINIC | Age: 87
End: 2022-03-17
Payer: MEDICARE

## 2022-03-17 DIAGNOSIS — Z95.0 CARDIAC PACEMAKER IN SITU: Primary | ICD-10-CM

## 2022-03-17 DIAGNOSIS — R00.1 BRADYCARDIA: ICD-10-CM

## 2022-03-17 DIAGNOSIS — I49.5 SSS (SICK SINUS SYNDROME) (HCC): ICD-10-CM

## 2022-03-17 PROCEDURE — 93288 INTERROG EVL PM/LDLS PM IP: CPT | Performed by: INTERNAL MEDICINE

## 2022-03-17 NOTE — PROGRESS NOTES
Patient states she has some soreness and pain at pacemaker site intermittently. Subclavian pacemaker  site approximated well, no discharge. No erythema or heat. Device interrogation WNL. Explained to patient that the soreness and pain she may feel are due to her pacemaker being new and it rubbing against nerve endings. Follow up as planned in 2 mo. See report scanned to chart. Chargeable visit.

## 2022-03-18 ENCOUNTER — PATIENT OUTREACH (OUTPATIENT)
Dept: CASE MANAGEMENT | Age: 87
End: 2022-03-18

## 2022-03-18 PROBLEM — M47.816 OSTEOARTHRITIS OF LUMBAR SPINE: Status: ACTIVE | Noted: 2017-02-14

## 2022-03-18 PROBLEM — D47.2 SMOLDERING MYELOMA: Status: ACTIVE | Noted: 2018-09-06

## 2022-03-18 PROBLEM — M19.042 PRIMARY OSTEOARTHRITIS OF BOTH HANDS: Status: ACTIVE | Noted: 2018-01-30

## 2022-03-18 PROBLEM — M19.041 PRIMARY OSTEOARTHRITIS OF BOTH HANDS: Status: ACTIVE | Noted: 2018-01-30

## 2022-03-18 NOTE — PROGRESS NOTES
Patient has graduated from the Bundle program on 3/18/2022. Patient/family has the ability to self-manage at this time Care management goals have been completed. Patient was referred to the [facility] team for further management. Goals Addressed                 This Visit's Progress     COMPLETED: Knowledge and adherence of prescribed medication (ie. action, side effects, missed dose, etc.).        2/22/2022  performed medication reconciliation with pt. [facility]    3/18/2022  pt reports no changes to medications. AFP       COMPLETED: Understands red flags post discharge. 2/22/2022  pt states understanding to call 911 for medical emergency. Pt reports that she is working on medicaid application. AFP    3/10/2022  pt reports that she feels anxious but this is not new. Pt reports that she has been anxious all of her life. Pt reports that medication was stopped. Pt states that she feels depressed but reports that she feels nervous and reports that buspar does not work for her. Pt reports that anti-anxiety medication works. Pt is agreeable to a call next week. Pt reports that she is doing okay and staying active. AFP    3/18/2022  pt reports that visit with cardiology went well. Pt states no concerns other than she remains anxious. Pt states that she is agreeable to CM. Pt has hx of CVA and HTN. AFP            Patient has Care Transition Nurse's contact information for any further questions, concerns, or needs.   Patients upcoming visits:    Future Appointments   Date Time Provider Precious Arrieta   5/5/2022 10:45 AM PACEMAKER, [facility] BS AMB   5/5/2022 11:00 AM MD [facility] BS AMB   6/3/2022 10:00 AM Dariel Nunez MD [facility] BS AMB

## 2022-03-19 PROBLEM — R42 DIZZINESS: Status: ACTIVE | Noted: 2021-12-07

## 2022-03-19 PROBLEM — G45.0 VERTEBRAL BASILAR INSUFFICIENCY: Status: ACTIVE | Noted: 2021-12-09

## 2022-03-19 PROBLEM — R00.1 BRADYCARDIC CARDIAC ARREST (HCC): Status: ACTIVE | Noted: 2021-12-08

## 2022-03-19 PROBLEM — N81.4 UTERINE PROLAPSE: Status: ACTIVE | Noted: 2017-02-14

## 2022-03-19 PROBLEM — R00.1 SYMPTOMATIC BRADYCARDIA: Status: ACTIVE | Noted: 2021-12-08

## 2022-03-19 PROBLEM — I67.89 HEMIPARESIS OF LEFT NONDOMINANT SIDE DUE TO ACUTE CEREBROVASCULAR DISEASE (HCC): Status: ACTIVE | Noted: 2021-12-09

## 2022-03-19 PROBLEM — G81.94 HEMIPARESIS OF LEFT NONDOMINANT SIDE DUE TO ACUTE CEREBROVASCULAR DISEASE (HCC): Status: ACTIVE | Noted: 2021-12-09

## 2022-03-19 PROBLEM — I46.2 BRADYCARDIC CARDIAC ARREST (HCC): Status: ACTIVE | Noted: 2021-12-08

## 2022-03-19 PROBLEM — I65.23 BILATERAL CAROTID ARTERY STENOSIS: Status: ACTIVE | Noted: 2021-12-09

## 2022-03-19 PROBLEM — I63.311 THROMBOTIC STROKE INVOLVING RIGHT MIDDLE CEREBRAL ARTERY (HCC): Status: ACTIVE | Noted: 2021-12-09

## 2022-03-20 PROBLEM — R42 DIZZINESS AND GIDDINESS: Status: ACTIVE | Noted: 2021-12-09

## 2022-03-20 PROBLEM — G56.03 BILATERAL CARPAL TUNNEL SYNDROME: Status: ACTIVE | Noted: 2019-03-05

## 2022-04-07 ENCOUNTER — OFFICE VISIT (OUTPATIENT)
Dept: INTERNAL MEDICINE CLINIC | Age: 87
End: 2022-04-07
Payer: MEDICARE

## 2022-04-07 VITALS
OXYGEN SATURATION: 94 % | SYSTOLIC BLOOD PRESSURE: 146 MMHG | TEMPERATURE: 98.3 F | HEART RATE: 77 BPM | HEIGHT: 61 IN | RESPIRATION RATE: 16 BRPM | WEIGHT: 159.7 LBS | BODY MASS INDEX: 30.15 KG/M2 | DIASTOLIC BLOOD PRESSURE: 81 MMHG

## 2022-04-07 DIAGNOSIS — I49.5 SSS (SICK SINUS SYNDROME) (HCC): Primary | ICD-10-CM

## 2022-04-07 DIAGNOSIS — I63.311 THROMBOTIC STROKE INVOLVING RIGHT MIDDLE CEREBRAL ARTERY (HCC): ICD-10-CM

## 2022-04-07 DIAGNOSIS — D47.2 SMOLDERING MYELOMA: ICD-10-CM

## 2022-04-07 DIAGNOSIS — Z00.00 WELLNESS EXAMINATION: ICD-10-CM

## 2022-04-07 DIAGNOSIS — F41.9 ANXIETY TENSION STATE: ICD-10-CM

## 2022-04-07 DIAGNOSIS — Z13.31 SCREENING FOR DEPRESSION: ICD-10-CM

## 2022-04-07 DIAGNOSIS — E78.5 DYSLIPIDEMIA: ICD-10-CM

## 2022-04-07 PROCEDURE — G8417 CALC BMI ABV UP PARAM F/U: HCPCS | Performed by: INTERNAL MEDICINE

## 2022-04-07 PROCEDURE — G8428 CUR MEDS NOT DOCUMENT: HCPCS | Performed by: INTERNAL MEDICINE

## 2022-04-07 PROCEDURE — G8510 SCR DEP NEG, NO PLAN REQD: HCPCS | Performed by: INTERNAL MEDICINE

## 2022-04-07 PROCEDURE — 1090F PRES/ABSN URINE INCON ASSESS: CPT | Performed by: INTERNAL MEDICINE

## 2022-04-07 PROCEDURE — 99214 OFFICE O/P EST MOD 30 MIN: CPT | Performed by: INTERNAL MEDICINE

## 2022-04-07 PROCEDURE — G0439 PPPS, SUBSEQ VISIT: HCPCS | Performed by: INTERNAL MEDICINE

## 2022-04-07 PROCEDURE — G8536 NO DOC ELDER MAL SCRN: HCPCS | Performed by: INTERNAL MEDICINE

## 2022-04-07 PROCEDURE — 1101F PT FALLS ASSESS-DOCD LE1/YR: CPT | Performed by: INTERNAL MEDICINE

## 2022-04-07 RX ORDER — FAMOTIDINE 40 MG/1
40 TABLET, FILM COATED ORAL DAILY
COMMUNITY

## 2022-04-07 NOTE — PROGRESS NOTES
Chief Complaint   Patient presents with   [facility] Annual Wellness Visit         1. Have you been to the ER, urgent care clinic since your last visit? Hospitalized since your last visit? No    2. Have you seen or consulted any other health care providers outside of the [address] [place] [address] since your last visit? Include any pap smears or colon screening.  No

## 2022-04-07 NOTE — PROGRESS NOTES
[address] [place] [address] and Primary Care  [place] [address]  [address] [address] [facility] [address] [address]  Phone:  [phone]  Fax: [phone]       Chief Complaint   Patient presents with   [facility] Wellness Visit   . SUBJECTIVE:    Patricia Cooney is a 80 y.o. female comes in for a return visit after a long absence. She was hospitalized at [facility] in December for apparent complete heart block. She had a pacemaker implanted and went to the [facility] where she rehabbed in December and January. She is now back in her apartment, doing quite well. She complains of being nervous and anxious, which is a chronic problem for her. 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 This has improved significantly. Finally, she was seeing Dr. Wynelle Dancer who I send her to for her paraproteinemia. She has an appointment to see him in the very near future. She is taking much less medication than she previously was. Current Outpatient Medications   Medication Sig Dispense Refill    folic acid/multivit-min/lutein (CENTRUM SILVER PO) Take 1 Tablet by mouth daily.  famotidine (PEPCID) 40 mg tablet Take 40 mg by mouth daily.  pantoprazole (PROTONIX) 40 mg tablet Take 1 Tablet by mouth Daily (before breakfast). 30 Tablet 0    aspirin 81 mg chewable tablet Take 1 Tablet by mouth daily. 30 Tablet 0    atorvastatin (LIPITOR) 10 mg tablet Take 1 Tablet by mouth nightly. 30 Tablet 0    clopidogreL (PLAVIX) 75 mg tab Take 1 Tablet by mouth daily. 30 Tablet 0    busPIRone (BUSPAR) 10 mg tablet Take 10 mg by mouth two (2) times a day.  citalopram (CELEXA) 20 mg tablet TAKE 1 TABLET BY MOUTH EVERY DAY 90 Tablet 3    losartan-hydroCHLOROthiazide (HYZAAR) 50-12.5 mg per tablet Take 1 Tab by mouth daily.  80 Tab 3     Past Medical History:   Diagnosis Date    Anemia     Lennox Comment MM,seeing     Arrhythmia     SSS    Bradycardia     CAD (coronary artery disease)     Essential hypertension     GERD (gastroesophageal reflux disease)     Psychiatric disorder     Anxiety    Psychotic disorder (HCC)     anxiety    Thrombotic stroke involving right middle cerebral artery ([facility] 12/9/2021    Vertigo      Past Surgical History:   Procedure Laterality Date    HX BUNIONECTOMY      HX HYSTERECTOMY  1971    partial    HX ORTHOPAEDIC      Status post bunionectomy    HX PARTIAL HYSTERECTOMY       No Known Allergies      REVIEW OF SYSTEMS:  General: negative for - chills or fever  ENT: negative for - headaches, nasal congestion or tinnitus  Respiratory: negative for - cough, hemoptysis, shortness of breath or wheezing  Cardiovascular : negative for - chest pain, edema, palpitations or shortness of breath  Gastrointestinal: negative for - abdominal pain, blood in stools, heartburn or nausea/vomiting  Genito-Urinary: no dysuria, trouble voiding, or hematuria  Musculoskeletal: negative for - gait disturbance, joint pain, joint stiffness or joint swelling  Neurological: no TIA or stroke symptoms  Hematologic: no bruises, no bleeding, no swollen glands  Integument: no lumps, mole changes, nail changes or rash  Endocrine: no malaise/lethargy or unexpected weight changes      Social History     Socioeconomic History    Marital status:    Tobacco Use    Smoking status: Never Smoker    Smokeless tobacco: Never Used   Vaping Use    Vaping Use: Never used   Substance and Sexual Activity    Alcohol use: No    Drug use: No    Sexual activity: Not Currently     Comment: ,2 children,     Family History   Problem Relation Age of Onset    Hypertension Mother        OBJECTIVE:    Visit Vitals  BP (!) 146/81   Pulse 77   Temp 98.3 °F (36.8 °C) (Oral)   Resp 16   Ht 5' 1\" (1.549 m)   Wt 159 lb 11.2 oz (72.4 kg)   SpO2 94%   BMI 30.18 kg/m²     CONSTITUTIONAL: well , well nourished, appears age appropriate  EYES: perrla, eom intact  ENMT:moist mucous membranes, pharynx clear  NECK: supple. Thyroid normal  RESPIRATORY: Chest: clear to ascultation and percussion   CARDIOVASCULAR: Heart: regular rate and rhythm  GASTROINTESTINAL: Abdomen: soft, bowel sounds active  HEMATOLOGIC: no pathological lymph nodes palpated  MUSCULOSKELETAL: Extremities: no edema, pulse 1+   INTEGUMENT: No unusual rashes or suspicious skin lesions noted. Nails appear normal.  NEUROLOGIC: non-focal exam   MENTAL STATUS: alert and oriented, appropriate affect      ASSESSMENT:  1. SSS (sick sinus syndrome) ([facility]    2. Thrombotic stroke involving right middle cerebral artery ([facility]    3. Smoldering myeloma    4. Anxiety tension state    5. Dyslipidemia        PLAN:  1. The patient has sick sinus syndrome manifesting itself as a profound bradycardia culminating in pacemaker implantation. She has done well since then. 2. She had a CVA leading to a left hemiparesis. Apparently, she had a thrombotic event in the right middle cerebral artery. She has no sequelae currently. 3. She also has smoldering myeloma and will be seeing Dr. Steven Antoine. 4. Her anxiety level remains extremely high as has been for years. She was formerly taking lorazepam but does not have that any longer. 5. She remains on a statin in view of her secondary cardiovascular risk prevention status created by her CVA. .  Orders Placed This Encounter    METABOLIC PANEL, COMPREHENSIVE    CBC WITH AUTOMATED DIFF    APOLIPOPROTEIN B    folic acid/multivit-min/lutein (CENTRUM SILVER PO)    famotidine (PEPCID) 40 mg tablet               Haven Pallas, MD  This is the Subsequent Medicare Annual Wellness Exam, performed 12 months or more after the Initial AWV or the last Subsequent AWV    I have reviewed the patient's medical history in detail and updated the computerized patient record. Assessment/Plan   Education and counseling provided:  Are appropriate based on today's review and evaluation    1.  SSS (sick sinus syndrome) ([facility]  -     METABOLIC PANEL, COMPREHENSIVE; Future  2. Thrombotic stroke involving right middle cerebral artery (HCC)  -     CBC WITH AUTOMATED DIFF; Future  3. Smoldering myeloma  4. Anxiety tension state  5. Dyslipidemia  -     APOLIPOPROTEIN B; Future       Depression Risk Factor Screening     3 most recent PHQ Screens 4/7/2022   PHQ Not Done -   Little interest or pleasure in doing things Not at all   Feeling down, depressed, irritable, or hopeless Not at all   Total Score PHQ 2 0   Trouble falling or staying asleep, or sleeping too much Not at all   Feeling tired or having little energy Not at all   Poor appetite, weight loss, or overeating Not at all   Feeling bad about yourself - or that you are a failure or have let yourself or your family down Not at all   Trouble concentrating on things such as school, work, reading, or watching TV Not at all   Moving or speaking so slowly that other people could have noticed; or the opposite being so fidgety that others notice Not at all   Thoughts of being better off dead, or hurting yourself in some way Not at all   PHQ 9 Score 0   How difficult have these problems made it for you to do your work, take care of your home and get along with others -       Alcohol & Drug Abuse Risk Screen    Do you average more than 1 drink per night or more than 7 drinks a week:  No    On any one occasion in the past three months have you have had more than 3 drinks containing alcohol:  No          Functional Ability and Level of Safety    Hearing: Hearing is good. Activities of Daily Living: The home contains: no safety equipment. Patient does total self care      Ambulation: with no difficulty     Fall Risk:  Fall Risk Assessment, last 12 mths 4/7/2022   Able to walk? Yes   Fall in past 12 months? 0   Do you feel unsteady?  0   Are you worried about falling 0      Abuse Screen:  Patient is not abused       Cognitive Screening    Has your family/caregiver stated any concerns about your memory: no     Cognitive Screening: Not applicable    Health Maintenance Due     Health Maintenance Due   Topic Date Due    COVID-19 Vaccine (1) Never done    Shingrix Vaccine Age 50> (1 of 2) Never done       Patient Care Team   Patient Care Team:  Other, MD Gege as PCP - Austin Munoz MD (Cardiology)    History     Patient Active Problem List   Diagnosis Code    Syncope and collapse R55    Obesity E66.9    Bradycardia R00.1    HTN (hypertension) I10    Sinus congestion R09.81    SSS (sick sinus syndrome) ([facility] I49.5    Vasomotor rhinitis J30.0    Non morbid obesity due to excess calories E66.09    Anemia D64.9    Primary osteoarthritis of both knees M17.0    Benign recurrent vertigo H81.10    Anxiety tension state F41.9    Dyspepsia R10.13    Uterine prolapse N81.4    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    Primary osteoarthritis of both hands M19.041, M19.042    Smoldering myeloma D47.2    Bilateral carpal tunnel syndrome G56.03    Dizziness R42    Symptomatic bradycardia R00.1    Bradycardic cardiac arrest (HCC) R00.1, I46.2    Thrombotic stroke involving right middle cerebral artery ([facility] I63.311    Bilateral carotid artery stenosis I65.23    Hemiparesis of left nondominant side due to acute cerebrovascular disease (HCC) I67.89, G81.94    Vertebral basilar insufficiency G45.0    Dizziness and giddiness R42     Past Medical History:   Diagnosis Date    Anemia     Mandy Sutton MM,seeing     Arrhythmia     SSS    Bradycardia     CAD (coronary artery disease)     Essential hypertension     GERD (gastroesophageal reflux disease)     Psychiatric disorder     Anxiety    Psychotic disorder (HCC)     anxiety    Thrombotic stroke involving right middle cerebral artery (HCC) 12/9/2021    Vertigo       Past Surgical History:   Procedure Laterality Date    HX BUNIONECTOMY      HX HYSTERECTOMY  1971    partial    HX ORTHOPAEDIC      Status post bunionectomy    HX PARTIAL HYSTERECTOMY       Current Outpatient Medications   Medication Sig Dispense Refill    folic acid/multivit-min/lutein (CENTRUM SILVER PO) Take 1 Tablet by mouth daily.  famotidine (PEPCID) 40 mg tablet Take 40 mg by mouth daily.  pantoprazole (PROTONIX) 40 mg tablet Take 1 Tablet by mouth Daily (before breakfast). 30 Tablet 0    aspirin 81 mg chewable tablet Take 1 Tablet by mouth daily. 30 Tablet 0    atorvastatin (LIPITOR) 10 mg tablet Take 1 Tablet by mouth nightly. 30 Tablet 0    clopidogreL (PLAVIX) 75 mg tab Take 1 Tablet by mouth daily. 30 Tablet 0    busPIRone (BUSPAR) 10 mg tablet Take 10 mg by mouth two (2) times a day.  citalopram (CELEXA) 20 mg tablet TAKE 1 TABLET BY MOUTH EVERY DAY 90 Tablet 3    losartan-hydroCHLOROthiazide (HYZAAR) 50-12.5 mg per tablet Take 1 Tab by mouth daily.  80 Tab 3     No Known Allergies    Family History   Problem Relation Age of Onset    Hypertension Mother      Social History     Tobacco Use    Smoking status: Never Smoker    Smokeless tobacco: Never Used   Substance Use Topics    Alcohol use: No         Thomas Castillo MD

## 2022-04-08 LAB
ALBUMIN SERPL-MCNC: 4 G/DL (ref 3.5–5)
ALBUMIN/GLOB SERPL: 1.1 {RATIO} (ref 1.1–2.2)
ALP SERPL-CCNC: 77 U/L (ref 45–117)
ALT SERPL-CCNC: 20 U/L (ref 12–78)
ANION GAP SERPL CALC-SCNC: 7 MMOL/L (ref 5–15)
AST SERPL-CCNC: 17 U/L (ref 15–37)
BASOPHILS # BLD: 0.1 K/UL (ref 0–0.1)
BASOPHILS NFR BLD: 1 % (ref 0–1)
BILIRUB SERPL-MCNC: 0.8 MG/DL (ref 0.2–1)
BUN SERPL-MCNC: 14 MG/DL (ref 6–20)
BUN/CREAT SERPL: 13 (ref 12–20)
CALCIUM SERPL-MCNC: 9.5 MG/DL (ref 8.5–10.1)
CHLORIDE SERPL-SCNC: 106 MMOL/L (ref 97–108)
CO2 SERPL-SCNC: 25 MMOL/L (ref 21–32)
CREAT SERPL-MCNC: 1.1 MG/DL (ref 0.55–1.02)
DIFFERENTIAL METHOD BLD: ABNORMAL
EOSINOPHIL # BLD: 0.2 K/UL (ref 0–0.4)
EOSINOPHIL NFR BLD: 2 % (ref 0–7)
ERYTHROCYTE [DISTWIDTH] IN BLOOD BY AUTOMATED COUNT: 22.5 % (ref 11.5–14.5)
GLOBULIN SER CALC-MCNC: 3.6 G/DL (ref 2–4)
GLUCOSE SERPL-MCNC: 92 MG/DL (ref 65–100)
HCT VFR BLD AUTO: 31.6 % (ref 35–47)
HGB BLD-MCNC: 10.4 G/DL (ref 11.5–16)
IMM GRANULOCYTES # BLD AUTO: 0.1 K/UL (ref 0–0.04)
IMM GRANULOCYTES NFR BLD AUTO: 1 % (ref 0–0.5)
LYMPHOCYTES # BLD: 2.1 K/UL (ref 0.8–3.5)
LYMPHOCYTES NFR BLD: 25 % (ref 12–49)
MCH RBC QN AUTO: 32.8 PG (ref 26–34)
MCHC RBC AUTO-ENTMCNC: 32.9 G/DL (ref 30–36.5)
MCV RBC AUTO: 99.7 FL (ref 80–99)
MONOCYTES # BLD: 0.6 K/UL (ref 0–1)
MONOCYTES NFR BLD: 7 % (ref 5–13)
NEUTS SEG # BLD: 5.2 K/UL (ref 1.8–8)
NEUTS SEG NFR BLD: 64 % (ref 32–75)
NRBC # BLD: 0.05 K/UL (ref 0–0.01)
NRBC BLD-RTO: 0.6 PER 100 WBC
PLATELET # BLD AUTO: 157 K/UL (ref 150–400)
POTASSIUM SERPL-SCNC: 3.7 MMOL/L (ref 3.5–5.1)
PROT SERPL-MCNC: 7.6 G/DL (ref 6.4–8.2)
RBC # BLD AUTO: 3.17 M/UL (ref 3.8–5.2)
RBC MORPH BLD: ABNORMAL
SODIUM SERPL-SCNC: 138 MMOL/L (ref 136–145)
WBC # BLD AUTO: 8.3 K/UL (ref 3.6–11)

## 2022-04-09 LAB — APO B SERPL-MCNC: 55 MG/DL

## 2022-04-10 NOTE — PATIENT INSTRUCTIONS

## 2022-04-11 RX ORDER — CITALOPRAM 20 MG/1
20 TABLET, FILM COATED ORAL DAILY
Qty: 90 TABLET | Refills: 3 | Status: SHIPPED | OUTPATIENT
Start: 2022-04-11 | End: 2022-07-01 | Stop reason: SDUPTHER

## 2022-04-13 ENCOUNTER — NURSE TRIAGE (OUTPATIENT)
Dept: OTHER | Facility: CLINIC | Age: 87
End: 2022-04-13

## 2022-04-13 NOTE — TELEPHONE ENCOUNTER
Received call from [facility] at [facility] with Red Flag Complaint. Subjective: Caller states \"fatigue and nervous\"     Current Symptoms: fatigue, denies SOB, Chest pain. Left foot swollen , for months. She was taken off plavix. She uses her rollator and is able to do dishes. She states \"every since they took away that little pill for my nerves, I have not been right\"  She sounds strong, oriented. .  No unilateral weakness. Onset: 2 weeks ago; sudden, intermittent    Pain Severity: 0/10; Temperature: denies by parent's tactile estimate    What has been tried: nothing    LMP: NA Pregnant: NA    Recommended disposition: See PCP within 3 Days    Care advice provided, patient verbalizes understanding; denies any other questions or concerns; instructed to call back for any new or worsening symptoms. Patient/Caller agrees with recommended disposition; writer provided warm transfer to Elizabeth Anne at [facility] for appointment scheduling    Attention Provider: Thank you for allowing me to participate in the care of your patient. The patient was connected to triage in response to information provided to the ECC. Please do not respond through this encounter as the response is not directed to a shared pool.       Reason for Disposition   MILD weakness (i.e., does not interfere with ability to work, go to school, normal activities) and persists > 1 week    Protocols used: WEAKNESS (GENERALIZED) AND FATIGUE-ADULT-OH

## 2022-04-27 RX ORDER — ATORVASTATIN CALCIUM 10 MG/1
10 TABLET, FILM COATED ORAL
Qty: 30 TABLET | Refills: 11 | Status: SHIPPED | OUTPATIENT
Start: 2022-04-27 | End: 2022-05-11 | Stop reason: SDUPTHER

## 2022-05-11 DIAGNOSIS — I10 ESSENTIAL HYPERTENSION: Primary | ICD-10-CM

## 2022-05-11 DIAGNOSIS — I10 ESSENTIAL HYPERTENSION: ICD-10-CM

## 2022-05-11 RX ORDER — ATORVASTATIN CALCIUM 10 MG/1
10 TABLET, FILM COATED ORAL
Qty: 90 TABLET | Refills: 0 | Status: SHIPPED | OUTPATIENT
Start: 2022-05-11 | End: 2022-10-13

## 2022-05-11 RX ORDER — ATORVASTATIN CALCIUM 10 MG/1
10 TABLET, FILM COATED ORAL
Qty: 90 TABLET | Refills: 1 | Status: SHIPPED | OUTPATIENT
Start: 2022-05-11 | End: 2022-05-11 | Stop reason: SDUPTHER

## 2022-05-11 NOTE — TELEPHONE ENCOUNTER
Patient is requesting a refill for Atrovastatin 10mg , pt is completely out of medication . Please Advise.            Pharmacy Verified     [phone]

## 2022-05-11 NOTE — TELEPHONE ENCOUNTER
Requested Prescriptions     Signed Prescriptions Disp Refills    atorvastatin (LIPITOR) 10 mg tablet 90 Tablet 1     Sig: Take 1 Tablet by mouth nightly.      Authorizing Provider: Елена Keith     Ordering User: Gorge Ivey    Per Dr. Alaina Ken verbal order

## 2022-05-11 NOTE — TELEPHONE ENCOUNTER
Requested Prescriptions     Signed Prescriptions Disp Refills    atorvastatin (LIPITOR) 10 mg tablet 90 Tablet 0     Sig: Take 1 Tablet by mouth nightly.      Authorizing Provider: Jonathan Mir     Ordering User: Nadya Beltran    Per Dr. Helio Guzman verbal order

## 2022-05-12 DIAGNOSIS — I10 ESSENTIAL HYPERTENSION: ICD-10-CM

## 2022-05-12 RX ORDER — LOSARTAN POTASSIUM AND HYDROCHLOROTHIAZIDE 12.5; 5 MG/1; MG/1
1 TABLET ORAL DAILY
Qty: 90 TABLET | Refills: 0 | Status: SHIPPED | OUTPATIENT
Start: 2022-05-12 | End: 2022-05-24

## 2022-05-12 NOTE — TELEPHONE ENCOUNTER
Patient is calling because she needs a refill on her losartan- hydrochlorothiazide 50 12.5- mg.patient has been out of her medicine for 2 days. Pharmacy is confirmed.     [phone]

## 2022-05-12 NOTE — TELEPHONE ENCOUNTER
Requested Prescriptions     Signed Prescriptions Disp Refills    losartan-hydroCHLOROthiazide (HYZAAR) 50-12.5 mg per tablet 90 Tablet 0     Sig: Take 1 Tablet by mouth daily. Please schedule follow up with Dr. Amanda Yusuf to receive further refills. Authorizing Provider: Amaya Grove     Ordering User: Marilee Goodson     Verbal order per Dr. Amanda Yusuf.

## 2022-05-19 DIAGNOSIS — I10 ESSENTIAL HYPERTENSION: ICD-10-CM

## 2022-05-24 RX ORDER — LOSARTAN POTASSIUM AND HYDROCHLOROTHIAZIDE 12.5; 5 MG/1; MG/1
1 TABLET ORAL DAILY
Qty: 90 TABLET | Refills: 0 | Status: SHIPPED | OUTPATIENT
Start: 2022-05-24 | End: 2022-10-13

## 2022-05-24 NOTE — TELEPHONE ENCOUNTER
Requested Prescriptions     Signed Prescriptions Disp Refills    losartan-hydroCHLOROthiazide (HYZAAR) 50-12.5 mg per tablet 90 Tablet 0     Sig: Take 1 Tablet by mouth daily. Please schedule follow up with Dr. Nikolas Cheatham to receive further refills.      Authorizing Provider: Aaron Boggs     Ordering User: Maribell Toribio    Per Dr. Donaldo Carrizales verbal order

## 2022-06-10 ENCOUNTER — OFFICE VISIT (OUTPATIENT)
Dept: CARDIOLOGY CLINIC | Age: 87
End: 2022-06-10
Payer: MEDICARE

## 2022-06-10 VITALS
WEIGHT: 157 LBS | BODY MASS INDEX: 29.64 KG/M2 | OXYGEN SATURATION: 99 % | RESPIRATION RATE: 16 BRPM | HEIGHT: 61 IN | DIASTOLIC BLOOD PRESSURE: 80 MMHG | HEART RATE: 70 BPM | SYSTOLIC BLOOD PRESSURE: 128 MMHG

## 2022-06-10 DIAGNOSIS — D64.9 ANEMIA, UNSPECIFIED TYPE: ICD-10-CM

## 2022-06-10 DIAGNOSIS — R00.1 BRADYCARDIA: ICD-10-CM

## 2022-06-10 DIAGNOSIS — I49.5 SSS (SICK SINUS SYNDROME) (HCC): Primary | ICD-10-CM

## 2022-06-10 DIAGNOSIS — I10 ESSENTIAL HYPERTENSION: ICD-10-CM

## 2022-06-10 DIAGNOSIS — F41.9 ANXIETY TENSION STATE: ICD-10-CM

## 2022-06-10 DIAGNOSIS — F41.9 ANXIETY: ICD-10-CM

## 2022-06-10 DIAGNOSIS — D47.2 SMOLDERING MYELOMA: ICD-10-CM

## 2022-06-10 DIAGNOSIS — E66.09 NON MORBID OBESITY DUE TO EXCESS CALORIES: ICD-10-CM

## 2022-06-10 DIAGNOSIS — Z95.0 CARDIAC PACEMAKER IN SITU: ICD-10-CM

## 2022-06-10 DIAGNOSIS — N18.30 STAGE 3 CHRONIC KIDNEY DISEASE, UNSPECIFIED WHETHER STAGE 3A OR 3B CKD (HCC): ICD-10-CM

## 2022-06-10 PROCEDURE — 1123F ACP DISCUSS/DSCN MKR DOCD: CPT | Performed by: SPECIALIST

## 2022-06-10 PROCEDURE — G8432 DEP SCR NOT DOC, RNG: HCPCS | Performed by: SPECIALIST

## 2022-06-10 PROCEDURE — G8427 DOCREV CUR MEDS BY ELIG CLIN: HCPCS | Performed by: SPECIALIST

## 2022-06-10 PROCEDURE — 1101F PT FALLS ASSESS-DOCD LE1/YR: CPT | Performed by: SPECIALIST

## 2022-06-10 PROCEDURE — 1090F PRES/ABSN URINE INCON ASSESS: CPT | Performed by: SPECIALIST

## 2022-06-10 PROCEDURE — G8536 NO DOC ELDER MAL SCRN: HCPCS | Performed by: SPECIALIST

## 2022-06-10 PROCEDURE — G8417 CALC BMI ABV UP PARAM F/U: HCPCS | Performed by: SPECIALIST

## 2022-06-10 PROCEDURE — 99214 OFFICE O/P EST MOD 30 MIN: CPT | Performed by: SPECIALIST

## 2022-06-10 PROCEDURE — G0463 HOSPITAL OUTPT CLINIC VISIT: HCPCS | Performed by: SPECIALIST

## 2022-06-10 RX ORDER — PSYLLIUM HUSK 3.4 G/5.8G
POWDER ORAL
COMMUNITY

## 2022-06-10 RX ORDER — NYSTATIN 100000 U/G
CREAM TOPICAL
COMMUNITY

## 2022-06-10 RX ORDER — LORAZEPAM 0.5 MG/1
0.5 TABLET ORAL
Qty: 30 TABLET | Refills: 5 | Status: SHIPPED | OUTPATIENT
Start: 2022-06-10

## 2022-06-10 NOTE — PROGRESS NOTES
HISTORY OF PRESENT ILLNESS  Reji Owens is a 80 y.o. female. I last saw her 6 months ago for treated hypertension. She has had a great deal of problems with anxiety. In December she presented to [facility] with bradycardia and was transferred to [facility] where she received a pacemaker. She had some left-sided weakness but her CT of her head and carotid duplex were unremarkable. She feels better except for a great deal of anxiety. She is improved in the past with low-dose Ativan. She has been given Celexa and BuSpar but they have not helped and she does not take them.   She had her pacemaker checked yesterday by Dr. Cristel Castillo.    HPI  Patient Active Problem List   Diagnosis Code    Syncope and collapse R55    Obesity E66.9    Bradycardia R00.1    HTN (hypertension) I10    Sinus congestion R09.81    SSS (sick sinus syndrome) ([facility] I49.5    Vasomotor rhinitis J30.0    Non morbid obesity due to excess calories E66.09    Anemia D64.9    Primary osteoarthritis of both knees M17.0    Benign recurrent vertigo H81.10    Anxiety tension state F41.9    Dyspepsia R10.13    Uterine prolapse N81.4    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    Primary osteoarthritis of both hands M19.041, M19.042    Smoldering myeloma D47.2    Bilateral carpal tunnel syndrome G56.03    Dizziness R42    Symptomatic bradycardia R00.1    Bradycardic cardiac arrest (HCC) R00.1, I46.2    Thrombotic stroke involving right middle cerebral artery (HCC) I63.311    Bilateral carotid artery stenosis I65.23    Hemiparesis of left nondominant side due to acute cerebrovascular disease (HCC) I67.89, G81.94    Vertebral basilar insufficiency G45.0    Dizziness and giddiness R42    Chronic renal disease, stage III N18.30     Current Outpatient Medications   Medication Sig Dispense Refill    nystatin (MYCOSTATIN) topical cream nystatin 100,000 unit/gram topical cream   APPLY TO THE AFFECTED AREA(S) BY TOPICAL ROUTE 2 TIMES PER DAY      psyllium husk-aspartame (Metamucil Fiber Singles) 3.4 gram pwpk packet Metamucil Sugar-Free (aspartame) 3.4 gram/5.8 gram oral powder   TAKE 3.4 GRAMS BY MOUTH DAILY      LORazepam (ATIVAN) 0.5 mg tablet Take 1 Tablet by mouth every eight (8) hours as needed for Anxiety. Max Daily Amount: 1.5 mg. 30 Tablet 5    losartan-hydroCHLOROthiazide (HYZAAR) 50-12.5 mg per tablet Take 1 Tablet by mouth daily. Please schedule follow up with Dr. Ginger Vidal to receive further refills. 90 Tablet 0    atorvastatin (LIPITOR) 10 mg tablet Take 1 Tablet by mouth nightly. 90 Tablet 0    citalopram (CELEXA) 20 mg tablet Take 1 Tablet by mouth daily. 90 Tablet 3    folic acid/multivit-min/lutein (CENTRUM SILVER PO) Take 1 Tablet by mouth daily.  aspirin 81 mg chewable tablet Take 1 Tablet by mouth daily. 30 Tablet 0    famotidine (PEPCID) 40 mg tablet Take 40 mg by mouth daily.  pantoprazole (PROTONIX) 40 mg tablet Take 1 Tablet by mouth Daily (before breakfast). (Patient not taking: Reported on 6/10/2022) 30 Tablet 0     Past Medical History:   Diagnosis Date    Anemia     Sarah Medina MM,seeing     Arrhythmia     SSS    Bradycardia     CAD (coronary artery disease)     Essential hypertension     GERD (gastroesophageal reflux disease)     Psychiatric disorder     Anxiety    Psychotic disorder (HCC)     anxiety    Thrombotic stroke involving right middle cerebral artery ([facility] 12/9/2021    Vertigo      Past Surgical History:   Procedure Laterality Date    HX BUNIONECTOMY      HX HYSTERECTOMY  1971    partial    HX ORTHOPAEDIC      Status post bunionectomy    HX PARTIAL HYSTERECTOMY         Review of Systems   Psychiatric/Behavioral: The patient is nervous/anxious. All other systems reviewed and are negative.     Visit Vitals  /80   Pulse 70   Resp 16   Ht 5' 1\" (1.549 m)   Wt 157 lb (71.2 kg)   SpO2 99%   BMI 29.66 kg/m²       Physical Exam  Vitals and nursing note reviewed. Constitutional:       Appearance: Normal appearance. HENT:      Head: Normocephalic. Right Ear: External ear normal.      Left Ear: External ear normal.      Nose: Nose normal.      Mouth/Throat:      Mouth: Mucous membranes are moist.   Eyes:      Extraocular Movements: Extraocular movements intact. Cardiovascular:      Rate and Rhythm: Normal rate. Heart sounds: Normal heart sounds. Pulmonary:      Breath sounds: Normal breath sounds. Abdominal:      Palpations: Abdomen is soft. Musculoskeletal:         General: Normal range of motion. Cervical back: Normal range of motion. Skin:     General: Skin is warm. Neurological:      Mental Status: She is alert. Mental status is at baseline. Psychiatric:         Behavior: Behavior normal.         ASSESSMENT and PLAN  She is stable from a cardiac standpoint with good control of her blood pressure. I will have her stop the Celexa and BuSpar and we will give her a prescription for Ativan 0.5 mg to take as needed for anxiety and plan to see her back in 6 months.

## 2022-07-02 RX ORDER — CITALOPRAM 20 MG/1
20 TABLET, FILM COATED ORAL DAILY
Qty: 90 TABLET | Refills: 3 | Status: SHIPPED | OUTPATIENT
Start: 2022-07-02 | End: 2022-10-13

## 2022-07-13 ENCOUNTER — OFFICE VISIT (OUTPATIENT)
Dept: INTERNAL MEDICINE CLINIC | Age: 87
End: 2022-07-13
Payer: MEDICARE

## 2022-07-13 VITALS
HEIGHT: 61 IN | BODY MASS INDEX: 30.25 KG/M2 | RESPIRATION RATE: 18 BRPM | SYSTOLIC BLOOD PRESSURE: 137 MMHG | WEIGHT: 160.2 LBS | HEART RATE: 74 BPM | TEMPERATURE: 98.5 F | OXYGEN SATURATION: 98 % | DIASTOLIC BLOOD PRESSURE: 82 MMHG

## 2022-07-13 DIAGNOSIS — I10 PRIMARY HYPERTENSION: ICD-10-CM

## 2022-07-13 DIAGNOSIS — D47.2 SMOLDERING MYELOMA: ICD-10-CM

## 2022-07-13 DIAGNOSIS — F41.9 ANXIETY TENSION STATE: Primary | ICD-10-CM

## 2022-07-13 DIAGNOSIS — G44.219 EPISODIC TENSION-TYPE HEADACHE, NOT INTRACTABLE: ICD-10-CM

## 2022-07-13 DIAGNOSIS — N64.4 MASTALGIA: ICD-10-CM

## 2022-07-13 PROCEDURE — 99214 OFFICE O/P EST MOD 30 MIN: CPT | Performed by: INTERNAL MEDICINE

## 2022-07-13 PROCEDURE — G8536 NO DOC ELDER MAL SCRN: HCPCS | Performed by: INTERNAL MEDICINE

## 2022-07-13 PROCEDURE — G8427 DOCREV CUR MEDS BY ELIG CLIN: HCPCS | Performed by: INTERNAL MEDICINE

## 2022-07-13 PROCEDURE — G8510 SCR DEP NEG, NO PLAN REQD: HCPCS | Performed by: INTERNAL MEDICINE

## 2022-07-13 PROCEDURE — 1090F PRES/ABSN URINE INCON ASSESS: CPT | Performed by: INTERNAL MEDICINE

## 2022-07-13 PROCEDURE — G8417 CALC BMI ABV UP PARAM F/U: HCPCS | Performed by: INTERNAL MEDICINE

## 2022-07-13 PROCEDURE — 1123F ACP DISCUSS/DSCN MKR DOCD: CPT | Performed by: INTERNAL MEDICINE

## 2022-07-13 PROCEDURE — 1101F PT FALLS ASSESS-DOCD LE1/YR: CPT | Performed by: INTERNAL MEDICINE

## 2022-07-13 NOTE — PROGRESS NOTES
Brad Patterson is a 80 y.o. female    Chief Complaint   Patient presents with    Hypertension     1. Have you been to the ER, urgent care clinic since your last visit? Hospitalized since your last visit? No     2. Have you seen or consulted any other health care providers outside of the [address] [place] [address] since your last visit? Include any pap smears or colon screening.  No

## 2022-07-17 NOTE — PROGRESS NOTES
[address] [facility]  [place] [address]  [address] [place] [address]  Phone:  [phone]  Fax: [phone]       Chief Complaint   Patient presents with    Hypertension   . SUBJECTIVE:    Hill Posada is a 80 y.o. female comes in for return visit stating that she has done reasonably well other than being a bit anxious. She needs refill on her Celexa which is her antidepressant. She has a vague pressure sensation in her head, which she has had previously. It is not associated with any other symptoms. She has vague pain in her left breast.     She has a past history of primary hypertension, dyslipidemia, as well as smoldering myeloma. Current Outpatient Medications   Medication Sig Dispense Refill    citalopram (CELEXA) 20 mg tablet Take 1 Tablet by mouth daily. 90 Tablet 3    nystatin (MYCOSTATIN) topical cream nystatin 100,000 unit/gram topical cream   APPLY TO THE AFFECTED AREA(S) BY TOPICAL ROUTE 2 TIMES PER DAY      psyllium husk-aspartame (Metamucil Fiber Singles) 3.4 gram pwpk packet Metamucil Sugar-Free (aspartame) 3.4 gram/5.8 gram oral powder   TAKE 3.4 GRAMS BY MOUTH DAILY      LORazepam (ATIVAN) 0.5 mg tablet Take 1 Tablet by mouth every eight (8) hours as needed for Anxiety. Max Daily Amount: 1.5 mg. 30 Tablet 5    losartan-hydroCHLOROthiazide (HYZAAR) 50-12.5 mg per tablet Take 1 Tablet by mouth daily. Please schedule follow up with Dr. Shayla Rivera to receive further refills. 90 Tablet 0    atorvastatin (LIPITOR) 10 mg tablet Take 1 Tablet by mouth nightly. 90 Tablet 0    folic acid/multivit-min/lutein (CENTRUM SILVER PO) Take 1 Tablet by mouth daily.  famotidine (PEPCID) 40 mg tablet Take 40 mg by mouth daily.  aspirin 81 mg chewable tablet Take 1 Tablet by mouth daily. 30 Tablet 0    pantoprazole (PROTONIX) 40 mg tablet Take 1 Tablet by mouth Daily (before breakfast).  (Patient not taking: Reported on 6/10/2022) 30 Tablet 0     Past Medical History:   Diagnosis Date    Anemia     Uziel Nelson MM,seeing     Arrhythmia     SSS    Bradycardia     CAD (coronary artery disease)     Essential hypertension     GERD (gastroesophageal reflux disease)     Psychiatric disorder     Anxiety    Psychotic disorder (HCC)     anxiety    [facility]romb[facility] stroke involving [facility] 12/9/2021    Vertigo      Past Surgical History:   Procedure Laterality Date    HX BUNIONECTOMY      HX HYSTERECTOMY  1971    partial    HX ORTHOPAEDIC      Status post bunionectomy    HX PARTIAL HYSTERECTOMY       No Known Allergies      REVIEW OF SYSTEMS:  General: negative for - chills or fever  ENT: negative for - headaches, nasal congestion or tinnitus  Respiratory: negative for - cough, hemoptysis, shortness of breath or wheezing  Cardiovascular : negative for - chest pain, edema, palpitations or shortness of breath  Gastrointestinal: negative for - abdominal pain, blood in stools, heartburn or nausea/vomiting  Genito-Urinary: no dysuria, trouble voiding, or hematuria  Musculoskeletal: negative for - gait disturbance, joint pain, joint stiffness or joint swelling  Neurological: no TIA or stroke symptoms  Hematologic: no bruises, no bleeding, no swollen glands  Integument: no lumps, mole changes, nail changes or rash  Endocrine: no malaise/lethargy or unexpected weight changes      Social History     Socioeconomic History    Marital status:    Tobacco Use    Smoking status: Never Smoker    Smokeless tobacco: Never Used   Vaping Use    Vaping Use: Never used   Substance and Sexual Activity    Alcohol use: No    Drug use: No    Sexual activity: Not Currently     Comment: ,2 children,     Family History   Problem Relation Age of Onset    Hypertension Mother        OBJECTIVE:    Visit Vitals  /82 (BP 1 Location: Left upper arm, BP Patient Position: Sitting)   Pulse 74   Temp 98.5 °F (36.9 °C) (Oral)   Resp 18   Ht 5' 1\" (1.549 m)   Wt 72.7 kg (160 lb 3.2 oz)   SpO2 98%   BMI 30.27 kg/m²     CONSTITUTIONAL: well , well nourished, appears age appropriate  EYES: perrla, eom intact  ENMT:moist mucous membranes, pharynx clear  NECK: supple. Thyroid normal  RESPIRATORY: Chest: clear to ascultation and percussion   CARDIOVASCULAR: Heart: regular rate and rhythm  GASTROINTESTINAL: Abdomen: soft, bowel sounds active  HEMATOLOGIC: no pathological lymph nodes palpated  MUSCULOSKELETAL: Extremities: no edema, pulse 1+   INTEGUMENT: No unusual rashes or suspicious skin lesions noted. Nails appear normal.  NEUROLOGIC: non-focal exam   MENTAL STATUS: alert and oriented, appropriate affect      ASSESSMENT:  1. Anxiety tension state    2. Episodic tension-type headache, not intractable    3. Mastalgia    4. Smoldering myeloma    5. Primary hypertension        PLAN:  1. The patient's anxiety persists. She will continue the Celexa 20 mg q.d. along with p.r.n. use of her lorazepam.  2. The sensation that she is having in her head is related more to anxiety. Nothing need to be done for now. 3. She has mild mastalgia. I did not find any pathology as far as her left breast is concerned. 4. She does have mild smoldering myeloma. Ideally she was seeing a hematologist, but I do not think this has indeed been the case. On her return visit appropriate lab work will be obtained to ensure stability in this domain. 5. Blood pressure is excellent, no adjustments are made for now. 6. She appears to be doing quite well. She needs to increase her physical activity on a more consistent basis. Follow-up and Dispositions    · Return in about 3 months (around 10/13/2022).            Mariluz Alvarado MD

## 2022-10-12 DIAGNOSIS — I10 ESSENTIAL HYPERTENSION: ICD-10-CM

## 2022-10-13 RX ORDER — ATORVASTATIN CALCIUM 10 MG/1
10 TABLET, FILM COATED ORAL
Qty: 90 TABLET | Refills: 1 | Status: SHIPPED | OUTPATIENT
Start: 2022-10-13

## 2022-10-13 RX ORDER — CITALOPRAM 20 MG/1
20 TABLET, FILM COATED ORAL DAILY
Qty: 90 TABLET | Refills: 3 | Status: SHIPPED | OUTPATIENT
Start: 2022-10-13

## 2022-10-13 RX ORDER — LOSARTAN POTASSIUM AND HYDROCHLOROTHIAZIDE 12.5; 5 MG/1; MG/1
TABLET ORAL
Qty: 90 TABLET | Refills: 1 | Status: SHIPPED | OUTPATIENT
Start: 2022-10-13

## 2022-10-13 NOTE — TELEPHONE ENCOUNTER
Requested Prescriptions     Signed Prescriptions Disp Refills    atorvastatin (LIPITOR) 10 mg tablet 90 Tablet 1     Sig: Take 1 Tablet by mouth nightly.      Authorizing Provider: Robert Rabago     Ordering User: Radha Mcgowan    losartan-hydroCHLOROthiazide (HYZAAR) 50-12.5 mg per tablet 90 Tablet 1     Sig: Take 1 Tablet by mouth daily     Authorizing Provider: Robert Rabago     Ordering User: Radha Mcgowan    Per Dr. Paul Martinez verbal order

## 2022-10-25 ENCOUNTER — OFFICE VISIT (OUTPATIENT)
Dept: INTERNAL MEDICINE CLINIC | Age: 87
End: 2022-10-25
Payer: MEDICARE

## 2022-10-25 VITALS
OXYGEN SATURATION: 94 % | SYSTOLIC BLOOD PRESSURE: 147 MMHG | BODY MASS INDEX: 30.11 KG/M2 | RESPIRATION RATE: 18 BRPM | TEMPERATURE: 97.6 F | HEART RATE: 66 BPM | WEIGHT: 159.5 LBS | DIASTOLIC BLOOD PRESSURE: 85 MMHG | HEIGHT: 61 IN

## 2022-10-25 DIAGNOSIS — D47.2 SMOLDERING MYELOMA: ICD-10-CM

## 2022-10-25 DIAGNOSIS — R79.89 PRERENAL AZOTEMIA: ICD-10-CM

## 2022-10-25 DIAGNOSIS — M17.0 PRIMARY OSTEOARTHRITIS OF BOTH KNEES: Primary | ICD-10-CM

## 2022-10-25 DIAGNOSIS — I87.2 VENOUS INSUFFICIENCY: ICD-10-CM

## 2022-10-25 DIAGNOSIS — Z23 ENCOUNTER FOR IMMUNIZATION: ICD-10-CM

## 2022-10-25 DIAGNOSIS — E78.5 DYSLIPIDEMIA: ICD-10-CM

## 2022-10-25 DIAGNOSIS — I63.81 LACUNAR INFARCTION (HCC): ICD-10-CM

## 2022-10-25 DIAGNOSIS — F41.9 ANXIETY TENSION STATE: ICD-10-CM

## 2022-10-25 DIAGNOSIS — I10 PRIMARY HYPERTENSION: ICD-10-CM

## 2022-10-25 LAB
ANION GAP SERPL CALC-SCNC: 5 MMOL/L (ref 5–15)
BUN SERPL-MCNC: 20 MG/DL (ref 6–20)
BUN/CREAT SERPL: 19 (ref 12–20)
CALCIUM SERPL-MCNC: 9.4 MG/DL (ref 8.5–10.1)
CHLORIDE SERPL-SCNC: 105 MMOL/L (ref 97–108)
CO2 SERPL-SCNC: 28 MMOL/L (ref 21–32)
CREAT SERPL-MCNC: 1.04 MG/DL (ref 0.55–1.02)
GLUCOSE SERPL-MCNC: 96 MG/DL (ref 65–100)
POTASSIUM SERPL-SCNC: 4.4 MMOL/L (ref 3.5–5.1)
SODIUM SERPL-SCNC: 138 MMOL/L (ref 136–145)

## 2022-10-25 PROCEDURE — 90694 VACC AIIV4 NO PRSRV 0.5ML IM: CPT | Performed by: INTERNAL MEDICINE

## 2022-10-25 PROCEDURE — 99214 OFFICE O/P EST MOD 30 MIN: CPT | Performed by: INTERNAL MEDICINE

## 2022-10-25 PROCEDURE — G8536 NO DOC ELDER MAL SCRN: HCPCS | Performed by: INTERNAL MEDICINE

## 2022-10-25 PROCEDURE — G8417 CALC BMI ABV UP PARAM F/U: HCPCS | Performed by: INTERNAL MEDICINE

## 2022-10-25 PROCEDURE — 1123F ACP DISCUSS/DSCN MKR DOCD: CPT | Performed by: INTERNAL MEDICINE

## 2022-10-25 PROCEDURE — G0008 ADMIN INFLUENZA VIRUS VAC: HCPCS | Performed by: INTERNAL MEDICINE

## 2022-10-25 PROCEDURE — G8427 DOCREV CUR MEDS BY ELIG CLIN: HCPCS | Performed by: INTERNAL MEDICINE

## 2022-10-25 PROCEDURE — 1090F PRES/ABSN URINE INCON ASSESS: CPT | Performed by: INTERNAL MEDICINE

## 2022-10-25 PROCEDURE — 1101F PT FALLS ASSESS-DOCD LE1/YR: CPT | Performed by: INTERNAL MEDICINE

## 2022-10-25 PROCEDURE — G8510 SCR DEP NEG, NO PLAN REQD: HCPCS | Performed by: INTERNAL MEDICINE

## 2022-10-25 NOTE — PROGRESS NOTES
Clarissa Mcrae is a 80 y.o. female  Chief Complaint   Patient presents with    Follow-up    Hypertension     Patient here for follow up high blood pressure. 1. Have you been to the ER, urgent care clinic since your last visit? Hospitalized since your last visit? No    2. Have you seen or consulted any other health care providers outside of the [address] [place] [address] since your last visit? Include any pap smears or colon screening.  No

## 2022-10-25 NOTE — PROGRESS NOTES
[address] [facility]  [place] [address]  [address] [place] [address]  Phone:  [phone]  Fax: [phone]       Chief Complaint   Patient presents with    Follow-up    Hypertension     Patient here for follow up high blood pressure. .      SUBJECTIVE:    Nikko Meek is a 80 y.o. female comes in for return visit stating that she has done reasonably well. She continues to complain of episodic swelling of her lower extremities related to chronic venous insufficiency. She has pain in both knees related to her osteoarthritis. She remains ambulatory however. She has a past history of primary hypertension, dyslipidemia and MGUS. Current Outpatient Medications   Medication Sig Dispense Refill    citalopram (CELEXA) 20 mg tablet Take 1 Tablet by mouth daily. 90 Tablet 3    atorvastatin (LIPITOR) 10 mg tablet Take 1 Tablet by mouth nightly. 90 Tablet 1    losartan-hydroCHLOROthiazide (HYZAAR) 50-12.5 mg per tablet Take 1 Tablet by mouth daily 90 Tablet 1    nystatin (MYCOSTATIN) topical cream nystatin 100,000 unit/gram topical cream   APPLY TO THE AFFECTED AREA(S) BY TOPICAL ROUTE 2 TIMES PER DAY      psyllium husk-aspartame (Metamucil Fiber Singles) 3.4 gram pwpk packet Metamucil Sugar-Free (aspartame) 3.4 gram/5.8 gram oral powder   TAKE 3.4 GRAMS BY MOUTH DAILY      LORazepam (ATIVAN) 0.5 mg tablet Take 1 Tablet by mouth every eight (8) hours as needed for Anxiety. Max Daily Amount: 1.5 mg. 30 Tablet 5    folic acid/multivit-min/lutein (CENTRUM SILVER PO) Take 1 Tablet by mouth daily. famotidine (PEPCID) 40 mg tablet Take 40 mg by mouth daily. aspirin 81 mg chewable tablet Take 1 Tablet by mouth daily. 30 Tablet 0    pantoprazole (PROTONIX) 40 mg tablet Take 1 Tablet by mouth Daily (before breakfast).  (Patient not taking: No sig reported) 30 Tablet 0     Past Medical History:   Diagnosis Date    Marco Barcenas MM,seeing     Arrhythmia     SSS Bradycardia     CAD (coronary artery disease)     Essential hypertension     GERD (gastroesophageal reflux disease)     Psychiatric disorder     Anxiety    Psychotic disorder (HCC)     anxiety    Thrombotic stroke involving right middle cerebral artery ([facility] 12/9/2021    Vertigo      Past Surgical History:   Procedure Laterality Date    HX BUNIONECTOMY      HX HYSTERECTOMY  1971    partial    HX ORTHOPAEDIC      Status post bunionectomy    HX PARTIAL HYSTERECTOMY       No Known Allergies      REVIEW OF SYSTEMS:  General: negative for - chills or fever  ENT: negative for - headaches, nasal congestion or tinnitus  Respiratory: negative for - cough, hemoptysis, shortness of breath or wheezing  Cardiovascular : negative for - chest pain, edema, palpitations or shortness of breath  Gastrointestinal: negative for - abdominal pain, blood in stools, heartburn or nausea/vomiting  Genito-Urinary: no dysuria, trouble voiding, or hematuria  Musculoskeletal: negative for - gait disturbance, joint pain, joint stiffness or joint swelling  Neurological: no TIA or stroke symptoms  Hematologic: no bruises, no bleeding, no swollen glands  Integument: no lumps, mole changes, nail changes or rash  Endocrine: no malaise/lethargy or unexpected weight changes      Social History     Socioeconomic History    Marital status:     Number of children: 2   Occupational History    Occupation: Retired   Tobacco Use    Smoking status: Never    Smokeless tobacco: Never   Vaping Use    Vaping Use: Never used   Substance and Sexual Activity    Alcohol use: No    Drug use: No    Sexual activity: Not Currently     Comment: ,2 children,     Family History   Problem Relation Age of Onset    Hypertension Mother        OBJECTIVE:    Visit Vitals  BP (!) 147/85   Pulse 66   Temp 97.6 °F (36.4 °C) (Oral)   Resp 18   Ht 5' 1\" (1.549 m)   Wt 159 lb 8 oz (72.3 kg)   SpO2 94%   BMI 30.14 kg/m²     CONSTITUTIONAL: well , well nourished, appears age appropriate  EYES: perrla, eom intact  ENMT:moist mucous membranes, pharynx clear  NECK: supple. Thyroid normal  RESPIRATORY: Chest: clear to ascultation and percussion   CARDIOVASCULAR: Heart: regular rate and rhythm  GASTROINTESTINAL: Abdomen: soft, bowel sounds active  HEMATOLOGIC: no pathological lymph nodes palpated  MUSCULOSKELETAL: Extremities: no edema, pulse 1+   INTEGUMENT: No unusual rashes or suspicious skin lesions noted. Nails appear normal.  NEUROLOGIC: non-focal exam   MENTAL STATUS: alert and oriented, appropriate affect      ASSESSMENT:  1. Primary osteoarthritis of both knees    2. Anxiety tension state    3. Venous insufficiency    4. Smoldering myeloma    5. Prerenal azotemia    6. Primary hypertension    7. Lacunar infarction ([facility]    8. Dyslipidemia    9. Encounter for immunization        PLAN:  1. The patient has osteoarthritis involving both knees. She is ambulatory however. The most important thing for minimizing the discomfort is her progressive weight loss. 2. She has chronic muscle tension headaches, but these are no different than before. 3. The swelling that she has had in her lower extremities is related to venous insufficiency. 4. Her smoldering myeloma is stable. 5. She has a mild pre-renal azotemia and BNP will be checked. I suggest she increase her intake of liquids. This is however created by her hydrochlorothiazide from her losartan combination. 6. She has had a lacunar infarct in the past, but currently has no sequelae from that event. 7. Because of her increased cardiovascular risk and the previous event that she had she is in a secondary risk prevention mode and needs a statin. .  Orders Placed This Encounter    Influenza, FLUAD, (age 72 y+), IM, PF, 0.5 mL    METABOLIC PANEL, BASIC    APOLIPOPROTEIN B         Follow-up and Dispositions    Return in about 2 months (around 12/25/2022).            Briana Woodruff MD

## 2022-10-27 LAB — APO B SERPL-MCNC: 47 MG/DL

## 2022-11-26 PROBLEM — I87.2 VENOUS INSUFFICIENCY: Status: ACTIVE | Noted: 2022-11-26

## 2023-01-04 RX ORDER — BUSPIRONE HYDROCHLORIDE 10 MG/1
10 TABLET ORAL 2 TIMES DAILY
Qty: 60 TABLET | Refills: 1 | Status: SHIPPED | OUTPATIENT
Start: 2023-01-04

## 2023-01-06 DIAGNOSIS — I10 ESSENTIAL HYPERTENSION: ICD-10-CM

## 2023-01-06 RX ORDER — ATORVASTATIN CALCIUM 10 MG/1
10 TABLET, FILM COATED ORAL
Qty: 90 TABLET | Refills: 0 | Status: SHIPPED | OUTPATIENT
Start: 2023-01-06

## 2023-01-06 RX ORDER — LOSARTAN POTASSIUM AND HYDROCHLOROTHIAZIDE 12.5; 5 MG/1; MG/1
1 TABLET ORAL DAILY
Qty: 90 TABLET | Refills: 1 | Status: SHIPPED | OUTPATIENT
Start: 2023-01-06

## 2023-01-06 NOTE — TELEPHONE ENCOUNTER
Requested Prescriptions     Signed Prescriptions Disp Refills    losartan-hydroCHLOROthiazide (HYZAAR) 50-12.5 mg per tablet 90 Tablet 1     Sig: Take 1 Tablet by mouth daily. Authorizing Provider: Huel Dubin     Ordering User: Mikel Fung    atorvastatin (LIPITOR) 10 mg tablet 90 Tablet 0     Sig: Take 1 Tablet by mouth nightly.  *NEED FASTING LABS*     Authorizing Provider: Huel Dubin     Ordering User: Mikel Fung    Per Dr. Lily Castro verbal order

## 2023-01-09 NOTE — DISCHARGE INSTRUCTIONS

## 2023-01-20 ENCOUNTER — OFFICE VISIT (OUTPATIENT)
Dept: CARDIOLOGY CLINIC | Age: 88
End: 2023-01-20
Payer: MEDICARE

## 2023-01-20 VITALS
SYSTOLIC BLOOD PRESSURE: 128 MMHG | HEART RATE: 85 BPM | OXYGEN SATURATION: 96 % | RESPIRATION RATE: 16 BRPM | DIASTOLIC BLOOD PRESSURE: 84 MMHG | BODY MASS INDEX: 30.89 KG/M2 | HEIGHT: 61 IN | WEIGHT: 163.6 LBS

## 2023-01-20 DIAGNOSIS — D64.9 ANEMIA, UNSPECIFIED TYPE: ICD-10-CM

## 2023-01-20 DIAGNOSIS — I49.5 SSS (SICK SINUS SYNDROME) (HCC): ICD-10-CM

## 2023-01-20 DIAGNOSIS — D47.2 SMOLDERING MYELOMA: ICD-10-CM

## 2023-01-20 DIAGNOSIS — R07.9 CHEST PAIN DUE TO GERD: ICD-10-CM

## 2023-01-20 DIAGNOSIS — Z95.0 CARDIAC PACEMAKER IN SITU: ICD-10-CM

## 2023-01-20 DIAGNOSIS — F41.9 ANXIETY: Primary | ICD-10-CM

## 2023-01-20 DIAGNOSIS — F41.9 ANXIETY TENSION STATE: ICD-10-CM

## 2023-01-20 DIAGNOSIS — K21.9 CHEST PAIN DUE TO GERD: ICD-10-CM

## 2023-01-20 DIAGNOSIS — I10 ESSENTIAL HYPERTENSION: ICD-10-CM

## 2023-01-20 RX ORDER — PANTOPRAZOLE SODIUM 40 MG/1
40 TABLET, DELAYED RELEASE ORAL
Qty: 30 TABLET | Refills: 5 | Status: SHIPPED | OUTPATIENT
Start: 2023-01-20

## 2023-01-20 RX ORDER — LOSARTAN POTASSIUM AND HYDROCHLOROTHIAZIDE 12.5; 5 MG/1; MG/1
1 TABLET ORAL DAILY
Qty: 90 TABLET | Refills: 3 | Status: SHIPPED | OUTPATIENT
Start: 2023-01-20

## 2023-01-20 NOTE — PROGRESS NOTES
HISTORY OF PRESENT ILLNESS  Livia Becerra is a 80 y.o. female. She has anxiety history of 6 syndrome and treated hypertension. She has a pacemaker he continues to live alone at her advanced age. Her major complaint is acid reflux which is quite bad at this point. She takes Protonix for this and used to take Pepcid. She continues to take aspirin. 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    Primary osteoarthritis of both hands M19.041, M19.042    Smoldering myeloma D47.2    Bilateral carpal tunnel syndrome G56.03    Dizziness R42    Symptomatic bradycardia R00.1    Bradycardic cardiac arrest (HCC) R00.1, I46.2    Thrombotic stroke involving right middle cerebral artery ([facility]) I63.311    Bilateral carotid artery stenosis I65.23    Hemiparesis of left nondominant side due to acute cerebrovascular disease ([facility]) I67.89, G81.94    Vertebral basilar insufficiency G45.0    Dizziness and giddiness R42    Chronic renal disease, stage III N18.30    Lacunar infarction ([facility]) I63.81    Prerenal azotemia R79.89    Venous insufficiency I87.2     Current Outpatient Medications   Medication Sig Dispense Refill    pantoprazole (PROTONIX) 40 mg tablet Take 1 Tablet by mouth Daily (before breakfast). 30 Tablet 5    losartan-hydroCHLOROthiazide (HYZAAR) 50-12.5 mg per tablet Take 1 Tablet by mouth daily. 90 Tablet 3    busPIRone (BUSPAR) 10 mg tablet Take 1 Tablet by mouth two (2) times a day. 60 Tablet 1    citalopram (CELEXA) 20 mg tablet Take 1 Tablet by mouth daily.  80 Tablet 3    nystatin (MYCOSTATIN) topical cream nystatin 100,000 unit/gram topical cream   APPLY TO THE AFFECTED AREA(S) BY TOPICAL ROUTE 2 TIMES PER DAY      psyllium husk-aspartame (Metamucil Fiber Singles) 3.4 gram pwpk packet Metamucil Sugar-Free (aspartame) 3.4 gram/5.8 gram oral powder   TAKE 3.4 GRAMS BY MOUTH DAILY      LORazepam (ATIVAN) 0.5 mg tablet Take 1 Tablet by mouth every eight (8) hours as needed for Anxiety. Max Daily Amount: 1.5 mg. 30 Tablet 5    folic acid/multivit-min/lutein (CENTRUM SILVER PO) Take 1 Tablet by mouth daily. Past Medical History:   Diagnosis Date    Anemia     Lenn Esters MM,seeing     Arrhythmia     SSS    Bradycardia     CAD (coronary artery disease)     Essential hypertension     GERD (gastroesophageal reflux disease)     Psychiatric disorder     Anxiety    Psychotic disorder (HCC)     anxiety    Thrombotic stroke involving right middle cerebral artery ([facility] 12/9/2021    Vertigo      Past Surgical History:   Procedure Laterality Date    HX BUNIONECTOMY      HX HYSTERECTOMY  1971    partial    HX ORTHOPAEDIC      Status post bunionectomy    HX PARTIAL HYSTERECTOMY         Review of Systems   Gastrointestinal:  Positive for heartburn. Neurological:  Positive for weakness. All other systems reviewed and are negative. Visit Vitals  /84 (BP 1 Location: Left upper arm, BP Patient Position: Sitting, BP Cuff Size: Adult)   Pulse 85   Resp 16   Ht 5' 1\" (1.549 m)   Wt 163 lb 9.6 oz (74.2 kg)   SpO2 96%   BMI 30.91 kg/m²       Physical Exam  Vitals and nursing note reviewed. HENT:      Head: Normocephalic. Right Ear: External ear normal.      Left Ear: External ear normal.      Nose: Nose normal.      Mouth/Throat:      Mouth: Mucous membranes are moist.   Eyes:      Extraocular Movements: Extraocular movements intact. Cardiovascular:      Rate and Rhythm: Normal rate and regular rhythm. Heart sounds: Normal heart sounds.    Pulmonary:      Breath sounds: Normal breath sounds. Abdominal:      Palpations: Abdomen is soft. Musculoskeletal:         General: Normal range of motion. Cervical back: Normal range of motion. Skin:     General: Skin is warm. Neurological:      Mental Status: She is alert. Mental status is at baseline. Psychiatric:         Behavior: Behavior normal.       ASSESSMENT and PLAN  Is unclear why her acid reflux is not improving. I will refill the Protonix. I will also have her stop her aspirin for now despite her remote history of a small lacunar infarct. I will also stop her Lipitor given her advanced age. Her anxiety continues to be a major problem. I will see her back in 6 months.

## 2023-03-14 DIAGNOSIS — F41.9 ANXIETY: ICD-10-CM

## 2023-03-14 RX ORDER — CITALOPRAM 20 MG/1
20 TABLET, FILM COATED ORAL DAILY
Qty: 90 TABLET | Refills: 3 | Status: SHIPPED | OUTPATIENT
Start: 2023-03-14

## 2023-03-28 ENCOUNTER — OFFICE VISIT (OUTPATIENT)
Dept: INTERNAL MEDICINE CLINIC | Age: 88
End: 2023-03-28

## 2023-03-28 DIAGNOSIS — M17.0 PRIMARY OSTEOARTHRITIS OF BOTH KNEES: ICD-10-CM

## 2023-03-28 DIAGNOSIS — F41.9 ANXIETY TENSION STATE: ICD-10-CM

## 2023-03-28 DIAGNOSIS — I63.311 THROMBOTIC STROKE INVOLVING RIGHT MIDDLE CEREBRAL ARTERY (HCC): Primary | ICD-10-CM

## 2023-03-28 DIAGNOSIS — E78.5 DYSLIPIDEMIA: ICD-10-CM

## 2023-03-28 DIAGNOSIS — D89.2 PARAPROTEINEMIA: ICD-10-CM

## 2023-03-28 NOTE — PROGRESS NOTES
[address] [place] [address] and Primary Care  [place] [address]  [address] [place] [address]  Phone:  [phone]  Fax: [phone]       Chief Complaint   Patient presents with    Follow-up    Hypertension    Dizziness     Patient here for follow up high blood pressure and dizziness. .      SUBJECTIVE:    Gi Peña is a 80 y.o. female  Dictation on: 03/28/2023 12:44 PM by: Eyal Sinha [[id number]]          Current Outpatient Medications   Medication Sig Dispense Refill    citalopram (CELEXA) 20 mg tablet Take 1 Tablet by mouth daily. 90 Tablet 3    pantoprazole (PROTONIX) 40 mg tablet Take 1 Tablet by mouth Daily (before breakfast). 30 Tablet 5    losartan-hydroCHLOROthiazide (HYZAAR) 50-12.5 mg per tablet Take 1 Tablet by mouth daily. 90 Tablet 3    nystatin (MYCOSTATIN) topical cream nystatin 100,000 unit/gram topical cream   APPLY TO THE AFFECTED AREA(S) BY TOPICAL ROUTE 2 TIMES PER DAY      psyllium husk-aspartame (Metamucil Fiber Singles) 3.4 gram pwpk packet Metamucil Sugar-Free (aspartame) 3.4 gram/5.8 gram oral powder   TAKE 3.4 GRAMS BY MOUTH DAILY      LORazepam (ATIVAN) 0.5 mg tablet Take 1 Tablet by mouth every eight (8) hours as needed for Anxiety. Max Daily Amount: 1.5 mg. 30 Tablet 5    folic acid/multivit-min/lutein (CENTRUM SILVER PO) Take 1 Tablet by mouth daily. busPIRone (BUSPAR) 10 mg tablet TAKE 1 TABLET BY MOUTH TWO TIMES A DAY.  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    Vertigo      Past Surgical History:   Procedure Laterality Date    HX BUNIONECTOMY      HX HYSTERECTOMY  1971    partial    HX ORTHOPAEDIC      Status post bunionectomy    HX PARTIAL HYSTERECTOMY       No Known Allergies      REVIEW OF SYSTEMS:  General: negative for - chills or fever  ENT: negative for - headaches, nasal congestion or tinnitus  Respiratory: negative for - cough, hemoptysis, shortness of breath or wheezing  Cardiovascular : negative for - chest pain, edema, palpitations or shortness of breath  Gastrointestinal: negative for - abdominal pain, blood in stools, heartburn or nausea/vomiting  Genito-Urinary: no dysuria, trouble voiding, or hematuria  Musculoskeletal: negative for - gait disturbance, joint pain, joint stiffness or joint swelling  Neurological: no TIA or stroke symptoms  Hematologic: no bruises, no bleeding, no swollen glands  Integument: no lumps, mole changes, nail changes or rash  Endocrine: no malaise/lethargy or unexpected weight changes      Social History     Socioeconomic History    Marital status:     Number of children: 2   Occupational History    Occupation: Retired   Tobacco Use    Smoking status: Never    Smokeless tobacco: Never   Vaping Use    Vaping Use: Never used   Substance and Sexual Activity    Alcohol use: No    Drug use: No    Sexual activity: Not Currently     Comment: ,2 children,     Social Determinants of Health     Financial Resource Strain: High Risk    Difficulty of Paying Living Expenses: Very hard   Food Insecurity: Food Insecurity Present    Worried About Running Out of Food in the Last Year: Often true    Ran Out of Food in the Last Year: Often true   Transportation Needs: Unmet Transportation Needs    Lack of Transportation (Medical): Yes    Lack of Transportation (Non-Medical): Yes   Housing Stability: Unknown    Unable to Pay for Housing in the Last Year: No    Unstable Housing in the Last Year: No     Family History   Problem Relation Age of Onset    Hypertension Mother        OBJECTIVE:    Visit Vitals  /77   Pulse 66   Temp 97.4 °F (36.3 °C) (Oral)   Resp 18   Ht 5' 1\" (1.549 m)   Wt 164 lb 9.6 oz (74.7 kg)   SpO2 99%   BMI 31.10 kg/m²     CONSTITUTIONAL: well , well nourished, appears age appropriate  EYES: perrla, eom intact  ENMT:moist mucous membranes, pharynx clear  NECK: supple. Thyroid normal  RESPIRATORY: Chest: clear to ascultation and percussion   CARDIOVASCULAR: Heart: regular rate and rhythm  GASTROINTESTINAL: Abdomen: soft, bowel sounds active  HEMATOLOGIC: no pathological lymph nodes palpated  MUSCULOSKELETAL: Extremities: no edema, pulse 1+   INTEGUMENT: No unusual rashes or suspicious skin lesions noted. Nails appear normal.  NEUROLOGIC: non-focal exam   MENTAL STATUS: alert and oriented, appropriate affect      ASSESSMENT:  1. Thrombotic stroke involving right middle cerebral artery ([address]    2. Primary osteoarthritis of both knees    3. Anxiety tension state    4. Dyslipidemia    5. Paraproteinemia        PLAN:   Dictation on: 04/09/2023 10:37 PM by: Neveah Farmer [[id number]]   .   Orders Placed This Encounter    APOLIPOPROTEIN B    CBC WITH AUTOMATED DIFF    CRP, HIGH SENSITIVITY    LIPID PANEL    METABOLIC PANEL, COMPREHENSIVE    TSH 3RD GENERATION    URINALYSIS W/ RFLX MICROSCOPIC    GAMMOPATHY EVAL, SPEP/JARROD, IG QT/FLC    MICROSCOPIC EXAMINATION               Amalia Zhu MD

## 2023-03-28 NOTE — PROGRESS NOTES
Anjel Cantu is a 80 y.o. female  Chief Complaint   Patient presents with    Follow-up    Hypertension    Dizziness     Patient here for follow up high blood pressure and dizziness. YES Answers must have Comments  1. \"Have you been to the ER, urgent care clinic since your last visit? Hospitalized since your last visit? \"    [] YES   [x] NO       2. Have you seen or consulted any other health care providers outside of [address] [place] [address] since your last visit?     [] YES When March 2023, Where [facility], and Reason for visit Vagina Prolapse. [x] NO       3. For patients aged 39-70: Have you had a colorectal cancer screening such as a colonoscopy/FIT/Cologuard? Nurse/CMA to request records if not in chart   [] YES   [] NO   [x] NA, based on age    If the patient is female:      4. For female patients aged 41-77: Martines Alar you had a mammogram in the last two years?  Nurse/CMA to request records if not in chart   [] YES   [] NO   [x] NA, based on age    11. For female patients aged 21-65: Martines Alar you had a pap smear?   Nurse/CMA to request records if not in chart   [] YES   [] NO  [x] NA, based on age

## 2023-03-29 NOTE — PROGRESS NOTES
comes in for return visit. She complains of vague pain in the frontal region of her head. She has had this for well over a month intermittently. She has a history of depression, as well as dyslipidemia and osteoarthritis with predominant involvement of her knees. 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

## 2023-04-01 LAB
ALBUMIN SERPL ELPH-MCNC: 4 G/DL (ref 2.9–4.4)
ALBUMIN SERPL-MCNC: 4.3 G/DL (ref 3.5–4.6)
ALBUMIN/GLOB SERPL: 1.3 {RATIO} (ref 0.7–1.7)
ALBUMIN/GLOB SERPL: 1.5 {RATIO} (ref 1.2–2.2)
ALP SERPL-CCNC: 63 IU/L (ref 44–121)
ALPHA1 GLOB SERPL ELPH-MCNC: 0.2 G/DL (ref 0–0.4)
ALPHA2 GLOB SERPL ELPH-MCNC: 0.6 G/DL (ref 0.4–1)
ALT SERPL-CCNC: 9 IU/L (ref 0–32)
APO B SERPL-MCNC: 81 MG/DL
APPEARANCE UR: CLEAR
AST SERPL-CCNC: 22 IU/L (ref 0–40)
B-GLOBULIN SERPL ELPH-MCNC: 0.8 G/DL (ref 0.7–1.3)
BACTERIA #/AREA URNS HPF: ABNORMAL /[HPF]
BASOPHILS # BLD AUTO: 0.1 X10E3/UL (ref 0–0.2)
BASOPHILS NFR BLD AUTO: 1 %
BILIRUB SERPL-MCNC: 0.8 MG/DL (ref 0–1.2)
BILIRUB UR QL STRIP: NEGATIVE
BUN SERPL-MCNC: 22 MG/DL (ref 10–36)
BUN/CREAT SERPL: 20 (ref 12–28)
CALCIUM SERPL-MCNC: 9.9 MG/DL (ref 8.7–10.3)
CASTS URNS QL MICRO: ABNORMAL /LPF
CHLORIDE SERPL-SCNC: 106 MMOL/L (ref 96–106)
CHOLEST SERPL-MCNC: 175 MG/DL (ref 100–199)
CO2 SERPL-SCNC: 20 MMOL/L (ref 20–29)
COLOR UR: YELLOW
CREAT SERPL-MCNC: 1.12 MG/DL (ref 0.57–1)
CRP SERPL HS-MCNC: 1.64 MG/L (ref 0–3)
EGFRCR SERPLBLD CKD-EPI 2021: 47 ML/MIN/1.73
EOSINOPHIL # BLD AUTO: 0.1 X10E3/UL (ref 0–0.4)
EOSINOPHIL NFR BLD AUTO: 2 %
EPI CELLS #/AREA URNS HPF: ABNORMAL /HPF (ref 0–10)
ERYTHROCYTE [DISTWIDTH] IN BLOOD BY AUTOMATED COUNT: 18.8 % (ref 11.7–15.4)
GAMMA GLOB SERPL ELPH-MCNC: 1.6 G/DL (ref 0.4–1.8)
GLOBULIN SER CALC-MCNC: 2.9 G/DL (ref 1.5–4.5)
GLOBULIN SER-MCNC: 3.2 G/DL (ref 2.2–3.9)
GLUCOSE SERPL-MCNC: 96 MG/DL (ref 70–99)
GLUCOSE UR QL STRIP: NEGATIVE
HCT VFR BLD AUTO: 29.5 % (ref 34–46.6)
HDLC SERPL-MCNC: 69 MG/DL
HGB BLD-MCNC: 10.4 G/DL (ref 11.1–15.9)
HGB UR QL STRIP: ABNORMAL
IGA SERPL-MCNC: 179 MG/DL (ref 64–422)
IGG SERPL-MCNC: 1740 MG/DL (ref 586–1602)
IGM SERPL-MCNC: 94 MG/DL (ref 26–217)
IMM GRANULOCYTES # BLD AUTO: 0 X10E3/UL (ref 0–0.1)
IMM GRANULOCYTES NFR BLD AUTO: 0 %
INTERPRETATION SERPL IEP-IMP: ABNORMAL
KAPPA LC FREE SER-MCNC: 28 MG/L (ref 3.3–19.4)
KAPPA LC FREE/LAMBDA FREE SER: 1.89 {RATIO} (ref 0.26–1.65)
KETONES UR QL STRIP: NEGATIVE
LABORATORY COMMENT REPORT: ABNORMAL
LAMBDA LC FREE SERPL-MCNC: 14.8 MG/L (ref 5.7–26.3)
LDLC SERPL CALC-MCNC: 97 MG/DL (ref 0–99)
LEUKOCYTE ESTERASE UR QL STRIP: ABNORMAL
LYMPHOCYTES # BLD AUTO: 2.1 X10E3/UL (ref 0.7–3.1)
LYMPHOCYTES NFR BLD AUTO: 32 %
M PROTEIN SERPL ELPH-MCNC: 0.9 G/DL
MCH RBC QN AUTO: 33.9 PG (ref 26.6–33)
MCHC RBC AUTO-ENTMCNC: 35.3 G/DL (ref 31.5–35.7)
MCV RBC AUTO: 96 FL (ref 79–97)
MICRO URNS: ABNORMAL
MONOCYTES # BLD AUTO: 0.4 X10E3/UL (ref 0.1–0.9)
MONOCYTES NFR BLD AUTO: 6 %
NEUTROPHILS # BLD AUTO: 3.8 X10E3/UL (ref 1.4–7)
NEUTROPHILS NFR BLD AUTO: 59 %
NITRITE UR QL STRIP: NEGATIVE
NRBC BLD AUTO-RTO: 1 % (ref 0–0)
PH UR STRIP: 6.5 [PH] (ref 5–7.5)
PLATELET # BLD AUTO: 181 X10E3/UL (ref 150–450)
POTASSIUM SERPL-SCNC: 4.1 MMOL/L (ref 3.5–5.2)
PROT SERPL-MCNC: 7.2 G/DL (ref 6–8.5)
PROT UR QL STRIP: NEGATIVE
RBC # BLD AUTO: 3.07 X10E6/UL (ref 3.77–5.28)
RBC #/AREA URNS HPF: ABNORMAL /HPF (ref 0–2)
SODIUM SERPL-SCNC: 142 MMOL/L (ref 134–144)
SP GR UR STRIP: 1.01 (ref 1–1.03)
TRIGL SERPL-MCNC: 44 MG/DL (ref 0–149)
TSH SERPL DL<=0.005 MIU/L-ACNC: 2.71 UIU/ML (ref 0.45–4.5)
UROBILINOGEN UR STRIP-MCNC: 0.2 MG/DL (ref 0.2–1)
VLDLC SERPL CALC-MCNC: 9 MG/DL (ref 5–40)
WBC # BLD AUTO: 6.4 X10E3/UL (ref 3.4–10.8)
WBC #/AREA URNS HPF: ABNORMAL /HPF (ref 0–5)

## 2023-04-01 RX ORDER — CEFUROXIME AXETIL 500 MG/1
500 TABLET ORAL 2 TIMES DAILY
Qty: 14 TABLET | Refills: 0 | Status: SHIPPED | OUTPATIENT
Start: 2023-04-01 | End: 2023-04-08

## 2023-04-04 ENCOUNTER — TELEPHONE (OUTPATIENT)
Dept: INTERNAL MEDICINE CLINIC | Age: 88
End: 2023-04-04

## 2023-04-04 NOTE — TELEPHONE ENCOUNTER
----- Message from Shaista De Souza MD sent at 4/1/2023  9:20 PM EDT -----  Patient has UTI and antibiotics were called in

## 2023-05-30 NOTE — PROGRESS NOTES
1620: Pt arrived to 2155 via EMS. Problem: MOBILITY - ADULT  Goal: Maintain or return to baseline ADL function  Description: INTERVENTIONS:  -  Assess patient's ability to carry out ADLs; assess patient's baseline for ADL function and identify physical deficits which impact ability to perform ADLs (bathing, care of mouth/teeth, toileting, grooming, dressing, etc )  - Assess/evaluate cause of self-care deficits   - Assess range of motion  - Assess patient's mobility; develop plan if impaired  - Assess patient's need for assistive devices and provide as appropriate  - Encourage maximum independence but intervene and supervise when necessary  - Involve family in performance of ADLs  - Assess for home care needs following discharge   - Consider OT consult to assist with ADL evaluation and planning for discharge  - Provide patient education as appropriate  5/30/2023 1251 by Norma Robison  Outcome: Adequate for Discharge  5/30/2023 1136 by Norma Robison  Outcome: Progressing  Goal: Maintains/Returns to pre admission functional level  Description: INTERVENTIONS:  - Perform BMAT or MOVE assessment daily    - Set and communicate daily mobility goal to care team and patient/family/caregiver     - Collaborate with rehabilitation services on mobility goals if consulted  - Dangle patient 3 times a day  - Stand patient 6 times a day  - Ambulate patient 4 times a day  - Out of bed to chair 3 times a day   - Out of bed for meals 3 times a day  - Out of bed for toileting  - Record patient progress and toleration of activity level   5/30/2023 1251 by Norma Robison  Outcome: Adequate for Discharge  5/30/2023 1136 by Norma Robison  Outcome: Progressing     Problem: Prexisting or High Potential for Compromised Skin Integrity  Goal: Skin integrity is maintained or improved  Description: INTERVENTIONS:  - Identify patients at risk for skin breakdown  - Assess and monitor skin integrity  - Assess and monitor nutrition and hydration status  - Monitor labs   - Assess for incontinence   - Turn and reposition patient  - Assist with mobility/ambulation  - Relieve pressure over bony prominences  - Avoid friction and shearing  - Provide appropriate hygiene as needed including keeping skin clean and dry  - Evaluate need for skin moisturizer/barrier cream  - Collaborate with interdisciplinary team   - Patient/family teaching  - Consider wound care consult   5/30/2023 1251 by Weir Wakefield  Outcome: Adequate for Discharge  5/30/2023 1136 by Weir Wakefield  Outcome: Progressing     Problem: PAIN - ADULT  Goal: Verbalizes/displays adequate comfort level or baseline comfort level  Description: Interventions:  - Encourage patient to monitor pain and request assistance  - Assess pain using appropriate pain scale  - Administer analgesics based on type and severity of pain and evaluate response  - Implement non-pharmacological measures as appropriate and evaluate response  - Consider cultural and social influences on pain and pain management  - Notify physician/advanced practitioner if interventions unsuccessful or patient reports new pain  5/30/2023 1251 by Weir Wakefield  Outcome: Adequate for Discharge  5/30/2023 1136 by Weir Wakefield  Outcome: Progressing     Problem: SAFETY ADULT  Goal: Maintain or return to baseline ADL function  Description: INTERVENTIONS:  -  Assess patient's ability to carry out ADLs; assess patient's baseline for ADL function and identify physical deficits which impact ability to perform ADLs (bathing, care of mouth/teeth, toileting, grooming, dressing, etc )  - Assess/evaluate cause of self-care deficits   - Assess range of motion  - Assess patient's mobility; develop plan if impaired  - Assess patient's need for assistive devices and provide as appropriate  - Encourage maximum independence but intervene and supervise when necessary  - Involve family in performance of ADLs  - Assess for home care needs following discharge   - Consider OT consult to assist with ADL evaluation and planning for discharge  - Provide patient education as appropriate  5/30/2023 1251 by Chino Khan  Outcome: Adequate for Discharge  5/30/2023 1136 by Chino Khan  Outcome: Progressing  Goal: Maintains/Returns to pre admission functional level  Description: INTERVENTIONS:  - Perform BMAT or MOVE assessment daily    - Set and communicate daily mobility goal to care team and patient/family/caregiver     - Collaborate with rehabilitation services on mobility goals if consulted  - Dangle patient 3 times a day  - Stand patient 6 times a day  - Ambulate patient 4 times a day  - Out of bed to chair 3 times a day   - Out of bed for meals 3 times a day  - Out of bed for toileting  - Record patient progress and toleration of activity level   5/30/2023 1251 by Chino Khan  Outcome: Adequate for Discharge  5/30/2023 1136 by Chino Khan  Outcome: Progressing  Goal: Patient will remain free of falls  Description: INTERVENTIONS:  - Educate patient/family on patient safety including physical limitations  - Instruct patient to call for assistance with activity   - Consult OT/PT to assist with strengthening/mobility   - Keep Call bell within reach  - Keep bed low and locked with side rails adjusted as appropriate  - Keep care items and personal belongings within reach  - Initiate and maintain comfort rounds  - Make Fall Risk Sign visible to staff  - Offer Toileting every 2 Hours, in advance of need  - Initiate/Maintain alarm  - Obtain necessary fall risk management equipment  - Apply yellow socks and bracelet for high fall risk patients  - Consider moving patient to room near nurses station  5/30/2023 1251 by Chino Khan  Outcome: Adequate for Discharge  5/30/2023 1136 by Chino Khan  Outcome: Progressing     Problem: DISCHARGE PLANNING  Goal: Discharge to home or other facility with appropriate resources  Description: INTERVENTIONS:  - Identify barriers to discharge w/patient and caregiver  - Arrange for needed discharge resources and transportation as appropriate  - Identify discharge learning needs (meds, wound care, etc )  - Arrange for interpretive services to assist at discharge as needed  - Refer to Case Management Department for coordinating discharge planning if the patient needs post-hospital services based on physician/advanced practitioner order or complex needs related to functional status, cognitive ability, or social support system  5/30/2023 1251 by Socorro Hernandez  Outcome: Adequate for Discharge  5/30/2023 1136 by Socorro Hernandez  Outcome: Progressing     Problem: Knowledge Deficit  Goal: Patient/family/caregiver demonstrates understanding of disease process, treatment plan, medications, and discharge instructions  Description: Complete learning assessment and assess knowledge base    Interventions:  - Provide teaching at level of understanding  - Provide teaching via preferred learning methods  5/30/2023 1251 by Socorro Hernandez  Outcome: Adequate for Discharge  5/30/2023 1136 by Socorro Hernandez  Outcome: Progressing     Problem: RESPIRATORY - ADULT  Goal: Achieves optimal ventilation and oxygenation  Description: INTERVENTIONS:  - Assess for changes in respiratory status  - Assess for changes in mentation and behavior  - Position to facilitate oxygenation and minimize respiratory effort  - Oxygen administered by appropriate delivery if ordered  - Initiate smoking cessation education as indicated  - Encourage broncho-pulmonary hygiene including cough, deep breathe, Incentive Spirometry  - Assess the need for suctioning and aspirate as needed  - Assess and instruct to report SOB or any respiratory difficulty  - Respiratory Therapy support as indicated  5/30/2023 1251 by Socorro Hernandez  Outcome: Adequate for Discharge  5/30/2023 1136 by Socorro Hernandez  Outcome: Progressing     Problem: Nutrition/Hydration-ADULT  Goal: Nutrient/Hydration intake appropriate for improving, restoring or maintaining nutritional needs  Description: Monitor and assess patient's nutrition/hydration status for malnutrition  Collaborate with interdisciplinary team and initiate plan and interventions as ordered  Monitor patient's weight and dietary intake as ordered or per policy  Utilize nutrition screening tool and intervene as necessary  Determine patient's food preferences and provide high-protein, high-caloric foods as appropriate       INTERVENTIONS:  - Monitor oral intake, urinary output, labs, and treatment plans  - Assess nutrition and hydration status and recommend course of action  - Evaluate amount of meals eaten  - Assist patient with eating if necessary   - Allow adequate time for meals  - Recommend/ encourage appropriate diets, oral nutritional supplements, and vitamin/mineral supplements  - Order, calculate, and assess calorie counts as needed  - Recommend, monitor, and adjust tube feedings and TPN/PPN based on assessed needs  - Assess need for intravenous fluids  - Provide specific nutrition/hydration education as appropriate  - Include patient/family/caregiver in decisions related to nutrition  5/30/2023 1251 by Cornell Parmele  Outcome: Adequate for Discharge  5/30/2023 1136 by Cornell Parmele  Outcome: Progressing

## 2023-06-01 ENCOUNTER — OFFICE VISIT (OUTPATIENT)
Facility: CLINIC | Age: 88
End: 2023-06-01

## 2023-06-01 VITALS
BODY MASS INDEX: 31.25 KG/M2 | HEIGHT: 61 IN | DIASTOLIC BLOOD PRESSURE: 78 MMHG | HEART RATE: 86 BPM | SYSTOLIC BLOOD PRESSURE: 123 MMHG | RESPIRATION RATE: 18 BRPM | TEMPERATURE: 98.5 F | OXYGEN SATURATION: 95 % | WEIGHT: 165.5 LBS

## 2023-06-01 DIAGNOSIS — D47.2 SMOLDERING MYELOMA: ICD-10-CM

## 2023-06-01 DIAGNOSIS — R42 DIZZINESS AND GIDDINESS: ICD-10-CM

## 2023-06-01 DIAGNOSIS — G44.219 EPISODIC TENSION-TYPE HEADACHE, NOT INTRACTABLE: ICD-10-CM

## 2023-06-01 DIAGNOSIS — I10 PRIMARY HYPERTENSION: ICD-10-CM

## 2023-06-01 DIAGNOSIS — F41.9 ANXIETY TENSION STATE: ICD-10-CM

## 2023-06-01 DIAGNOSIS — E78.5 DYSLIPIDEMIA: ICD-10-CM

## 2023-06-01 DIAGNOSIS — Z00.00 MEDICARE ANNUAL WELLNESS VISIT, SUBSEQUENT: Primary | ICD-10-CM

## 2023-06-01 RX ORDER — ROSUVASTATIN CALCIUM 5 MG/1
5 TABLET, COATED ORAL DAILY
Qty: 30 TABLET | Refills: 11 | Status: SHIPPED | OUTPATIENT
Start: 2023-06-01

## 2023-06-01 SDOH — HEALTH STABILITY: PHYSICAL HEALTH: ON AVERAGE, HOW MANY MINUTES DO YOU ENGAGE IN EXERCISE AT THIS LEVEL?: 10 MIN

## 2023-06-01 SDOH — HEALTH STABILITY: PHYSICAL HEALTH: ON AVERAGE, HOW MANY DAYS PER WEEK DO YOU ENGAGE IN MODERATE TO STRENUOUS EXERCISE (LIKE A BRISK WALK)?: 7 DAYS

## 2023-06-01 SDOH — ECONOMIC STABILITY: HOUSING INSECURITY
IN THE LAST 12 MONTHS, WAS THERE A TIME WHEN YOU DID NOT HAVE A STEADY PLACE TO SLEEP OR SLEPT IN A SHELTER (INCLUDING NOW)?: NO

## 2023-06-01 SDOH — ECONOMIC STABILITY: FOOD INSECURITY: WITHIN THE PAST 12 MONTHS, YOU WORRIED THAT YOUR FOOD WOULD RUN OUT BEFORE YOU GOT MONEY TO BUY MORE.: NEVER TRUE

## 2023-06-01 SDOH — ECONOMIC STABILITY: INCOME INSECURITY: IN THE LAST 12 MONTHS, WAS THERE A TIME WHEN YOU WERE NOT ABLE TO PAY THE MORTGAGE OR RENT ON TIME?: NO

## 2023-06-01 SDOH — ECONOMIC STABILITY: HOUSING INSECURITY: IN THE LAST 12 MONTHS, HOW MANY PLACES HAVE YOU LIVED?: 1

## 2023-06-01 SDOH — ECONOMIC STABILITY: FOOD INSECURITY: WITHIN THE PAST 12 MONTHS, THE FOOD YOU BOUGHT JUST DIDN'T LAST AND YOU DIDN'T HAVE MONEY TO GET MORE.: NEVER TRUE

## 2023-06-01 ASSESSMENT — ANXIETY QUESTIONNAIRES
5. BEING SO RESTLESS THAT IT IS HARD TO SIT STILL: 0
1. FEELING NERVOUS, ANXIOUS, OR ON EDGE: 0
IF YOU CHECKED OFF ANY PROBLEMS ON THIS QUESTIONNAIRE, HOW DIFFICULT HAVE THESE PROBLEMS MADE IT FOR YOU TO DO YOUR WORK, TAKE CARE OF THINGS AT HOME, OR GET ALONG WITH OTHER PEOPLE: NOT DIFFICULT AT ALL
GAD7 TOTAL SCORE: 0
2. NOT BEING ABLE TO STOP OR CONTROL WORRYING: 0
6. BECOMING EASILY ANNOYED OR IRRITABLE: 0
7. FEELING AFRAID AS IF SOMETHING AWFUL MIGHT HAPPEN: 0
4. TROUBLE RELAXING: 0
3. WORRYING TOO MUCH ABOUT DIFFERENT THINGS: 0

## 2023-06-01 ASSESSMENT — SOCIAL DETERMINANTS OF HEALTH (SDOH)
WITHIN THE LAST YEAR, HAVE TO BEEN RAPED OR FORCED TO HAVE ANY KIND OF SEXUAL ACTIVITY BY YOUR PARTNER OR EX-PARTNER?: NO
HOW OFTEN DO YOU ATTEND CHURCH OR RELIGIOUS SERVICES?: MORE THAN 4 TIMES PER YEAR
WITHIN THE LAST YEAR, HAVE YOU BEEN HUMILIATED OR EMOTIONALLY ABUSED IN OTHER WAYS BY YOUR PARTNER OR EX-PARTNER?: NO
WITHIN THE LAST YEAR, HAVE YOU BEEN KICKED, HIT, SLAPPED, OR OTHERWISE PHYSICALLY HURT BY YOUR PARTNER OR EX-PARTNER?: NO
HOW OFTEN DO YOU GET TOGETHER WITH FRIENDS OR RELATIVES?: MORE THAN THREE TIMES A WEEK
IN A TYPICAL WEEK, HOW MANY TIMES DO YOU TALK ON THE PHONE WITH FAMILY, FRIENDS, OR NEIGHBORS?: MORE THAN THREE TIMES A WEEK
DO YOU BELONG TO ANY CLUBS OR ORGANIZATIONS SUCH AS CHURCH GROUPS UNIONS, FRATERNAL OR ATHLETIC GROUPS, OR SCHOOL GROUPS?: YES
WITHIN THE LAST YEAR, HAVE YOU BEEN AFRAID OF YOUR PARTNER OR EX-PARTNER?: NO
HOW OFTEN DO YOU ATTENT MEETINGS OF THE CLUB OR ORGANIZATION YOU BELONG TO?: MORE THAN 4 TIMES PER YEAR

## 2023-06-01 ASSESSMENT — PATIENT HEALTH QUESTIONNAIRE - PHQ9
SUM OF ALL RESPONSES TO PHQ9 QUESTIONS 1 & 2: 0
SUM OF ALL RESPONSES TO PHQ QUESTIONS 1-9: 0
2. FEELING DOWN, DEPRESSED OR HOPELESS: 0
1. LITTLE INTEREST OR PLEASURE IN DOING THINGS: 0

## 2023-06-01 ASSESSMENT — LIFESTYLE VARIABLES
HOW OFTEN DO YOU HAVE A DRINK CONTAINING ALCOHOL: NEVER
HOW MANY STANDARD DRINKS CONTAINING ALCOHOL DO YOU HAVE ON A TYPICAL DAY: PATIENT DOES NOT DRINK

## 2023-06-02 NOTE — PATIENT INSTRUCTIONS
Starting a Weight Loss Plan: Care Instructions  Overview     If you're thinking about losing weight, it can be hard to know where to start. Your doctor can help you set up a weight loss plan that best meets your needs. You may want to take a class on nutrition or exercise, or you could join a weight loss support group. If you have questions about how to make changes to your eating or exercise habits, ask your doctor about seeing a registered dietitian or an exercise specialist.  It can be a big challenge to lose weight. But you don't have to make huge changes at once. Make small changes, and stick with them. When those changes become habit, add a few more changes. If you don't think you're ready to make changes right now, try to pick a date in the future. Make an appointment to see your doctor to discuss whether the time is right for you to start a plan. Follow-up care is a key part of your treatment and safety. Be sure to make and go to all appointments, and call your doctor if you are having problems. It's also a good idea to know your test results and keep a list of the medicines you take. How can you care for yourself at home? Set realistic goals. Many people expect to lose much more weight than is likely. A weight loss of 5% to 10% of your body weight may be enough to improve your health. Get family and friends involved to provide support. Talk to them about why you are trying to lose weight, and ask them to help. They can help by participating in exercise and having meals with you, even if they may be eating something different. Find what works best for you. If you do not have time or do not like to cook, a program that offers meal replacement bars or shakes may be better for you. Or if you like to prepare meals, finding a plan that includes daily menus and recipes may be best.  Ask your doctor about other health professionals who can help you achieve your weight loss goals.   A dietitian can help
.

## 2023-06-02 NOTE — PROGRESS NOTES
1. The patient's anxiety is quiet high, but this is her usual.  2. As far as her dizziness and lightheadedness is concerned, nothing more needs to be done. This is nonspecific at best.  3. She has chronic muscle tension headaches and she is relegated to symptomatic treatment preferably with acetaminophen. 4. 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 5. She remains normotensive and will continue her current antihypertensive medication.
Quintin Samson is a 80 y.o. female  Chief Complaint   Patient presents with    Medicare AWV     Patient here for Annual Medicare Wellness Exam. Patient also reports headaches. YES Answers must have Comments  1. \"Have you been to the ER, urgent care clinic since your last visit? Hospitalized since your last visit? \"    [] YES   [x] NO       2. Have you seen or consulted any other health care providers outside of [address] [place] [address] since your last visit?     [] YES   [x] NO       3. For patients aged 39-70: Have you had a colorectal cancer screening such as a colonoscopy/FIT/Cologuard? Nurse/CMA to request records if not in chart   [] YES   [] NO   [x] NA, based on age    If the patient is female:      4. For female patients aged 41-77: Elwyn Peeling you had a mammogram in the last two years?  Nurse/CMA to request records if not in chart   [] YES   [] NO   [x] NA, based on age    11. For female patients aged 21-65: Elwyn Peeling you had a pap smear?   Nurse/CMA to request records if not in chart   [] YES   [] NO  [x] NA, based on age
Food Insecurity    Worried About Running Out of Food in the Last Year: Never true    Ran Out of Food in the Last Year: Never true   Transportation Needs: Unmet Transportation Needs    Lack of Transportation (Medical): Yes    Lack of Transportation (Non-Medical): Yes   Physical Activity: Insufficiently Active    Days of Exercise per Week: 7 days    Minutes of Exercise per Session: 10 min   Stress: No Stress Concern Present    Feeling of Stress : Only a little   Social Connections: Moderately Integrated    Frequency of Communication with Friends and Family: More than three times a week    Frequency of Social Gatherings with Friends and Family: More than three times a week    Attends Yarsanism Services: More than 4 times per year    Active Member of [employer] or Organizations: Yes    Attends Club or Organization Meetings: More than 4 times per year    Marital Status:    Intimate Partner Violence: Not At Risk    Fear of Current or Ex-Partner: No    Emotionally Abused: No    Physically Abused: No    Sexually Abused: No   Housing Stability: Low Risk     Unable to Pay for Housing in the Last Year: No    Number of Places Lived in the Last Year: 1    Unstable Housing in the Last Year: No     Family History   Problem Relation Age of Onset    Hypertension Mother        OBJECTIVE:    /78 (Site: Right Upper Arm, Position: Sitting, Cuff Size: Medium Adult)   Pulse 86   Temp 98.5 °F (36.9 °C) (Oral)   Resp 18   Ht 5' 1\" (1.549 m)   Wt 165 lb 8 oz (75.1 kg)   SpO2 95%   BMI 31.27 kg/m²   CONSTITUTIONAL: well , well nourished, appears age appropriate  EYES: perrla, eom intact  ENMT:moist mucous membranes, pharynx clear  NECK: supple.  Thyroid normal  RESPIRATORY: Chest: clear to ascultation and percussion   CARDIOVASCULAR: Heart: regular rate and rhythm  GASTROINTESTINAL: Abdomen: soft, bowel sounds active  HEMATOLOGIC: no pathological lymph nodes palpated  MUSCULOSKELETAL: Extremities: no edema, pulse 1+

## 2023-07-21 ENCOUNTER — OFFICE VISIT (OUTPATIENT)
Age: 88
End: 2023-07-21
Payer: MEDICARE

## 2023-07-21 VITALS
BODY MASS INDEX: 30.58 KG/M2 | HEIGHT: 61 IN | RESPIRATION RATE: 20 BRPM | DIASTOLIC BLOOD PRESSURE: 80 MMHG | HEART RATE: 90 BPM | WEIGHT: 162 LBS | OXYGEN SATURATION: 98 % | SYSTOLIC BLOOD PRESSURE: 116 MMHG

## 2023-07-21 DIAGNOSIS — D47.2 MONOCLONAL GAMMOPATHY: ICD-10-CM

## 2023-07-21 DIAGNOSIS — I49.5 SICK SINUS SYNDROME (HCC): ICD-10-CM

## 2023-07-21 DIAGNOSIS — I10 ESSENTIAL (PRIMARY) HYPERTENSION: Primary | ICD-10-CM

## 2023-07-21 DIAGNOSIS — Z95.0 PRESENCE OF CARDIAC PACEMAKER: ICD-10-CM

## 2023-07-21 PROCEDURE — 99214 OFFICE O/P EST MOD 30 MIN: CPT | Performed by: SPECIALIST

## 2023-07-21 PROCEDURE — 1123F ACP DISCUSS/DSCN MKR DOCD: CPT | Performed by: SPECIALIST

## 2023-07-21 RX ORDER — FERROUS SULFATE 325(65) MG
325 TABLET ORAL
COMMUNITY

## 2023-07-21 RX ORDER — MECLIZINE HCL 12.5 MG/1
1 TABLET ORAL DAILY PRN
COMMUNITY

## 2023-07-21 RX ORDER — LIDOCAINE 50 MG/G
1 PATCH TOPICAL DAILY
Qty: 30 PATCH | Refills: 11 | COMMUNITY
Start: 2023-02-08 | End: 2024-02-08

## 2023-07-21 ASSESSMENT — PATIENT HEALTH QUESTIONNAIRE - PHQ9
SUM OF ALL RESPONSES TO PHQ QUESTIONS 1-9: 0
SUM OF ALL RESPONSES TO PHQ9 QUESTIONS 1 & 2: 0
2. FEELING DOWN, DEPRESSED OR HOPELESS: 0
SUM OF ALL RESPONSES TO PHQ QUESTIONS 1-9: 0
SUM OF ALL RESPONSES TO PHQ QUESTIONS 1-9: 0
1. LITTLE INTEREST OR PLEASURE IN DOING THINGS: 0
SUM OF ALL RESPONSES TO PHQ QUESTIONS 1-9: 0

## 2023-07-21 NOTE — PROGRESS NOTES
tablet by mouth daily (with breakfast)      rosuvastatin (CRESTOR) 5 MG tablet Take 1 tablet by mouth daily 30 tablet 11    busPIRone (BUSPAR) 10 MG tablet Take by mouth 2 times daily      citalopram (CELEXA) 20 MG tablet Take by mouth daily      LORazepam (ATIVAN) 0.5 MG tablet Take by mouth every 8 hours as needed. losartan-hydroCHLOROthiazide (HYZAAR) 50-12.5 MG per tablet Take 1 tablet by mouth daily      pantoprazole (PROTONIX) 40 MG tablet Take by mouth every morning (before breakfast)      nystatin (MYCOSTATIN) 215232 UNIT/GM cream nystatin 100,000 unit/gram topical cream   APPLY TO THE AFFECTED AREA(S) BY TOPICAL ROUTE 2 TIMES PER DAY (Patient not taking: Reported on 7/21/2023)      psyllium (METAMUCIL SMOOTH TEXTURE) 58.6 % powder Metamucil Sugar-Free (aspartame) 3.4 gram/5.8 gram oral powder   TAKE 3.4 GRAMS BY MOUTH DAILY (Patient not taking: Reported on 7/21/2023)       No current facility-administered medications for this visit.      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

## 2023-11-29 ENCOUNTER — OFFICE VISIT (OUTPATIENT)
Facility: CLINIC | Age: 88
End: 2023-11-29

## 2023-11-29 VITALS
DIASTOLIC BLOOD PRESSURE: 77 MMHG | HEIGHT: 61 IN | BODY MASS INDEX: 32.1 KG/M2 | SYSTOLIC BLOOD PRESSURE: 125 MMHG | WEIGHT: 170 LBS | OXYGEN SATURATION: 96 % | HEART RATE: 67 BPM | TEMPERATURE: 98.1 F | RESPIRATION RATE: 18 BRPM

## 2023-11-29 DIAGNOSIS — D64.9 ANEMIA, UNSPECIFIED TYPE: Primary | ICD-10-CM

## 2023-11-29 DIAGNOSIS — D47.2 SMOLDERING MYELOMA: ICD-10-CM

## 2023-11-29 DIAGNOSIS — E78.5 DYSLIPIDEMIA: ICD-10-CM

## 2023-11-29 DIAGNOSIS — Z09 HOSPITAL DISCHARGE FOLLOW-UP: ICD-10-CM

## 2023-11-29 DIAGNOSIS — I87.2 VENOUS INSUFFICIENCY: ICD-10-CM

## 2023-11-29 DIAGNOSIS — N18.31 STAGE 3A CHRONIC KIDNEY DISEASE (HCC): ICD-10-CM

## 2023-11-29 DIAGNOSIS — I10 PRIMARY HYPERTENSION: ICD-10-CM

## 2023-11-29 ASSESSMENT — PATIENT HEALTH QUESTIONNAIRE - PHQ9
SUM OF ALL RESPONSES TO PHQ QUESTIONS 1-9: 0
SUM OF ALL RESPONSES TO PHQ QUESTIONS 1-9: 0
1. LITTLE INTEREST OR PLEASURE IN DOING THINGS: 0
2. FEELING DOWN, DEPRESSED OR HOPELESS: 0
SUM OF ALL RESPONSES TO PHQ QUESTIONS 1-9: 0
SUM OF ALL RESPONSES TO PHQ9 QUESTIONS 1 & 2: 0
SUM OF ALL RESPONSES TO PHQ QUESTIONS 1-9: 0

## 2023-11-29 NOTE — PROGRESS NOTES
[facility]  [address] [facility] [address]  Phone:  [phone]  Fax: [phone]       Chief Complaint   Patient presents with    Follow-Up from Hospital   .      SUBJECTIVE:    Laila Birmingham is a 80 y.o. female  Dictation on: 11/29/2023  3:34 PM by: Amy Scruggs [[id number]]          Current Outpatient Medications   Medication Sig Dispense Refill    meclizine (ANTIVERT) 12.5 MG tablet Take 1 tablet by mouth daily as needed      ferrous sulfate (IRON 325) 325 (65 Fe) MG tablet Take 1 tablet by mouth daily (with breakfast)      rosuvastatin (CRESTOR) 5 MG tablet Take 1 tablet by mouth daily 30 tablet 11    busPIRone (BUSPAR) 10 MG tablet Take by mouth 2 times daily      citalopram (CELEXA) 20 MG tablet Take by mouth daily      LORazepam (ATIVAN) 0.5 MG tablet Take by mouth every 8 hours as needed. losartan-hydroCHLOROthiazide (HYZAAR) 50-12.5 MG per tablet Take 1 tablet by mouth daily      vitamin D (CHOLECALCIFEROL) 25 MCG (1000 UT) TABS tablet Take 25 mcg by mouth daily (Patient not taking: Reported on 11/29/2023)      lidocaine (LIDODERM) 5 % Apply 1 patch topically daily (Patient not taking: Reported on 11/29/2023) 30 patch 11    Multiple Vitamin (MULTIVITAMIN ADULT PO) Take by mouth Centrum silver. Out of it ut start soon (Patient not taking: Reported on 11/29/2023)      nystatin (MYCOSTATIN) 306069 UNIT/GM cream nystatin 100,000 unit/gram topical cream   APPLY TO THE AFFECTED AREA(S) BY TOPICAL ROUTE 2 TIMES PER DAY (Patient not taking: Reported on 7/21/2023)      pantoprazole (PROTONIX) 40 MG tablet Take by mouth every morning (before breakfast) (Patient not taking: Reported on 11/29/2023)      psyllium (METAMUCIL SMOOTH TEXTURE) 58.6 % powder Metamucil Sugar-Free (aspartame) 3.4 gram/5.8 gram oral powder   TAKE 3.4 GRAMS BY MOUTH DAILY (Patient not taking: Reported on 7/21/2023)       No current facility-administered medications for this visit.      Past

## 2023-11-30 NOTE — PROGRESS NOTES
1.  As far as the patient's anemia is concerned this is chronic and will be addressed by Dr. Elo Thurman. She will continue the ferrous gluconate as given to her by him. 2. She has mild chronic renal failure with a pre-renal component. BNP will be checked particularly since she was most recently hydrated. 3. She has mild venous insufficiency of her lower extremities which is really not causing any major problem and not that severe. 4. She has smoldering myeloma cared for by Dr. Elo Thurman which is stable. 5. Blood pressure is excellent, no adjustments are made. 6. She has an increased cardiovascular risk and is in a secondary risk prevention mode in view of her history of cerebrovascular disease. I have personally seen and examined the patient.  I agree with the history and physical which I have reviewed and noted any changes below.                                                               POST OPERATIVE PROCEDURAL DOCUMENTATION  PRE-OP DIAGNOSIS: Aortic regurgitation and mitral stenosis    POST-OP DIAGNOSIS:     PROCEDURE: Transesophageal echocardiogram    Primary Physician: Dr. Esau MD  Fellow: Vicki Miller MD  Ananesthesia:     ANESTHESIA TYPE  [  ] General Anesthesia  [ x ] Intravenous Sedation  [  ] Local/Regional    CONDITION  [  ] Critical  [  ] Serious  [  ] Fair  [ x ] Good    SPECIMENS REMOVED (IF APPLICABLE): NA    ESTIMATED BLOOD LOSS: None    TRANSESOPHAGEAL ECHOCARDIOGRAM     After risks, benefits and alternatives of the procedure were explained, consent was signed and placed in the medical record.  Procedural timeout was taken.  Sedation was administered by anesthesia.  MONROE probe inserted without complication and MONROE performed.  Patient tolerated the procedure well without complication.  Post-procedure vital signs were stable.    Findings of the procedure discussed with the patient and referring cardiologist.    MONROE findings:  LVEF 55-60%  Mechanical mitral valve   Mild to Moderate MR  Trace TR  Mechanical aortic valve and   No JASPER thrombus  See full report for findings.      Recommendations:  Continue all current medications.  Will discuss findings with primary team I have personally seen and examined the patient.  I agree with the history and physical which I have reviewed and noted any changes below.                                                               POST OPERATIVE PROCEDURAL DOCUMENTATION  PRE-OP DIAGNOSIS: Aortic regurgitation and mitral stenosis    POST-OP DIAGNOSIS: Moderate MR with paravalvular leak and possible thrombus    PROCEDURE: Transesophageal echocardiogram    Primary Physician: Dr. Esau MD  Fellow: Vicki Miller MD  Ananesthesia:     ANESTHESIA TYPE  [  ] General Anesthesia  [ x ] Intravenous Sedation  [  ] Local/Regional    CONDITION  [  ] Critical  [  ] Serious  [  ] Fair  [ x ] Good    SPECIMENS REMOVED (IF APPLICABLE): NA    ESTIMATED BLOOD LOSS: None    TRANSESOPHAGEAL ECHOCARDIOGRAM     After risks, benefits and alternatives of the procedure were explained, consent was signed and placed in the medical record.  Procedural timeout was taken.  Sedation was administered by anesthesia.  MONROE probe inserted without complication and MONROE performed.  Patient tolerated the procedure well without complication.  Post-procedure vital signs were stable.    Findings of the procedure discussed with the patient and referring cardiologist.    MONROE findings:  LVEF 55-60%  Mechanical mitral valve with Moderate MR and paravalvular leak. A mobile echodense lesion was seen towards the atrial aspect of the mitral valve  Moderate TR with RA dilatation and pulmonary pressures of atleast 50 mmHg  Mechanical aortic valve which seems to be functioning normal. Mean pressure gradient of 26 mmHg  No JASPER thrombus  See full report for findings.      Recommendations:  Continue all current medications.  Will discuss findings with patient and primary team.  Findings will be discussed with cardiothoracic surgeon to decide the next step. I have personally seen and examined the patient.  I agree with the history and physical which I have reviewed and noted any changes below.                                                               POST OPERATIVE PROCEDURAL DOCUMENTATION  PRE-OP DIAGNOSIS: Aortic regurgitation and mitral stenosis    POST-OP DIAGNOSIS: Moderate MR with paravalvular leak and possible thrombus    PROCEDURE: Transesophageal echocardiogram    Primary Physician: Dr. Esau MD  Fellow: Vicik Miller MD  Ananesthesia:     ANESTHESIA TYPE  [  ] General Anesthesia  [ x ] Intravenous Sedation  [  ] Local/Regional    CONDITION  [  ] Critical  [  ] Serious  [  ] Fair  [ x ] Good    SPECIMENS REMOVED (IF APPLICABLE): NA    ESTIMATED BLOOD LOSS: None    TRANSESOPHAGEAL ECHOCARDIOGRAM     After risks, benefits and alternatives of the procedure were explained, consent was signed and placed in the medical record.  Procedural timeout was taken.  Sedation was administered by anesthesia.  MONROE probe inserted without complication and MONROE performed.  Patient tolerated the procedure well without complication.  Post-procedure vital signs were stable.    Findings of the procedure discussed with the patient and referring cardiologist.    MONROE findings:  LVEF 55-60%  Mechanical mitral valve with moderate MR and paravalvular leak. A small mobile mass appears attached to the atrial aspect of the valve. Significant pannus formation noted.  Moderate TR with RA dilatation and pulmonary pressures of at least 50 mmHg  Mechanical aortic valve was not well visualized. No thrombus or vegetation noted. No AI. Peak pressure gradient of 26 mmHg (although much higher gradient recorded on TTE (67 mmHg).  No JASPER thrombus  See full report for findings.      Recommendations:  Continue all current medications.  Blood culture repeatedly to r/o endocarditis.  Patient needs left heart catheterization prior to AVR/MVR. If agrees to stay for it, please hold coumadin and start IV Heparin once INR < 2.

## 2023-11-30 NOTE — PROGRESS NOTES
comes in for return visit stating that she was hospitalized for several days for dehydration and she was mildly anemic. The details are unknown at this point. I do not have a copy of the hospitalization. In any event she was discharged on ferrous gluconate which she has chronically been on from her hematologist, as well as an antibiotic presumably for urinary tract infection. She states that she feels fine. She states that she was treated for dehydration with parenteral IV fluids. She also has swelling of her lower extremities, but this is related to her chronic venous insufficiency.

## 2023-12-01 LAB
BASOPHILS # BLD AUTO: 0.1 X10E3/UL (ref 0–0.2)
BASOPHILS NFR BLD AUTO: 1 %
BUN SERPL-MCNC: 13 MG/DL (ref 10–36)
BUN/CREAT SERPL: 11 (ref 12–28)
CALCIUM SERPL-MCNC: 9.4 MG/DL (ref 8.7–10.3)
CHLORIDE SERPL-SCNC: 105 MMOL/L (ref 96–106)
CO2 SERPL-SCNC: 17 MMOL/L (ref 20–29)
CREAT SERPL-MCNC: 1.17 MG/DL (ref 0.57–1)
EGFRCR SERPLBLD CKD-EPI 2021: 44 ML/MIN/1.73
EOSINOPHIL # BLD AUTO: 0 X10E3/UL (ref 0–0.4)
EOSINOPHIL NFR BLD AUTO: 1 %
ERYTHROCYTE [DISTWIDTH] IN BLOOD BY AUTOMATED COUNT: 18.7 % (ref 11.7–15.4)
GLUCOSE SERPL-MCNC: 102 MG/DL (ref 70–99)
HCT VFR BLD AUTO: 22.8 % (ref 34–46.6)
HGB BLD-MCNC: 7.8 G/DL (ref 11.1–15.9)
IMM GRANULOCYTES # BLD AUTO: 0.1 X10E3/UL (ref 0–0.1)
IMM GRANULOCYTES NFR BLD AUTO: 2 %
LYMPHOCYTES # BLD AUTO: 1.6 X10E3/UL (ref 0.7–3.1)
LYMPHOCYTES NFR BLD AUTO: 25 %
MCH RBC QN AUTO: 33.9 PG (ref 26.6–33)
MCHC RBC AUTO-ENTMCNC: 34.2 G/DL (ref 31.5–35.7)
MCV RBC AUTO: 99 FL (ref 79–97)
MONOCYTES # BLD AUTO: 0.7 X10E3/UL (ref 0.1–0.9)
MONOCYTES NFR BLD AUTO: 12 %
MORPHOLOGY BLD-IMP: ABNORMAL
NEUTROPHILS # BLD AUTO: 3.8 X10E3/UL (ref 1.4–7)
NEUTROPHILS NFR BLD AUTO: 59 %
NRBC BLD AUTO-RTO: 2 % (ref 0–0)
PLATELET # BLD AUTO: 326 X10E3/UL (ref 150–450)
POTASSIUM SERPL-SCNC: 3.9 MMOL/L (ref 3.5–5.2)
RBC # BLD AUTO: 2.3 X10E6/UL (ref 3.77–5.28)
SODIUM SERPL-SCNC: 140 MMOL/L (ref 134–144)
WBC # BLD AUTO: 6.3 X10E3/UL (ref 3.4–10.8)

## 2023-12-05 ENCOUNTER — TELEPHONE (OUTPATIENT)
Age: 88
End: 2023-12-05

## 2023-12-05 NOTE — TELEPHONE ENCOUNTER
Gloria Osei. [facility]. Pt having fatigue, EKG showed a block, which she said she knows not much can be done about. They did determine pt has a UTI and she said pt has a lot of anxiety alos. I told her I would call patient see if she could move up her 6 month f/u from January to sooner date. NP also stated pt was recently hospitalized, probably at [facility], so will ask pt and request records. Called pt. Verified patient's identity with two identifiers. Offered to move up appt with Dr. Terrie Spangler. Pt agreed. R/s'ed January appt to this Friday at 10:00 am at [facility]. Pt also confirmed she was hospitalized at [facility] so requesting records. Patient verbalized understanding and denied further questions or concerns.      Future Appointments   Date Time Provider [address]   12/8/2023 10:00 AM MD TONYA Perez   2/29/2024 12:30 PM Carlos Montoya MD [facility] CARMELLA BS AMB        Faxed records request

## 2023-12-05 NOTE — TELEPHONE ENCOUNTER
Pt has reported some fatigue, BRIEN Hall did an EKG and wants to speak with someone who can interpret what her last EKG was for her to compare.      Katia Single Phone - [phone]

## 2023-12-08 ENCOUNTER — OFFICE VISIT (OUTPATIENT)
Age: 88
End: 2023-12-08
Payer: MEDICARE

## 2023-12-08 VITALS
SYSTOLIC BLOOD PRESSURE: 130 MMHG | HEART RATE: 91 BPM | HEIGHT: 61 IN | OXYGEN SATURATION: 99 % | WEIGHT: 165 LBS | DIASTOLIC BLOOD PRESSURE: 62 MMHG | BODY MASS INDEX: 31.15 KG/M2

## 2023-12-08 DIAGNOSIS — R00.2 PALPITATIONS: Primary | ICD-10-CM

## 2023-12-08 DIAGNOSIS — I10 ESSENTIAL (PRIMARY) HYPERTENSION: ICD-10-CM

## 2023-12-08 DIAGNOSIS — Z95.0 PRESENCE OF CARDIAC PACEMAKER: ICD-10-CM

## 2023-12-08 DIAGNOSIS — I49.5 SICK SINUS SYNDROME (HCC): ICD-10-CM

## 2023-12-08 DIAGNOSIS — N18.31 STAGE 3A CHRONIC KIDNEY DISEASE (HCC): ICD-10-CM

## 2023-12-08 PROCEDURE — 99214 OFFICE O/P EST MOD 30 MIN: CPT | Performed by: SPECIALIST

## 2023-12-08 PROCEDURE — 1123F ACP DISCUSS/DSCN MKR DOCD: CPT | Performed by: SPECIALIST

## 2023-12-08 ASSESSMENT — PATIENT HEALTH QUESTIONNAIRE - PHQ9
SUM OF ALL RESPONSES TO PHQ QUESTIONS 1-9: 0
2. FEELING DOWN, DEPRESSED OR HOPELESS: 0
SUM OF ALL RESPONSES TO PHQ QUESTIONS 1-9: 0
SUM OF ALL RESPONSES TO PHQ9 QUESTIONS 1 & 2: 0
1. LITTLE INTEREST OR PLEASURE IN DOING THINGS: 0

## 2023-12-08 NOTE — PROGRESS NOTES
HISTORY OF PRESENT ILLNESS  Elvis Houston is a 80 y.o. female   She has a pacemaker placed about 2 years ago still followed at [facility].  She lives alone but her family members are with her every day. She has a great deal of anxiety. She complains of palpitations when getting into a car. She is anemic and was recently at [facility] for sepsis. This was presumed due to a urinary source. She was given intravenous iron because she refused transfusion for Sikh reasons and sent home on iron pills. Her hemoglobin was 7.8 when last checked. Hypertension  Associated symptoms include palpitations. Specialty Problems          Cardiology Problems    Bradycardia        HTN (hypertension)        SSS (sick sinus syndrome) ([facility])        Bradycardic cardiac arrest ([facility])        Symptomatic bradycardia        Bilateral carotid artery stenosis        Hemiparesis of left nondominant side due to acute cerebrovascular disease ([facility])        Thrombotic stroke involving right middle cerebral artery ([facility])        Vertebral basilar insufficiency        Venous insufficiency          Current Outpatient Medications   Medication Instructions    busPIRone (BUSPAR) 10 MG tablet Oral, 2 TIMES DAILY    citalopram (CELEXA) 20 MG tablet Oral, DAILY    ferrous sulfate (IRON 325) 325 mg, Oral, DAILY WITH BREAKFAST    lidocaine (LIDODERM) 5 % 1 patch, DAILY    LORazepam (ATIVAN) 0.5 MG tablet Oral, EVERY 8 HOURS PRN    losartan-hydroCHLOROthiazide (HYZAAR) 50-12.5 MG per tablet 1 tablet, Oral, DAILY    meclizine (ANTIVERT) 12.5 MG tablet 1 tablet, Oral, DAILY PRN    Multiple Vitamin (MULTIVITAMIN ADULT PO) Oral, Centrum silver.  Out of it ut start soon    nystatin (MYCOSTATIN) 396847 UNIT/GM cream     pantoprazole (PROTONIX) 40 MG tablet Oral, DAILY BEFORE BREAKFAST    psyllium (METAMUCIL SMOOTH TEXTURE) 58.6 % powder Metamucil Sugar-Free (aspartame) 3.4 gram/5.8 gram oral powder   TAKE 3.4 GRAMS BY MOUTH DAILY

## 2023-12-27 ENCOUNTER — TELEPHONE (OUTPATIENT)
Facility: CLINIC | Age: 88
End: 2023-12-27

## 2024-01-02 ENCOUNTER — NURSE ONLY (OUTPATIENT)
Facility: CLINIC | Age: 89
End: 2024-01-02
Payer: MEDICARE

## 2024-01-02 DIAGNOSIS — Z23 NEED FOR VACCINATION: Primary | ICD-10-CM

## 2024-01-02 DIAGNOSIS — D64.9 ANEMIA, UNSPECIFIED TYPE: ICD-10-CM

## 2024-01-02 PROCEDURE — G0008 ADMIN INFLUENZA VIRUS VAC: HCPCS | Performed by: INTERNAL MEDICINE

## 2024-01-02 PROCEDURE — 90694 VACC AIIV4 NO PRSRV 0.5ML IM: CPT | Performed by: INTERNAL MEDICINE

## 2024-03-14 ENCOUNTER — OFFICE VISIT (OUTPATIENT)
Age: 89
End: 2024-03-14
Payer: MEDICARE

## 2024-03-14 VITALS
DIASTOLIC BLOOD PRESSURE: 84 MMHG | SYSTOLIC BLOOD PRESSURE: 134 MMHG | BODY MASS INDEX: 30.78 KG/M2 | HEART RATE: 94 BPM | OXYGEN SATURATION: 97 % | WEIGHT: 163 LBS | HEIGHT: 61 IN

## 2024-03-14 DIAGNOSIS — N18.31 STAGE 3A CHRONIC KIDNEY DISEASE (HCC): ICD-10-CM

## 2024-03-14 DIAGNOSIS — Z95.0 PRESENCE OF CARDIAC PACEMAKER: ICD-10-CM

## 2024-03-14 DIAGNOSIS — F41.9 ANXIETY DISORDER, UNSPECIFIED TYPE: ICD-10-CM

## 2024-03-14 DIAGNOSIS — D47.2 MONOCLONAL GAMMOPATHY: ICD-10-CM

## 2024-03-14 DIAGNOSIS — I10 ESSENTIAL (PRIMARY) HYPERTENSION: Primary | ICD-10-CM

## 2024-03-14 DIAGNOSIS — I49.5 SICK SINUS SYNDROME (HCC): ICD-10-CM

## 2024-03-14 PROCEDURE — 99214 OFFICE O/P EST MOD 30 MIN: CPT | Performed by: SPECIALIST

## 2024-03-14 PROCEDURE — 1123F ACP DISCUSS/DSCN MKR DOCD: CPT | Performed by: SPECIALIST

## 2024-03-14 ASSESSMENT — PATIENT HEALTH QUESTIONNAIRE - PHQ9
SUM OF ALL RESPONSES TO PHQ QUESTIONS 1-9: 1
SUM OF ALL RESPONSES TO PHQ QUESTIONS 1-9: 1
1. LITTLE INTEREST OR PLEASURE IN DOING THINGS: 0
2. FEELING DOWN, DEPRESSED OR HOPELESS: 1
SUM OF ALL RESPONSES TO PHQ QUESTIONS 1-9: 1
SUM OF ALL RESPONSES TO PHQ9 QUESTIONS 1 & 2: 1
SUM OF ALL RESPONSES TO PHQ QUESTIONS 1-9: 1

## 2024-03-14 NOTE — PROGRESS NOTES
Baylee Paz (4/19/1932) is a 89 y.o. female who presents for who presents for {:38596} of {:60078}. The patient has a past history of {:29760}. {Symptoms (Optional):11601}    {Common ambulatory SmartLinks:24282}    Current Outpatient Medications   Medication Sig Dispense Refill   • meclizine (ANTIVERT) 12.5 MG tablet Take 1 tablet by mouth daily as needed     • Multiple Vitamin (MULTIVITAMIN ADULT PO) Take by mouth Centrum silver. Out of it ut start soon     • ferrous sulfate (IRON 325) 325 (65 Fe) MG tablet Take 1 tablet by mouth daily (with breakfast)     • rosuvastatin (CRESTOR) 5 MG tablet Take 1 tablet by mouth daily 30 tablet 11   • busPIRone (BUSPAR) 10 MG tablet Take by mouth 2 times daily     • citalopram (CELEXA) 20 MG tablet Take by mouth daily     • LORazepam (ATIVAN) 0.5 MG tablet Take by mouth every 8 hours as needed.     • losartan-hydroCHLOROthiazide (HYZAAR) 50-12.5 MG per tablet Take 1 tablet by mouth daily     • nystatin (MYCOSTATIN) 126643 UNIT/GM cream      • pantoprazole (PROTONIX) 40 MG tablet Take by mouth every morning (before breakfast)     • vitamin D (CHOLECALCIFEROL) 25 MCG (1000 UT) TABS tablet Take 25 mcg by mouth daily (Patient not taking: Reported on 11/29/2023)     • psyllium (METAMUCIL SMOOTH TEXTURE) 58.6 % powder Metamucil Sugar-Free (aspartame) 3.4 gram/5.8 gram oral powder   TAKE 3.4 GRAMS BY MOUTH DAILY (Patient not taking: Reported on 7/21/2023)       No current facility-administered medications for this visit.       Review of Systems     Objective:  /84 (Site: Left Upper Arm, Position: Sitting, Cuff Size: Medium Adult)   Pulse 94   Ht 1.549 m (5' 1\")   Wt 73.9 kg (163 lb)   SpO2 97%   BMI 30.80 kg/m²   Physical Exam    Data:  ECG: {ekg findings:887597}  ECHO: ***  {Chest X-ray (Optional):74273}     Lab Review   Lab Results   Component Value Date/Time    WBC 6.3 11/29/2023 03:04 PM    RBC 2.30 11/29/2023 03:04 PM    HGB 7.8 11/29/2023 03:04 PM    HCT 22.8 
  Neurological:      Mental Status: She is alert and oriented to person, place, and time.   Psychiatric:         Behavior: Behavior normal.          ASSESSMENT and PLAN  1. Essential (primary) hypertension  2. Sick sinus syndrome (HCC)  3. Presence of cardiac pacemaker  4. Stage 3a chronic kidney disease (HCC)  5. Monoclonal gammopathy  6. Anxiety disorder, unspecified type     She has multiple complaints but actually is doing quite well given her age and debility.  I will continue her current regimen and see her back in 4 months time at her request.

## 2024-03-29 ENCOUNTER — OFFICE VISIT (OUTPATIENT)
Facility: CLINIC | Age: 89
End: 2024-03-29

## 2024-03-29 VITALS
RESPIRATION RATE: 16 BRPM | TEMPERATURE: 98.6 F | WEIGHT: 165.7 LBS | SYSTOLIC BLOOD PRESSURE: 128 MMHG | OXYGEN SATURATION: 98 % | HEIGHT: 61 IN | BODY MASS INDEX: 31.28 KG/M2 | HEART RATE: 72 BPM | DIASTOLIC BLOOD PRESSURE: 70 MMHG

## 2024-03-29 DIAGNOSIS — N39.0 URINARY TRACT INFECTION WITH HEMATURIA, SITE UNSPECIFIED: Primary | ICD-10-CM

## 2024-03-29 DIAGNOSIS — Z00.00 MEDICARE ANNUAL WELLNESS VISIT, SUBSEQUENT: ICD-10-CM

## 2024-03-29 DIAGNOSIS — I10 PRIMARY HYPERTENSION: ICD-10-CM

## 2024-03-29 DIAGNOSIS — R31.9 URINARY TRACT INFECTION WITH HEMATURIA, SITE UNSPECIFIED: Primary | ICD-10-CM

## 2024-03-29 DIAGNOSIS — F32.9 REACTIVE DEPRESSION: ICD-10-CM

## 2024-03-29 DIAGNOSIS — K21.00 GASTROESOPHAGEAL REFLUX DISEASE WITH ESOPHAGITIS WITHOUT HEMORRHAGE: ICD-10-CM

## 2024-03-29 DIAGNOSIS — E78.5 DYSLIPIDEMIA: ICD-10-CM

## 2024-03-29 DIAGNOSIS — I69.354 HEMIPLEGIA AND HEMIPARESIS FOLLOWING CEREBRAL INFARCTION AFFECTING LEFT NON-DOMINANT SIDE (HCC): ICD-10-CM

## 2024-03-29 PROBLEM — R26.2 DIFFICULTY IN WALKING, NOT ELSEWHERE CLASSIFIED: Status: ACTIVE | Noted: 2021-12-23

## 2024-03-29 PROBLEM — R41.841 COGNITIVE COMMUNICATION DEFICIT: Status: ACTIVE | Noted: 2021-12-23

## 2024-03-29 PROBLEM — U07.1 COVID-19: Status: ACTIVE | Noted: 2022-01-28

## 2024-03-29 PROBLEM — E78.2 MIXED HYPERLIPIDEMIA: Status: ACTIVE | Noted: 2022-06-09

## 2024-03-29 PROBLEM — H81.13 BENIGN PAROXYSMAL VERTIGO, BILATERAL: Status: ACTIVE | Noted: 2021-12-23

## 2024-03-29 PROBLEM — R13.12 DYSPHAGIA, OROPHARYNGEAL PHASE: Status: ACTIVE | Noted: 2021-12-23

## 2024-03-29 PROBLEM — G89.29 CHRONIC HEADACHE DISORDER: Status: ACTIVE | Noted: 2024-03-29

## 2024-03-29 PROBLEM — M15.9 GENERALIZED OA: Status: ACTIVE | Noted: 2024-03-29

## 2024-03-29 PROBLEM — K59.00 CONSTIPATION: Status: ACTIVE | Noted: 2022-02-03

## 2024-03-29 PROBLEM — Z95.0 PRESENCE OF CARDIAC PACEMAKER: Status: ACTIVE | Noted: 2022-05-04

## 2024-03-29 PROBLEM — Z46.89 PESSARY MAINTENANCE: Status: ACTIVE | Noted: 2022-02-03

## 2024-03-29 PROBLEM — R10.2 VAGINAL PAIN: Status: ACTIVE | Noted: 2023-02-07

## 2024-03-29 PROBLEM — F32.A DEPRESSION: Status: ACTIVE | Noted: 2022-02-03

## 2024-03-29 PROBLEM — A41.9 SEPSIS (HCC): Status: ACTIVE | Noted: 2023-11-21

## 2024-03-29 PROBLEM — D50.9 IRON DEFICIENCY ANEMIA: Status: ACTIVE | Noted: 2024-03-29

## 2024-03-29 PROBLEM — I63.9 CVA (CEREBRAL VASCULAR ACCIDENT) (HCC): Status: ACTIVE | Noted: 2022-01-28

## 2024-03-29 PROBLEM — R51.9 CHRONIC HEADACHE DISORDER: Status: ACTIVE | Noted: 2024-03-29

## 2024-03-29 PROBLEM — N99.3 PROLAPSE OF VAGINAL VAULT AFTER HYSTERECTOMY: Status: ACTIVE | Noted: 2023-02-07

## 2024-03-29 PROBLEM — I69.391 DYSPHAGIA FOLLOWING CEREBRAL INFARCTION: Status: ACTIVE | Noted: 2021-12-23

## 2024-03-29 PROBLEM — M62.81 MUSCLE WEAKNESS (GENERALIZED): Status: ACTIVE | Noted: 2021-12-23

## 2024-03-29 PROBLEM — G31.84 MCI (MILD COGNITIVE IMPAIRMENT): Status: ACTIVE | Noted: 2022-03-14

## 2024-03-29 RX ORDER — CLOPIDOGREL BISULFATE 75 MG/1
1 TABLET ORAL DAILY
COMMUNITY

## 2024-03-29 RX ORDER — PSEUDOEPHEDRINE HCL 30 MG
100 TABLET ORAL DAILY
COMMUNITY

## 2024-03-29 SDOH — ECONOMIC STABILITY: INCOME INSECURITY: HOW HARD IS IT FOR YOU TO PAY FOR THE VERY BASICS LIKE FOOD, HOUSING, MEDICAL CARE, AND HEATING?: NOT HARD AT ALL

## 2024-03-29 SDOH — ECONOMIC STABILITY: FOOD INSECURITY: WITHIN THE PAST 12 MONTHS, YOU WORRIED THAT YOUR FOOD WOULD RUN OUT BEFORE YOU GOT MONEY TO BUY MORE.: NEVER TRUE

## 2024-03-29 ASSESSMENT — PATIENT HEALTH QUESTIONNAIRE - PHQ9
10. IF YOU CHECKED OFF ANY PROBLEMS, HOW DIFFICULT HAVE THESE PROBLEMS MADE IT FOR YOU TO DO YOUR WORK, TAKE CARE OF THINGS AT HOME, OR GET ALONG WITH OTHER PEOPLE: NOT DIFFICULT AT ALL
3. TROUBLE FALLING OR STAYING ASLEEP: NOT AT ALL
5. POOR APPETITE OR OVEREATING: NOT AT ALL
8. MOVING OR SPEAKING SO SLOWLY THAT OTHER PEOPLE COULD HAVE NOTICED. OR THE OPPOSITE, BEING SO FIGETY OR RESTLESS THAT YOU HAVE BEEN MOVING AROUND A LOT MORE THAN USUAL: NOT AT ALL
SUM OF ALL RESPONSES TO PHQ QUESTIONS 1-9: 0
SUM OF ALL RESPONSES TO PHQ QUESTIONS 1-9: 0
6. FEELING BAD ABOUT YOURSELF - OR THAT YOU ARE A FAILURE OR HAVE LET YOURSELF OR YOUR FAMILY DOWN: NOT AT ALL
4. FEELING TIRED OR HAVING LITTLE ENERGY: NOT AT ALL
9. THOUGHTS THAT YOU WOULD BE BETTER OFF DEAD, OR OF HURTING YOURSELF: NOT AT ALL
3. TROUBLE FALLING OR STAYING ASLEEP: NOT AT ALL
SUM OF ALL RESPONSES TO PHQ QUESTIONS 1-9: 0
SUM OF ALL RESPONSES TO PHQ QUESTIONS 1-9: 0
6. FEELING BAD ABOUT YOURSELF - OR THAT YOU ARE A FAILURE OR HAVE LET YOURSELF OR YOUR FAMILY DOWN: NOT AT ALL
SUM OF ALL RESPONSES TO PHQ QUESTIONS 1-9: 0
SUM OF ALL RESPONSES TO PHQ QUESTIONS 1-9: 0
4. FEELING TIRED OR HAVING LITTLE ENERGY: NOT AT ALL
8. MOVING OR SPEAKING SO SLOWLY THAT OTHER PEOPLE COULD HAVE NOTICED. OR THE OPPOSITE, BEING SO FIGETY OR RESTLESS THAT YOU HAVE BEEN MOVING AROUND A LOT MORE THAN USUAL: NOT AT ALL
SUM OF ALL RESPONSES TO PHQ9 QUESTIONS 1 & 2: 0
SUM OF ALL RESPONSES TO PHQ9 QUESTIONS 1 & 2: 0
1. LITTLE INTEREST OR PLEASURE IN DOING THINGS: NOT AT ALL
7. TROUBLE CONCENTRATING ON THINGS, SUCH AS READING THE NEWSPAPER OR WATCHING TELEVISION: NOT AT ALL
SUM OF ALL RESPONSES TO PHQ QUESTIONS 1-9: 0
10. IF YOU CHECKED OFF ANY PROBLEMS, HOW DIFFICULT HAVE THESE PROBLEMS MADE IT FOR YOU TO DO YOUR WORK, TAKE CARE OF THINGS AT HOME, OR GET ALONG WITH OTHER PEOPLE: NOT DIFFICULT AT ALL
2. FEELING DOWN, DEPRESSED OR HOPELESS: NOT AT ALL
7. TROUBLE CONCENTRATING ON THINGS, SUCH AS READING THE NEWSPAPER OR WATCHING TELEVISION: NOT AT ALL
1. LITTLE INTEREST OR PLEASURE IN DOING THINGS: NOT AT ALL
2. FEELING DOWN, DEPRESSED OR HOPELESS: NOT AT ALL
5. POOR APPETITE OR OVEREATING: NOT AT ALL
9. THOUGHTS THAT YOU WOULD BE BETTER OFF DEAD, OR OF HURTING YOURSELF: NOT AT ALL
SUM OF ALL RESPONSES TO PHQ QUESTIONS 1-9: 0

## 2024-03-29 ASSESSMENT — ANXIETY QUESTIONNAIRES
6. BECOMING EASILY ANNOYED OR IRRITABLE: NOT AT ALL
7. FEELING AFRAID AS IF SOMETHING AWFUL MIGHT HAPPEN: NOT AT ALL
4. TROUBLE RELAXING: NOT AT ALL
3. WORRYING TOO MUCH ABOUT DIFFERENT THINGS: NOT AT ALL
5. BEING SO RESTLESS THAT IT IS HARD TO SIT STILL: NOT AT ALL
IF YOU CHECKED OFF ANY PROBLEMS ON THIS QUESTIONNAIRE, HOW DIFFICULT HAVE THESE PROBLEMS MADE IT FOR YOU TO DO YOUR WORK, TAKE CARE OF THINGS AT HOME, OR GET ALONG WITH OTHER PEOPLE: NOT DIFFICULT AT ALL
1. FEELING NERVOUS, ANXIOUS, OR ON EDGE: NOT AT ALL
2. NOT BEING ABLE TO STOP OR CONTROL WORRYING: NOT AT ALL
GAD7 TOTAL SCORE: 0

## 2024-03-29 ASSESSMENT — LIFESTYLE VARIABLES
HOW MANY STANDARD DRINKS CONTAINING ALCOHOL DO YOU HAVE ON A TYPICAL DAY: PATIENT DOES NOT DRINK
HOW OFTEN DO YOU HAVE A DRINK CONTAINING ALCOHOL: NEVER

## 2024-03-29 NOTE — PROGRESS NOTES
Chief Complaint   Patient presents with    Medicare AWV   Patient states \" vaginal area hurts and she has a rash on her bottom area.\"    \"Have you been to the ER, urgent care clinic since your last visit?  Hospitalized since your last visit?\"    NO    “Have you seen or consulted any other health care providers outside of [facility] since your last visit?”    NO            Click Here for Release of Records Request

## 2024-03-29 NOTE — PATIENT INSTRUCTIONS
sunscreen that protects against both UVA and UVB radiation with an SPF of 30 or greater. Reapply every 2 to 3 hours or after sweating, drying off with a towel, or swimming.  Always wear a seat belt when traveling in a car. Always wear a helmet when riding a bicycle or motorcycle.

## 2024-03-30 RX ORDER — BUSPIRONE HYDROCHLORIDE 10 MG/1
10 TABLET ORAL 2 TIMES DAILY
Qty: 60 TABLET | Refills: 5 | Status: SHIPPED | OUTPATIENT
Start: 2024-03-30

## 2024-03-30 RX ORDER — FAMOTIDINE 20 MG/1
20 TABLET, FILM COATED ORAL 2 TIMES DAILY
Qty: 60 TABLET | Refills: 5 | Status: SHIPPED | OUTPATIENT
Start: 2024-03-30

## 2024-03-30 RX ORDER — CITALOPRAM 20 MG/1
20 TABLET ORAL DAILY
Qty: 30 TABLET | Refills: 5 | Status: SHIPPED | OUTPATIENT
Start: 2024-03-30

## 2024-03-30 NOTE — PROGRESS NOTES
[facility]  [address]  [address]  [place] [address]  Phone:  [phone]  Fax: [phone]       Chief Complaint   Patient presents with    Medicare AWV   .      SUBJECTIVE:    Baylee Paz is a 89 y.o. female  Dictation on: 03/30/2024  1:53 PM by: SARA HA [[id number]]          Current Outpatient Medications   Medication Sig Dispense Refill    clopidogrel (PLAVIX) 75 MG tablet Take 1 tablet by mouth daily      docusate (COLACE, DULCOLAX) 100 MG CAPS Take 100 mg by mouth daily      meclizine (ANTIVERT) 12.5 MG tablet Take 1 tablet by mouth daily as needed      Multiple Vitamin (MULTIVITAMIN ADULT PO) Take by mouth Centrum silver. Out of it ut start soon      ferrous sulfate (IRON 325) 325 (65 Fe) MG tablet Take 1 tablet by mouth daily (with breakfast)      rosuvastatin (CRESTOR) 5 MG tablet Take 1 tablet by mouth daily 30 tablet 11    busPIRone (BUSPAR) 10 MG tablet Take by mouth 2 times daily      citalopram (CELEXA) 20 MG tablet Take by mouth daily      LORazepam (ATIVAN) 0.5 MG tablet Take by mouth every 8 hours as needed.      losartan-hydroCHLOROthiazide (HYZAAR) 50-12.5 MG per tablet Take 1 tablet by mouth daily      nystatin (MYCOSTATIN) 993294 UNIT/GM cream       pantoprazole (PROTONIX) 40 MG tablet Take by mouth every morning (before breakfast)      famotidine (PEPCID) 20 MG tablet Take 1 tablet by mouth 2 times daily 60 tablet 5    vitamin D (CHOLECALCIFEROL) 25 MCG (1000 UT) TABS tablet Take 25 mcg by mouth daily (Patient not taking: Reported on 11/29/2023)      psyllium (METAMUCIL SMOOTH TEXTURE) 58.6 % powder Metamucil Sugar-Free (aspartame) 3.4 gram/5.8 gram oral powder   TAKE 3.4 GRAMS BY MOUTH DAILY (Patient not taking: Reported on 7/21/2023)       No current facility-administered medications for this visit.     Past Medical History:   Diagnosis Date    Anemia     /possible MM,seeing     Arrhythmia     SSS    Bradycardia     CAD (coronary artery

## 2024-04-13 DIAGNOSIS — F32.9 REACTIVE DEPRESSION: ICD-10-CM

## 2024-04-15 RX ORDER — CITALOPRAM 20 MG/1
20 TABLET ORAL DAILY
Qty: 90 TABLET | Refills: 2 | Status: SHIPPED | OUTPATIENT
Start: 2024-04-15

## 2024-04-15 RX ORDER — BUSPIRONE HYDROCHLORIDE 10 MG/1
10 TABLET ORAL 2 TIMES DAILY
Qty: 180 TABLET | Refills: 2 | Status: SHIPPED | OUTPATIENT
Start: 2024-04-15

## 2024-05-07 ENCOUNTER — OFFICE VISIT (OUTPATIENT)
Facility: CLINIC | Age: 89
End: 2024-05-07
Payer: MEDICARE

## 2024-05-07 VITALS
TEMPERATURE: 98.4 F | DIASTOLIC BLOOD PRESSURE: 64 MMHG | RESPIRATION RATE: 16 BRPM | OXYGEN SATURATION: 98 % | HEART RATE: 76 BPM | WEIGHT: 164.7 LBS | SYSTOLIC BLOOD PRESSURE: 124 MMHG | BODY MASS INDEX: 31.1 KG/M2 | HEIGHT: 61 IN

## 2024-05-07 DIAGNOSIS — D64.9 ANEMIA, UNSPECIFIED TYPE: Primary | ICD-10-CM

## 2024-05-07 DIAGNOSIS — E78.5 DYSLIPIDEMIA: ICD-10-CM

## 2024-05-07 DIAGNOSIS — F41.9 ANXIETY TENSION STATE: ICD-10-CM

## 2024-05-07 DIAGNOSIS — I63.81 LACUNAR INFARCTION (HCC): ICD-10-CM

## 2024-05-07 DIAGNOSIS — I10 PRIMARY HYPERTENSION: ICD-10-CM

## 2024-05-07 PROCEDURE — 1123F ACP DISCUSS/DSCN MKR DOCD: CPT | Performed by: INTERNAL MEDICINE

## 2024-05-07 PROCEDURE — 36415 COLL VENOUS BLD VENIPUNCTURE: CPT | Performed by: INTERNAL MEDICINE

## 2024-05-07 PROCEDURE — 99214 OFFICE O/P EST MOD 30 MIN: CPT | Performed by: INTERNAL MEDICINE

## 2024-05-07 RX ORDER — ESCITALOPRAM OXALATE 10 MG/1
10 TABLET ORAL DAILY
COMMUNITY
Start: 2024-04-26

## 2024-05-07 SDOH — ECONOMIC STABILITY: FOOD INSECURITY: WITHIN THE PAST 12 MONTHS, YOU WORRIED THAT YOUR FOOD WOULD RUN OUT BEFORE YOU GOT MONEY TO BUY MORE.: NEVER TRUE

## 2024-05-07 SDOH — ECONOMIC STABILITY: FOOD INSECURITY: WITHIN THE PAST 12 MONTHS, THE FOOD YOU BOUGHT JUST DIDN'T LAST AND YOU DIDN'T HAVE MONEY TO GET MORE.: NEVER TRUE

## 2024-05-07 SDOH — ECONOMIC STABILITY: INCOME INSECURITY: HOW HARD IS IT FOR YOU TO PAY FOR THE VERY BASICS LIKE FOOD, HOUSING, MEDICAL CARE, AND HEATING?: NOT HARD AT ALL

## 2024-05-07 ASSESSMENT — PATIENT HEALTH QUESTIONNAIRE - PHQ9
SUM OF ALL RESPONSES TO PHQ QUESTIONS 1-9: 0
SUM OF ALL RESPONSES TO PHQ QUESTIONS 1-9: 0
3. TROUBLE FALLING OR STAYING ASLEEP: NOT AT ALL
6. FEELING BAD ABOUT YOURSELF - OR THAT YOU ARE A FAILURE OR HAVE LET YOURSELF OR YOUR FAMILY DOWN: NOT AT ALL
10. IF YOU CHECKED OFF ANY PROBLEMS, HOW DIFFICULT HAVE THESE PROBLEMS MADE IT FOR YOU TO DO YOUR WORK, TAKE CARE OF THINGS AT HOME, OR GET ALONG WITH OTHER PEOPLE: NOT DIFFICULT AT ALL
SUM OF ALL RESPONSES TO PHQ QUESTIONS 1-9: 0
SUM OF ALL RESPONSES TO PHQ QUESTIONS 1-9: 0
SUM OF ALL RESPONSES TO PHQ9 QUESTIONS 1 & 2: 0
2. FEELING DOWN, DEPRESSED OR HOPELESS: NOT AT ALL
4. FEELING TIRED OR HAVING LITTLE ENERGY: NOT AT ALL
7. TROUBLE CONCENTRATING ON THINGS, SUCH AS READING THE NEWSPAPER OR WATCHING TELEVISION: NOT AT ALL
1. LITTLE INTEREST OR PLEASURE IN DOING THINGS: NOT AT ALL
8. MOVING OR SPEAKING SO SLOWLY THAT OTHER PEOPLE COULD HAVE NOTICED. OR THE OPPOSITE, BEING SO FIGETY OR RESTLESS THAT YOU HAVE BEEN MOVING AROUND A LOT MORE THAN USUAL: NOT AT ALL
5. POOR APPETITE OR OVEREATING: NOT AT ALL

## 2024-05-07 ASSESSMENT — ANXIETY QUESTIONNAIRES
6. BECOMING EASILY ANNOYED OR IRRITABLE: SEVERAL DAYS
2. NOT BEING ABLE TO STOP OR CONTROL WORRYING: SEVERAL DAYS
1. FEELING NERVOUS, ANXIOUS, OR ON EDGE: SEVERAL DAYS
7. FEELING AFRAID AS IF SOMETHING AWFUL MIGHT HAPPEN: SEVERAL DAYS
IF YOU CHECKED OFF ANY PROBLEMS ON THIS QUESTIONNAIRE, HOW DIFFICULT HAVE THESE PROBLEMS MADE IT FOR YOU TO DO YOUR WORK, TAKE CARE OF THINGS AT HOME, OR GET ALONG WITH OTHER PEOPLE: SOMEWHAT DIFFICULT
3. WORRYING TOO MUCH ABOUT DIFFERENT THINGS: SEVERAL DAYS
5. BEING SO RESTLESS THAT IT IS HARD TO SIT STILL: SEVERAL DAYS

## 2024-05-08 LAB
ALBUMIN SERPL-MCNC: 4.6 G/DL (ref 3.6–4.6)
ALBUMIN/GLOB SERPL: 1.5 {RATIO} (ref 1.2–2.2)
ALP SERPL-CCNC: 60 IU/L (ref 44–121)
ALT SERPL-CCNC: 12 IU/L (ref 0–32)
APO B SERPL-MCNC: 58 MG/DL
APPEARANCE UR: CLEAR
AST SERPL-CCNC: 20 IU/L (ref 0–40)
BACTERIA #/AREA URNS HPF: NORMAL /[HPF]
BASOPHILS # BLD AUTO: 0.1 X10E3/UL (ref 0–0.2)
BASOPHILS NFR BLD AUTO: 1 %
BILIRUB SERPL-MCNC: 0.8 MG/DL (ref 0–1.2)
BILIRUB UR QL STRIP: NEGATIVE
BUN SERPL-MCNC: 18 MG/DL (ref 10–36)
BUN/CREAT SERPL: 16 (ref 12–28)
CALCIUM SERPL-MCNC: 10.1 MG/DL (ref 8.7–10.3)
CASTS URNS QL MICRO: NORMAL /LPF
CHLORIDE SERPL-SCNC: 101 MMOL/L (ref 96–106)
CHOLEST SERPL-MCNC: 133 MG/DL (ref 100–199)
CO2 SERPL-SCNC: 19 MMOL/L (ref 20–29)
COLOR UR: YELLOW
CREAT SERPL-MCNC: 1.16 MG/DL (ref 0.57–1)
CRP SERPL HS-MCNC: 8.08 MG/L (ref 0–3)
EGFRCR SERPLBLD CKD-EPI 2021: 44 ML/MIN/1.73
EOSINOPHIL # BLD AUTO: 0.2 X10E3/UL (ref 0–0.4)
EOSINOPHIL NFR BLD AUTO: 3 %
EPI CELLS #/AREA URNS HPF: NORMAL /HPF (ref 0–10)
ERYTHROCYTE [DISTWIDTH] IN BLOOD BY AUTOMATED COUNT: 18.8 % (ref 11.7–15.4)
GLOBULIN SER CALC-MCNC: 3 G/DL (ref 1.5–4.5)
GLUCOSE SERPL-MCNC: 97 MG/DL (ref 70–99)
GLUCOSE UR QL STRIP: NEGATIVE
HCT VFR BLD AUTO: 29.3 % (ref 34–46.6)
HDLC SERPL-MCNC: 68 MG/DL
HGB BLD-MCNC: 9.7 G/DL (ref 11.1–15.9)
HGB UR QL STRIP: NEGATIVE
IMM GRANULOCYTES # BLD AUTO: 0 X10E3/UL (ref 0–0.1)
IMM GRANULOCYTES NFR BLD AUTO: 0 %
KETONES UR QL STRIP: NEGATIVE
LDLC SERPL CALC-MCNC: 53 MG/DL (ref 0–99)
LEUKOCYTE ESTERASE UR QL STRIP: NEGATIVE
LYMPHOCYTES # BLD AUTO: 2.3 X10E3/UL (ref 0.7–3.1)
LYMPHOCYTES NFR BLD AUTO: 34 %
MCH RBC QN AUTO: 33.4 PG (ref 26.6–33)
MCHC RBC AUTO-ENTMCNC: 33.1 G/DL (ref 31.5–35.7)
MCV RBC AUTO: 101 FL (ref 79–97)
MICRO URNS: NORMAL
MICRO URNS: NORMAL
MONOCYTES # BLD AUTO: 0.5 X10E3/UL (ref 0.1–0.9)
MONOCYTES NFR BLD AUTO: 7 %
NEUTROPHILS # BLD AUTO: 3.8 X10E3/UL (ref 1.4–7)
NEUTROPHILS NFR BLD AUTO: 55 %
NITRITE UR QL STRIP: NEGATIVE
NRBC BLD AUTO-RTO: 2 % (ref 0–0)
PH UR STRIP: 6.5 [PH] (ref 5–7.5)
PLATELET # BLD AUTO: 163 X10E3/UL (ref 150–450)
POTASSIUM SERPL-SCNC: 4 MMOL/L (ref 3.5–5.2)
PROT SERPL-MCNC: 7.6 G/DL (ref 6–8.5)
PROT UR QL STRIP: NEGATIVE
RBC # BLD AUTO: 2.9 X10E6/UL (ref 3.77–5.28)
RBC #/AREA URNS HPF: NORMAL /HPF (ref 0–2)
SODIUM SERPL-SCNC: 137 MMOL/L (ref 134–144)
SP GR UR STRIP: 1.01 (ref 1–1.03)
TRIGL SERPL-MCNC: 54 MG/DL (ref 0–149)
TSH SERPL DL<=0.005 MIU/L-ACNC: 4.62 UIU/ML (ref 0.45–4.5)
UROBILINOGEN UR STRIP-MCNC: 0.2 MG/DL (ref 0.2–1)
VLDLC SERPL CALC-MCNC: 12 MG/DL (ref 5–40)
WBC # BLD AUTO: 7 X10E3/UL (ref 3.4–10.8)
WBC #/AREA URNS HPF: NORMAL /HPF (ref 0–5)

## 2024-05-14 NOTE — PROGRESS NOTES
1. The patient has a chronic anemia and follows up with her hematologist, Dr. Wood, because she also has gammopathy with an M-spike. The latter has not been progressive.  2. She continues with her chronic anxiety, which is unchanged for the most part.  3. She has an increased cardiovascular risk created by her secondary cardiovascular risk prevention status from her CVA.  4. Blood pressure is excellent, no adjustments are made.  5. As I commented earlier, she has had a previous lacunar infarct with no sequelae.  6. I encouraged her to increase her physical activity on a more consistent basis. She uses a rollator.    
Chief Complaint   Patient presents with    Follow-up    Hypertension   Patient states \" she is having abdominal pain and also she does not sleep at night.\"  \"Have you been to the ER, urgent care clinic since your last visit?  Hospitalized since your last visit?\"    NO    “Have you seen or consulted any other health care providers outside of [facility] since your last visit?”    NO            Click Here for Release of Records Request  
comes in for return visit stating that she has done well.  She has chronic anxiety neurosis but it is reasonably stable.  She continues to live alone even at 89 years of age.    She does see Dr. Wood for her MGUS.  This has been stable.  He has also suggested that she continue taking her iron supplement.    She has a past history of mild depression, primary hypertension and dyslipidemia.    She has had a previous CVA for which she takes aspirin.  She has no neurological deficits currently.    
  Pulse 76   Temp 98.4 °F (36.9 °C) (Oral)   Resp 16   Ht 1.549 m (5' 1\")   Wt 74.7 kg (164 lb 11.2 oz)   SpO2 98%   BMI 31.12 kg/m²   CONSTITUTIONAL: well , well nourished, appears age appropriate  EYES: perrla, eom intact  ENMT:moist mucous membranes, pharynx clear  NECK: supple. Thyroid normal  RESPIRATORY: Chest: clear to ascultation and percussion   CARDIOVASCULAR: Heart: regular rate and rhythm  GASTROINTESTINAL: Abdomen: soft, bowel sounds active  HEMATOLOGIC: no pathological lymph nodes palpated  MUSCULOSKELETAL: Extremities: no edema, pulse 1+   INTEGUMENT: No unusual rashes or suspicious skin lesions noted. Nails appear normal.  NEUROLOGIC: non-focal exam   MENTAL STATUS: alert and oriented, appropriate affect      ASSESSMENT:  1. Anemia, unspecified type    2. Anxiety tension state    3. Dyslipidemia    4. Primary hypertension    5. Lacunar infarction (HCC)        PLAN:   Dictation on: 05/13/2024 11:34 PM by: SARA HA [[id number]]     Orders Placed This Encounter   Procedures    COLLECTION VENOUS BLOOD,VENIPUNCTURE    Apolipoprotein B-100     Standing Status:   Future     Number of Occurrences:   1     Standing Expiration Date:   5/7/2025    CBC with Auto Differential     Standing Status:   Future     Number of Occurrences:   1     Standing Expiration Date:   5/7/2025    Comprehensive Metabolic Panel     Standing Status:   Future     Number of Occurrences:   1     Standing Expiration Date:   5/7/2025    High sensitivity CRP     Standing Status:   Future     Number of Occurrences:   1     Standing Expiration Date:   5/7/2025    Lipid Panel     Standing Status:   Future     Number of Occurrences:   1     Standing Expiration Date:   5/7/2025    TSH     Standing Status:   Future     Number of Occurrences:   1     Standing Expiration Date:   5/7/2025    Urinalysis with Microscopic     Standing Status:   Future     Number of Occurrences:   1     Standing Expiration Date:   5/7/2025     Order Specific

## 2025-04-16 ENCOUNTER — APPOINTMENT (OUTPATIENT)
Facility: HOSPITAL | Age: 89
DRG: 065 | End: 2025-04-16
Payer: MEDICARE

## 2025-04-16 ENCOUNTER — HOSPITAL ENCOUNTER (INPATIENT)
Facility: HOSPITAL | Age: 89
LOS: 3 days | Discharge: HOME | DRG: 065 | End: 2025-04-19
Attending: EMERGENCY MEDICINE | Admitting: INTERNAL MEDICINE
Payer: MEDICARE

## 2025-04-16 DIAGNOSIS — G45.9 TIA (TRANSIENT ISCHEMIC ATTACK): Primary | ICD-10-CM

## 2025-04-16 DIAGNOSIS — E78.5 DYSLIPIDEMIA: ICD-10-CM

## 2025-04-16 DIAGNOSIS — I63.9 CEREBROVASCULAR ACCIDENT (CVA), UNSPECIFIED MECHANISM (HCC): ICD-10-CM

## 2025-04-16 PROBLEM — I61.9 CVA (CEREBROVASCULAR ACCIDENT DUE TO INTRACEREBRAL HEMORRHAGE) (HCC): Status: ACTIVE | Noted: 2025-04-16

## 2025-04-16 LAB
ALBUMIN SERPL-MCNC: 3.6 G/DL (ref 3.5–5)
ALBUMIN/GLOB SERPL: 0.9 (ref 1.1–2.2)
ALP SERPL-CCNC: 56 U/L (ref 45–117)
ALT SERPL-CCNC: 22 U/L (ref 12–78)
ANION GAP SERPL CALC-SCNC: 6 MMOL/L (ref 2–12)
APPEARANCE UR: CLEAR
AST SERPL-CCNC: 31 U/L (ref 15–37)
BACTERIA URNS QL MICRO: ABNORMAL /HPF
BASOPHILS # BLD: 0.05 K/UL (ref 0–0.1)
BASOPHILS NFR BLD: 0.9 % (ref 0–1)
BILIRUB SERPL-MCNC: 1.2 MG/DL (ref 0.2–1)
BILIRUB UR QL: NEGATIVE
BUN SERPL-MCNC: 32 MG/DL (ref 6–20)
BUN/CREAT SERPL: 24 (ref 12–20)
CALCIUM SERPL-MCNC: 8.9 MG/DL (ref 8.5–10.1)
CHLORIDE SERPL-SCNC: 110 MMOL/L (ref 97–108)
CHOLEST SERPL-MCNC: 111 MG/DL
CO2 SERPL-SCNC: 23 MMOL/L (ref 21–32)
COLOR UR: ABNORMAL
CREAT SERPL-MCNC: 1.36 MG/DL (ref 0.55–1.02)
DIFFERENTIAL METHOD BLD: ABNORMAL
EKG ATRIAL RATE: 91 BPM
EKG DIAGNOSIS: NORMAL
EKG P AXIS: 44 DEGREES
EKG P-R INTERVAL: 236 MS
EKG Q-T INTERVAL: 368 MS
EKG QRS DURATION: 84 MS
EKG QTC CALCULATION (BAZETT): 452 MS
EKG R AXIS: -57 DEGREES
EKG T AXIS: 9 DEGREES
EKG VENTRICULAR RATE: 91 BPM
EOSINOPHIL # BLD: 0.04 K/UL (ref 0–0.4)
EOSINOPHIL NFR BLD: 0.7 % (ref 0–7)
EPITH CASTS URNS QL MICRO: ABNORMAL /LPF
ERYTHROCYTE [DISTWIDTH] IN BLOOD BY AUTOMATED COUNT: 22.5 % (ref 11.5–14.5)
GLOBULIN SER CALC-MCNC: 3.8 G/DL (ref 2–4)
GLUCOSE BLD STRIP.AUTO-MCNC: 128 MG/DL (ref 65–117)
GLUCOSE SERPL-MCNC: 128 MG/DL (ref 65–100)
GLUCOSE UR STRIP.AUTO-MCNC: NEGATIVE MG/DL
HCT VFR BLD AUTO: 25.6 % (ref 35–47)
HDLC SERPL-MCNC: 55 MG/DL
HDLC SERPL: 2 (ref 0–5)
HGB BLD-MCNC: 8.6 G/DL (ref 11.5–16)
HGB UR QL STRIP: ABNORMAL
HYALINE CASTS URNS QL MICRO: ABNORMAL /LPF (ref 0–2)
IMM GRANULOCYTES # BLD AUTO: 0.01 K/UL (ref 0–0.04)
IMM GRANULOCYTES NFR BLD AUTO: 0.2 % (ref 0–0.5)
INR PPP: 1.1 (ref 0.9–1.1)
KETONES UR QL STRIP.AUTO: NEGATIVE MG/DL
LDLC SERPL CALC-MCNC: 47.4 MG/DL (ref 0–100)
LEUKOCYTE ESTERASE UR QL STRIP.AUTO: NEGATIVE
LYMPHOCYTES # BLD: 1.52 K/UL (ref 0.8–3.5)
LYMPHOCYTES NFR BLD: 28.2 % (ref 12–49)
MCH RBC QN AUTO: 32.6 PG (ref 26–34)
MCHC RBC AUTO-ENTMCNC: 33.6 G/DL (ref 30–36.5)
MCV RBC AUTO: 97 FL (ref 80–99)
MONOCYTES # BLD: 0.35 K/UL (ref 0–1)
MONOCYTES NFR BLD: 6.4 % (ref 5–13)
NEUTS SEG # BLD: 3.43 K/UL (ref 1.8–8)
NEUTS SEG NFR BLD: 63.6 % (ref 32–75)
NITRITE UR QL STRIP.AUTO: NEGATIVE
NRBC # BLD: 0.06 K/UL (ref 0–0.01)
NRBC BLD-RTO: 1.1 PER 100 WBC
PH UR STRIP: 6 (ref 5–8)
PLATELET # BLD AUTO: 165 K/UL (ref 150–400)
PMV BLD AUTO: 11.3 FL (ref 8.9–12.9)
POTASSIUM SERPL-SCNC: 3.4 MMOL/L (ref 3.5–5.1)
PROT SERPL-MCNC: 7.4 G/DL (ref 6.4–8.2)
PROT UR STRIP-MCNC: NEGATIVE MG/DL
PROTHROMBIN TIME: 11.4 SEC (ref 9.2–11.2)
RBC # BLD AUTO: 2.64 M/UL (ref 3.8–5.2)
RBC #/AREA URNS HPF: ABNORMAL /HPF (ref 0–5)
RBC MORPH BLD: ABNORMAL
SERVICE CMNT-IMP: ABNORMAL
SODIUM SERPL-SCNC: 139 MMOL/L (ref 136–145)
SP GR UR REFRACTOMETRY: 1.01 (ref 1–1.03)
TRIGL SERPL-MCNC: 43 MG/DL
TROPONIN I SERPL HS-MCNC: 165 NG/L (ref 0–51)
URINE CULTURE IF INDICATED: ABNORMAL
UROBILINOGEN UR QL STRIP.AUTO: 0.2 EU/DL (ref 0.2–1)
VLDLC SERPL CALC-MCNC: 8.6 MG/DL
WBC # BLD AUTO: 5.4 K/UL (ref 3.6–11)
WBC URNS QL MICRO: ABNORMAL /HPF (ref 0–4)

## 2025-04-16 PROCEDURE — 70450 CT HEAD/BRAIN W/O DYE: CPT

## 2025-04-16 PROCEDURE — 6360000004 HC RX CONTRAST MEDICATION: Performed by: EMERGENCY MEDICINE

## 2025-04-16 PROCEDURE — G0378 HOSPITAL OBSERVATION PER HR: HCPCS

## 2025-04-16 PROCEDURE — 80061 LIPID PANEL: CPT

## 2025-04-16 PROCEDURE — 82962 GLUCOSE BLOOD TEST: CPT

## 2025-04-16 PROCEDURE — 6370000000 HC RX 637 (ALT 250 FOR IP): Performed by: STUDENT IN AN ORGANIZED HEALTH CARE EDUCATION/TRAINING PROGRAM

## 2025-04-16 PROCEDURE — 70498 CT ANGIOGRAPHY NECK: CPT

## 2025-04-16 PROCEDURE — 83036 HEMOGLOBIN GLYCOSYLATED A1C: CPT

## 2025-04-16 PROCEDURE — 85610 PROTHROMBIN TIME: CPT

## 2025-04-16 PROCEDURE — 81001 URINALYSIS AUTO W/SCOPE: CPT

## 2025-04-16 PROCEDURE — 1100000000 HC RM PRIVATE

## 2025-04-16 PROCEDURE — 99285 EMERGENCY DEPT VISIT HI MDM: CPT

## 2025-04-16 PROCEDURE — 93005 ELECTROCARDIOGRAM TRACING: CPT | Performed by: EMERGENCY MEDICINE

## 2025-04-16 PROCEDURE — 36415 COLL VENOUS BLD VENIPUNCTURE: CPT

## 2025-04-16 PROCEDURE — 85025 COMPLETE CBC W/AUTO DIFF WBC: CPT

## 2025-04-16 PROCEDURE — 84484 ASSAY OF TROPONIN QUANT: CPT

## 2025-04-16 PROCEDURE — 80053 COMPREHEN METABOLIC PANEL: CPT

## 2025-04-16 PROCEDURE — 2500000003 HC RX 250 WO HCPCS: Performed by: STUDENT IN AN ORGANIZED HEALTH CARE EDUCATION/TRAINING PROGRAM

## 2025-04-16 RX ORDER — METOPROLOL SUCCINATE 25 MG/1
25 TABLET, EXTENDED RELEASE ORAL DAILY
Status: DISCONTINUED | OUTPATIENT
Start: 2025-04-17 | End: 2025-04-17

## 2025-04-16 RX ORDER — MECOBALAMIN 5000 MCG
5 TABLET,DISINTEGRATING ORAL NIGHTLY PRN
COMMUNITY

## 2025-04-16 RX ORDER — MULTIVIT WITH MINERALS/LUTEIN
1 TABLET ORAL NIGHTLY
COMMUNITY

## 2025-04-16 RX ORDER — IOPAMIDOL 755 MG/ML
100 INJECTION, SOLUTION INTRAVASCULAR
Status: COMPLETED | OUTPATIENT
Start: 2025-04-16 | End: 2025-04-16

## 2025-04-16 RX ORDER — SODIUM CHLORIDE 0.9 % (FLUSH) 0.9 %
5-40 SYRINGE (ML) INJECTION EVERY 12 HOURS SCHEDULED
Status: DISCONTINUED | OUTPATIENT
Start: 2025-04-16 | End: 2025-04-19 | Stop reason: HOSPADM

## 2025-04-16 RX ORDER — HYDROCHLOROTHIAZIDE 25 MG/1
12.5 TABLET ORAL DAILY
Status: DISCONTINUED | OUTPATIENT
Start: 2025-04-16 | End: 2025-04-19 | Stop reason: HOSPADM

## 2025-04-16 RX ORDER — ONDANSETRON 4 MG/1
4 TABLET, ORALLY DISINTEGRATING ORAL EVERY 8 HOURS PRN
Status: DISCONTINUED | OUTPATIENT
Start: 2025-04-16 | End: 2025-04-19 | Stop reason: HOSPADM

## 2025-04-16 RX ORDER — ONDANSETRON 2 MG/ML
4 INJECTION INTRAMUSCULAR; INTRAVENOUS EVERY 6 HOURS PRN
Status: DISCONTINUED | OUTPATIENT
Start: 2025-04-16 | End: 2025-04-19 | Stop reason: HOSPADM

## 2025-04-16 RX ORDER — ACETAMINOPHEN 500 MG
1000 TABLET ORAL 2 TIMES DAILY
COMMUNITY

## 2025-04-16 RX ORDER — BUSPIRONE HYDROCHLORIDE 10 MG/1
10 TABLET ORAL 2 TIMES DAILY
Status: DISCONTINUED | OUTPATIENT
Start: 2025-04-16 | End: 2025-04-19 | Stop reason: HOSPADM

## 2025-04-16 RX ORDER — SODIUM CHLORIDE 9 MG/ML
INJECTION, SOLUTION INTRAVENOUS PRN
Status: DISCONTINUED | OUTPATIENT
Start: 2025-04-16 | End: 2025-04-19 | Stop reason: HOSPADM

## 2025-04-16 RX ORDER — MULTIVITAMIN WITH IRON
1 TABLET ORAL NIGHTLY
Status: DISCONTINUED | OUTPATIENT
Start: 2025-04-16 | End: 2025-04-19 | Stop reason: HOSPADM

## 2025-04-16 RX ORDER — ASPIRIN 300 MG/1
300 SUPPOSITORY RECTAL DAILY
Status: DISCONTINUED | OUTPATIENT
Start: 2025-04-16 | End: 2025-04-19 | Stop reason: HOSPADM

## 2025-04-16 RX ORDER — FAMOTIDINE 20 MG/1
20 TABLET, FILM COATED ORAL NIGHTLY
Status: DISCONTINUED | OUTPATIENT
Start: 2025-04-16 | End: 2025-04-19 | Stop reason: HOSPADM

## 2025-04-16 RX ORDER — LOSARTAN POTASSIUM AND HYDROCHLOROTHIAZIDE 12.5; 5 MG/1; MG/1
1 TABLET ORAL DAILY
Status: DISCONTINUED | OUTPATIENT
Start: 2025-04-17 | End: 2025-04-16

## 2025-04-16 RX ORDER — ESCITALOPRAM OXALATE 10 MG/1
10 TABLET ORAL DAILY
Status: DISCONTINUED | OUTPATIENT
Start: 2025-04-17 | End: 2025-04-19 | Stop reason: HOSPADM

## 2025-04-16 RX ORDER — POLYETHYLENE GLYCOL 3350 17 G/17G
17 POWDER, FOR SOLUTION ORAL DAILY PRN
Status: DISCONTINUED | OUTPATIENT
Start: 2025-04-16 | End: 2025-04-19 | Stop reason: HOSPADM

## 2025-04-16 RX ORDER — ASPIRIN 81 MG/1
81 TABLET ORAL DAILY
Status: DISCONTINUED | OUTPATIENT
Start: 2025-04-17 | End: 2025-04-18 | Stop reason: SDUPTHER

## 2025-04-16 RX ORDER — ENOXAPARIN SODIUM 100 MG/ML
30 INJECTION SUBCUTANEOUS DAILY
Status: DISCONTINUED | OUTPATIENT
Start: 2025-04-16 | End: 2025-04-19 | Stop reason: HOSPADM

## 2025-04-16 RX ORDER — LOSARTAN POTASSIUM 25 MG/1
50 TABLET ORAL DAILY
Status: DISCONTINUED | OUTPATIENT
Start: 2025-04-16 | End: 2025-04-19 | Stop reason: HOSPADM

## 2025-04-16 RX ORDER — PANTOPRAZOLE SODIUM 40 MG/1
40 TABLET, DELAYED RELEASE ORAL
Status: DISCONTINUED | OUTPATIENT
Start: 2025-04-17 | End: 2025-04-19 | Stop reason: HOSPADM

## 2025-04-16 RX ORDER — FERROUS SULFATE 325(65) MG
325 TABLET ORAL
Status: DISCONTINUED | OUTPATIENT
Start: 2025-04-17 | End: 2025-04-19 | Stop reason: HOSPADM

## 2025-04-16 RX ORDER — METOPROLOL SUCCINATE 25 MG/1
25 TABLET, EXTENDED RELEASE ORAL DAILY
COMMUNITY

## 2025-04-16 RX ORDER — MECLIZINE HCL 12.5 MG 12.5 MG/1
12.5 TABLET ORAL DAILY PRN
Status: DISCONTINUED | OUTPATIENT
Start: 2025-04-16 | End: 2025-04-19 | Stop reason: HOSPADM

## 2025-04-16 RX ORDER — ATORVASTATIN CALCIUM 40 MG/1
80 TABLET, FILM COATED ORAL NIGHTLY
Status: DISCONTINUED | OUTPATIENT
Start: 2025-04-16 | End: 2025-04-19 | Stop reason: HOSPADM

## 2025-04-16 RX ORDER — ASPIRIN 81 MG/1
81 TABLET ORAL DAILY
COMMUNITY

## 2025-04-16 RX ORDER — ROSUVASTATIN CALCIUM 5 MG/1
5 TABLET, COATED ORAL DAILY
Status: DISCONTINUED | OUTPATIENT
Start: 2025-04-17 | End: 2025-04-16

## 2025-04-16 RX ORDER — ASPIRIN 81 MG/1
81 TABLET, CHEWABLE ORAL DAILY
Status: DISCONTINUED | OUTPATIENT
Start: 2025-04-16 | End: 2025-04-19 | Stop reason: HOSPADM

## 2025-04-16 RX ORDER — SENNOSIDES 8.6 MG
1300 CAPSULE ORAL 2 TIMES DAILY PRN
COMMUNITY

## 2025-04-16 RX ORDER — SODIUM CHLORIDE 0.9 % (FLUSH) 0.9 %
5-40 SYRINGE (ML) INJECTION PRN
Status: DISCONTINUED | OUTPATIENT
Start: 2025-04-16 | End: 2025-04-19 | Stop reason: HOSPADM

## 2025-04-16 RX ADMIN — BUSPIRONE HYDROCHLORIDE 10 MG: 10 TABLET ORAL at 20:29

## 2025-04-16 RX ADMIN — THERA TABS 1 TABLET: TAB at 20:29

## 2025-04-16 RX ADMIN — ATORVASTATIN CALCIUM 80 MG: 40 TABLET, FILM COATED ORAL at 20:29

## 2025-04-16 RX ADMIN — FAMOTIDINE 20 MG: 20 TABLET, FILM COATED ORAL at 20:29

## 2025-04-16 RX ADMIN — SODIUM CHLORIDE, PRESERVATIVE FREE 10 ML: 5 INJECTION INTRAVENOUS at 20:33

## 2025-04-16 RX ADMIN — MELATONIN 4.5 MG: at 20:29

## 2025-04-16 RX ADMIN — IOPAMIDOL 100 ML: 755 INJECTION, SOLUTION INTRAVENOUS at 14:00

## 2025-04-16 ASSESSMENT — PAIN - FUNCTIONAL ASSESSMENT: PAIN_FUNCTIONAL_ASSESSMENT: NONE - DENIES PAIN

## 2025-04-16 NOTE — H&P
Hospitalist Admission Note    NAME:   Baylee Paz   : 1932   MRN: [id number]     Date/Time: 2025 4:51 PM    Patient PCP: Birdie Gray APRN - NP    ______________________________________________________________________  Given the patient's current clinical presentation, I have a high level of concern for decompensation if discharged from the emergency department.  Complex decision making was performed, which includes reviewing the patient's available past medical records, laboratory results, and x-ray films.       My assessment of this patient's clinical condition and my plan of care is as follows.    Assessment / Plan:    CVA evaluation  TIA  Vertigo Hx  Right ICA falciform aneurysm  Symptoms of difficulty with word finding  Risks factors W includes HTN, HLD, CVA in the past  CT head and neck negative for acute process or large vessel occlusion or stenosis  Pending A1c, lipid panel  BMP nonactionable, CBC negative for leukocytosis  Frequent neurochecks, vital sign parameters  MRI Brain without contrast to evaluate for any  structural abnormalities that may be contributing to symptoms.   Aspirin 81 mg po q day and high intensity statin  Fasting lipid panel, HbA1c, TTE ECHO,  If TTE ECHO is positive consult cardiology and call Neurology back,  We will continue permissive hypertension for typically 48 hrs  Neurology consult,    Hypokalemia  - Monitor and replete as needed    CKD stage III  - Near baseline creatinine    HTN  Anxiety/depression  GERD  Iron deficiency  HLD  Osteoarthritis  -Continue home medications, hold blood pressure medications for now      Medical Decision Making:   I personally reviewed labs: CBC, BMP  I personally reviewed imaging: CTA head and neck  I personally reviewed EKG:  Toxic drug monitoring: N/A  Discussed case with: ED provider. After discussion I am in agreement that acuity of patient's medical condition necessitates hospital stay.      Code Status: Full

## 2025-04-16 NOTE — ED PROVIDER NOTES
[facility] EMERGENCY DEPARTMENT  EMERGENCY DEPARTMENT ENCOUNTER       Pt Name: Baylee Paz  MRN: [id number]  Birthdate 4/19/1932  Date of evaluation: 4/16/2025  Provider: Onel Park MD   PCP: Birdie Gray APRN - BRIEN  Note Started: 4:38 PM 4/16/25     CHIEF COMPLAINT       Chief Complaint   Patient presents with    Referral - General        HISTORY OF PRESENT ILLNESS: 1 or more elements      History From: Patient, History limited by: None     Baylee Paz is a 89 y.o. female with a history of anxiety, coronary artery disease, right MCA stroke, chronic vertigo who presents for speech abnormality.  Patient with reported speech difficulty for the past 2 days, associated intermittent vertigo which patient states is chronic.  Patient tells me she has had no abnormalities to her speech and believes that she is fine.  She has no complaint to myself.     Nursing Notes were all reviewed and agreed with or any disagreements were addressed in the HPI.     REVIEW OF SYSTEMS        Positives and Pertinent negatives as per HPI.    PAST HISTORY     Past Medical History:  Past Medical History:   Diagnosis Date    Anemia     /possible MM,seeing     Arrhythmia     SSS    Bradycardia     CAD (coronary artery disease)     Essential hypertension     GERD (gastroesophageal reflux disease)     Psychiatric disorder     Anxiety    Psychotic disorder (HCC)     anxiety    Thrombotic stroke involving right middle cerebral artery (HCC) 12/9/2021    Vertigo        Past Surgical History:  Past Surgical History:   Procedure Laterality Date    BUNIONECTOMY      CT BIOPSY BONE MARROW  1/9/2020    CT BONE MARROW BIOPSY 1/9/2020 [facility] RAD CT    HYSTERECTOMY (CERVIX STATUS UNKNOWN)  1971    partial    ORTHOPEDIC SURGERY      Status post bunionectomy    PARTIAL HYSTERECTOMY (CERVIX NOT REMOVED)         Family History:  Family History   Problem Relation Age of Onset    Hypertension Mother        Social History:  Social

## 2025-04-16 NOTE — ED NOTES
Patient BIBEMS after her home health provider went to the home and she reported that was having problems finding her words. Patient denies any issues. She states her words are fine. Patient reports a 20 yr history of vertigo.

## 2025-04-16 NOTE — PROGRESS NOTES
Pharmacy Medication History Review    Medication history obtained by Dang Monterroso while patient was in room ER11/11 and was completed based on information available during current patient encounter. Medication history was completed before home meds were reconciled by provider.        PTA medication list was corrected to the following:   Prior to Admission Medications   Prescriptions Last Dose Informant   Multiple Vitamins-Minerals (CENTRUM SILVER) TABS 4/15/2025 Bedtime Self, Other   Sig: Take 1 tablet by mouth at bedtime   acetaminophen (TYLENOL 8 HOUR ARTHRITIS PAIN) 650 MG extended release tablet Unknown Self, Other   Sig: Take 2 tablets by mouth 2 times daily as needed for Pain   acetaminophen (TYLENOL) 500 MG tablet Unknown Self, Other   Sig: Take 2 tablets by mouth in the morning and at bedtime   aspirin 81 MG EC tablet 4/16/2025 Morning Self, Other   Sig: Take 1 tablet by mouth daily   busPIRone (BUSPAR) 10 MG tablet Past Week Self, Other   Sig: TAKE 1 TABLET BY MOUTH TWICE A DAY   Patient taking differently: Take 1 tablet by mouth nightly   diclofenac sodium (VOLTAREN) 1 % GEL Past Week Self, Other   Sig: Apply 2 g topically 4 times daily as needed for Pain   escitalopram (LEXAPRO) 10 MG tablet 4/16/2025 Morning Self, Other   Sig: Take 1 tablet by mouth daily   famotidine (PEPCID) 20 MG tablet 4/15/2025 Bedtime Self, Other   Sig: Take 1 tablet by mouth 2 times daily   Patient taking differently: Take 1 tablet by mouth at bedtime   ferrous sulfate (IRON 325) 325 (65 Fe) MG tablet 4/16/2025 Morning Self, Other   Sig: Take 1 tablet by mouth daily (with breakfast)   losartan-hydroCHLOROthiazide (HYZAAR) 50-12.5 MG per tablet 4/16/2025 Morning Self, Other   Sig: Take 1 tablet by mouth daily   meclizine (ANTIVERT) 12.5 MG tablet 4/13/2025 Self, Other   Sig: Take 1 tablet by mouth daily as needed for Dizziness   melatonin 5 MG TBDP disintegrating tablet 4/15/2025 Bedtime Self, Other   Sig: Take 1 tablet by mouth

## 2025-04-16 NOTE — PROGRESS NOTES
End of Shift Note    Bedside shift change report given to  Kita, RN  (oncoming nurse) by OMID MOYA RN .        Shift worked:  days   Shift summary and any significant changes:     New admit R/O CVA. Database complete.  Urine sent, victorino has a pacemaker, and she is adamant about not getting an MRI     Concerns for physician to address:  none   Zone phone for oncoming shift:   [phone]     Patient Information  Baylee Paz  89 y.o.  4/16/2025 12:22 PM by Rosales Francisco MD. Baylee Paz was admitted from [facility]    Problem List  Patient Active Problem List    Diagnosis Date Noted    Acute ischemic stroke (HCC) 04/16/2025    CVA (cerebrovascular accident due to intracerebral hemorrhage) ([facility]) 04/16/2025    Generalized OA 03/29/2024    Hyperlipidemia, acquired 03/29/2024    Iron deficiency anemia 03/29/2024    Chronic headache disorder 03/29/2024    Sepsis (HCC) 11/21/2023    Dyslipidemia 06/01/2023    Prolapse of vaginal vault after hysterectomy 02/07/2023    Vaginal pain 02/07/2023    Venous insufficiency 11/26/2022    Lacunar infarction (HCC) 10/25/2022    Prerenal azotemia 10/25/2022    Chronic renal disease, stage III ([facility]) 06/10/2022    Mixed hyperlipidemia 06/09/2022    Presence of cardiac pacemaker 05/04/2022    MCI (mild cognitive impairment) 03/14/2022    Constipation 02/03/2022    Depression 02/03/2022    Pessary maintenance 02/03/2022    COVID-19 01/28/2022    CVA (cerebral vascular accident) (HCC) 01/28/2022    Benign paroxysmal vertigo, bilateral 12/23/2021    Cognitive communication deficit 12/23/2021    Difficulty in walking, not elsewhere classified 12/23/2021    Dysphagia following cerebral infarction 12/23/2021    Dysphagia, oropharyngeal phase 12/23/2021    Hemiplegia and hemiparesis following cerebral infarction affecting left non-dominant side (HCC) 12/23/2021    Muscle weakness (generalized) 12/23/2021    Vertebral basilar insufficiency 12/09/2021    Thrombotic stroke involving right middle

## 2025-04-17 ENCOUNTER — APPOINTMENT (OUTPATIENT)
Facility: HOSPITAL | Age: 89
DRG: 065 | End: 2025-04-17
Payer: MEDICARE

## 2025-04-17 PROBLEM — R53.1 GENERALIZED WEAKNESS: Status: ACTIVE | Noted: 2025-04-17

## 2025-04-17 PROBLEM — R47.1 DYSARTHRIA: Status: ACTIVE | Noted: 2025-04-17

## 2025-04-17 PROBLEM — F44.89 CONFUSIONAL STATE: Status: ACTIVE | Noted: 2025-04-17

## 2025-04-17 PROBLEM — R47.01 APHASIA: Status: ACTIVE | Noted: 2025-04-17

## 2025-04-17 LAB
ERYTHROCYTE [DISTWIDTH] IN BLOOD BY AUTOMATED COUNT: 22.3 % (ref 11.5–14.5)
EST. AVERAGE GLUCOSE BLD GHB EST-MCNC: 120 MG/DL
FERRITIN SERPL-MCNC: 886 NG/ML (ref 8–252)
FOLATE SERPL-MCNC: 29 NG/ML (ref 5–21)
HBA1C MFR BLD: 5.8 % (ref 4–5.6)
HCT VFR BLD AUTO: 22.5 % (ref 35–47)
HGB BLD-MCNC: 7.4 G/DL (ref 11.5–16)
IRON SATN MFR SERPL: 70 % (ref 20–50)
IRON SERPL-MCNC: 150 UG/DL (ref 35–150)
MCH RBC QN AUTO: 31.9 PG (ref 26–34)
MCHC RBC AUTO-ENTMCNC: 32.9 G/DL (ref 30–36.5)
MCV RBC AUTO: 97 FL (ref 80–99)
NRBC # BLD: 0.06 K/UL (ref 0–0.01)
NRBC BLD-RTO: 1.1 PER 100 WBC
PLATELET # BLD AUTO: 126 K/UL (ref 150–400)
PMV BLD AUTO: 10.9 FL (ref 8.9–12.9)
RBC # BLD AUTO: 2.32 M/UL (ref 3.8–5.2)
TIBC SERPL-MCNC: 213 UG/DL (ref 250–450)
VIT B12 SERPL-MCNC: 441 PG/ML (ref 193–986)
WBC # BLD AUTO: 5.7 K/UL (ref 3.6–11)

## 2025-04-17 PROCEDURE — 85027 COMPLETE CBC AUTOMATED: CPT

## 2025-04-17 PROCEDURE — 97530 THERAPEUTIC ACTIVITIES: CPT | Performed by: OCCUPATIONAL THERAPIST

## 2025-04-17 PROCEDURE — 95816 EEG AWAKE AND DROWSY: CPT | Performed by: PSYCHIATRY & NEUROLOGY

## 2025-04-17 PROCEDURE — 97535 SELF CARE MNGMENT TRAINING: CPT | Performed by: OCCUPATIONAL THERAPIST

## 2025-04-17 PROCEDURE — 97161 PT EVAL LOW COMPLEX 20 MIN: CPT

## 2025-04-17 PROCEDURE — 2500000003 HC RX 250 WO HCPCS: Performed by: STUDENT IN AN ORGANIZED HEALTH CARE EDUCATION/TRAINING PROGRAM

## 2025-04-17 PROCEDURE — 6370000000 HC RX 637 (ALT 250 FOR IP): Performed by: STUDENT IN AN ORGANIZED HEALTH CARE EDUCATION/TRAINING PROGRAM

## 2025-04-17 PROCEDURE — 95957 EEG DIGITAL ANALYSIS: CPT

## 2025-04-17 PROCEDURE — 82728 ASSAY OF FERRITIN: CPT

## 2025-04-17 PROCEDURE — 92610 EVALUATE SWALLOWING FUNCTION: CPT

## 2025-04-17 PROCEDURE — 83550 IRON BINDING TEST: CPT

## 2025-04-17 PROCEDURE — 95819 EEG AWAKE AND ASLEEP: CPT

## 2025-04-17 PROCEDURE — 1100000000 HC RM PRIVATE

## 2025-04-17 PROCEDURE — 70450 CT HEAD/BRAIN W/O DYE: CPT

## 2025-04-17 PROCEDURE — 83540 ASSAY OF IRON: CPT

## 2025-04-17 PROCEDURE — 97116 GAIT TRAINING THERAPY: CPT

## 2025-04-17 PROCEDURE — 36415 COLL VENOUS BLD VENIPUNCTURE: CPT

## 2025-04-17 PROCEDURE — G0378 HOSPITAL OBSERVATION PER HR: HCPCS

## 2025-04-17 PROCEDURE — 97530 THERAPEUTIC ACTIVITIES: CPT

## 2025-04-17 PROCEDURE — 97165 OT EVAL LOW COMPLEX 30 MIN: CPT | Performed by: OCCUPATIONAL THERAPIST

## 2025-04-17 PROCEDURE — 6370000000 HC RX 637 (ALT 250 FOR IP): Performed by: INTERNAL MEDICINE

## 2025-04-17 PROCEDURE — 82746 ASSAY OF FOLIC ACID SERUM: CPT

## 2025-04-17 PROCEDURE — 6360000002 HC RX W HCPCS: Performed by: STUDENT IN AN ORGANIZED HEALTH CARE EDUCATION/TRAINING PROGRAM

## 2025-04-17 PROCEDURE — 82607 VITAMIN B-12: CPT

## 2025-04-17 PROCEDURE — 99223 1ST HOSP IP/OBS HIGH 75: CPT | Performed by: PSYCHIATRY & NEUROLOGY

## 2025-04-17 RX ORDER — POTASSIUM CHLORIDE 750 MG/1
40 TABLET, EXTENDED RELEASE ORAL ONCE
Status: COMPLETED | OUTPATIENT
Start: 2025-04-17 | End: 2025-04-17

## 2025-04-17 RX ORDER — METOPROLOL SUCCINATE 25 MG/1
25 TABLET, EXTENDED RELEASE ORAL DAILY
Status: DISCONTINUED | OUTPATIENT
Start: 2025-04-18 | End: 2025-04-19 | Stop reason: HOSPADM

## 2025-04-17 RX ORDER — CLOPIDOGREL BISULFATE 75 MG/1
75 TABLET ORAL DAILY
Status: DISCONTINUED | OUTPATIENT
Start: 2025-04-18 | End: 2025-04-19 | Stop reason: HOSPADM

## 2025-04-17 RX ADMIN — ATORVASTATIN CALCIUM 80 MG: 40 TABLET, FILM COATED ORAL at 19:54

## 2025-04-17 RX ADMIN — ESCITALOPRAM OXALATE 10 MG: 10 TABLET ORAL at 08:16

## 2025-04-17 RX ADMIN — ASPIRIN 81 MG: 81 TABLET, CHEWABLE ORAL at 08:16

## 2025-04-17 RX ADMIN — PANTOPRAZOLE SODIUM 40 MG: 40 TABLET, DELAYED RELEASE ORAL at 06:32

## 2025-04-17 RX ADMIN — SODIUM CHLORIDE, PRESERVATIVE FREE 10 ML: 5 INJECTION INTRAVENOUS at 08:18

## 2025-04-17 RX ADMIN — ENOXAPARIN SODIUM 30 MG: 100 INJECTION SUBCUTANEOUS at 08:15

## 2025-04-17 RX ADMIN — FERROUS SULFATE TAB 325 MG (65 MG ELEMENTAL FE) 325 MG: 325 (65 FE) TAB at 08:16

## 2025-04-17 RX ADMIN — MELATONIN 4.5 MG: at 19:54

## 2025-04-17 RX ADMIN — THERA TABS 1 TABLET: TAB at 19:54

## 2025-04-17 RX ADMIN — FAMOTIDINE 20 MG: 20 TABLET, FILM COATED ORAL at 19:54

## 2025-04-17 RX ADMIN — BUSPIRONE HYDROCHLORIDE 10 MG: 10 TABLET ORAL at 08:16

## 2025-04-17 RX ADMIN — BUSPIRONE HYDROCHLORIDE 10 MG: 10 TABLET ORAL at 19:54

## 2025-04-17 RX ADMIN — POTASSIUM CHLORIDE 40 MEQ: 750 TABLET, FILM COATED, EXTENDED RELEASE ORAL at 12:39

## 2025-04-17 RX ADMIN — SODIUM CHLORIDE, PRESERVATIVE FREE 10 ML: 5 INJECTION INTRAVENOUS at 19:55

## 2025-04-17 NOTE — PLAN OF CARE
Problem: Physical Therapy - Adult  Goal: By Discharge: Performs mobility at highest level of function for planned discharge setting.  See evaluation for individualized goals.  Description: FUNCTIONAL STATUS PRIOR TO ADMISSION: Pt reports mod I with rollator PTA. Denies falls.     HOME SUPPORT PRIOR TO ADMISSION: Lives at independent living senior apartments. Has handicap accessible setup (walk in shower, bench in shower, grab bars)    Physical Therapy Goals  Initiated 4/17/2025  1.  Patient will move from supine to sit and sit to supine, scoot up and down, and roll side to side in bed with independence within 7 day(s).    2.  Patient will perform sit to stand with supervision/set-up within 7 day(s).  3.  Patient will transfer from bed to chair and chair to bed with supervision/set-up using the least restrictive device within 7 day(s).  4.  Patient will ambulate with supervision/set-up for 50 feet with the least restrictive device within 7 day(s).     Outcome: Progressing   PHYSICAL THERAPY EVALUATION    Patient: Baylee Paz (89 y.o. female)  Date: 4/17/2025  Primary Diagnosis: TIA (transient ischemic attack) [G45.9]  CVA (cerebrovascular accident due to intracerebral hemorrhage) (HCC) [I61.9]  Acute ischemic stroke (HCC) [I63.9]  Cerebrovascular accident (CVA), unspecified mechanism (HCC) [I63.9]       Precautions: Restrictions/Precautions  Restrictions/Precautions: Bed Alarm, Fall Risk            ASSESSMENT :   DEFICITS/IMPAIRMENTS:   The patient is limited by decreased functional mobility, strength, activity tolerance, endurance, safety awareness     Based on the impairments listed above pt presented to ED after acute onset of speech changes, admitted for CVA workup. Head CT negative, pt currently refusing MRI. Pt received for PT eval supine in bed, agreeable to therapy. She required Supervision and rail for bed mobility, scooting EOB with Supervision. Sit <>stand EOB with CGA and RW, cues to push up from bed  patient evaluation is determined to be of the following complexity level: Low

## 2025-04-17 NOTE — PROGRESS NOTES
OCCUPATIONAL THERAPY EVALUATION/DISCHARGE  Patient: Baylee Paz (89 y.o. female)  Date: 4/17/2025  Primary Diagnosis: TIA (transient ischemic attack) [G45.9]  CVA (cerebrovascular accident due to intracerebral hemorrhage) (HCC) [I61.9]  Acute ischemic stroke (HCC) [I63.9]  Cerebrovascular accident (CVA), unspecified mechanism (HCC) [I63.9]         Precautions: Bed Alarm, Fall Risk                  ASSESSMENT :  Based on the objective data below, the patient demonstrates the ability to perform her ADLs at an independent to supervision level, is CGA for transfers from low surfaces and is ambulating with a RW here in the hospital with supervision. Her speech is intelligible and no word finding difficulties were noted during this evaluation. She does have generally decreased, but functional LUE strength, which is only mildly impaired due to having had a R MCA CVA in the past. Her strength, balance and coordination are otherwise adequate for the performance of ADLs and functional mobility. Cueing was needed for management of the RW during ambulation mainly due to the patient being more familiar with ambulating with her rollator at baseline. In regard to her activity tolerance, she is reporting increased fatigue during ADLs and functional mobility, with her HR noted to be 127 during short distance ambulation. Her BP was stable with positional changes. At this time the patient is without further OT needs, acutely and after discharge, as she is very near her functional baseline.      Further skilled acute occupational therapy is not indicated at this time.     PLAN :  Discharge from Acute OT    Recommendation for discharge: (in order for the patient to meet his/her long term goals):   No skilled occupational therapy needs indicated at this time    Other factors to consider for discharge: lives alone    IF patient discharges home will need the following DME: patient owns DME and AE required for discharge       OBJECTIVE

## 2025-04-17 NOTE — PROGRESS NOTES
Speech pathology brief note; full note to follow. Evaluation completed. Patient with WFL oral/pharyngeal swallow and speech/language issues have resolved. Recommend regular/thin liquid diet and no further SLP needs. Thank you.    MAGGIE Vizcarra Ed., CCC-SLP

## 2025-04-17 NOTE — PROGRESS NOTES
End of Shift Note    Bedside shift change report given to Sari RN  (oncoming nurse) by Semaj Yates RN .        Shift worked: Days   Shift summary and any significant changes:    ECHO pending.     Concerns for physician to address:  none   Zone phone for oncoming shift:   [phone]     Patient Information  Baylee Paz  89 y.o.  4/16/2025 12:22 PM by Rosales Francisco MD. Baylee Paz was admitted from [facility]    Problem List  Patient Active Problem List    Diagnosis Date Noted    Acute ischemic stroke (HCC) 04/16/2025    CVA (cerebrovascular accident due to intracerebral hemorrhage) ([facility]) 04/16/2025    Generalized OA 03/29/2024    Hyperlipidemia, acquired 03/29/2024    Iron deficiency anemia 03/29/2024    Chronic headache disorder 03/29/2024    Sepsis ([facility]) 11/21/2023    Dyslipidemia 06/01/2023    Prolapse of vaginal vault after hysterectomy 02/07/2023    Vaginal pain 02/07/2023    Venous insufficiency 11/26/2022    Lacunar infarction ([facility]) 10/25/2022    Prerenal azotemia 10/25/2022    Chronic renal disease, stage III ([facility]) 06/10/2022    Mixed hyperlipidemia 06/09/2022    Presence of cardiac pacemaker 05/04/2022    MCI (mild cognitive impairment) 03/14/2022    Constipation 02/03/2022    Depression 02/03/2022    Pessary maintenance 02/03/2022    COVID-19 01/28/2022    CVA (cerebral vascular accident) ([facility]) 01/28/2022    Benign paroxysmal vertigo, bilateral 12/23/2021    Cognitive communication deficit 12/23/2021    Difficulty in walking, not elsewhere classified 12/23/2021    Dysphagia following cerebral infarction 12/23/2021    Dysphagia, oropharyngeal phase 12/23/2021    Hemiplegia and hemiparesis following cerebral infarction affecting left non-dominant side (HCC) 12/23/2021    Muscle weakness (generalized) 12/23/2021    Vertebral basilar insufficiency 12/09/2021    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

## 2025-04-17 NOTE — PROGRESS NOTES
Occupational Therapy    OT referral received, chart reviewed, and attempted to see patient for evaluation. Patient is presently having an EEG performed at bedside. Will defer and follow up later today or tomorrow for evaluation.    Irineo Anderson, OTR/L

## 2025-04-17 NOTE — PROCEDURES
EEG REPORT    Patient Name: Baylee Paz  : 1932  Age: 89 y.o.    Ordering physician: Dr. Jordan  Date of EE2025  10:38 - 11:00  Diagnosis: confusional state  Interpreting physician: Rohan Jimenez D.O. FAAN    Procedure: EEG    CLINICAL INDICATION: The patient is a 89 y.o. female who is being evaluated for baseline electro cerebral activities and to rule out seizure focus.      Current Facility-Administered Medications   Medication Dose Route Frequency    [START ON 2025] metoprolol succinate (TOPROL XL) extended release tablet 25 mg  25 mg Oral Daily    [START ON 2025] clopidogrel (PLAVIX) tablet 75 mg  75 mg Oral Daily    aspirin EC tablet 81 mg  81 mg Oral Daily    busPIRone (BUSPAR) tablet 10 mg  10 mg Oral BID    diclofenac sodium (VOLTAREN) 1 % gel 2 g  2 g Topical 4x Daily PRN    escitalopram (LEXAPRO) tablet 10 mg  10 mg Oral Daily    famotidine (PEPCID) tablet 20 mg  20 mg Oral Nightly    ferrous sulfate (IRON 325) tablet 325 mg  325 mg Oral Daily with breakfast    melatonin tablet 4.5 mg  4.5 mg Oral Nightly    meclizine (ANTIVERT) tablet 12.5 mg  12.5 mg Oral Daily PRN    multivitamin 1 tablet  1 tablet Oral Nightly    pantoprazole (PROTONIX) tablet 40 mg  40 mg Oral QAM AC    sodium chloride flush 0.9 % injection 5-40 mL  5-40 mL IntraVENous 2 times per day    sodium chloride flush 0.9 % injection 5-40 mL  5-40 mL IntraVENous PRN    0.9 % sodium chloride infusion   IntraVENous PRN    ondansetron (ZOFRAN-ODT) disintegrating tablet 4 mg  4 mg Oral Q8H PRN    Or    ondansetron (ZOFRAN) injection 4 mg  4 mg IntraVENous Q6H PRN    polyethylene glycol (GLYCOLAX) packet 17 g  17 g Oral Daily PRN    enoxaparin Sodium (LOVENOX) injection 30 mg  30 mg SubCUTAneous Daily    atorvastatin (LIPITOR) tablet 80 mg  80 mg Oral Nightly    aspirin chewable tablet 81 mg  81 mg Oral Daily    Or    aspirin suppository 300 mg  300 mg Rectal Daily    [Held by provider] losartan (COZAAR) tablet 50 mg  50

## 2025-04-17 NOTE — CONSULTS
Neurology Note    Patient ID:  Baylee Paz  [id number]  89 y.o.  4/19/1932      Date of Consultation:  April 17, 2025    Referring Physician: Dr. Francisco    Reason for Consultation:  stroke like symptoms      Assessment and Plan:    The patient is a pleasant 89-year-old female who developed episode of vertigo and then subsequent episode of dysarthria and word finding difficulties.  Her current neurological examination reveals mild generalized this with no focality.      Transient neurological symptoms:  Differential includes stroke with resolution, TIA, intermittent seizure  Head CT with no acute abnormality.  CTA with no large vessel flow-limiting stenosis.  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  Neurointerventional surgery consulted  Continue 81 mg aspirin  I will add Plavix 75 mg daily for the next 21 days.  Continue on telemetry  Patient does refuse a MRI.  Given the improvement of symptoms, we will follow her neurological examination  I would recommend that she receive an echocardiogram.  Prior echocardiogram did reveal a PFO and a mildly dilated left atrium.  Will ensure there is no thrombus.  PT and OT consultation  Eeg ordered  Hypertension: Aggressive control goal blood pressure less than 140/90  Lipid panel at goal.  Continue home statin therapy  Hemoglobin A1c at goal  I provided stroke education today in regards to risk factors for stroke and lifestyle modifications to help minimize the risk of future stroke.  This included medication compliance, regular follow up with primary care physician,  and healthy lifestyle habits (nutrition/exercise)    Chronic kidney disease  Reflux disease  Osteoarthritis    Neurology will continue to follow closely in this complicated patient with ongoing neurological symptoms necessitating additional testing. Updated orders placed.  Care plan discussed with primary team.            Subjective: my speech       History of Present Illness:   Baylee Paz is a 89 y.o.

## 2025-04-17 NOTE — PROCEDURES
PROCEDURE NOTE  Date: 4/17/2025   Name: Baylee Paz  YOB: 1932    Procedures        This Routine EEG was performed in real-time by an EEG technologist. Electrodes placed according to the 10-20 International System. Standard sensitivity 7uV/mm and high filter 70 Hz and low filter 1 Hz settings were set at initiation of the recording and adjustments, as appropriate, and noted on the recording. Baseline electrode impedances were at or below 10k Ohms. Per protocol, this recording data is uploaded/transferred from the EEG equipment to a central storage location in real time. Duration of this EEG was (21:46) minutes. Photic stimulation was not performed, and Hyperventilation was not performed. Digital analysis is recorded using a software tool that analyzes EEG data to identify seizures called Persyst.

## 2025-04-17 NOTE — PROGRESS NOTES
Speech LAnguage Pathology EVALUATION    Patient: Baylee Paz (89 y.o. female)  Date: 4/17/2025  Primary Diagnosis: TIA (transient ischemic attack) [G45.9]  CVA (cerebrovascular accident due to intracerebral hemorrhage) (HCC) [I61.9]  Acute ischemic stroke (HCC) [I63.9]  Cerebrovascular accident (CVA), unspecified mechanism (HCC) [I63.9]       Precautions:  Bed Alarm, Fall Risk                  ASSESSMENT :  Patient with WFL oral/pharyngeal swallow and no overt s/s aspiration observed. Patient reported that motor speech, language, and cognitive function are now at baseline.     Patient will benefit from skilled intervention to address the above impairments.     PLAN :  Recommendations and Planned Interventions:  Diet: Regular and thin liquids  General aspiration precautions, including upright for all PO intake, small/single bites and sips, slow rate     Recommend next SLP session: further evaluation if needed pending repeat CT results    Acute SLP Services: SLP Plan of Care: PRN. Patient's rehabilitation potential is considered to be Good.  Discharge Recommendations: No, additional SLP treatment not indicated at discharge     SUBJECTIVE:   Patient stated, “It's good.” re: her speech. SLP consulted due to stroke/TIA. Patient admitted with speech abnormality. Initial CT showed no acute process, and patient declined MRI so repeat CT pending.     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

## 2025-04-17 NOTE — PROGRESS NOTES
Patient refusing MRI per ROBERTO CARLOS Ortega. They will repeat head CT instead. Okay to cancel order for MRI. Scanned patient's MRI pacemaker settings into media.

## 2025-04-17 NOTE — PROGRESS NOTES
End of Shift Note    Bedside shift change report given to DAVID  RN  (oncoming nurse) by CHARMAINE MORENO RN .        Shift worked: Nights.   Shift summary and any significant changes:    Q3 neuro checks andd  vital signs done.  Urine sent, victorino has a pacemaker, and she is adamant about not getting an MRI     Concerns for physician to address:  none   Zone phone for oncoming shift:   [phone]     Patient Information  Baylee Paz  89 y.o.  4/16/2025 12:22 PM by Rosales Francisco MD. Baylee Paz was admitted from [facility]    Problem List  Patient Active Problem List    Diagnosis Date Noted    Acute ischemic stroke (HCC) 04/16/2025    CVA (cerebrovascular accident due to intracerebral hemorrhage) ([facility]) 04/16/2025    Generalized OA 03/29/2024    Hyperlipidemia, acquired 03/29/2024    Iron deficiency anemia 03/29/2024    Chronic headache disorder 03/29/2024    Sepsis (HCC) 11/21/2023    Dyslipidemia 06/01/2023    Prolapse of vaginal vault after hysterectomy 02/07/2023    Vaginal pain 02/07/2023    Venous insufficiency 11/26/2022    Lacunar infarction (HCC) 10/25/2022    Prerenal azotemia 10/25/2022    Chronic renal disease, stage III ([facility]) 06/10/2022    Mixed hyperlipidemia 06/09/2022    Presence of cardiac pacemaker 05/04/2022    MCI (mild cognitive impairment) 03/14/2022    Constipation 02/03/2022    Depression 02/03/2022    Pessary maintenance 02/03/2022    COVID-19 01/28/2022    CVA (cerebral vascular accident) (HCC) 01/28/2022    Benign paroxysmal vertigo, bilateral 12/23/2021    Cognitive communication deficit 12/23/2021    Difficulty in walking, not elsewhere classified 12/23/2021    Dysphagia following cerebral infarction 12/23/2021    Dysphagia, oropharyngeal phase 12/23/2021    Hemiplegia and hemiparesis following cerebral infarction affecting left non-dominant side (HCC) 12/23/2021    Muscle weakness (generalized) 12/23/2021    Vertebral basilar insufficiency 12/09/2021    Thrombotic stroke involving right middle

## 2025-04-17 NOTE — CONSULTS
NIS brief note     CTA reviewed 9mm fusiform R cavernous ICA aneurysm    At her age, location and size of aneurysm this poses little to no risk. No follow up or repeat imaging necessary

## 2025-04-17 NOTE — PROGRESS NOTES
Hospitalist Progress Note    NAME:   Baylee Paz   : 1932   MRN: [id number]     Date/Time: 2025 12:21 PM  Patient PCP: Birdie Gray, CHRISTEN - NP    Estimated discharge date:      Assessment / Plan:  CVA evaluation  TIA  Vertigo Hx  Right ICA falciform aneurysm, no intervention per neurointerventional surg  CT head and neck negative for acute process or large vessel occlusion or stenosis   Pt declined MRI brain therefore repeat head CT  CTA h/n :No large vessel occlusion, or high-grade stenosis.   Repeat head CT since pt declined MRI brain  LDL 47, A1C 5.8  EEG is pending  PT/OT evaluation  Neuro consulted    Acute on chronic anemia  No evidence of blood loss, hbg down trending  Check Ferritin, iron, b12, folate  Supportive care  BMP in the AM    Thrombocytopenia   on admission, down trending to 126.  No evidence of blood loss  Monitor CBC daily    Hypokalemia  Replace with KCL    HTN  BP soft, hold pta antihypertensives, resume as needed   Cont' BB with holding parameters      Medical Decision Making:   I personally reviewed labs  I personally reviewed imaging  Toxic drug monitoring  I personally reviewed EKG  Discussed case with: RN, ANNE-MARIE, discussed plan of care and dispo during round        Code Status: Fulll  DVT Prophylaxis: lovenox    Subjective:      Patient is up in bed, no complaint.    Discussed with RN events overnight.       Objective:     VITALS:   Last 24hrs VS reviewed since prior progress note. Most recent are:  Patient Vitals for the past 24 hrs:   BP Temp Temp src Pulse Resp SpO2 Height Weight   25 1135 (!) 116/57 97.5 °F (36.4 °C) Oral 60 16 98 % -- --   25 0810 103/62 98.1 °F (36.7 °C) Oral 58 18 98 % -- --   25 0555 (!) 103/55 97.5 °F (36.4 °C) -- 59 17 98 % -- --   25 0214 113/64 97.7 °F (36.5 °C) Oral 63 18 -- -- --   25 2320 (!) 112/57 98.2 °F (36.8 °C) Oral 68 18 93 % -- --   25 125/71 97.5 °F (36.4 °C) Oral 69 18

## 2025-04-17 NOTE — PROGRESS NOTES
Attempted to deliver and verbally explain the MOON/VOON with patient. Patient was with clinical staff. Randa Min, Care Management Assistant

## 2025-04-17 NOTE — PLAN OF CARE
Problem: Chronic Conditions and Co-morbidities  Goal: Patient's chronic conditions and co-morbidity symptoms are monitored and maintained or improved  Outcome: Progressing     Problem: Discharge Planning  Goal: Discharge to home or other facility with appropriate resources  Outcome: Progressing     Problem: Neurosensory - Adult  Goal: Achieves stable or improved neurological status  Outcome: Progressing  Goal: Absence of seizures  Outcome: Progressing  Goal: Remains free of injury related to seizures activity  Outcome: Progressing  Goal: Achieves maximal functionality and self care  Outcome: Progressing     Problem: ABCDS Injury Assessment  Goal: Absence of physical injury  Outcome: Progressing     Problem: Safety - Adult  Goal: Free from fall injury  Outcome: Progressing

## 2025-04-18 ENCOUNTER — APPOINTMENT (OUTPATIENT)
Facility: HOSPITAL | Age: 89
DRG: 065 | End: 2025-04-18
Attending: STUDENT IN AN ORGANIZED HEALTH CARE EDUCATION/TRAINING PROGRAM
Payer: MEDICARE

## 2025-04-18 ENCOUNTER — TELEPHONE (OUTPATIENT)
Age: 89
End: 2025-04-18

## 2025-04-18 LAB
ANION GAP SERPL CALC-SCNC: 6 MMOL/L (ref 2–12)
BASOPHILS # BLD: 0.05 K/UL (ref 0–0.1)
BASOPHILS NFR BLD: 1 % (ref 0–1)
BUN SERPL-MCNC: 25 MG/DL (ref 6–20)
BUN/CREAT SERPL: 22 (ref 12–20)
CALCIUM SERPL-MCNC: 9.1 MG/DL (ref 8.5–10.1)
CHLORIDE SERPL-SCNC: 110 MMOL/L (ref 97–108)
CO2 SERPL-SCNC: 22 MMOL/L (ref 21–32)
CREAT SERPL-MCNC: 1.12 MG/DL (ref 0.55–1.02)
DIFFERENTIAL METHOD BLD: ABNORMAL
EOSINOPHIL # BLD: 0.2 K/UL (ref 0–0.4)
EOSINOPHIL NFR BLD: 4.1 % (ref 0–7)
ERYTHROCYTE [DISTWIDTH] IN BLOOD BY AUTOMATED COUNT: 22.4 % (ref 11.5–14.5)
GLUCOSE SERPL-MCNC: 97 MG/DL (ref 65–100)
HCT VFR BLD AUTO: 24 % (ref 35–47)
HEMOCCULT STL QL: POSITIVE
HGB BLD-MCNC: 7.8 G/DL (ref 11.5–16)
IMM GRANULOCYTES # BLD AUTO: 0.03 K/UL (ref 0–0.04)
IMM GRANULOCYTES NFR BLD AUTO: 0.6 % (ref 0–0.5)
LYMPHOCYTES # BLD: 1.39 K/UL (ref 0.8–3.5)
LYMPHOCYTES NFR BLD: 28.3 % (ref 12–49)
MCH RBC QN AUTO: 31.6 PG (ref 26–34)
MCHC RBC AUTO-ENTMCNC: 32.5 G/DL (ref 30–36.5)
MCV RBC AUTO: 97.2 FL (ref 80–99)
MONOCYTES # BLD: 0.42 K/UL (ref 0–1)
MONOCYTES NFR BLD: 8.6 % (ref 5–13)
NEUTS SEG # BLD: 2.81 K/UL (ref 1.8–8)
NEUTS SEG NFR BLD: 57.4 % (ref 32–75)
NRBC # BLD: 0.09 K/UL (ref 0–0.01)
NRBC BLD-RTO: 1.8 PER 100 WBC
PLATELET # BLD AUTO: 142 K/UL (ref 150–400)
PMV BLD AUTO: 11.6 FL (ref 8.9–12.9)
POTASSIUM SERPL-SCNC: 3.9 MMOL/L (ref 3.5–5.1)
RBC # BLD AUTO: 2.47 M/UL (ref 3.8–5.2)
RBC MORPH BLD: ABNORMAL
SODIUM SERPL-SCNC: 138 MMOL/L (ref 136–145)
WBC # BLD AUTO: 4.9 K/UL (ref 3.6–11)

## 2025-04-18 PROCEDURE — 2500000003 HC RX 250 WO HCPCS: Performed by: STUDENT IN AN ORGANIZED HEALTH CARE EDUCATION/TRAINING PROGRAM

## 2025-04-18 PROCEDURE — 1100000003 HC PRIVATE W/ TELEMETRY

## 2025-04-18 PROCEDURE — 85025 COMPLETE CBC W/AUTO DIFF WBC: CPT

## 2025-04-18 PROCEDURE — 80048 BASIC METABOLIC PNL TOTAL CA: CPT

## 2025-04-18 PROCEDURE — 97116 GAIT TRAINING THERAPY: CPT

## 2025-04-18 PROCEDURE — 97110 THERAPEUTIC EXERCISES: CPT

## 2025-04-18 PROCEDURE — 6370000000 HC RX 637 (ALT 250 FOR IP): Performed by: STUDENT IN AN ORGANIZED HEALTH CARE EDUCATION/TRAINING PROGRAM

## 2025-04-18 PROCEDURE — 6360000002 HC RX W HCPCS: Performed by: STUDENT IN AN ORGANIZED HEALTH CARE EDUCATION/TRAINING PROGRAM

## 2025-04-18 PROCEDURE — 82272 OCCULT BLD FECES 1-3 TESTS: CPT

## 2025-04-18 PROCEDURE — 99232 SBSQ HOSP IP/OBS MODERATE 35: CPT | Performed by: PSYCHIATRY & NEUROLOGY

## 2025-04-18 PROCEDURE — 36415 COLL VENOUS BLD VENIPUNCTURE: CPT

## 2025-04-18 PROCEDURE — 6370000000 HC RX 637 (ALT 250 FOR IP): Performed by: PSYCHIATRY & NEUROLOGY

## 2025-04-18 PROCEDURE — 6370000000 HC RX 637 (ALT 250 FOR IP): Performed by: INTERNAL MEDICINE

## 2025-04-18 RX ADMIN — ENOXAPARIN SODIUM 30 MG: 100 INJECTION SUBCUTANEOUS at 07:59

## 2025-04-18 RX ADMIN — ATORVASTATIN CALCIUM 80 MG: 40 TABLET, FILM COATED ORAL at 21:07

## 2025-04-18 RX ADMIN — ASPIRIN 81 MG: 81 TABLET, CHEWABLE ORAL at 07:59

## 2025-04-18 RX ADMIN — FAMOTIDINE 20 MG: 20 TABLET, FILM COATED ORAL at 21:07

## 2025-04-18 RX ADMIN — BUSPIRONE HYDROCHLORIDE 10 MG: 10 TABLET ORAL at 21:07

## 2025-04-18 RX ADMIN — MELATONIN 4.5 MG: at 21:07

## 2025-04-18 RX ADMIN — CLOPIDOGREL BISULFATE 75 MG: 75 TABLET, FILM COATED ORAL at 07:59

## 2025-04-18 RX ADMIN — SODIUM CHLORIDE, PRESERVATIVE FREE 10 ML: 5 INJECTION INTRAVENOUS at 07:59

## 2025-04-18 RX ADMIN — BUSPIRONE HYDROCHLORIDE 10 MG: 10 TABLET ORAL at 07:59

## 2025-04-18 RX ADMIN — METOPROLOL SUCCINATE 25 MG: 25 TABLET, EXTENDED RELEASE ORAL at 07:59

## 2025-04-18 RX ADMIN — PANTOPRAZOLE SODIUM 40 MG: 40 TABLET, DELAYED RELEASE ORAL at 06:28

## 2025-04-18 RX ADMIN — ESCITALOPRAM OXALATE 10 MG: 10 TABLET ORAL at 07:59

## 2025-04-18 RX ADMIN — FERROUS SULFATE TAB 325 MG (65 MG ELEMENTAL FE) 325 MG: 325 (65 FE) TAB at 07:59

## 2025-04-18 RX ADMIN — THERA TABS 1 TABLET: TAB at 21:07

## 2025-04-18 RX ADMIN — SODIUM CHLORIDE, PRESERVATIVE FREE 10 ML: 5 INJECTION INTRAVENOUS at 21:07

## 2025-04-18 ASSESSMENT — PAIN SCALES - GENERAL: PAINLEVEL_OUTOF10: 0

## 2025-04-18 NOTE — PROGRESS NOTES
Hospitalist Progress Note    NAME:   Baylee Paz   : 1932   MRN: [id number]     Date/Time: 2025 2:28 PM  Patient PCP: Birdie Gray, CHRISTEN - NP    Estimated discharge date:      Assessment / Plan:  AcuteCVA   Vertigo Hx  Right ICA falciform aneurysm, no intervention per neurointerventional surg  CT head and neck negative for acute process or large vessel occlusion or stenosis   Pt declined MRI brain .  Repeat head CT shows small left cerebral infarct has become apparent. No bleed or shift.   CTA h/n :No large vessel occlusion, or high-grade stenosis.   Repeat head CT since pt declined MRI brain  LDL 47, A1C 5.8  EEGneg for seizure  TTE, will need this prior to discharge  PT/OT evaluation,HHPTOT.  Pt is eager to be discharge today   Appreciate neuro following    Acute on chronic anemia  FOB positive, hbg 7.8.  no overt blood loss per pt.  Supportive care  BMP in the AM  Outpt f/u with GI    Thrombocytopenia   -->126-->142.  No evidence of blood loss  Monitor CBC daily    Hypokalemia  Wnl today    HTN  BP stable ~130s  Cont' BB with holding parameters      Medical Decision Making:   I personally reviewed labs  I personally reviewed imaging  Toxic drug monitoring  I personally reviewed EKG  Discussed case with: RN, ANNE-MARIE, discussed plan of care and dispo during round        Code Status: Fulll  DVT Prophylaxis: lovenox    Subjective:      Patient is up in chair, c/o regular diet is too hard to eat. Request easy to chew diet.  Eager to be discharge.  Denies any blood in stool that can be seen per pt.   Discussed with RN events overnight.       Objective:     VITALS:   Last 24hrs VS reviewed since prior progress note. Most recent are:  Patient Vitals for the past 24 hrs:   BP Temp Temp src Pulse Resp SpO2   25 1345 130/66 97.7 °F (36.5 °C) Oral 63 18 98 %   25 0756 131/60 97.5 °F (36.4 °C) Oral 68 16 97 %   25 2310 118/64 97.7 °F (36.5 °C) Oral 61 18 100 %   25

## 2025-04-18 NOTE — TELEPHONE ENCOUNTER
Please schedule patient for a hospital follow up appointment in clinic    Neurology provider: any provider      In person or virtual:     When: 4-8 weeks    Diagnosis/reason for follow up:  stroke    Thanks!

## 2025-04-18 NOTE — PROGRESS NOTES
End of Shift Note    Bedside shift change report given to DAVID  RN  (oncoming nurse) by Jose Allen RN .        Shift worked: Nights.   Shift summary and any significant changes:     No acute changes overnight     Concerns for physician to address:  none   Zone phone for oncoming shift:   [phone]     Patient Information  Baylee Paz  89 y.o.  4/16/2025 12:22 PM by Rosales Francisco MD. Baylee Paz was admitted from [facility]    Problem List  Patient Active Problem List    Diagnosis Date Noted    Dysarthria 04/17/2025    Aphasia 04/17/2025    Generalized weakness 04/17/2025    Confusional state 04/17/2025    Acute ischemic stroke (HCC) 04/16/2025    CVA (cerebrovascular accident due to intracerebral hemorrhage) (HCC) 04/16/2025    Generalized OA 03/29/2024    Hyperlipidemia, acquired 03/29/2024    Iron deficiency anemia 03/29/2024    Chronic headache disorder 03/29/2024    Sepsis (HCC) 11/21/2023    Dyslipidemia 06/01/2023    Prolapse of vaginal vault after hysterectomy 02/07/2023    Vaginal pain 02/07/2023    Venous insufficiency 11/26/2022    Lacunar infarction (HCC) 10/25/2022    Prerenal azotemia 10/25/2022    Chronic renal disease, stage III (HCC) 06/10/2022    Mixed hyperlipidemia 06/09/2022    Presence of cardiac pacemaker 05/04/2022    MCI (mild cognitive impairment) 03/14/2022    Constipation 02/03/2022    Depression 02/03/2022    Pessary maintenance 02/03/2022    COVID-19 01/28/2022    CVA (cerebral vascular accident) (HCC) 01/28/2022    Benign paroxysmal vertigo, bilateral 12/23/2021    Cognitive communication deficit 12/23/2021    Difficulty in walking, not elsewhere classified 12/23/2021    Dysphagia following cerebral infarction 12/23/2021    Dysphagia, oropharyngeal phase 12/23/2021    Hemiplegia and hemiparesis following cerebral infarction affecting left non-dominant side (HCC) 12/23/2021    Muscle weakness (generalized) 12/23/2021    Vertebral basilar insufficiency 12/09/2021    Thrombotic stroke  involving right middle cerebral artery (HCC) 12/09/2021    Bilateral carotid artery stenosis 12/09/2021    Hemiparesis of left nondominant side due to acute cerebrovascular disease ([facility]) 12/09/2021    Dizziness and giddiness 12/09/2021    Bradycardic cardiac arrest ([facility]) 12/08/2021    Symptomatic bradycardia 12/08/2021    Dizziness 12/07/2021    Bilateral carpal tunnel syndrome 03/05/2019    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    Primary osteoarthritis of both hands 01/30/2018    Osteoarthritis of lumbar spine 02/14/2017    Uterine prolapse 02/14/2017    Anemia 11/04/2016    Benign recurrent vertigo 11/04/2016    Non morbid obesity due to excess calories 11/04/2016    Vasomotor rhinitis 11/04/2016    Anxiety tension state 11/04/2016    Dyspepsia 11/04/2016    Primary osteoarthritis of both knees 11/04/2016    Gastroesophageal reflux disease 03/30/2015    SSS (sick sinus syndrome) ([facility]) 02/16/2015    Sinus congestion 08/23/2013    Bradycardia 10/09/2012    HTN (hypertension) 10/09/2012    Obesity 10/09/2012    Syncope and collapse 09/18/2012     Past Medical History:   Diagnosis Date    Anemia     /possible MM,seeing     Arrhythmia     SSS    Bradycardia     CAD (coronary artery disease)     Essential hypertension     GERD (gastroesophageal reflux disease)     Psychiatric disorder     Anxiety    Psychotic disorder ([facility])     anxiety    Thrombotic stroke involving right middle cerebral artery ([facility]) 12/9/2021    Vertigo        Core Measures:  CVA:Y   CHF: N  PNA: N    Activity:Level of Assistance: Minimal assist, patient does 75% or more  Number times ambulated in hallways past shift: 0  Number of times OOB to chair past shift: 0    Cardiac:   Cardiac Monitoring: Y    Access:   Current line(s): PIV    Respiratory:   O2 Device: None (Room air)    GI:     Current diet:  ADULT DIET; Regular  Tolerating current diet: Yes    Pain Management:   Patient states pain is manageable on current regimen: N/A    Skin:  Jeanmarie Scale

## 2025-04-18 NOTE — PLAN OF CARE
Problem: Chronic Conditions and Co-morbidities  Goal: Patient's chronic conditions and co-morbidity symptoms are monitored and maintained or improved  4/17/2025 2057 by Jose Allen RN  Outcome: Progressing  4/17/2025 0856 by Semaj Yates RN  Outcome: Progressing     Problem: Discharge Planning  Goal: Discharge to home or other facility with appropriate resources  4/17/2025 2057 by Jose Allen RN  Outcome: Progressing  4/17/2025 0856 by Semaj Yates RN  Outcome: Progressing     Problem: Neurosensory - Adult  Goal: Achieves stable or improved neurological status  4/17/2025 2057 by Jose Allen RN  Outcome: Progressing  4/17/2025 0856 by Semaj Yates RN  Outcome: Progressing  Goal: Absence of seizures  4/17/2025 2057 by Jose Allen RN  Outcome: Progressing  4/17/2025 0856 by Semaj Yates RN  Outcome: Progressing  Goal: Remains free of injury related to seizures activity  4/17/2025 2057 by Jose Allen RN  Outcome: Progressing  4/17/2025 0856 by Semaj Yates RN  Outcome: Progressing  Goal: Achieves maximal functionality and self care  4/17/2025 2057 by Jose Allen RN  Outcome: Progressing  4/17/2025 0856 by Semaj Yates RN  Outcome: Progressing     Problem: ABCDS Injury Assessment  Goal: Absence of physical injury  4/17/2025 2057 by Jose Allen RN  Outcome: Progressing  4/17/2025 0856 by Semaj Yates RN  Outcome: Progressing     Problem: Safety - Adult  Goal: Free from fall injury  4/17/2025 2057 by Jose Allen RN  Outcome: Progressing  4/17/2025 0856 by Semaj Yates RN  Outcome: Progressing     Problem: Physical Therapy - Adult  Goal: By Discharge: Performs mobility at highest level of function for planned discharge setting.  See evaluation for individualized goals.  Description: FUNCTIONAL STATUS PRIOR TO ADMISSION: Pt reports mod I with rollator PTA. Denies falls.     HOME SUPPORT PRIOR TO ADMISSION: Lives at independent living

## 2025-04-18 NOTE — PROGRESS NOTES
End of Shift Note    Bedside shift change report given to DAVID  RN  (oncoming nurse) by Jose Allen RN .        Shift worked: Nights.   Shift summary and any significant changes:     No acute changes overnight     Concerns for physician to address:  none   Zone phone for oncoming shift:   [phone]     Patient Information  Baylee Paz  89 y.o.  4/16/2025 12:22 PM by Rosales Francisco MD. Baylee Paz was admitted from [facility]    Problem List  Patient Active Problem List    Diagnosis Date Noted    Dysarthria 04/17/2025    Aphasia 04/17/2025    Generalized weakness 04/17/2025    Confusional state 04/17/2025    Acute ischemic stroke (HCC) 04/16/2025    CVA (cerebrovascular accident due to intracerebral hemorrhage) (HCC) 04/16/2025    Generalized OA 03/29/2024    Hyperlipidemia, acquired 03/29/2024    Iron deficiency anemia 03/29/2024    Chronic headache disorder 03/29/2024    Sepsis (HCC) 11/21/2023    Dyslipidemia 06/01/2023    Prolapse of vaginal vault after hysterectomy 02/07/2023    Vaginal pain 02/07/2023    Venous insufficiency 11/26/2022    Lacunar infarction (HCC) 10/25/2022    Prerenal azotemia 10/25/2022    Chronic renal disease, stage III (HCC) 06/10/2022    Mixed hyperlipidemia 06/09/2022    Presence of cardiac pacemaker 05/04/2022    MCI (mild cognitive impairment) 03/14/2022    Constipation 02/03/2022    Depression 02/03/2022    Pessary maintenance 02/03/2022    COVID-19 01/28/2022    CVA (cerebral vascular accident) (HCC) 01/28/2022    Benign paroxysmal vertigo, bilateral 12/23/2021    Cognitive communication deficit 12/23/2021    Difficulty in walking, not elsewhere classified 12/23/2021    Dysphagia following cerebral infarction 12/23/2021    Dysphagia, oropharyngeal phase 12/23/2021    Hemiplegia and hemiparesis following cerebral infarction affecting left non-dominant side (HCC) 12/23/2021    Muscle weakness (generalized) 12/23/2021    Vertebral basilar insufficiency 12/09/2021    Thrombotic stroke

## 2025-04-18 NOTE — PLAN OF CARE
Problem: Physical Therapy - Adult  Goal: By Discharge: Performs mobility at highest level of function for planned discharge setting.  See evaluation for individualized goals.  Description: FUNCTIONAL STATUS PRIOR TO ADMISSION: Pt reports mod I with rollator PTA. Denies falls.     HOME SUPPORT PRIOR TO ADMISSION: Lives at independent living senior apartments. Has handicap accessible setup (walk in shower, bench in shower, grab bars)    Physical Therapy Goals  Initiated 4/17/2025  1.  Patient will move from supine to sit and sit to supine, scoot up and down, and roll side to side in bed with independence within 7 day(s).    2.  Patient will perform sit to stand with supervision/set-up within 7 day(s).  3.  Patient will transfer from bed to chair and chair to bed with supervision/set-up using the least restrictive device within 7 day(s).  4.  Patient will ambulate with supervision/set-up for 50 feet with the least restrictive device within 7 day(s).     Outcome: Progressing   PHYSICAL THERAPY TREATMENT    Patient: Baylee Paz (89 y.o. female)  Date: 4/18/2025  Diagnosis: TIA (transient ischemic attack) [G45.9]  CVA (cerebrovascular accident due to intracerebral hemorrhage) (HCC) [I61.9]  Acute ischemic stroke (HCC) [I63.9]  Cerebrovascular accident (CVA), unspecified mechanism (HCC) [I63.9] Acute ischemic stroke (HCC)      Precautions: Restrictions/Precautions  Restrictions/Precautions: Bed Alarm, Fall Risk            ASSESSMENT:    Patient is cleared for discharge from PT standpoint:  YES [x]     NO []      Patient continues to benefit from skilled PT services and is progressing towards goals. Pt received for PT session seated in chair, eager for mobility. She demonstrates improved strength and safety this date compared to eval. Sit <> Stands with SBA and RW. Pt walked 100ft with RW and SBA, short steps and slow speed. Pt bending forward into flexion onto elbows with fatigue during gait, per pt is her baseline. 2

## 2025-04-18 NOTE — PROGRESS NOTES
Known Allergies       Current Facility-Administered Medications   Medication Dose Route Frequency    metoprolol succinate (TOPROL XL) extended release tablet 25 mg  25 mg Oral Daily    clopidogrel (PLAVIX) tablet 75 mg  75 mg Oral Daily    aspirin EC tablet 81 mg  81 mg Oral Daily    busPIRone (BUSPAR) tablet 10 mg  10 mg Oral BID    diclofenac sodium (VOLTAREN) 1 % gel 2 g  2 g Topical 4x Daily PRN    escitalopram (LEXAPRO) tablet 10 mg  10 mg Oral Daily    famotidine (PEPCID) tablet 20 mg  20 mg Oral Nightly    ferrous sulfate (IRON 325) tablet 325 mg  325 mg Oral Daily with breakfast    melatonin tablet 4.5 mg  4.5 mg Oral Nightly    meclizine (ANTIVERT) tablet 12.5 mg  12.5 mg Oral Daily PRN    multivitamin 1 tablet  1 tablet Oral Nightly    pantoprazole (PROTONIX) tablet 40 mg  40 mg Oral QAM AC    sodium chloride flush 0.9 % injection 5-40 mL  5-40 mL IntraVENous 2 times per day    sodium chloride flush 0.9 % injection 5-40 mL  5-40 mL IntraVENous PRN    0.9 % sodium chloride infusion   IntraVENous PRN    ondansetron (ZOFRAN-ODT) disintegrating tablet 4 mg  4 mg Oral Q8H PRN    Or    ondansetron (ZOFRAN) injection 4 mg  4 mg IntraVENous Q6H PRN    polyethylene glycol (GLYCOLAX) packet 17 g  17 g Oral Daily PRN    enoxaparin Sodium (LOVENOX) injection 30 mg  30 mg SubCUTAneous Daily    atorvastatin (LIPITOR) tablet 80 mg  80 mg Oral Nightly    aspirin chewable tablet 81 mg  81 mg Oral Daily    Or    aspirin suppository 300 mg  300 mg Rectal Daily    [Held by provider] losartan (COZAAR) tablet 50 mg  50 mg Oral Daily    And    [Held by provider] hydroCHLOROthiazide (HYDRODIURIL) tablet 12.5 mg  12.5 mg Oral Daily       Review of Systems:    General, constitutional: pain  Eyes, vision: negative  Ears, nose, throat: negative  Cardiovascular, heart: negative  Respiratory: negative  Gastrointestinal: negative  Genitourinary: negative  Musculoskeletal: negative  Skin and integumentary: negative  Psychiatric:  04/18/2025 05:33 AM    HGB 7.8 04/18/2025 05:33 AM     04/18/2025 05:33 AM    LDL 47.4 04/16/2025 04:49 PM    LDL 97 03/28/2023 12:00 AM                    I spent   35   minutes providing care to this  acutely ill inpatient with > 50% of the time counseling and assisting in the coordination of care of the patient on the patient's hospital floor/unit.               Signed By:  Rohan Jimenez DO FAAN    April 18, 2025

## 2025-04-18 NOTE — CARE COORDINATION
Care Management Initial Assessment       RUR: 16% moderate risk for readmission  Readmission? No  1st IM letter given? No - OBS; ODALIS signed 4/18/25  1st  letter given: No    Initial note: Chart review completed prior to assessment. CM completed assessment with pt at bedside. CM introduced self, role of CM, verified demographics to ensure accuracy, and discussed transition of care planning. Pt resides alone in 1 level independent living apartment with elevator access. Pt reports independence with ADLs and is ambulatory with rollator at baseline. Pt denies falls. She utilizes transportation provided by her senior living complex as she is not an active . Pt denies available supports to provide transportation home at time of d/c. Pt would require authorization through [facility] Medicare for non-emergent stretcher transportation, and will not qualify due to current level of functioning. Will explore alternative options for transportation to include private pay H2H vs Roundtrip.    Pt is recommended to have HH PT at d/c and agreeable to coordination of services. Freedom of choice offered, pt has no preferences in agencies. Referrals placed via [facility] and pending.    Care management will continue to be available to assist as transition of care planning needs arise. Full assessment below:       04/18/25 1113   Service Assessment   Patient Orientation Alert and Oriented   Cognition Alert   History Provided By Patient   Primary Caregiver Self   Accompanied By/Relationship N/A   Support Systems Children;Friends/Neighbors   Patient's Healthcare Decision Maker is: Named in Scanned ACP Document  (Pt reports that she would like to revoke AMD on file. Education provided to pt and blank copy of AMD for review and consideration of new healthcare decision makers.)   PCP Verified by CM Yes  (Birdie Gray)   Last Visit to PCP Within last 3 months  (Has upcoming appt on 4/25/25)   Prior Functional Level Independent in

## 2025-04-18 NOTE — PROGRESS NOTES
Attempted to schedule a PCP hospital follow up. Unable to reach the office and unable to leave a voicemail. [facility] placed [facility] information AVS for patient resource. Pending patient discharge. Randa Min, Care Management Assistant

## 2025-04-19 ENCOUNTER — APPOINTMENT (OUTPATIENT)
Facility: HOSPITAL | Age: 89
DRG: 065 | End: 2025-04-19
Attending: STUDENT IN AN ORGANIZED HEALTH CARE EDUCATION/TRAINING PROGRAM
Payer: MEDICARE

## 2025-04-19 VITALS
HEART RATE: 62 BPM | WEIGHT: 164 LBS | TEMPERATURE: 97.9 F | HEIGHT: 64 IN | SYSTOLIC BLOOD PRESSURE: 112 MMHG | RESPIRATION RATE: 18 BRPM | BODY MASS INDEX: 28 KG/M2 | OXYGEN SATURATION: 97 % | DIASTOLIC BLOOD PRESSURE: 64 MMHG

## 2025-04-19 LAB
ANION GAP SERPL CALC-SCNC: 7 MMOL/L (ref 2–12)
BASOPHILS # BLD: 0.07 K/UL (ref 0–0.1)
BASOPHILS NFR BLD: 1.4 % (ref 0–1)
BUN SERPL-MCNC: 21 MG/DL (ref 6–20)
BUN/CREAT SERPL: 19 (ref 12–20)
CALCIUM SERPL-MCNC: 9.1 MG/DL (ref 8.5–10.1)
CHLORIDE SERPL-SCNC: 108 MMOL/L (ref 97–108)
CO2 SERPL-SCNC: 23 MMOL/L (ref 21–32)
CREAT SERPL-MCNC: 1.08 MG/DL (ref 0.55–1.02)
DIFFERENTIAL METHOD BLD: ABNORMAL
ECHO AO ROOT DIAM: 3.6 CM
ECHO AO ROOT INDEX: 2 CM/M2
ECHO AV CUSP MM: 1.7 CM
ECHO AV MEAN GRADIENT: 5 MMHG
ECHO AV MEAN VELOCITY: 1 M/S
ECHO AV PEAK GRADIENT: 9 MMHG
ECHO AV PEAK VELOCITY: 1.5 M/S
ECHO AV VTI: 33.6 CM
ECHO BSA: 1.83 M2
ECHO EST RA PRESSURE: 5 MMHG
ECHO LA DIAMETER INDEX: 1.44 CM/M2
ECHO LA DIAMETER: 2.6 CM
ECHO LA TO AORTIC ROOT RATIO: 0.72
ECHO LV EDV A4C: 43 ML
ECHO LV EDV INDEX A4C: 24 ML/M2
ECHO LV EF PHYSICIAN: 50 %
ECHO LV EJECTION FRACTION A4C: 56 %
ECHO LV ESV A4C: 19 ML
ECHO LV ESV INDEX A4C: 11 ML/M2
ECHO LV FRACTIONAL SHORTENING: 30 % (ref 28–44)
ECHO LV INTERNAL DIMENSION DIASTOLE INDEX: 1.67 CM/M2
ECHO LV INTERNAL DIMENSION DIASTOLIC: 3 CM (ref 3.9–5.3)
ECHO LV INTERNAL DIMENSION SYSTOLIC INDEX: 1.17 CM/M2
ECHO LV INTERNAL DIMENSION SYSTOLIC: 2.1 CM
ECHO LV IVSD: 1.3 CM (ref 0.6–0.9)
ECHO LV MASS 2D: 140.5 G (ref 67–162)
ECHO LV MASS INDEX 2D: 78 G/M2 (ref 43–95)
ECHO LV POSTERIOR WALL DIASTOLIC: 1.5 CM (ref 0.6–0.9)
ECHO LV RELATIVE WALL THICKNESS RATIO: 1
ECHO LVOT AREA: 3.1 CM2
ECHO LVOT DIAM: 2 CM
ECHO PV MAX VELOCITY: 0.6 M/S
ECHO PV PEAK GRADIENT: 1 MMHG
ECHO RIGHT VENTRICULAR SYSTOLIC PRESSURE (RVSP): 31 MMHG
ECHO RV INTERNAL DIMENSION: 4 CM
ECHO TV REGURGITANT MAX VELOCITY: 2.54 M/S
ECHO TV REGURGITANT PEAK GRADIENT: 26 MMHG
EOSINOPHIL # BLD: 0.23 K/UL (ref 0–0.4)
EOSINOPHIL NFR BLD: 4.7 % (ref 0–7)
ERYTHROCYTE [DISTWIDTH] IN BLOOD BY AUTOMATED COUNT: 22.1 % (ref 11.5–14.5)
GLUCOSE SERPL-MCNC: 93 MG/DL (ref 65–100)
HCT VFR BLD AUTO: 24.2 % (ref 35–47)
HGB BLD-MCNC: 8.2 G/DL (ref 11.5–16)
IMM GRANULOCYTES # BLD AUTO: 0.02 K/UL (ref 0–0.04)
IMM GRANULOCYTES NFR BLD AUTO: 0.4 % (ref 0–0.5)
LYMPHOCYTES # BLD: 1.42 K/UL (ref 0.8–3.5)
LYMPHOCYTES NFR BLD: 29.3 % (ref 12–49)
MCH RBC QN AUTO: 32.5 PG (ref 26–34)
MCHC RBC AUTO-ENTMCNC: 33.9 G/DL (ref 30–36.5)
MCV RBC AUTO: 96 FL (ref 80–99)
MONOCYTES # BLD: 0.42 K/UL (ref 0–1)
MONOCYTES NFR BLD: 8.7 % (ref 5–13)
NEUTS SEG # BLD: 2.69 K/UL (ref 1.8–8)
NEUTS SEG NFR BLD: 55.5 % (ref 32–75)
NRBC # BLD: 0.08 K/UL (ref 0–0.01)
NRBC BLD-RTO: 1.6 PER 100 WBC
PLATELET # BLD AUTO: 155 K/UL (ref 150–400)
PMV BLD AUTO: 11.4 FL (ref 8.9–12.9)
POTASSIUM SERPL-SCNC: 3.7 MMOL/L (ref 3.5–5.1)
RBC # BLD AUTO: 2.52 M/UL (ref 3.8–5.2)
SODIUM SERPL-SCNC: 138 MMOL/L (ref 136–145)
WBC # BLD AUTO: 4.9 K/UL (ref 3.6–11)

## 2025-04-19 PROCEDURE — 85025 COMPLETE CBC W/AUTO DIFF WBC: CPT

## 2025-04-19 PROCEDURE — 80048 BASIC METABOLIC PNL TOTAL CA: CPT

## 2025-04-19 PROCEDURE — 6370000000 HC RX 637 (ALT 250 FOR IP): Performed by: STUDENT IN AN ORGANIZED HEALTH CARE EDUCATION/TRAINING PROGRAM

## 2025-04-19 PROCEDURE — 93308 TTE F-UP OR LMTD: CPT

## 2025-04-19 PROCEDURE — 6360000002 HC RX W HCPCS: Performed by: STUDENT IN AN ORGANIZED HEALTH CARE EDUCATION/TRAINING PROGRAM

## 2025-04-19 PROCEDURE — 6370000000 HC RX 637 (ALT 250 FOR IP): Performed by: INTERNAL MEDICINE

## 2025-04-19 PROCEDURE — 6370000000 HC RX 637 (ALT 250 FOR IP): Performed by: PSYCHIATRY & NEUROLOGY

## 2025-04-19 PROCEDURE — 2500000003 HC RX 250 WO HCPCS: Performed by: STUDENT IN AN ORGANIZED HEALTH CARE EDUCATION/TRAINING PROGRAM

## 2025-04-19 PROCEDURE — 36415 COLL VENOUS BLD VENIPUNCTURE: CPT

## 2025-04-19 RX ORDER — CLOPIDOGREL BISULFATE 75 MG/1
75 TABLET ORAL DAILY
Qty: 21 TABLET | Refills: 0 | Status: SHIPPED | OUTPATIENT
Start: 2025-04-20

## 2025-04-19 RX ORDER — ROSUVASTATIN CALCIUM 5 MG/1
10 TABLET, COATED ORAL DAILY
Qty: 30 TABLET | Refills: 0 | Status: SHIPPED | OUTPATIENT
Start: 2025-04-19

## 2025-04-19 RX ADMIN — METOPROLOL SUCCINATE 25 MG: 25 TABLET, EXTENDED RELEASE ORAL at 08:31

## 2025-04-19 RX ADMIN — PANTOPRAZOLE SODIUM 40 MG: 40 TABLET, DELAYED RELEASE ORAL at 06:43

## 2025-04-19 RX ADMIN — ASPIRIN 81 MG: 81 TABLET, CHEWABLE ORAL at 08:30

## 2025-04-19 RX ADMIN — ONDANSETRON 4 MG: 4 TABLET, ORALLY DISINTEGRATING ORAL at 06:43

## 2025-04-19 RX ADMIN — SODIUM CHLORIDE, PRESERVATIVE FREE 10 ML: 5 INJECTION INTRAVENOUS at 08:36

## 2025-04-19 RX ADMIN — ENOXAPARIN SODIUM 30 MG: 100 INJECTION SUBCUTANEOUS at 08:31

## 2025-04-19 RX ADMIN — ESCITALOPRAM OXALATE 10 MG: 10 TABLET ORAL at 08:30

## 2025-04-19 RX ADMIN — CLOPIDOGREL BISULFATE 75 MG: 75 TABLET, FILM COATED ORAL at 08:30

## 2025-04-19 RX ADMIN — BUSPIRONE HYDROCHLORIDE 10 MG: 10 TABLET ORAL at 08:30

## 2025-04-19 RX ADMIN — FERROUS SULFATE TAB 325 MG (65 MG ELEMENTAL FE) 325 MG: 325 (65 FE) TAB at 08:30

## 2025-04-19 ASSESSMENT — PAIN SCALES - GENERAL
PAINLEVEL_OUTOF10: 0
PAINLEVEL_OUTOF10: 0

## 2025-04-19 NOTE — DISCHARGE SUMMARY
Discharge Summary    Name: Baylee Paz  [id number]  YOB: 1932 (Age: 89 y.o.)   Date of Admission: 4/16/2025  Date of Discharge: 4/19/2025  Attending Physician: Mona Padilla MD    Discharge Diagnosis:     AcuteCVA   Vertigo Hx  Right ICA falciform aneurysm  Acute on chronic anemia  Thrombocytopenia  Hypokalemia  HTN    Consultations:  IP CONSULT TO NEUROLOGY  IP CONSULT TO CASE MANAGEMENT  IP CONSULT TO NEUROSURGERY  IP CONSULT TO NEUROINTERVENTIONAL SURGERY  IP CONSULT TO CASE MANAGEMENT      Brief Admission History/Reason for Admission Per Cat Gavin MD:     Baylee Paz is a 89 y.o.  female with PMHx significant for HTN, HLD, vertigo, CVA, CAD who comes in with complaints of speech abnormality.  Patient and family at bedside, reports '' speech is not right\", reports that that she had difficulty with word finding since yesterday.  Denies any arm or leg weakness, numbness, headaches, blurry vision or dizziness denies any slurry speech speech, facial droop.  Denies any history of CVA in the past, but does have history of right MCA stroke in the past documented, does have history of vertigo.  Denies any other symptoms of chest pain, shortness of breath, fevers, chills, pain or burning on urination.  We were asked to admit for work up and evaluation of the above problems.     Brief Hospital Course by Main Problems:     AcuteCVA   Vertigo Hx  Right ICA falciform aneurysm, no intervention per neurointerventional surg  CT head and neck negative for acute process or large vessel occlusion or stenosis   Pt declined MRI brain .  Repeat head CT shows small left cerebral infarct has become apparent. No bleed or shift.   CTA h/n :No large vessel occlusion, or high-grade stenosis.   Repeat head CT since pt declined MRI brain  LDL 47, A1C 5.8  EEGneg for seizure  TTE done, patient wishes to go home today prior to read. Follow up outpatient with Dr. Camacho.  HH not available

## 2025-04-19 NOTE — PROGRESS NOTES
End of Shift Note    Bedside shift change report given to ROBERTO CARLOS Hernandez (oncoming nurse) by ERICH DAMON RN (offgoing nurse).  Report included the following information SBAR, MAR, and Recent Results    Shift worked:  Night     Shift summary and any significant changes:     No complains over night. ECHO is pending.      Concerns for physician to address:  none     Zone phone for oncoming shift:   [phone]       Activity:  Level of Assistance: Minimal assist, patient does 75% or more  Number times ambulated in hallways past shift: 0  Number of times OOB to chair past shift: 1    Cardiac:   Cardiac Monitoring: Yes           Access:  Current line(s): PIV     Genitourinary:   Urinary Status: Voiding, Bathroom privileges    Respiratory:   O2 Device: None (Room air)  Chronic home O2 use?: NO  Incentive spirometer at bedside: NO    GI:  Last BM (including prior to admit): 04/17/25  Current diet:  ADULT DIET; Dysphagia - Soft and Bite Sized  DIET ONE TIME MESSAGE;  Passing flatus: YES    Pain Management:   Patient states pain is manageable on current regimen: YES    Skin:  Jeanmarie Scale Score: 18  Interventions: Wound Offloading (Prevention Methods): Elevate heels, Repositioning, Turning    Patient Safety:  Fall Risk: Nursing Judgement-Fall Risk High(Add Comments): Yes  Fall Risk Interventions  Nursing Judgement-Fall Risk High(Add Comments): Yes  Toilet Every 2 Hours-In Advance of Need: Yes  Hourly Visual Checks: Awake, In bed  Fall Visual Posted: Socks  Room Door Open: Yes  Alarm On: Bed  Patient Moved Closer to Nursing Station: No    Active Consults:   IP CONSULT TO NEUROLOGY  IP CONSULT TO CASE MANAGEMENT  IP CONSULT TO NEUROSURGERY  IP CONSULT TO NEUROINTERVENTIONAL SURGERY  IP CONSULT TO CASE MANAGEMENT    Length of Stay:  Expected LOS: 2  Actual LOS: 2    ERICH DAMON RN

## 2025-04-19 NOTE — PLAN OF CARE
Problem: Chronic Conditions and Co-morbidities  Goal: Patient's chronic conditions and co-morbidity symptoms are monitored and maintained or improved  4/19/2025 1030 by Navya Choudhary RN  Outcome: Progressing  4/18/2025 2314 by Sari Hilton RN  Outcome: Progressing     Problem: Discharge Planning  Goal: Discharge to home or other facility with appropriate resources  4/19/2025 1030 by Navya Choudhary RN  Outcome: Progressing  4/18/2025 2314 by Sari Hilton RN  Outcome: Progressing     Problem: Neurosensory - Adult  Goal: Achieves stable or improved neurological status  4/19/2025 1030 by Navya Choudhary RN  Outcome: Progressing  4/18/2025 2314 by Sari Hilton RN  Outcome: Progressing  Goal: Absence of seizures  4/19/2025 1030 by Navya Choudhary RN  Outcome: Progressing  4/18/2025 2314 by Sari Hilton RN  Outcome: Progressing  Goal: Remains free of injury related to seizures activity  4/19/2025 1030 by Navya Choudhary RN  Outcome: Progressing  4/18/2025 2314 by Sari Hilton RN  Outcome: Progressing  Goal: Achieves maximal functionality and self care  4/19/2025 1030 by Navya Choudhary RN  Outcome: Progressing  4/18/2025 2314 by Sari Hilton RN  Outcome: Progressing     Problem: ABCDS Injury Assessment  Goal: Absence of physical injury  4/19/2025 1030 by Navya Choudhary RN  Outcome: Progressing  4/18/2025 2314 by Sari Hilton RN  Outcome: Progressing     Problem: Safety - Adult  Goal: Free from fall injury  4/19/2025 1030 by Navya Choudhary RN  Outcome: Progressing  4/18/2025 2314 by Sari Hilton RN  Outcome: Progressing     Problem: Skin/Tissue Integrity  Goal: Skin integrity remains intact  Description: 1.  Monitor for areas of redness and/or skin breakdown2.  Assess vascular access sites hourly3.  Every 4-6 hours minimum:  Change oxygen saturation probe site4.  Every 4-6 hours:  If on nasal continuous positive airway pressure, respiratory therapy assess nares and determine need for appliance change or resting period  4/19/2025 1030 by

## 2025-04-19 NOTE — PLAN OF CARE
Problem: Chronic Conditions and Co-morbidities  Goal: Patient's chronic conditions and co-morbidity symptoms are monitored and maintained or improved  Outcome: Progressing     Problem: Discharge Planning  Goal: Discharge to home or other facility with appropriate resources  Outcome: Progressing     Problem: Neurosensory - Adult  Goal: Achieves stable or improved neurological status  Outcome: Progressing  Goal: Absence of seizures  Outcome: Progressing  Goal: Remains free of injury related to seizures activity  Outcome: Progressing  Goal: Achieves maximal functionality and self care  Outcome: Progressing     Problem: ABCDS Injury Assessment  Goal: Absence of physical injury  Outcome: Progressing     Problem: Safety - Adult  Goal: Free from fall injury  Outcome: Progressing     Problem: Physical Therapy - Adult  Goal: By Discharge: Performs mobility at highest level of function for planned discharge setting.  See evaluation for individualized goals.  Description: FUNCTIONAL STATUS PRIOR TO ADMISSION: Pt reports mod I with rollator PTA. Denies falls.     HOME SUPPORT PRIOR TO ADMISSION: Lives at independent living senior apartments. Has handicap accessible setup (walk in shower, bench in shower, grab bars)    Physical Therapy Goals  Initiated 4/17/2025  1.  Patient will move from supine to sit and sit to supine, scoot up and down, and roll side to side in bed with independence within 7 day(s).    2.  Patient will perform sit to stand with supervision/set-up within 7 day(s).  3.  Patient will transfer from bed to chair and chair to bed with supervision/set-up using the least restrictive device within 7 day(s).  4.  Patient will ambulate with supervision/set-up for 50 feet with the least restrictive device within 7 day(s).     4/18/2025 1102 by Tierra Castillo, PT  Outcome: Progressing     Problem: Skin/Tissue Integrity  Goal: Skin integrity remains intact  Description: 1.  Monitor for areas of redness and/or skin

## 2025-04-19 NOTE — DISCHARGE INSTRUCTIONS
All of your medications from before your hospitalization are the same EXCEPT:   Your new prescription for you to start is plavix for 21 days only, then you can take crestor 10 mg instead of your usual 5mg for stroke.  Please take all of your medications as prescribed. If prescribed any medications, please read all pharmacy instructions and inserts. Inform your doctor and pharmacist about all other medications and alternative therapies.  Please follow up with your PCP in 1-2 weeks to be reassessed after your hospital stay.  Please also follow up with neurology for a follow up after your stroke.  Please also follow up with [facility] (Dr. Camacho) to talk about your possible PFO and your echo results.  If you start feeling any symptoms similar to what brought you into the hospital, please come back to the ED to be re-evaluated.

## 2025-04-19 NOTE — PROGRESS NOTES
1615 - Discharge information reviewed with patient. Patient verbalized understanding. Pharmacy and need to make follow-up appointments verified with patient. Patient does not have someone who is able to come take her home. Patient does not need a stretcher but does need a wheelchair to get into her apartment since she does not have her rollator/walker with her. She does have her keys. Nursing supervisor gave permission to set up a wheelchair van with [facility]. [facility]  scheduled for 2370.

## 2025-04-19 NOTE — CARE COORDINATION
CM acknowledges d/c order. No accepting home health agency at this time, mass referral placed on yesterday to 12 agencies who were unable to accept. Recommend OP PT script for pt to utilize with transportation support from [facility]. Pt denies available supports to provide transportation home at time of d/c. Pt would require authorization through [employer] Medicare for non-emergent stretcher transportation, and will not qualify due to current level of functioning. Recommend wheelchair transportation to ensure safety as pt has her rollator. Staffed with RN who will escalate to RN Supervisor. No further CM needs identified at present, clear for d/c from CM standpoint.    ARIES Segura.  Care Manager, [facility]  [phone]/Available on Perfect [employer]

## 2025-05-01 ENCOUNTER — HOSPITAL ENCOUNTER (EMERGENCY)
Facility: HOSPITAL | Age: 89
Discharge: HOME | End: 2025-05-02
Payer: MEDICARE

## 2025-05-01 ENCOUNTER — APPOINTMENT (OUTPATIENT)
Facility: HOSPITAL | Age: 89
End: 2025-05-01
Payer: MEDICARE

## 2025-05-01 DIAGNOSIS — G45.9 TIA (TRANSIENT ISCHEMIC ATTACK): Primary | ICD-10-CM

## 2025-05-01 LAB
ALBUMIN SERPL-MCNC: 3.5 G/DL (ref 3.5–5)
ALBUMIN/GLOB SERPL: 1.1 (ref 1.1–2.2)
ALP SERPL-CCNC: 67 U/L (ref 45–117)
ALT SERPL-CCNC: 16 U/L (ref 12–78)
ANION GAP SERPL CALC-SCNC: 7 MMOL/L (ref 2–12)
AST SERPL-CCNC: 18 U/L (ref 15–37)
BASOPHILS # BLD: 0.04 K/UL (ref 0–0.1)
BASOPHILS NFR BLD: 0.8 % (ref 0–1)
BILIRUB SERPL-MCNC: 0.9 MG/DL (ref 0.2–1)
BUN SERPL-MCNC: 29 MG/DL (ref 6–20)
BUN/CREAT SERPL: 22 (ref 12–20)
CALCIUM SERPL-MCNC: 8.9 MG/DL (ref 8.5–10.1)
CHLORIDE SERPL-SCNC: 110 MMOL/L (ref 97–108)
CO2 SERPL-SCNC: 21 MMOL/L (ref 21–32)
CREAT SERPL-MCNC: 1.34 MG/DL (ref 0.55–1.02)
DIFFERENTIAL METHOD BLD: ABNORMAL
EOSINOPHIL # BLD: 0.1 K/UL (ref 0–0.4)
EOSINOPHIL NFR BLD: 1.9 % (ref 0–7)
ERYTHROCYTE [DISTWIDTH] IN BLOOD BY AUTOMATED COUNT: 23.7 % (ref 11.5–14.5)
GLOBULIN SER CALC-MCNC: 3.1 G/DL (ref 2–4)
GLUCOSE SERPL-MCNC: 118 MG/DL (ref 65–100)
HCT VFR BLD AUTO: 25.5 % (ref 35–47)
HGB BLD-MCNC: 8.5 G/DL (ref 11.5–16)
IMM GRANULOCYTES # BLD AUTO: 0.03 K/UL (ref 0–0.04)
IMM GRANULOCYTES NFR BLD AUTO: 0.6 % (ref 0–0.5)
INR PPP: 1.1 (ref 0.9–1.1)
LYMPHOCYTES # BLD: 1.19 K/UL (ref 0.8–3.5)
LYMPHOCYTES NFR BLD: 22.5 % (ref 12–49)
MCH RBC QN AUTO: 32.7 PG (ref 26–34)
MCHC RBC AUTO-ENTMCNC: 33.3 G/DL (ref 30–36.5)
MCV RBC AUTO: 98.1 FL (ref 80–99)
MONOCYTES # BLD: 0.35 K/UL (ref 0–1)
MONOCYTES NFR BLD: 6.6 % (ref 5–13)
NEUTS SEG # BLD: 3.59 K/UL (ref 1.8–8)
NEUTS SEG NFR BLD: 67.6 % (ref 32–75)
NRBC # BLD: 0.08 K/UL (ref 0–0.01)
NRBC BLD-RTO: 1.5 PER 100 WBC
PLATELET # BLD AUTO: 160 K/UL (ref 150–400)
PMV BLD AUTO: 11.2 FL (ref 8.9–12.9)
POTASSIUM SERPL-SCNC: 3.2 MMOL/L (ref 3.5–5.1)
PROT SERPL-MCNC: 6.6 G/DL (ref 6.4–8.2)
PROTHROMBIN TIME: 11.3 SEC (ref 9.2–11.2)
RBC # BLD AUTO: 2.6 M/UL (ref 3.8–5.2)
RBC MORPH BLD: ABNORMAL
RBC MORPH BLD: ABNORMAL
SODIUM SERPL-SCNC: 138 MMOL/L (ref 136–145)
TROPONIN I SERPL HS-MCNC: 71 NG/L (ref 0–51)
WBC # BLD AUTO: 5.3 K/UL (ref 3.6–11)

## 2025-05-01 PROCEDURE — 93005 ELECTROCARDIOGRAM TRACING: CPT

## 2025-05-01 PROCEDURE — 85025 COMPLETE CBC W/AUTO DIFF WBC: CPT

## 2025-05-01 PROCEDURE — 0042T CT BRAIN PERFUSION: CPT

## 2025-05-01 PROCEDURE — 85610 PROTHROMBIN TIME: CPT

## 2025-05-01 PROCEDURE — 36415 COLL VENOUS BLD VENIPUNCTURE: CPT

## 2025-05-01 PROCEDURE — 80053 COMPREHEN METABOLIC PANEL: CPT

## 2025-05-01 PROCEDURE — 84484 ASSAY OF TROPONIN QUANT: CPT

## 2025-05-01 PROCEDURE — 86042 ACETYLCHOLN RCPTR BLCKG ANTB: CPT

## 2025-05-01 PROCEDURE — 6360000004 HC RX CONTRAST MEDICATION: Performed by: RADIOLOGY

## 2025-05-01 PROCEDURE — 86043 ACETYLCHOLN RCPTR MODLG ANTB: CPT

## 2025-05-01 PROCEDURE — 70450 CT HEAD/BRAIN W/O DYE: CPT

## 2025-05-01 PROCEDURE — 70498 CT ANGIOGRAPHY NECK: CPT

## 2025-05-01 PROCEDURE — 99285 EMERGENCY DEPT VISIT HI MDM: CPT

## 2025-05-01 PROCEDURE — 70496 CT ANGIOGRAPHY HEAD: CPT

## 2025-05-01 PROCEDURE — 86041 ACETYLCHOLN RCPTR BNDNG ANTB: CPT

## 2025-05-01 RX ORDER — IOPAMIDOL 755 MG/ML
100 INJECTION, SOLUTION INTRAVASCULAR
Status: COMPLETED | OUTPATIENT
Start: 2025-05-01 | End: 2025-05-01

## 2025-05-01 RX ORDER — NICARDIPINE HYDROCHLORIDE 2.5 MG/ML
INJECTION INTRAVENOUS
Status: DISCONTINUED
Start: 2025-05-01 | End: 2025-05-01 | Stop reason: WASHOUT

## 2025-05-01 RX ORDER — HYDRALAZINE HYDROCHLORIDE 20 MG/ML
INJECTION INTRAMUSCULAR; INTRAVENOUS
Status: DISCONTINUED
Start: 2025-05-01 | End: 2025-05-01 | Stop reason: WASHOUT

## 2025-05-01 RX ORDER — LABETALOL HYDROCHLORIDE 5 MG/ML
INJECTION, SOLUTION INTRAVENOUS
Status: DISCONTINUED
Start: 2025-05-01 | End: 2025-05-01 | Stop reason: WASHOUT

## 2025-05-01 RX ADMIN — IOPAMIDOL 100 ML: 755 INJECTION, SOLUTION INTRAVENOUS at 22:06

## 2025-05-02 VITALS
DIASTOLIC BLOOD PRESSURE: 55 MMHG | RESPIRATION RATE: 21 BRPM | SYSTOLIC BLOOD PRESSURE: 116 MMHG | BODY MASS INDEX: 28 KG/M2 | OXYGEN SATURATION: 98 % | TEMPERATURE: 97.5 F | WEIGHT: 164.02 LBS | HEIGHT: 64 IN | HEART RATE: 68 BPM

## 2025-05-02 LAB
EKG ATRIAL RATE: 79 BPM
EKG DIAGNOSIS: NORMAL
EKG P AXIS: 49 DEGREES
EKG P-R INTERVAL: 284 MS
EKG Q-T INTERVAL: 394 MS
EKG QRS DURATION: 84 MS
EKG QTC CALCULATION (BAZETT): 451 MS
EKG R AXIS: -43 DEGREES
EKG T AXIS: -4 DEGREES
EKG VENTRICULAR RATE: 79 BPM
TROPONIN I SERPL HS-MCNC: 63 NG/L (ref 0–51)

## 2025-05-02 PROCEDURE — 84484 ASSAY OF TROPONIN QUANT: CPT

## 2025-05-02 PROCEDURE — 36415 COLL VENOUS BLD VENIPUNCTURE: CPT

## 2025-05-02 NOTE — DISCHARGE INSTRUCTIONS
Thank you for choosing our Emergency Department for your care.  It is our privilege to care for you in your time of need.  In the next several days, you may receive a survey via email or mailed to your home about your experience with our team.  We would greatly appreciate you taking a few minutes to complete the survey, as we use this information to learn what we have done well and what we could be doing better. Thank you for trusting us with your care!    Below you will find a list of your tests from today's visit.   Labs and Radiology Studies  Recent Results (from the past 12 hours)   CBC with Auto Differential    Collection Time: 05/01/25 10:24 PM   Result Value Ref Range    WBC 5.3 3.6 - 11.0 K/uL    RBC 2.60 (L) 3.80 - 5.20 M/uL    Hemoglobin 8.5 (L) 11.5 - 16.0 g/dL    Hematocrit 25.5 (L) 35.0 - 47.0 %    MCV 98.1 80.0 - 99.0 FL    MCH 32.7 26.0 - 34.0 PG    MCHC 33.3 30.0 - 36.5 g/dL    RDW 23.7 (H) 11.5 - 14.5 %    Platelets 160 150 - 400 K/uL    MPV 11.2 8.9 - 12.9 FL    Nucleated RBCs 1.5 (H) 0  WBC    nRBC 0.08 (H) 0.00 - 0.01 K/uL    Neutrophils % 67.6 32.0 - 75.0 %    Lymphocytes % 22.5 12.0 - 49.0 %    Monocytes % 6.6 5.0 - 13.0 %    Eosinophils % 1.9 0.0 - 7.0 %    Basophils % 0.8 0.0 - 1.0 %    Immature Granulocytes % 0.6 (H) 0.0 - 0.5 %    Neutrophils Absolute 3.59 1.80 - 8.00 K/UL    Lymphocytes Absolute 1.19 0.80 - 3.50 K/UL    Monocytes Absolute 0.35 0.00 - 1.00 K/UL    Eosinophils Absolute 0.10 0.00 - 0.40 K/UL    Basophils Absolute 0.04 0.00 - 0.10 K/UL    Immature Granulocytes Absolute 0.03 0.00 - 0.04 K/UL    Differential Type AUTOMATED      RBC Comment ANISOCYTOSIS  1+        RBC Comment RBC agglutination present     Comprehensive Metabolic Panel    Collection Time: 05/01/25 10:24 PM   Result Value Ref Range    Sodium 138 136 - 145 mmol/L    Potassium 3.2 (L) 3.5 - 5.1 mmol/L    Chloride 110 (H) 97 - 108 mmol/L    CO2 21 21 - 32 mmol/L    Anion Gap 7 2 - 12 mmol/L    Glucose 118  or physician assistant to:  (1) confirm your diagnosis,  (2) re-evaluation of changes in your illness and treatment, and (3) for ongoing care. Please take your discharge instructions with you when you go to your follow-up appointment.     If you have any problem arranging a follow-up appointment, contact us!  If your symptoms become worse or you do not improve as expected, please return to us. We are available 24 hours a day.     If a prescription has been provided, please fill it as soon as possible to prevent a delay in treatment. If you have any questions or reservations about taking the medication due to side effects or interactions with other medications, please call your primary care provider or contact us directly.  Again, THANK YOU for choosing us to care for YOU!

## 2025-05-02 NOTE — ED PROVIDER NOTES
[facility] EMERGENCY DEPARTMENT  EMERGENCY DEPARTMENT ENCOUNTER    Patient Name: Baylee Paz  MRN: [id number]  YOB: 1932  Provider: Temo Delgado MD  PCP: iBrdie Gray APRN - NP  Time/Date of evaluation:  5/1/25    History of Presenting Illness     Chief Complaint   Patient presents with    Loss of Vision     Pt arrived via EMS states that she lost her vision about an hour ago while watching television, has hx of stroke about 3 years ago was recently hospitalized here for a tia and has started plavix. Patient can see now and presents with no focal deficits. Physician at bedside during triage and called code stroke       HISTORY (Narrative):   Baylee Paz is a 89 y.o. female presents to the Emergency Department with a chief complaint of vision loss that occurred approximately one hour ago while watching TV. The patient has a history of recent stroke, having been treated for this condition at the same facility on April 16th, about two weeks prior to the current visit.    The vision loss started suddenly while the patient was watching television. Although some improvement has occurred since the initial onset, the patient states, \"I still can't see like I supposed to.\" The vision changes are described as \"different now\" compared to the patient's baseline. The patient expresses concern about the possibility of another stroke, indicating awareness of the potential seriousness of the symptoms.    The patient kept her phone with her at all times, possibly to seek help or document symptoms. There is evident anxiety about her condition, as she states, \"I don't wanna end up-\" before being interrupted, suggesting concern about potential negative outcomes.    Medical History  - Stroke, treated in the emergency department on April 16th    Social History  - Activities: Patient was watching TV when symptoms occurred    Review of Systems  HEENT: Positive for vision changes, specifically  CONSIDERATIONS:  None  Procedures/Critical Care     Procedures    ED FINAL IMPRESSION     1. TIA (transient ischemic attack)        DISPOSITION/PLAN     DISPOSITION Decision To Discharge 05/02/2025 03:10:23 AM   DISPOSITION CONDITION Stable        Discharge Note: The patient is stable for discharge home. The signs, symptoms, diagnosis, and discharge instructions have been discussed, understanding conveyed, and agreed upon. The patient is to follow up as recommended or return to ER should their symptoms worsen.     PATIENT REFERRED TO:    Birdie Gray APRN - NP  [address] [facility] [address] [phone]    In 3 days  for re-evaluation    [facility] Emergency Department  [address] [address] [phone]    If symptoms worsen      DISCHARGE MEDICATIONS:       Medication List        ASK your doctor about these medications      * acetaminophen 500 MG tablet  Commonly known as: TYLENOL     * Tylenol 8 Hour Arthritis Pain 650 MG extended release tablet  Generic drug: acetaminophen     aspirin 81 MG EC tablet     busPIRone 10 MG tablet  Commonly known as: BUSPAR  TAKE 1 TABLET BY MOUTH TWICE A DAY     Centrum Silver Tabs     clopidogrel 75 MG tablet  Commonly known as: PLAVIX  Take 1 tablet by mouth daily     diclofenac sodium 1 % Gel  Commonly known as: VOLTAREN     escitalopram 10 MG tablet  Commonly known as: LEXAPRO     famotidine 20 MG tablet  Commonly known as: PEPCID  Take 1 tablet by mouth 2 times daily     ferrous sulfate 325 (65 Fe) MG tablet  Commonly known as: IRON 325     losartan-hydroCHLOROthiazide 50-12.5 MG per tablet  Commonly known as: HYZAAR     meclizine 12.5 MG tablet  Commonly known as: ANTIVERT     melatonin 5 MG Tbdp disintegrating tablet     metoprolol succinate 25 MG extended release tablet  Commonly known as: TOPROL XL     nystatin 264591 UNIT/GM cream  Commonly known as: MYCOSTATIN     pantoprazole 40 MG tablet  Commonly known as:

## 2025-05-05 ENCOUNTER — APPOINTMENT (OUTPATIENT)
Facility: HOSPITAL | Age: 89
End: 2025-05-05
Payer: MEDICARE

## 2025-05-05 ENCOUNTER — HOSPITAL ENCOUNTER (EMERGENCY)
Facility: HOSPITAL | Age: 89
Discharge: HOME | End: 2025-05-05
Attending: STUDENT IN AN ORGANIZED HEALTH CARE EDUCATION/TRAINING PROGRAM
Payer: MEDICARE

## 2025-05-05 VITALS
RESPIRATION RATE: 19 BRPM | HEART RATE: 78 BPM | DIASTOLIC BLOOD PRESSURE: 75 MMHG | BODY MASS INDEX: 30.49 KG/M2 | HEIGHT: 64 IN | TEMPERATURE: 97.5 F | SYSTOLIC BLOOD PRESSURE: 114 MMHG | WEIGHT: 178.57 LBS | OXYGEN SATURATION: 98 %

## 2025-05-05 DIAGNOSIS — G51.4 FACIAL TWITCHING: Primary | ICD-10-CM

## 2025-05-05 LAB
ALBUMIN SERPL-MCNC: 3.9 G/DL (ref 3.5–5)
ALBUMIN/GLOB SERPL: 1.1 (ref 1.1–2.2)
ALP SERPL-CCNC: 62 U/L (ref 45–117)
ALT SERPL-CCNC: 18 U/L (ref 12–78)
ANION GAP SERPL CALC-SCNC: 7 MMOL/L (ref 2–12)
AST SERPL-CCNC: 20 U/L (ref 15–37)
BASOPHILS # BLD: 0.06 K/UL (ref 0–0.1)
BASOPHILS NFR BLD: 0.7 % (ref 0–1)
BILIRUB SERPL-MCNC: 1.2 MG/DL (ref 0.2–1)
BUN SERPL-MCNC: 33 MG/DL (ref 6–20)
BUN/CREAT SERPL: 21 (ref 12–20)
CALCIUM SERPL-MCNC: 9.8 MG/DL (ref 8.5–10.1)
CHLORIDE SERPL-SCNC: 109 MMOL/L (ref 97–108)
CO2 SERPL-SCNC: 23 MMOL/L (ref 21–32)
CREAT SERPL-MCNC: 1.54 MG/DL (ref 0.55–1.02)
DIFFERENTIAL METHOD BLD: ABNORMAL
EKG ATRIAL RATE: 75 BPM
EKG DIAGNOSIS: NORMAL
EKG P-R INTERVAL: 258 MS
EKG Q-T INTERVAL: 386 MS
EKG QRS DURATION: 80 MS
EKG QTC CALCULATION (BAZETT): 440 MS
EKG R AXIS: -59 DEGREES
EKG T AXIS: -25 DEGREES
EKG VENTRICULAR RATE: 78 BPM
EOSINOPHIL # BLD: 0.14 K/UL (ref 0–0.4)
EOSINOPHIL NFR BLD: 1.6 % (ref 0–7)
ERYTHROCYTE [DISTWIDTH] IN BLOOD BY AUTOMATED COUNT: 22.9 % (ref 11.5–14.5)
GLOBULIN SER CALC-MCNC: 3.6 G/DL (ref 2–4)
GLUCOSE SERPL-MCNC: 105 MG/DL (ref 65–100)
HCT VFR BLD AUTO: 26.6 % (ref 35–47)
HGB BLD-MCNC: 9 G/DL (ref 11.5–16)
IMM GRANULOCYTES # BLD AUTO: 0.05 K/UL (ref 0–0.04)
IMM GRANULOCYTES NFR BLD AUTO: 0.6 % (ref 0–0.5)
LYMPHOCYTES # BLD: 2.03 K/UL (ref 0.8–3.5)
LYMPHOCYTES NFR BLD: 22.6 % (ref 12–49)
MAGNESIUM SERPL-MCNC: 1.8 MG/DL (ref 1.6–2.4)
MCH RBC QN AUTO: 32.7 PG (ref 26–34)
MCHC RBC AUTO-ENTMCNC: 33.8 G/DL (ref 30–36.5)
MCV RBC AUTO: 96.7 FL (ref 80–99)
MONOCYTES # BLD: 0.55 K/UL (ref 0–1)
MONOCYTES NFR BLD: 6.1 % (ref 5–13)
NEUTS SEG # BLD: 6.16 K/UL (ref 1.8–8)
NEUTS SEG NFR BLD: 68.4 % (ref 32–75)
NRBC # BLD: 0.07 K/UL (ref 0–0.01)
NRBC BLD-RTO: 0.8 PER 100 WBC
PHOSPHATE SERPL-MCNC: 3.1 MG/DL (ref 2.6–4.7)
PLATELET # BLD AUTO: 164 K/UL (ref 150–400)
PMV BLD AUTO: 11.4 FL (ref 8.9–12.9)
POTASSIUM SERPL-SCNC: 3.4 MMOL/L (ref 3.5–5.1)
PROT SERPL-MCNC: 7.5 G/DL (ref 6.4–8.2)
RBC # BLD AUTO: 2.75 M/UL (ref 3.8–5.2)
RBC MORPH BLD: ABNORMAL
SODIUM SERPL-SCNC: 139 MMOL/L (ref 136–145)
WBC # BLD AUTO: 9 K/UL (ref 3.6–11)

## 2025-05-05 PROCEDURE — 80053 COMPREHEN METABOLIC PANEL: CPT

## 2025-05-05 PROCEDURE — 70450 CT HEAD/BRAIN W/O DYE: CPT

## 2025-05-05 PROCEDURE — 99284 EMERGENCY DEPT VISIT MOD MDM: CPT

## 2025-05-05 PROCEDURE — 85025 COMPLETE CBC W/AUTO DIFF WBC: CPT

## 2025-05-05 PROCEDURE — 6370000000 HC RX 637 (ALT 250 FOR IP): Performed by: STUDENT IN AN ORGANIZED HEALTH CARE EDUCATION/TRAINING PROGRAM

## 2025-05-05 PROCEDURE — 83735 ASSAY OF MAGNESIUM: CPT

## 2025-05-05 PROCEDURE — 36415 COLL VENOUS BLD VENIPUNCTURE: CPT

## 2025-05-05 PROCEDURE — 93005 ELECTROCARDIOGRAM TRACING: CPT | Performed by: STUDENT IN AN ORGANIZED HEALTH CARE EDUCATION/TRAINING PROGRAM

## 2025-05-05 PROCEDURE — 84100 ASSAY OF PHOSPHORUS: CPT

## 2025-05-05 RX ORDER — BUSPIRONE HYDROCHLORIDE 10 MG/1
10 TABLET ORAL
Status: COMPLETED | OUTPATIENT
Start: 2025-05-05 | End: 2025-05-05

## 2025-05-05 RX ADMIN — BUSPIRONE HYDROCHLORIDE 10 MG: 10 TABLET ORAL at 19:51

## 2025-05-05 NOTE — ED PROVIDER NOTES
[facility] EMERGENCY DEPARTMENT  EMERGENCY DEPARTMENT ENCOUNTER       Pt Name: Baylee Paz  MRN: [id number]  Birthdate 4/19/1932  Date of evaluation: 5/5/2025  Provider: Chava Shelton MD   PCP: Birdie Gray APRN - NP  Note Started: 1:23 PM 5/5/25     CHIEF COMPLAINT       Chief Complaint   Patient presents with    Dizziness     Pt BIBEMS d/t sudden onset L sided facial twitching at 1130, per EMS mild L sided facial droop noted at rest. Pt takes plavix. Pt endorses generalized weakness, dizziness and nausea att. Pt 's on scene         HISTORY OF PRESENT ILLNESS: 1 or more elements      History From: Patient  None     Baylee Paz is a 89 y.o. female who presents to the emergency department with left-sided facial twitching/droop.  Patient states that she noticed twitching of the left side of her face around her mouth lasting roughly 1 minute.  On EMS arrival paramedics noted some left-sided facial droop.  Patient states that she looked at herself in the mirror while this was going on and felt that her face looked normal.  This seems to have resolved at this time.  Patient denies any weakness, numbness, dizziness, or any vision changes today.     Nursing Notes were all reviewed and agreed with or any disagreements were addressed in the HPI.     REVIEW OF SYSTEMS      Review of Systems     Positives and Pertinent negatives as per HPI.    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

## 2025-05-05 NOTE — DISCHARGE INSTRUCTIONS
Thank You!    It was a pleasure taking care of you in our Emergency Department today. We know that when you come to our Emergency Department, you are entrusting us with your health, comfort, and safety. Our physicians and nurses honor that trust, and truly appreciate the opportunity to care for you and your loved ones.      We also value your feedback. If you receive a survey about your Emergency Department experience today, please fill it out.  We care about our patients' feedback, and we listen to what you have to say.  Thank you.    Chava Shelton MD  ________________________________________________________________________  I have included a copy of your lab results and/or radiologic studies from today's visit so you can have them easily available at your follow-up visit. We hope you feel better and please do not hesitate to contact the ED if you have any questions at all!    Recent Results (from the past 12 hours)   EKG 12 Lead    Collection Time: 05/05/25  1:11 PM   Result Value Ref Range    Ventricular Rate 78 BPM    Atrial Rate 75 BPM    P-R Interval 258 ms    QRS Duration 80 ms    Q-T Interval 386 ms    QTc Calculation (Bazett) 440 ms    R Axis -59 degrees    T Axis -25 degrees    Diagnosis       Atrial-paced rhythm with prolonged AV conduction  Left anterior fascicular block  Anterior infarct (cited on or before 05-MAY-2025)  When compared with ECG of 01-MAY-2025 22:40,  Electronic atrial pacemaker has replaced Sinus rhythm  ST now depressed in Inferior leads     CBC with Auto Differential    Collection Time: 05/05/25  1:26 PM   Result Value Ref Range    WBC 9.0 3.6 - 11.0 K/uL    RBC 2.75 (L) 3.80 - 5.20 M/uL    Hemoglobin 9.0 (L) 11.5 - 16.0 g/dL    Hematocrit 26.6 (L) 35.0 - 47.0 %    MCV 96.7 80.0 - 99.0 FL    MCH 32.7 26.0 - 34.0 PG    MCHC 33.8 30.0 - 36.5 g/dL    RDW 22.9 (H) 11.5 - 14.5 %    Platelets 164 150 - 400 K/uL    MPV 11.4 8.9 - 12.9 FL    Nucleated RBCs 0.8 (H) 0  WBC    nRBC 0.07

## 2025-05-09 LAB
ACHR AB SER-SCNC: <0.07 NMOL/L (ref 0–0.24)
ACHR BLOCK AB SER-ACNC: 18 % (ref 0–25)
ACHR MOD AB/ACHR TOTAL SFR SER: NORMAL %
ACHR MODULATING AB: 2 % (ref 0–45)

## 2025-06-29 ENCOUNTER — HOSPITAL ENCOUNTER (EMERGENCY)
Facility: HOSPITAL | Age: 89
Discharge: HOME | End: 2025-06-30
Attending: EMERGENCY MEDICINE
Payer: MEDICARE

## 2025-06-29 VITALS
DIASTOLIC BLOOD PRESSURE: 59 MMHG | WEIGHT: 166.45 LBS | RESPIRATION RATE: 14 BRPM | SYSTOLIC BLOOD PRESSURE: 117 MMHG | TEMPERATURE: 98 F | OXYGEN SATURATION: 99 % | HEART RATE: 60 BPM | BODY MASS INDEX: 28.57 KG/M2

## 2025-06-29 DIAGNOSIS — R20.2 PARESTHESIA: Primary | ICD-10-CM

## 2025-06-29 DIAGNOSIS — F41.1 ANXIETY STATE: ICD-10-CM

## 2025-06-29 LAB
ALBUMIN SERPL-MCNC: 3.8 G/DL (ref 3.5–5)
ALBUMIN/GLOB SERPL: 0.9 (ref 1.1–2.2)
ALP SERPL-CCNC: 70 U/L (ref 45–117)
ALT SERPL-CCNC: 24 U/L (ref 12–78)
ANION GAP SERPL CALC-SCNC: 6 MMOL/L (ref 2–12)
AST SERPL-CCNC: 20 U/L (ref 15–37)
BASOPHILS # BLD: 0.05 K/UL (ref 0–0.1)
BASOPHILS NFR BLD: 1.1 % (ref 0–1)
BILIRUB SERPL-MCNC: 0.7 MG/DL (ref 0.2–1)
BUN SERPL-MCNC: 20 MG/DL (ref 6–20)
BUN/CREAT SERPL: 14 (ref 12–20)
CALCIUM SERPL-MCNC: 9.5 MG/DL (ref 8.5–10.1)
CHLORIDE SERPL-SCNC: 107 MMOL/L (ref 97–108)
CO2 SERPL-SCNC: 25 MMOL/L (ref 21–32)
CREAT SERPL-MCNC: 1.39 MG/DL (ref 0.55–1.02)
DIFFERENTIAL METHOD BLD: ABNORMAL
EKG ATRIAL RATE: 84 BPM
EKG DIAGNOSIS: NORMAL
EKG P AXIS: 40 DEGREES
EKG P-R INTERVAL: 236 MS
EKG Q-T INTERVAL: 368 MS
EKG QRS DURATION: 82 MS
EKG QTC CALCULATION (BAZETT): 434 MS
EKG R AXIS: -61 DEGREES
EKG T AXIS: 5 DEGREES
EKG VENTRICULAR RATE: 84 BPM
EOSINOPHIL # BLD: 0.05 K/UL (ref 0–0.4)
EOSINOPHIL NFR BLD: 1.1 % (ref 0–7)
ERYTHROCYTE [DISTWIDTH] IN BLOOD BY AUTOMATED COUNT: 21.7 % (ref 11.5–14.5)
GLOBULIN SER CALC-MCNC: 4.3 G/DL (ref 2–4)
GLUCOSE SERPL-MCNC: 105 MG/DL (ref 65–100)
HCT VFR BLD AUTO: 26.7 % (ref 35–47)
HGB BLD-MCNC: 9.1 G/DL (ref 11.5–16)
IMM GRANULOCYTES # BLD AUTO: 0.03 K/UL (ref 0–0.04)
IMM GRANULOCYTES NFR BLD AUTO: 0.7 % (ref 0–0.5)
LYMPHOCYTES # BLD: 1.49 K/UL (ref 0.8–3.5)
LYMPHOCYTES NFR BLD: 32.3 % (ref 12–49)
MAGNESIUM SERPL-MCNC: 1.8 MG/DL (ref 1.6–2.4)
MCH RBC QN AUTO: 33.5 PG (ref 26–34)
MCHC RBC AUTO-ENTMCNC: 34.1 G/DL (ref 30–36.5)
MCV RBC AUTO: 98.2 FL (ref 80–99)
MONOCYTES # BLD: 0.36 K/UL (ref 0–1)
MONOCYTES NFR BLD: 7.8 % (ref 5–13)
NEUTS SEG # BLD: 2.62 K/UL (ref 1.8–8)
NEUTS SEG NFR BLD: 57 % (ref 32–75)
NRBC # BLD: 0.03 K/UL (ref 0–0.01)
NRBC BLD-RTO: 0.7 PER 100 WBC
PLATELET # BLD AUTO: 210 K/UL (ref 150–400)
PMV BLD AUTO: 11.4 FL (ref 8.9–12.9)
POTASSIUM SERPL-SCNC: 3.8 MMOL/L (ref 3.5–5.1)
PROT SERPL-MCNC: 8.1 G/DL (ref 6.4–8.2)
RBC # BLD AUTO: 2.72 M/UL (ref 3.8–5.2)
RBC MORPH BLD: ABNORMAL
RBC MORPH BLD: ABNORMAL
SODIUM SERPL-SCNC: 138 MMOL/L (ref 136–145)
TROPONIN I SERPL HS-MCNC: 114 NG/L (ref 0–51)
TROPONIN I SERPL HS-MCNC: 121 NG/L (ref 0–51)
WBC # BLD AUTO: 4.6 K/UL (ref 3.6–11)

## 2025-06-29 PROCEDURE — 85025 COMPLETE CBC W/AUTO DIFF WBC: CPT

## 2025-06-29 PROCEDURE — 99284 EMERGENCY DEPT VISIT MOD MDM: CPT

## 2025-06-29 PROCEDURE — 36415 COLL VENOUS BLD VENIPUNCTURE: CPT

## 2025-06-29 PROCEDURE — 84484 ASSAY OF TROPONIN QUANT: CPT

## 2025-06-29 PROCEDURE — 80053 COMPREHEN METABOLIC PANEL: CPT

## 2025-06-29 PROCEDURE — 83735 ASSAY OF MAGNESIUM: CPT

## 2025-06-29 PROCEDURE — 93005 ELECTROCARDIOGRAM TRACING: CPT | Performed by: EMERGENCY MEDICINE

## 2025-06-29 ASSESSMENT — PAIN SCALES - GENERAL
PAINLEVEL_OUTOF10: 0
PAINLEVEL_OUTOF10: 0

## 2025-06-29 NOTE — ED NOTES
Full report given to ROBERTO CARLOS Cummings. YAZANAR reviewed and all questions answered at this time.

## 2025-06-30 NOTE — ED PROVIDER NOTES
appointment on Tuesday.  I think that is a reasonable timing for her to assess her medications, her ongoing anxiety about her medical conditions, and determine if any adjustment in her outpatient treatment is warranted.         Disposition Considerations (Tests not done, Shared Decision Making, Pt Expectation of Test or Tx.):      FINAL IMPRESSION     1. Paresthesia    2. Anxiety state          DISPOSITION/PLAN   DISPOSITION Decision To Discharge 06/29/2025 09:19:24 PM   DISPOSITION CONDITION Stable           Discharge Note: The patient is stable for discharge home. The signs, symptoms, diagnosis, and discharge instructions have been discussed, understanding conveyed, and agreed upon. The patient is to follow up as recommended or return to ER should their symptoms worsen.      PATIENT REFERRED TO:  Birdie Gray, CHRISTEN - NP  [address] [facility] [address] [phone]    In 1 week  follow-up appointment.  Mrs. Paz,  Thank you for visiting today. Here is a summary of the key instructions:  Medications: - Continue taking your current medications as prescribed - Take your daily aspirin as usual  Follow-up: - Attend your scheduled appointment on Tuesday with Dr. Birdie Dickson - Discuss your medications and any concerns about them with your doctor at this appointment  Home Care: - Use your walker when moving around at home - Rest and take it easy  Monitoring: - A  will call you tomorrow to check on you - They will make sure you have everything you need at home  Safety: - Do not be scared we have checked a lot of test today, and you have had extensive testing for stroke when your last several visits to our hospital, but call for help if you need it  Please reach out if you have any questions or concerns.  Best Regards,  Juan Jose Arcos MD, Emergency Medicine    [facility] Emergency Department  [address] [facility] [address] [phone]  In 2 days  If symptoms

## 2025-06-30 NOTE — CARE COORDINATION
CM aware of consult placed in \"CM Follow up Book\" for needing additional resources for assistance as pt lives alone. Chart reviewed.  CM attempted to reach out to pt (left message on cell phone,[phone]; other two contact are not working). CM also general LM on child's contact  CM received a one ring call ([phone]), answered w/no one - noted Baylee Paz on caller ID. CM returned call and LM.       Brenda Throckmorton RN  [facility] Case Management  /[phone]

## 2025-06-30 NOTE — DISCHARGE INSTRUCTIONS
Thank you for choosing our Emergency Department for your care.  It is our privilege to care for you in your time of need.  In the next several days, you may receive a survey via email or mailed to your home about your experience with our team.  We would greatly appreciate you taking a few minutes to complete the survey, as we use this information to learn what we have done well and what we could be doing better. Thank you for trusting us with your care!    Below you will find a list of your tests from today's visit.   Labs and Radiology Studies  Recent Results (from the past 12 hours)   EKG 12 Lead    Collection Time: 06/29/25  6:37 PM   Result Value Ref Range    Ventricular Rate 84 BPM    Atrial Rate 84 BPM    P-R Interval 236 ms    QRS Duration 82 ms    Q-T Interval 368 ms    QTc Calculation (Bazett) 434 ms    P Axis 40 degrees    R Axis -61 degrees    T Axis 5 degrees    Diagnosis       Sinus rhythm with 1st degree AV block with occasional ventricular-paced   complexes and premature supraventricular complexes  Left anterior fascicular block  Anterior infarct , age undetermined  Abnormal ECG  When compared with ECG of 05-MAY-2025 13:11,  Electronic ventricular pacemaker has replaced Electronic atrial pacemaker  Confirmed by Isrrael ABEL Talco ([id number]) on 6/29/2025 7:45:23 PM     CBC with Auto Differential    Collection Time: 06/29/25  6:56 PM   Result Value Ref Range    WBC 4.6 3.6 - 11.0 K/uL    RBC 2.72 (L) 3.80 - 5.20 M/uL    Hemoglobin 9.1 (L) 11.5 - 16.0 g/dL    Hematocrit 26.7 (L) 35.0 - 47.0 %    MCV 98.2 80.0 - 99.0 FL    MCH 33.5 26.0 - 34.0 PG    MCHC 34.1 30.0 - 36.5 g/dL    RDW 21.7 (H) 11.5 - 14.5 %    Platelets 210 150 - 400 K/uL    MPV 11.4 8.9 - 12.9 FL    Nucleated RBCs 0.7 (H) 0  WBC    nRBC 0.03 (H) 0.00 - 0.01 K/uL    Neutrophils % 57.0 32.0 - 75.0 %    Lymphocytes % 32.3 12.0 - 49.0 %    Monocytes % 7.8 5.0 - 13.0 %    Eosinophils % 1.1 0.0 - 7.0 %    Basophils % 1.1 (H) 0.0 -

## 2025-07-02 LAB
EKG ATRIAL RATE: 70 BPM
EKG DIAGNOSIS: NORMAL
EKG P AXIS: 16 DEGREES
EKG P-R INTERVAL: 208 MS
EKG Q-T INTERVAL: 398 MS
EKG QRS DURATION: 80 MS
EKG QTC CALCULATION (BAZETT): 429 MS
EKG R AXIS: -49 DEGREES
EKG T AXIS: -9 DEGREES
EKG VENTRICULAR RATE: 70 BPM

## (undated) DEVICE — BAG BELONG PT PERS CLEAR HANDL

## (undated) DEVICE — BW-412T DISP COMBO CLEANING BRUSH: Brand: SINGLE USE COMBINATION CLEANING BRUSH

## (undated) DEVICE — FORCEPS BX L240CM JAW DIA2.8MM L CAP W/ NDL MIC MESH TOOTH

## (undated) DEVICE — BAG SPEC BIOHZD LF 2MIL 6X10IN -- CONVERT TO ITEM 357326

## (undated) DEVICE — PACK PROC PACEMAKER  LF

## (undated) DEVICE — REM POLYHESIVE ADULT PATIENT RETURN ELECTRODE: Brand: VALLEYLAB

## (undated) DEVICE — INTRO SHTH 9FR 13X20CM -- USE ITEM# 341577

## (undated) DEVICE — NEEDLE HYPO 18GA L1.5IN PNK S STL HUB POLYPR SHLD REG BVL

## (undated) DEVICE — 1200 GUARD II KIT W/5MM TUBE W/O VAC TUBE: Brand: GUARDIAN

## (undated) DEVICE — SYRINGE MED 20ML STD CLR PLAS LUERLOCK TIP N CTRL DISP

## (undated) DEVICE — ENDO CARRY-ON PROCEDURE KIT INCLUDES ENZYMATIC SPONGE, GAUZE, BIOHAZARD LABEL, TRAY, LUBRICANT, DIRTY SCOPE LABEL, WATER LABEL, TRAY, DRAWSTRING PAD, AND DEFENDO 4-PIECE KIT.: Brand: ENDO CARRY-ON PROCEDURE KIT

## (undated) DEVICE — SUTURE V-LOC 180 SZ 2-0 L12IN ABSRB VLT GS-21 L37MM 1/2 CIR VLOCM0315

## (undated) DEVICE — MEDI-TRACE CADENCE ADULT, DEFIBRILLATION ELECTRODE -RTS  (10 PR/PK) - PHYSIO-CONTROL: Brand: MEDI-TRACE CADENCE

## (undated) DEVICE — Z DISCONTINUED NO SUB IDED SET EXTN W/ 4 W STPCOCK M SPIN LOK 36IN

## (undated) DEVICE — BITE BLK ENDOSCP AD 54FR GRN POLYETH ENDOSCP W STRP SLD

## (undated) DEVICE — CANN NASAL O2 CAPNOGRAPHY AD -- FILTERLINE

## (undated) DEVICE — SOLIDIFIER FLUID 3000 CC ABSORB

## (undated) DEVICE — SUT SLK 0 30IN SH BLK --

## (undated) DEVICE — CATH IV AUTOGRD BC BLU 22GA 25 -- INSYTE

## (undated) DEVICE — INTRO SHTH 7FR 13X20CM -- TEARAWAY

## (undated) DEVICE — ADHESIVE TISS CLOSURE 22X4 CM 4 CC HI VISC EXOFIN

## (undated) DEVICE — 3M™ IOBAN™ 2 ANTIMICROBIAL INCISE DRAPE 6650EZ: Brand: IOBAN™ 2

## (undated) DEVICE — NEONATAL-ADULT SPO2 SENSOR: Brand: NELLCOR

## (undated) DEVICE — ABSORBABLE WOUND CLOSURE DEVICE: Brand: V-LOC 90

## (undated) DEVICE — TRAY,IRRIGATION,PISTON SYRINGE,60ML,STRL: Brand: MEDLINE

## (undated) DEVICE — SET ADMIN 16ML TBNG L100IN 2 Y INJ SITE IV PIGGY BK DISP

## (undated) DEVICE — Device

## (undated) DEVICE — KENDALL RADIOLUCENT FOAM MONITORING ELECTRODE -RECTANGULAR SHAPE: Brand: KENDALL

## (undated) DEVICE — KIT IV STRT W CHLORAPREP PD 1ML

## (undated) DEVICE — CONTAINER SPEC 20 ML LID NEUT BUFF FORMALIN 10 % POLYPR STS

## (undated) DEVICE — KIT ACCS INTRO 4FR L10CM NDL 21GA L7CM GWIRE L40CM